# Patient Record
Sex: MALE | Race: WHITE | HISPANIC OR LATINO
[De-identification: names, ages, dates, MRNs, and addresses within clinical notes are randomized per-mention and may not be internally consistent; named-entity substitution may affect disease eponyms.]

---

## 2023-09-29 PROBLEM — Z00.00 ENCOUNTER FOR PREVENTIVE HEALTH EXAMINATION: Status: ACTIVE | Noted: 2023-09-29

## 2023-10-04 ENCOUNTER — LABORATORY RESULT (OUTPATIENT)
Age: 83
End: 2023-10-04

## 2023-10-04 ENCOUNTER — OUTPATIENT (OUTPATIENT)
Dept: OUTPATIENT SERVICES | Facility: HOSPITAL | Age: 83
LOS: 1 days | End: 2023-10-04
Payer: MEDICARE

## 2023-10-04 ENCOUNTER — APPOINTMENT (OUTPATIENT)
Dept: ULTRASOUND IMAGING | Facility: HOSPITAL | Age: 83
End: 2023-10-04

## 2023-10-04 ENCOUNTER — APPOINTMENT (OUTPATIENT)
Dept: NEPHROLOGY | Facility: CLINIC | Age: 83
End: 2023-10-04
Payer: MEDICARE

## 2023-10-04 VITALS — SYSTOLIC BLOOD PRESSURE: 173 MMHG | HEART RATE: 67 BPM | DIASTOLIC BLOOD PRESSURE: 76 MMHG

## 2023-10-04 PROCEDURE — 76770 US EXAM ABDO BACK WALL COMP: CPT

## 2023-10-04 PROCEDURE — 76770 US EXAM ABDO BACK WALL COMP: CPT | Mod: 26

## 2023-10-04 PROCEDURE — 99205 OFFICE O/P NEW HI 60 MIN: CPT

## 2023-10-04 RX ORDER — INSULIN GLARGINE 100 [IU]/ML
100 INJECTION, SOLUTION SUBCUTANEOUS
Refills: 0 | Status: ACTIVE | COMMUNITY

## 2023-10-05 LAB
ALBUMIN SERPL ELPH-MCNC: 3.6 G/DL
ALP BLD-CCNC: 91 U/L
ALT SERPL-CCNC: 16 U/L
ANION GAP SERPL CALC-SCNC: 13 MMOL/L
APPEARANCE: CLEAR
AST SERPL-CCNC: 18 U/L
BACTERIA: NEGATIVE /HPF
BILIRUB SERPL-MCNC: 0.2 MG/DL
BILIRUBIN URINE: NEGATIVE
BLOOD URINE: ABNORMAL
BUN SERPL-MCNC: 58 MG/DL
CALCIUM SERPL-MCNC: 8.9 MG/DL
CAST: 2 /LPF
CHLORIDE SERPL-SCNC: 110 MMOL/L
CO2 SERPL-SCNC: 16 MMOL/L
COLOR: YELLOW
CREAT SERPL-MCNC: 5.4 MG/DL
CREAT SPEC-SCNC: 94 MG/DL
CREAT SPEC-SCNC: 94 MG/DL
CREAT/PROT UR: 9.2 RATIO
CYSTATIN C SERPL-MCNC: 3.21 MG/L
EGFR: 10 ML/MIN/1.73M2
EPITHELIAL CELLS: 0 /HPF
ESTIMATED AVERAGE GLUCOSE: 169 MG/DL
GFR/BSA.PRED SERPLBLD CYS-BASED-ARV: 15 ML/MIN/1.73M2
GLUCOSE QUALITATIVE U: NEGATIVE MG/DL
HBA1C MFR BLD HPLC: 7.5 %
HBV SURFACE AB SER QL: NONREACTIVE
HBV SURFACE AG SER QL: NONREACTIVE
KETONES URINE: NEGATIVE MG/DL
LEUKOCYTE ESTERASE URINE: NEGATIVE
MICROALBUMIN 24H UR DL<=1MG/L-MCNC: 499.4 MG/DL
MICROALBUMIN/CREAT 24H UR-RTO: 5330 MG/G
MICROSCOPIC-UA: NORMAL
NITRITE URINE: NEGATIVE
PH URINE: 6
PHOSPHATE SERPL-MCNC: 5.3 MG/DL
POTASSIUM SERPL-SCNC: 6.7 MMOL/L
PROT SERPL-MCNC: 6.8 G/DL
PROT UR-MCNC: 862 MG/DL
PROTEIN URINE: 300 MG/DL
RED BLOOD CELLS URINE: 0 /HPF
SODIUM SERPL-SCNC: 139 MMOL/L
SPECIFIC GRAVITY URINE: 1.02
UROBILINOGEN URINE: 0.2 MG/DL
WHITE BLOOD CELLS URINE: 1 /HPF

## 2023-10-05 RX ORDER — SEVELAMER HYDROCHLORIDE 800 MG/1
800 TABLET, FILM COATED ORAL 3 TIMES DAILY
Qty: 540 | Refills: 0 | Status: DISCONTINUED | COMMUNITY
Start: 2023-10-05 | End: 2023-10-05

## 2023-10-06 LAB
C3 SERPL-MCNC: 140 MG/DL
C4 SERPL-MCNC: 38 MG/DL
DEPRECATED KAPPA LC FREE/LAMBDA SER: 1.63 RATIO
DSDNA AB SER-ACNC: <12 IU/ML
KAPPA LC CSF-MCNC: 8.47 MG/DL
KAPPA LC SERPL-MCNC: 13.77 MG/DL
M PROTEIN SPEC IFE-MCNC: NORMAL

## 2023-10-25 ENCOUNTER — APPOINTMENT (OUTPATIENT)
Dept: NEPHROLOGY | Facility: CLINIC | Age: 83
End: 2023-10-25

## 2023-11-08 LAB
CALCIUM SERPL-MCNC: 9.2 MG/DL
PARATHYROID HORMONE INTACT: 194 PG/ML

## 2023-11-17 ENCOUNTER — APPOINTMENT (OUTPATIENT)
Dept: NEPHROLOGY | Facility: CLINIC | Age: 83
End: 2023-11-17
Payer: MEDICARE

## 2023-11-17 PROCEDURE — 99214 OFFICE O/P EST MOD 30 MIN: CPT

## 2023-11-17 RX ORDER — SEVELAMER CARBONATE 800 MG/1
800 TABLET, FILM COATED ORAL 3 TIMES DAILY
Qty: 540 | Refills: 1 | Status: ACTIVE | COMMUNITY
Start: 2023-10-05 | End: 1900-01-01

## 2023-11-17 RX ORDER — NIFEDIPINE 60 MG/1
60 TABLET, EXTENDED RELEASE ORAL
Qty: 180 | Refills: 0 | Status: ACTIVE | COMMUNITY
Start: 2023-10-04 | End: 1900-01-01

## 2023-11-17 RX ORDER — VALSARTAN 160 MG/1
160 TABLET, COATED ORAL DAILY
Qty: 90 | Refills: 0 | Status: ACTIVE | COMMUNITY
Start: 2023-10-04 | End: 1900-01-01

## 2023-11-17 RX ORDER — MELOXICAM 15 MG/1
15 TABLET ORAL
Refills: 0 | Status: DISCONTINUED | COMMUNITY
End: 2023-11-17

## 2023-11-17 RX ORDER — LOSARTAN POTASSIUM 100 MG/1
100 TABLET, FILM COATED ORAL
Refills: 0 | Status: DISCONTINUED | COMMUNITY
End: 2023-11-17

## 2023-11-17 RX ORDER — SODIUM BICARBONATE 650 MG/1
650 TABLET ORAL 3 TIMES DAILY
Qty: 360 | Refills: 1 | Status: ACTIVE | COMMUNITY
Start: 2023-10-04 | End: 1900-01-01

## 2023-11-17 RX ORDER — AMLODIPINE BESYLATE 5 MG/1
5 TABLET ORAL
Refills: 0 | Status: DISCONTINUED | COMMUNITY
End: 2023-11-17

## 2023-11-17 RX ORDER — FUROSEMIDE 80 MG/1
80 TABLET ORAL DAILY
Qty: 30 | Refills: 0 | Status: DISCONTINUED | COMMUNITY
Start: 2023-10-04 | End: 2023-11-17

## 2023-11-17 RX ORDER — AMLODIPINE BESYLATE 2.5 MG/1
2.5 TABLET ORAL
Refills: 0 | Status: DISCONTINUED | COMMUNITY
End: 2023-11-17

## 2023-11-17 RX ORDER — BACLOFEN 10 MG/1
10 TABLET ORAL
Refills: 0 | Status: DISCONTINUED | COMMUNITY
End: 2023-11-17

## 2023-11-21 LAB
ALBUMIN MFR SERPL ELPH: 44.3 %
ALBUMIN SERPL-MCNC: 3 G/DL
ALBUMIN/GLOB SERPL: 0.8 RATIO
ALPHA1 GLOB MFR SERPL ELPH: 5.1 %
ALPHA1 GLOB SERPL ELPH-MCNC: 0.3 G/DL
ALPHA2 GLOB MFR SERPL ELPH: 18.9 %
ALPHA2 GLOB SERPL ELPH-MCNC: 1.3 G/DL
B-GLOBULIN MFR SERPL ELPH: 15.4 %
B-GLOBULIN SERPL ELPH-MCNC: 1 G/DL
DEPRECATED KAPPA LC FREE/LAMBDA SER: 1.38 RATIO
GAMMA GLOB FLD ELPH-MCNC: 1.1 G/DL
GAMMA GLOB MFR SERPL ELPH: 16.3 %
IGA SER QL IEP: 498 MG/DL
IGG SER QL IEP: 1064 MG/DL
IGM SER QL IEP: 45 MG/DL
INTERPRETATION SERPL IEP-IMP: NORMAL
KAPPA LC CSF-MCNC: 8.84 MG/DL
KAPPA LC SERPL-MCNC: 12.17 MG/DL
M PROTEIN SPEC IFE-MCNC: NORMAL
PROT SERPL-MCNC: 6.7 G/DL
PROT SERPL-MCNC: 6.7 G/DL

## 2023-12-04 ENCOUNTER — LABORATORY RESULT (OUTPATIENT)
Age: 83
End: 2023-12-04

## 2023-12-21 ENCOUNTER — APPOINTMENT (OUTPATIENT)
Dept: NEPHROLOGY | Facility: CLINIC | Age: 83
End: 2023-12-21
Payer: MEDICARE

## 2023-12-21 VITALS — DIASTOLIC BLOOD PRESSURE: 80 MMHG | SYSTOLIC BLOOD PRESSURE: 170 MMHG | HEART RATE: 79 BPM

## 2023-12-21 DIAGNOSIS — Z91.199 PATIENT'S NONCOMPLIANCE WITH OTHER MEDICAL TREATMENT AND REGIMEN DUE TO UNSPECIFIED REASON: ICD-10-CM

## 2023-12-21 PROCEDURE — 99214 OFFICE O/P EST MOD 30 MIN: CPT

## 2023-12-21 RX ORDER — FUROSEMIDE 80 MG/1
80 TABLET ORAL TWICE DAILY
Qty: 180 | Refills: 0 | Status: DISCONTINUED | COMMUNITY
Start: 2023-11-17 | End: 2023-12-21

## 2023-12-21 RX ORDER — FUROSEMIDE 80 MG/1
80 TABLET ORAL
Qty: 270 | Refills: 0 | Status: ACTIVE | COMMUNITY
Start: 2023-12-21 | End: 1900-01-01

## 2023-12-21 NOTE — HISTORY OF PRESENT ILLNESS
[FreeTextEntry1] : 83y M w/ hx of HTN, DM, HLP, and hx of CKD V presents today escorted by his wife for nephrology follow up; at the present time, denies active complaints such as CP, SOB, N/V/D, dizziness, lightheadedness, dysuria, hematuria, fever or chills. Denies recent visits to the adult ER or hospital admissions.  With regards to renal function, SCr per most recent labs from Dec 2023 showed renal function at 6.27 ranges w/ GFR at 8% w/o electrolyte imbalances reported on most recent BMP and cystatin C levels at 3.21 w/ eGFR by cystatin C levels at 15%

## 2023-12-21 NOTE — ASSESSMENT
[FreeTextEntry1] : 1. CKD stage V multifactorial - likely related to long standing Hx of HTN and DM nephropathy in addition to chronic use of NSAIDs At the present time, most recent SCr value at 6.27 w/ GFR at 8% with cystatin C levels at 3.93 w/ eGFR by cystatin C at 12% At present continues to show progression of CKD  Advised to adjust all meds to GFR< 10% Advised and educated on CVD risk factors Recommended to avoid NSAIDS and other nephrotoxins including baclofen  Discuss w/ pt and family member that CKD is close to requiring dialysis.  Wants to pursue RRT modalities Will refer to vascular surgery for AVF creation. May require a THDC placement due to marginal GFR At the present time, no urgent RRT needs. Acceptable lytes and volume status  Per [facility] patient has a risk of progression to dialysis up to 85% in 2 years. This was explained to family member and patient.  New BMP for electrolyte monitoring and cystatin c levels New hep B and Hep C panel requested.  New gamma interferon requested as well   2. Nephrotic range proteinuria - UPC showing 9.0-11g with previous albuminuria of 5.3g This is likely related to DM nephropathy in the setting of poorly controlled DM Previous work up negative for secondary causes of proteinuria (neg serological work up and SPEP) Not a candidate for SGLT2-i at present based on GFR Will c/w max'd dose tolerated of ARB. Already on max'd CCB doses  3. HTN - not at goal Suspect non compliance is contributing to poorly controlled BP BP regiment: valsartan 160mg PO daily, nifedipine 60mg PO BID and furosemide 80mg PO BID Will increase furosemide to 80mg PO TID BP checks and logs at home recommended C/w low salt diet  3. Metabolic acidosis - most recent bicarb at 19 ranges. Needs to c/w bicarb 1300mg PO TID  4. 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 Hyperphosphatemia - c/w renvela 1600mg PO TID Trend PTH before next visit   6. Non compliance - advised to attend medical appointments w/ family member in order to assure understanding on severity of on going CKD

## 2023-12-21 NOTE — REVIEW OF SYSTEMS
[Feeling Poorly] : not feeling poorly [Feeling Tired] : not feeling tired [Eyesight Problems] : no eyesight problems [Discharge From Eyes] : no purulent discharge from the eyes [Nosebleeds] : no nosebleeds [Nasal Discharge] : no nasal discharge [Chest Pain] : no chest pain [Palpitations] : no palpitations [Cough] : no cough [SOB on Exertion] : no shortness of breath during exertion [Constipation] : no constipation [Diarrhea] : no diarrhea [Hesitancy] : no urinary hesitancy [Nocturia] : no nocturia [Limb Pain] : no limb pain [Limb Swelling] : no limb swelling [Itching] : no itching [Change In A Mole] : no change in a mole [Dizziness] : no dizziness [Fainting] : no fainting [Anxiety] : no anxiety [Depression] : no depression [Muscle Weakness] : no muscle weakness [Erectile Dysfunction] : no erectile dysfunction [Swollen Glands] : no swollen glands [Swollen Glands In The Neck] : no swollen glands in the neck

## 2023-12-21 NOTE — PHYSICAL EXAM
[General Appearance - Alert] : alert [Strabismus] : no strabismus was seen [Hearing Threshold Finger Rub Not [place]] : hearing was normal [Neck Cervical Mass (___cm)] : no neck mass was observed [Jugular Venous Distention Increased] : there was no jugular-venous distention [] : no respiratory distress [Auscultation Breath Sounds / Voice Sounds] : lungs were clear to auscultation bilaterally [Heart Sounds] : normal S1 and S2 [Heart Sounds Gallop] : no gallops [Murmurs] : no murmurs [Edema] : there was no peripheral edema [Bowel Sounds] : normal bowel sounds [Abdomen Tenderness] : non-tender [No CVA Tenderness] : no ~M costovertebral angle tenderness [No Focal Deficits] : no focal deficits [FreeTextEntry1] : no asterixis  [Oriented To Time, Place, And Person] : oriented to person, place, and time

## 2024-01-16 LAB
ALBUMIN SERPL ELPH-MCNC: 3.6 G/DL
ALP BLD-CCNC: 79 U/L
ALT SERPL-CCNC: 12 U/L
ANION GAP SERPL CALC-SCNC: 15 MMOL/L
APPEARANCE: CLEAR
AST SERPL-CCNC: 11 U/L
BACTERIA: NEGATIVE /HPF
BILIRUB SERPL-MCNC: 0.2 MG/DL
BILIRUBIN URINE: NEGATIVE
BLOOD URINE: NEGATIVE
BUN SERPL-MCNC: 67 MG/DL
CALCIUM SERPL-MCNC: 8.6 MG/DL
CAST: 0 /LPF
CHLORIDE SERPL-SCNC: 101 MMOL/L
CO2 SERPL-SCNC: 25 MMOL/L
COLOR: YELLOW
CREAT SERPL-MCNC: 6.71 MG/DL
CREAT SPEC-SCNC: 30 MG/DL
CREAT/PROT UR: 12.9 RATIO
CYSTATIN C SERPL-MCNC: 4.45 MG/L
EGFR: 8 ML/MIN/1.73M2
EPITHELIAL CELLS: 0 /HPF
GFR/BSA.PRED SERPLBLD CYS-BASED-ARV: 10 ML/MIN/1.73M2
GLUCOSE QUALITATIVE U: 100 MG/DL
KETONES URINE: NEGATIVE MG/DL
LEUKOCYTE ESTERASE URINE: NEGATIVE
MICROSCOPIC-UA: NORMAL
NITRITE URINE: NEGATIVE
PH URINE: 7.5
PHOSPHATE SERPL-MCNC: 4.6 MG/DL
POTASSIUM SERPL-SCNC: 5 MMOL/L
PROT SERPL-MCNC: 6.7 G/DL
PROT UR-MCNC: 386 MG/DL
PROTEIN URINE: 300 MG/DL
RED BLOOD CELLS URINE: 1 /HPF
SODIUM SERPL-SCNC: 141 MMOL/L
SPECIFIC GRAVITY URINE: 1.01
UROBILINOGEN URINE: 0.2 MG/DL
WHITE BLOOD CELLS URINE: 0 /HPF

## 2024-01-22 ENCOUNTER — APPOINTMENT (OUTPATIENT)
Dept: VASCULAR SURGERY | Facility: CLINIC | Age: 84
End: 2024-01-22
Payer: MEDICARE

## 2024-01-22 VITALS
HEIGHT: 67 IN | BODY MASS INDEX: 29.82 KG/M2 | WEIGHT: 190 LBS | DIASTOLIC BLOOD PRESSURE: 57 MMHG | SYSTOLIC BLOOD PRESSURE: 149 MMHG | HEART RATE: 58 BPM

## 2024-01-22 DIAGNOSIS — Z01.818 ENCOUNTER FOR OTHER PREPROCEDURAL EXAMINATION: ICD-10-CM

## 2024-01-22 PROCEDURE — 93986 DUP-SCAN HEMO COMPL UNI STD: CPT

## 2024-01-22 PROCEDURE — 99205 OFFICE O/P NEW HI 60 MIN: CPT

## 2024-01-22 RX ORDER — SODIUM POLYSTYRENE SULFONATE 15 G/60ML
15 SUSPENSION ORAL; RECTAL
Qty: 3600 | Refills: 0 | Status: DISCONTINUED | COMMUNITY
Start: 2023-10-05 | End: 2024-01-22

## 2024-01-23 RX ORDER — ATORVASTATIN CALCIUM 80 MG/1
TABLET, FILM COATED ORAL
Refills: 0 | Status: ACTIVE | COMMUNITY

## 2024-01-30 NOTE — ASSESSMENT
[Other: _____] : [unfilled] [FreeTextEntry1] : 83yoM, Uz[place] speaking w/ hx of HTN, T2DM, HLD, CKD V who is referred by Dr. Timothy Winston to be evaluated for an AVF creation for impending dialysis.  On exam palpable radial/brachial pulses bilaterally, no neurologic deficits. LUE US was done in the office that demonstrated basilic vein that is adequate conduit for AV fistula. We discussed the procedure of creating of AVF, its RABs, all questions were answered. Patient agrees and would like to proceed. He will go for a pre-op testing today.

## 2024-01-30 NOTE — PROCEDURE
[FreeTextEntry1] : LUE US was done in the office that demonstrated basilic vein that is adequate conduit for AV fistula.

## 2024-01-30 NOTE — ADDENDUM
[FreeTextEntry1] : This note was written by Odalys RAYA, acting as a scribe for Dr. Dc Lai.  I, Dr. Dc Lai, have read and attest that all the information, medical decision-making, and discharge instructions within are true and accurate.  I, Dr. Dc Lai, personally performed the evaluation and management (E/M) services for this new patient.  That E/M includes conducting the initial examination, assessing all conditions, and establishing the plan of care.  Today, my ACP, Odalys ARYA, was here to observe my evaluation and management services for this patient to be followed going forward.  I spent a total of 62 minutes in this encounter.

## 2024-01-30 NOTE — PHYSICAL EXAM
[Respiratory Effort] : normal respiratory effort [Normal Heart Sounds] : normal heart sounds [2+] : left 2+ [Alert] : alert [Calm] : calm [Ankle Swelling (On Exam)] : not present [Abdomen Tenderness] : ~T ~M No abdominal tenderness [de-identified] : WN/WD, NAD [de-identified] : NC/AT [de-identified] : supple [de-identified] : +FROM

## 2024-01-30 NOTE — HISTORY OF PRESENT ILLNESS
[FreeTextEntry1] : 83yoM, Lebanese speaking w/ hx of HTN, T2DM, HLD, CKD V who is referred by Dr. Timothy Winston to be evaluated for an AVF creation for impending dialysis. Patient feels well, he is not sure how soon he might need a dialysis initiation. He is right hand dominant.

## 2024-01-31 ENCOUNTER — APPOINTMENT (OUTPATIENT)
Dept: NEPHROLOGY | Facility: CLINIC | Age: 84
End: 2024-01-31
Payer: MEDICARE

## 2024-01-31 ENCOUNTER — OUTPATIENT (OUTPATIENT)
Dept: OUTPATIENT SERVICES | Facility: HOSPITAL | Age: 84
LOS: 1 days | End: 2024-01-31
Payer: MEDICARE

## 2024-01-31 VITALS — DIASTOLIC BLOOD PRESSURE: 58 MMHG | SYSTOLIC BLOOD PRESSURE: 153 MMHG | HEART RATE: 59 BPM

## 2024-01-31 DIAGNOSIS — E87.20 ACIDOSIS, UNSPECIFIED: ICD-10-CM

## 2024-01-31 DIAGNOSIS — N25.81 SECONDARY HYPERPARATHYROIDISM OF RENAL ORIGIN: ICD-10-CM

## 2024-01-31 DIAGNOSIS — N18.5 CHRONIC KIDNEY DISEASE, STAGE 5: ICD-10-CM

## 2024-01-31 DIAGNOSIS — E13.22 OTHER SPECIFIED DIABETES MELLITUS WITH DIABETIC CHRONIC KIDNEY DISEASE: ICD-10-CM

## 2024-01-31 DIAGNOSIS — E11.29 TYPE 2 DIABETES MELLITUS WITH OTHER DIABETIC KIDNEY COMPLICATION: ICD-10-CM

## 2024-01-31 DIAGNOSIS — N18.5 OTHER SPECIFIED DIABETES MELLITUS WITH DIABETIC CHRONIC KIDNEY DISEASE: ICD-10-CM

## 2024-01-31 DIAGNOSIS — D63.8 ANEMIA IN OTHER CHRONIC DISEASES CLASSIFIED ELSEWHERE: ICD-10-CM

## 2024-01-31 DIAGNOSIS — I10 ESSENTIAL (PRIMARY) HYPERTENSION: ICD-10-CM

## 2024-01-31 DIAGNOSIS — I12.0 OTHER SPECIFIED DIABETES MELLITUS WITH DIABETIC CHRONIC KIDNEY DISEASE: ICD-10-CM

## 2024-01-31 DIAGNOSIS — Z01.818 ENCOUNTER FOR OTHER PREPROCEDURAL EXAMINATION: ICD-10-CM

## 2024-01-31 PROCEDURE — 93010 ELECTROCARDIOGRAM REPORT: CPT | Mod: NC

## 2024-01-31 PROCEDURE — 99214 OFFICE O/P EST MOD 30 MIN: CPT

## 2024-01-31 PROCEDURE — G2211 COMPLEX E/M VISIT ADD ON: CPT

## 2024-01-31 PROCEDURE — 93005 ELECTROCARDIOGRAM TRACING: CPT

## 2024-01-31 RX ORDER — CALCITRIOL 0.25 UG/1
0.25 CAPSULE, LIQUID FILLED ORAL
Qty: 60 | Refills: 0 | Status: ACTIVE | COMMUNITY
Start: 2024-01-31 | End: 1900-01-01

## 2024-01-31 NOTE — ASSESSMENT
[FreeTextEntry1] : 1. CKD stage V multifactorial - likely related to long standing Hx of HTN and DM nephropathy in addition to chronic use of NSAIDs At the present time, most recent SCr value at 6.91 w/ GFR at 7% with cystatin C levels at 4.65 w/ eGFR by cystatin C at 9% At present continues to show slow progression of CKD Advised to adjust all meds to GFR< 10% Advised and educated on CVD risk factors Recommended to avoid NSAIDS and other nephrotoxins Discuss w/ pt and family member that CKD is close to requiring dialysis. Advised and educated on uremic symptoms Underwent vascular surgery evaluation for AVF creation - pending pre-op testing.  Possible site of AVF ~ LUE basilic vein Plan for OR on 2/6/24 At the present time, no urgent RRT needs. Acceptable lytes and volume status Per [facility] patient has a risk of progression to dialysis up to 85% in 2 years. This was explained to family member and patient. New BMP for electrolyte monitoring and cystatin c levels New hep B and Hep C panel requested. New quantiferon  requested as well  2. Nephrotic range proteinuria - UPC showing 9.0-11g with previous albuminuria of 5.3g This is likely related to DM nephropathy in the setting of poorly controlled DM Previous work up negative for secondary causes of proteinuria (neg serological work up and SPEP) Not a candidate for SGLT2-i at present based on GFR Will c/w max'd dose tolerated of ARB. Already on max'd CCB doses  3. HTN - not at goal Suspect non compliance is contributing to poorly controlled BP BP regiment: valsartan 160mg PO daily, nifedipine 60mg PO BID and furosemide 80mg PO TID BP checks and logs at home recommended C/w low salt diet c/w fluid restriction 1.2L/d  3. Metabolic acidosis - most recent bicarb at goal c/w bicarb 1300mg PO TID  4. 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 Hyperphosphatemia/Secondary hyperparathyroidism - phosp levels gradually improving c/w renvela 2400mg PO TID Will start 0.25mcg PO daily  Trend PTH before next visit  6. Anemia of chronic disease- will check iron studies and cbc before next visit  7.Non compliance - advised to attend medical appointments w/ family member in order to assure understanding on severity of on going CKD.   8. No contraindications from the nephrology perspective to pursue AVF creation by vascular.

## 2024-01-31 NOTE — HISTORY OF PRESENT ILLNESS
[FreeTextEntry1] :     83y M w/ hx of HTN, DM, HLP, and hx of CKD V presents today escorted by his wife for nephrology follow up; at the present time, denies active complaints such as CP, SOB, N/V/D, dizziness, lightheadedness, dysuria, abnormal taste in the mouth, lower extremity edema, hematuria, fever or chills. Denies recent visits to the adult ER or hospital admissions in the last week. No new medications started in the last month  With regards to renal function, SCr per most recent labs from Jan 2024 showed renal function at 6.27 ranges w/ GFR at 8% w/o electrolyte imbalances reported on most recent BMP and cystatin C levels at 3.21 w/ eGFR by cystatin C levels at 15%

## 2024-01-31 NOTE — PHYSICAL EXAM
[General Appearance - Alert] : alert [Strabismus] : no strabismus was seen [Hearing Threshold Finger Rub Not [place]] : hearing was normal [] : the neck was supple [Neck Cervical Mass (___cm)] : no neck mass was observed [Jugular Venous Distention Increased] : there was no jugular-venous distention [Respiration, Rhythm And Depth] : normal respiratory rhythm and effort [Exaggerated Use Of Accessory Muscles For Inspiration] : no accessory muscle use [Auscultation Breath Sounds / Voice Sounds] : lungs were clear to auscultation bilaterally [Heart Sounds] : normal S1 and S2 [Heart Sounds Gallop] : no gallops [Murmurs] : no murmurs [Edema] : there was no peripheral edema [Bowel Sounds] : normal bowel sounds [Abdomen Soft] : soft [Abdomen Mass (___ Cm)] : no abdominal mass palpated [No CVA Tenderness] : no ~M costovertebral angle tenderness [No Focal Deficits] : no focal deficits [Oriented To Time, Place, And Person] : oriented to person, place, and time

## 2024-01-31 NOTE — REVIEW OF SYSTEMS
[Feeling Poorly] : not feeling poorly [Feeling Tired] : not feeling tired [Eyesight Problems] : no eyesight problems [Discharge From Eyes] : no purulent discharge from the eyes [Nosebleeds] : no nosebleeds [Nasal Discharge] : no nasal discharge [Chest Pain] : no chest pain [Palpitations] : no palpitations [Cough] : no cough [SOB on Exertion] : no shortness of breath during exertion [Diarrhea] : no diarrhea [Constipation] : no constipation [Hesitancy] : no urinary hesitancy [Nocturia] : no nocturia [Limb Pain] : no limb pain [Limb Swelling] : no limb swelling [Itching] : no itching [Change In A Mole] : no change in a mole [Dizziness] : no dizziness [Fainting] : no fainting [Anxiety] : no anxiety [Depression] : no depression [Muscle Weakness] : no muscle weakness [Erectile Dysfunction] : no erectile dysfunction [Swollen Glands] : no swollen glands [Swollen Glands In The Neck] : no swollen glands in the neck

## 2024-02-05 VITALS
DIASTOLIC BLOOD PRESSURE: 65 MMHG | HEIGHT: 68 IN | OXYGEN SATURATION: 99 % | SYSTOLIC BLOOD PRESSURE: 156 MMHG | TEMPERATURE: 97 F | RESPIRATION RATE: 16 BRPM | WEIGHT: 159.17 LBS | HEART RATE: 72 BPM

## 2024-02-05 NOTE — ASU PATIENT PROFILE, ADULT - NSICDXPASTSURGICALHX_GEN_ALL_CORE_FT
PAST SURGICAL HISTORY:  H/O eye surgery b/l glaucoma    H/O knee surgery left    H/O shoulder surgery left

## 2024-02-05 NOTE — ASU PREOP CHECKLIST - 3.
potassium 5.9, DOS potassium 5.9, DOS, DR Lai made aware potassium 5.9 via I-stat on admission, blood samplJ sent to lab for BMP, and potassium was 6. Vascular team made aware. patient was given Lokelma 10 g PO, and 5 units of insulin IVP and dextrose 50 % 1 dose IVP per vascular team. Pt's potassium 5.9 via I-stat on admission, blood sample sent to the lab for BMP, and potassium was 6. Vascular team made aware. Patient was given Lokelma 10 g PO, and 5 units of insulin IVP and dextrose 50 % 1 dose IVP per vascular team. Report given the ED nurse Sea and pt was transferred  to ED via w/c at 1230. Jaqueline ALBARRAN Pt's potassium 5.9 via I-stat on admission, blood sample sent to the lab for BMP, and potassium was 6. pt denies any chest pain. and Vascular team made aware. Patient was given Lokelma 10 g PO, and 5 units of insulin IVP and dextrose 50 % 1 dose IVP per vascular team. Report given the ED nurse Sea and pt was transferred  to ED via w/c at 1230. Jaqueline ALBARRAN

## 2024-02-05 NOTE — ASU PREOP CHECKLIST - PATIENT SENT TO
Problem: Pain  Goal: Verbalizes/displays adequate comfort level or baseline comfort level  Outcome: Progressing  Flowsheets (Taken 11/12/2022 0750)  Verbalizes/displays adequate comfort level or baseline comfort level: Encourage patient to monitor pain and request assistance     Problem: ABCDS Injury Assessment  Goal: Absence of physical injury  Outcome: Progressing     Problem: Safety - Adult  Goal: Free from fall injury  Outcome: Progressing     Problem: Chronic Conditions and Co-morbidities  Goal: Patient's chronic conditions and co-morbidity symptoms are monitored and maintained or improved  Outcome: Progressing  Flowsheets  Taken 11/12/2022 0750 by Sandra Moe 34 - Patient's Chronic Conditions and Co-Morbidity Symptoms are Monitored and Maintained or Improved: Monitor and assess patient's chronic conditions and comorbid symptoms for stability, deterioration, or improvement  Taken 11/11/2022 2200 by Austyn Vazquez RN  Care Plan - Patient's Chronic Conditions and Co-Morbidity Symptoms are Monitored and Maintained or Improved:   Monitor and assess patient's chronic conditions and comorbid symptoms for stability, deterioration, or improvement   Collaborate with multidisciplinary team to address chronic and comorbid conditions and prevent exacerbation or deterioration   Update acute care plan with appropriate goals if chronic or comorbid symptoms are exacerbated and prevent overall improvement and discharge     Problem: Skin/Tissue Integrity  Goal: Absence of new skin breakdown  Description: 1. Monitor for areas of redness and/or skin breakdown  2. Assess vascular access sites hourly  3. Every 4-6 hours minimum:  Change oxygen saturation probe site  4. Every 4-6 hours:  If on nasal continuous positive airway pressure, respiratory therapy assess nares and determine need for appliance change or resting period.   Outcome: Progressing operating room

## 2024-02-05 NOTE — ASU PATIENT PROFILE, ADULT - NSICDXPASTMEDICALHX_GEN_ALL_CORE_FT
PAST MEDICAL HISTORY:  Anemia     Chronic kidney disease (CKD) stage V    DM (diabetes mellitus)     Glaucoma     HLD (hyperlipidemia)     HTN (hypertension)

## 2024-02-06 ENCOUNTER — INPATIENT (INPATIENT)
Facility: HOSPITAL | Age: 84
LOS: 10 days | Discharge: HOME | DRG: 673 | End: 2024-02-17
Attending: STUDENT IN AN ORGANIZED HEALTH CARE EDUCATION/TRAINING PROGRAM | Admitting: SURGERY
Payer: MEDICARE

## 2024-02-06 ENCOUNTER — OUTPATIENT (OUTPATIENT)
Dept: INPATIENT UNIT | Facility: HOSPITAL | Age: 84
LOS: 1 days | Discharge: HOME | End: 2024-02-06

## 2024-02-06 VITALS
OXYGEN SATURATION: 98 % | HEART RATE: 66 BPM | DIASTOLIC BLOOD PRESSURE: 57 MMHG | WEIGHT: 184.97 LBS | TEMPERATURE: 98 F | RESPIRATION RATE: 18 BRPM | HEIGHT: 68 IN | SYSTOLIC BLOOD PRESSURE: 163 MMHG

## 2024-02-06 VITALS
WEIGHT: 159.17 LBS | DIASTOLIC BLOOD PRESSURE: 65 MMHG | TEMPERATURE: 97 F | OXYGEN SATURATION: 99 % | HEIGHT: 68 IN | RESPIRATION RATE: 16 BRPM | SYSTOLIC BLOOD PRESSURE: 156 MMHG | HEART RATE: 72 BPM

## 2024-02-06 DIAGNOSIS — D72.829 ELEVATED WHITE BLOOD CELL COUNT, UNSPECIFIED: ICD-10-CM

## 2024-02-06 DIAGNOSIS — Z98.890 OTHER SPECIFIED POSTPROCEDURAL STATES: Chronic | ICD-10-CM

## 2024-02-06 DIAGNOSIS — Z29.9 ENCOUNTER FOR PROPHYLACTIC MEASURES, UNSPECIFIED: ICD-10-CM

## 2024-02-06 DIAGNOSIS — E11.9 TYPE 2 DIABETES MELLITUS WITHOUT COMPLICATIONS: ICD-10-CM

## 2024-02-06 DIAGNOSIS — D64.9 ANEMIA, UNSPECIFIED: ICD-10-CM

## 2024-02-06 DIAGNOSIS — N18.5 CHRONIC KIDNEY DISEASE, STAGE 5: ICD-10-CM

## 2024-02-06 DIAGNOSIS — I10 ESSENTIAL (PRIMARY) HYPERTENSION: ICD-10-CM

## 2024-02-06 DIAGNOSIS — E78.5 HYPERLIPIDEMIA, UNSPECIFIED: ICD-10-CM

## 2024-02-06 DIAGNOSIS — E87.5 HYPERKALEMIA: ICD-10-CM

## 2024-02-06 DIAGNOSIS — I44.1 ATRIOVENTRICULAR BLOCK, SECOND DEGREE: ICD-10-CM

## 2024-02-06 LAB
ALBUMIN SERPL ELPH-MCNC: 3.1 G/DL — LOW (ref 3.3–5)
ALP SERPL-CCNC: 80 U/L — SIGNIFICANT CHANGE UP (ref 40–120)
ALT FLD-CCNC: 7 U/L — LOW (ref 10–45)
ANION GAP SERPL CALC-SCNC: 11 MMOL/L — SIGNIFICANT CHANGE UP (ref 5–17)
ANION GAP SERPL CALC-SCNC: 12 MMOL/L — SIGNIFICANT CHANGE UP (ref 5–17)
ANION GAP SERPL CALC-SCNC: 13 MMOL/L — SIGNIFICANT CHANGE UP (ref 5–17)
AST SERPL-CCNC: 10 U/L — SIGNIFICANT CHANGE UP (ref 10–40)
BASOPHILS # BLD AUTO: 0.07 K/UL — SIGNIFICANT CHANGE UP (ref 0–0.2)
BASOPHILS NFR BLD AUTO: 0.6 % — SIGNIFICANT CHANGE UP (ref 0–2)
BILIRUB SERPL-MCNC: 0.2 MG/DL — SIGNIFICANT CHANGE UP (ref 0.2–1.2)
BLD GP AB SCN SERPL QL: NEGATIVE — SIGNIFICANT CHANGE UP
BUN SERPL-MCNC: 79 MG/DL — HIGH (ref 7–23)
BUN SERPL-MCNC: 81 MG/DL — HIGH (ref 7–23)
BUN SERPL-MCNC: 84 MG/DL — HIGH (ref 7–23)
CALCIUM SERPL-MCNC: 8.5 MG/DL — SIGNIFICANT CHANGE UP (ref 8.4–10.5)
CALCIUM SERPL-MCNC: 8.6 MG/DL — SIGNIFICANT CHANGE UP (ref 8.4–10.5)
CALCIUM SERPL-MCNC: 8.7 MG/DL — SIGNIFICANT CHANGE UP (ref 8.4–10.5)
CHLORIDE SERPL-SCNC: 101 MMOL/L — SIGNIFICANT CHANGE UP (ref 96–108)
CHLORIDE SERPL-SCNC: 102 MMOL/L — SIGNIFICANT CHANGE UP (ref 96–108)
CHLORIDE SERPL-SCNC: 102 MMOL/L — SIGNIFICANT CHANGE UP (ref 96–108)
CO2 SERPL-SCNC: 23 MMOL/L — SIGNIFICANT CHANGE UP (ref 22–31)
CO2 SERPL-SCNC: 24 MMOL/L — SIGNIFICANT CHANGE UP (ref 22–31)
CO2 SERPL-SCNC: 24 MMOL/L — SIGNIFICANT CHANGE UP (ref 22–31)
CREAT SERPL-MCNC: 8.76 MG/DL — HIGH (ref 0.5–1.3)
CREAT SERPL-MCNC: 8.87 MG/DL — HIGH (ref 0.5–1.3)
CREAT SERPL-MCNC: 8.94 MG/DL — HIGH (ref 0.5–1.3)
EGFR: 5 ML/MIN/1.73M2 — LOW
EGFR: 6 ML/MIN/1.73M2 — LOW
EGFR: 6 ML/MIN/1.73M2 — LOW
EOSINOPHIL # BLD AUTO: 0.26 K/UL — SIGNIFICANT CHANGE UP (ref 0–0.5)
EOSINOPHIL NFR BLD AUTO: 2.4 % — SIGNIFICANT CHANGE UP (ref 0–6)
GLUCOSE BLDC GLUCOMTR-MCNC: 140 MG/DL — HIGH (ref 70–99)
GLUCOSE BLDC GLUCOMTR-MCNC: 154 MG/DL — HIGH (ref 70–99)
GLUCOSE BLDC GLUCOMTR-MCNC: 161 MG/DL — HIGH (ref 70–99)
GLUCOSE BLDC GLUCOMTR-MCNC: 178 MG/DL — HIGH (ref 70–99)
GLUCOSE SERPL-MCNC: 169 MG/DL — HIGH (ref 70–99)
GLUCOSE SERPL-MCNC: 206 MG/DL — HIGH (ref 70–99)
GLUCOSE SERPL-MCNC: 209 MG/DL — HIGH (ref 70–99)
HCT VFR BLD CALC: 28.9 % — LOW (ref 39–50)
HGB BLD-MCNC: 9.4 G/DL — LOW (ref 13–17)
IMM GRANULOCYTES NFR BLD AUTO: 0.3 % — SIGNIFICANT CHANGE UP (ref 0–0.9)
ISTAT VENOUS BE: 1 MMOL/L — SIGNIFICANT CHANGE UP (ref -2–3)
ISTAT VENOUS GLUCOSE: 160 MG/DL — HIGH (ref 70–99)
ISTAT VENOUS HCO3: 26 MMOL/L — SIGNIFICANT CHANGE UP (ref 23–28)
ISTAT VENOUS HEMATOCRIT: 29 % — LOW (ref 39–50)
ISTAT VENOUS HEMOGLOBIN: 9.9 GM/DL — LOW (ref 13–17)
ISTAT VENOUS IONIZED CALCIUM: 1.07 MMOL/L — LOW (ref 1.12–1.3)
ISTAT VENOUS PCO2: 42 MMHG — SIGNIFICANT CHANGE UP (ref 41–51)
ISTAT VENOUS PH: 7.39 — SIGNIFICANT CHANGE UP (ref 7.31–7.41)
ISTAT VENOUS PO2: <66 MMHG — LOW (ref 35–40)
ISTAT VENOUS POTASSIUM: 5.9 MMOL/L — HIGH (ref 3.5–5.3)
ISTAT VENOUS SO2: 50 % — SIGNIFICANT CHANGE UP
ISTAT VENOUS SODIUM: 137 MMOL/L — SIGNIFICANT CHANGE UP (ref 135–145)
ISTAT VENOUS TCO2: 27 MMOL/L — SIGNIFICANT CHANGE UP (ref 22–31)
LYMPHOCYTES # BLD AUTO: 1.18 K/UL — SIGNIFICANT CHANGE UP (ref 1–3.3)
LYMPHOCYTES # BLD AUTO: 10.8 % — LOW (ref 13–44)
MAGNESIUM SERPL-MCNC: 2.3 MG/DL — SIGNIFICANT CHANGE UP (ref 1.6–2.6)
MCHC RBC-ENTMCNC: 30.2 PG — SIGNIFICANT CHANGE UP (ref 27–34)
MCHC RBC-ENTMCNC: 32.5 GM/DL — SIGNIFICANT CHANGE UP (ref 32–36)
MCV RBC AUTO: 92.9 FL — SIGNIFICANT CHANGE UP (ref 80–100)
MONOCYTES # BLD AUTO: 1.46 K/UL — HIGH (ref 0–0.9)
MONOCYTES NFR BLD AUTO: 13.4 % — SIGNIFICANT CHANGE UP (ref 2–14)
NEUTROPHILS # BLD AUTO: 7.91 K/UL — HIGH (ref 1.8–7.4)
NEUTROPHILS NFR BLD AUTO: 72.5 % — SIGNIFICANT CHANGE UP (ref 43–77)
NRBC # BLD: 0 /100 WBCS — SIGNIFICANT CHANGE UP (ref 0–0)
PHOSPHATE SERPL-MCNC: 4.5 MG/DL — SIGNIFICANT CHANGE UP (ref 2.5–4.5)
PLATELET # BLD AUTO: 345 K/UL — SIGNIFICANT CHANGE UP (ref 150–400)
POTASSIUM SERPL-MCNC: 5 MMOL/L — SIGNIFICANT CHANGE UP (ref 3.5–5.3)
POTASSIUM SERPL-MCNC: 5 MMOL/L — SIGNIFICANT CHANGE UP (ref 3.5–5.3)
POTASSIUM SERPL-MCNC: 6 MMOL/L — HIGH (ref 3.5–5.3)
POTASSIUM SERPL-SCNC: 5 MMOL/L — SIGNIFICANT CHANGE UP (ref 3.5–5.3)
POTASSIUM SERPL-SCNC: 5 MMOL/L — SIGNIFICANT CHANGE UP (ref 3.5–5.3)
POTASSIUM SERPL-SCNC: 6 MMOL/L — HIGH (ref 3.5–5.3)
PROT SERPL-MCNC: 7.1 G/DL — SIGNIFICANT CHANGE UP (ref 6–8.3)
RBC # BLD: 3.11 M/UL — LOW (ref 4.2–5.8)
RBC # FLD: 12.7 % — SIGNIFICANT CHANGE UP (ref 10.3–14.5)
RH IG SCN BLD-IMP: NEGATIVE — SIGNIFICANT CHANGE UP
SODIUM SERPL-SCNC: 137 MMOL/L — SIGNIFICANT CHANGE UP (ref 135–145)
SODIUM SERPL-SCNC: 137 MMOL/L — SIGNIFICANT CHANGE UP (ref 135–145)
SODIUM SERPL-SCNC: 138 MMOL/L — SIGNIFICANT CHANGE UP (ref 135–145)
TSH SERPL-MCNC: 1.5 UIU/ML — SIGNIFICANT CHANGE UP (ref 0.27–4.2)
WBC # BLD: 10.91 K/UL — HIGH (ref 3.8–10.5)
WBC # FLD AUTO: 10.91 K/UL — HIGH (ref 3.8–10.5)

## 2024-02-06 PROCEDURE — 93010 ELECTROCARDIOGRAM REPORT: CPT

## 2024-02-06 PROCEDURE — 99291 CRITICAL CARE FIRST HOUR: CPT

## 2024-02-06 PROCEDURE — 99223 1ST HOSP IP/OBS HIGH 75: CPT

## 2024-02-06 RX ORDER — VALSARTAN 80 MG/1
160 TABLET ORAL EVERY 24 HOURS
Refills: 0 | Status: DISCONTINUED | OUTPATIENT
Start: 2024-02-06 | End: 2024-02-06

## 2024-02-06 RX ORDER — DEXTROSE 50 % IN WATER 50 %
15 SYRINGE (ML) INTRAVENOUS ONCE
Refills: 0 | Status: DISCONTINUED | OUTPATIENT
Start: 2024-02-06 | End: 2024-02-09

## 2024-02-06 RX ORDER — SEVELAMER CARBONATE 2400 MG/1
1600 POWDER, FOR SUSPENSION ORAL
Refills: 0 | Status: DISCONTINUED | OUTPATIENT
Start: 2024-02-06 | End: 2024-02-09

## 2024-02-06 RX ORDER — INSULIN GLARGINE 100 [IU]/ML
20 INJECTION, SOLUTION SUBCUTANEOUS AT BEDTIME
Refills: 0 | Status: DISCONTINUED | OUTPATIENT
Start: 2024-02-06 | End: 2024-02-06

## 2024-02-06 RX ORDER — TIMOLOL 0.5 %
1 DROPS OPHTHALMIC (EYE)
Refills: 0 | DISCHARGE

## 2024-02-06 RX ORDER — NIFEDIPINE 30 MG
60 TABLET, EXTENDED RELEASE 24 HR ORAL EVERY 12 HOURS
Refills: 0 | Status: DISCONTINUED | OUTPATIENT
Start: 2024-02-06 | End: 2024-02-07

## 2024-02-06 RX ORDER — SODIUM CHLORIDE 9 MG/ML
1000 INJECTION, SOLUTION INTRAVENOUS
Refills: 0 | Status: DISCONTINUED | OUTPATIENT
Start: 2024-02-06 | End: 2024-02-09

## 2024-02-06 RX ORDER — ACETAMINOPHEN 500 MG
650 TABLET ORAL EVERY 6 HOURS
Refills: 0 | Status: DISCONTINUED | OUTPATIENT
Start: 2024-02-06 | End: 2024-02-09

## 2024-02-06 RX ORDER — TAMSULOSIN HYDROCHLORIDE 0.4 MG/1
1 CAPSULE ORAL
Refills: 0 | DISCHARGE

## 2024-02-06 RX ORDER — DEXTROSE 50 % IN WATER 50 %
25 SYRINGE (ML) INTRAVENOUS ONCE
Refills: 0 | Status: DISCONTINUED | OUTPATIENT
Start: 2024-02-06 | End: 2024-02-09

## 2024-02-06 RX ORDER — HEPARIN SODIUM 5000 [USP'U]/ML
5000 INJECTION INTRAVENOUS; SUBCUTANEOUS ONCE
Refills: 0 | Status: COMPLETED | OUTPATIENT
Start: 2024-02-06 | End: 2024-02-06

## 2024-02-06 RX ORDER — ATORVASTATIN CALCIUM 80 MG/1
10 TABLET, FILM COATED ORAL AT BEDTIME
Refills: 0 | Status: DISCONTINUED | OUTPATIENT
Start: 2024-02-06 | End: 2024-02-09

## 2024-02-06 RX ORDER — NIFEDIPINE 30 MG
60 TABLET, EXTENDED RELEASE 24 HR ORAL EVERY 12 HOURS
Refills: 0 | Status: DISCONTINUED | OUTPATIENT
Start: 2024-02-06 | End: 2024-02-06

## 2024-02-06 RX ORDER — SODIUM BICARBONATE 1 MEQ/ML
1300 SYRINGE (ML) INTRAVENOUS THREE TIMES A DAY
Refills: 0 | Status: DISCONTINUED | OUTPATIENT
Start: 2024-02-07 | End: 2024-02-09

## 2024-02-06 RX ORDER — DEXTROSE 50 % IN WATER 50 %
50 SYRINGE (ML) INTRAVENOUS ONCE
Refills: 0 | Status: COMPLETED | OUTPATIENT
Start: 2024-02-06 | End: 2024-02-06

## 2024-02-06 RX ORDER — VALSARTAN 80 MG/1
1 TABLET ORAL
Refills: 0 | DISCHARGE

## 2024-02-06 RX ORDER — INFLUENZA VIRUS VACCINE 15; 15; 15; 15 UG/.5ML; UG/.5ML; UG/.5ML; UG/.5ML
0.7 SUSPENSION INTRAMUSCULAR ONCE
Refills: 0 | Status: DISCONTINUED | OUTPATIENT
Start: 2024-02-06 | End: 2024-02-17

## 2024-02-06 RX ORDER — HYDRALAZINE HCL 50 MG
5 TABLET ORAL ONCE
Refills: 0 | Status: COMPLETED | OUTPATIENT
Start: 2024-02-06 | End: 2024-02-06

## 2024-02-06 RX ORDER — INSULIN HUMAN 100 [IU]/ML
5 INJECTION, SOLUTION SUBCUTANEOUS ONCE
Refills: 0 | Status: COMPLETED | OUTPATIENT
Start: 2024-02-06 | End: 2024-02-06

## 2024-02-06 RX ORDER — TIMOLOL 0.5 %
1 DROPS OPHTHALMIC (EYE)
Refills: 0 | Status: DISCONTINUED | OUTPATIENT
Start: 2024-02-06 | End: 2024-02-06

## 2024-02-06 RX ORDER — FUROSEMIDE 40 MG
80 TABLET ORAL EVERY 8 HOURS
Refills: 0 | Status: DISCONTINUED | OUTPATIENT
Start: 2024-02-06 | End: 2024-02-06

## 2024-02-06 RX ORDER — INSULIN GLARGINE 100 [IU]/ML
30 INJECTION, SOLUTION SUBCUTANEOUS AT BEDTIME
Refills: 0 | Status: DISCONTINUED | OUTPATIENT
Start: 2024-02-06 | End: 2024-02-06

## 2024-02-06 RX ORDER — INSULIN GLARGINE 100 [IU]/ML
40 INJECTION, SOLUTION SUBCUTANEOUS
Refills: 0 | DISCHARGE

## 2024-02-06 RX ORDER — CALCIUM GLUCONATE 100 MG/ML
1 VIAL (ML) INTRAVENOUS ONCE
Refills: 0 | Status: DISCONTINUED | OUTPATIENT
Start: 2024-02-06 | End: 2024-02-06

## 2024-02-06 RX ORDER — INSULIN LISPRO 100/ML
VIAL (ML) SUBCUTANEOUS
Refills: 0 | Status: DISCONTINUED | OUTPATIENT
Start: 2024-02-06 | End: 2024-02-09

## 2024-02-06 RX ORDER — SODIUM ZIRCONIUM CYCLOSILICATE 10 G/10G
10 POWDER, FOR SUSPENSION ORAL EVERY 8 HOURS
Refills: 0 | Status: COMPLETED | OUTPATIENT
Start: 2024-02-06 | End: 2024-02-07

## 2024-02-06 RX ORDER — CALCITRIOL 0.5 UG/1
0.25 CAPSULE ORAL DAILY
Refills: 0 | Status: DISCONTINUED | OUTPATIENT
Start: 2024-02-06 | End: 2024-02-09

## 2024-02-06 RX ORDER — DEXTROSE 50 % IN WATER 50 %
12.5 SYRINGE (ML) INTRAVENOUS ONCE
Refills: 0 | Status: DISCONTINUED | OUTPATIENT
Start: 2024-02-06 | End: 2024-02-09

## 2024-02-06 RX ORDER — CALCIUM CHLORIDE
500 POWDER (GRAM) MISCELLANEOUS ONCE
Refills: 0 | Status: DISCONTINUED | OUTPATIENT
Start: 2024-02-06 | End: 2024-02-06

## 2024-02-06 RX ORDER — LANOLIN ALCOHOL/MO/W.PET/CERES
3 CREAM (GRAM) TOPICAL AT BEDTIME
Refills: 0 | Status: DISCONTINUED | OUTPATIENT
Start: 2024-02-06 | End: 2024-02-09

## 2024-02-06 RX ORDER — NIFEDIPINE 30 MG
1 TABLET, EXTENDED RELEASE 24 HR ORAL
Refills: 0 | DISCHARGE

## 2024-02-06 RX ORDER — ATORVASTATIN CALCIUM 80 MG/1
1 TABLET, FILM COATED ORAL
Refills: 0 | DISCHARGE

## 2024-02-06 RX ORDER — FUROSEMIDE 40 MG
80 TABLET ORAL EVERY 8 HOURS
Refills: 0 | Status: DISCONTINUED | OUTPATIENT
Start: 2024-02-06 | End: 2024-02-07

## 2024-02-06 RX ORDER — INSULIN GLARGINE 100 [IU]/ML
20 INJECTION, SOLUTION SUBCUTANEOUS AT BEDTIME
Refills: 0 | Status: DISCONTINUED | OUTPATIENT
Start: 2024-02-06 | End: 2024-02-09

## 2024-02-06 RX ORDER — GLUCAGON INJECTION, SOLUTION 0.5 MG/.1ML
1 INJECTION, SOLUTION SUBCUTANEOUS ONCE
Refills: 0 | Status: DISCONTINUED | OUTPATIENT
Start: 2024-02-06 | End: 2024-02-09

## 2024-02-06 RX ORDER — HYDRALAZINE HCL 50 MG
10 TABLET ORAL ONCE
Refills: 0 | Status: COMPLETED | OUTPATIENT
Start: 2024-02-06 | End: 2024-02-06

## 2024-02-06 RX ORDER — FUROSEMIDE 40 MG
1 TABLET ORAL
Refills: 0 | DISCHARGE

## 2024-02-06 RX ORDER — CALCITRIOL 0.5 UG/1
1 CAPSULE ORAL
Refills: 0 | DISCHARGE

## 2024-02-06 RX ORDER — SODIUM ZIRCONIUM CYCLOSILICATE 10 G/10G
10 POWDER, FOR SUSPENSION ORAL ONCE
Refills: 0 | Status: COMPLETED | OUTPATIENT
Start: 2024-02-06 | End: 2024-02-06

## 2024-02-06 RX ORDER — AMLODIPINE BESYLATE 2.5 MG/1
1 TABLET ORAL
Refills: 0 | DISCHARGE

## 2024-02-06 RX ORDER — CALCIUM GLUCONATE 100 MG/ML
1 VIAL (ML) INTRAVENOUS ONCE
Refills: 0 | Status: COMPLETED | OUTPATIENT
Start: 2024-02-06 | End: 2024-02-06

## 2024-02-06 RX ORDER — SEVELAMER CARBONATE 2400 MG/1
800 POWDER, FOR SUSPENSION ORAL
Refills: 0 | Status: DISCONTINUED | OUTPATIENT
Start: 2024-02-06 | End: 2024-02-06

## 2024-02-06 RX ORDER — SEVELAMER CARBONATE 2400 MG/1
2 POWDER, FOR SUSPENSION ORAL
Refills: 0 | DISCHARGE

## 2024-02-06 RX ADMIN — Medication 2: at 22:26

## 2024-02-06 RX ADMIN — SODIUM ZIRCONIUM CYCLOSILICATE 10 GRAM(S): 10 POWDER, FOR SUSPENSION ORAL at 19:29

## 2024-02-06 RX ADMIN — INSULIN HUMAN 5 UNIT(S): 100 INJECTION, SOLUTION SUBCUTANEOUS at 12:08

## 2024-02-06 RX ADMIN — Medication 10 MILLIGRAM(S): at 22:12

## 2024-02-06 RX ADMIN — Medication 80 MILLIGRAM(S): at 17:31

## 2024-02-06 RX ADMIN — Medication 100 GRAM(S): at 13:00

## 2024-02-06 RX ADMIN — SODIUM ZIRCONIUM CYCLOSILICATE 10 GRAM(S): 10 POWDER, FOR SUSPENSION ORAL at 11:43

## 2024-02-06 RX ADMIN — Medication 50 MILLILITER(S): at 12:02

## 2024-02-06 RX ADMIN — SEVELAMER CARBONATE 1600 MILLIGRAM(S): 2400 POWDER, FOR SUSPENSION ORAL at 17:30

## 2024-02-06 RX ADMIN — Medication 5 MILLIGRAM(S): at 21:45

## 2024-02-06 RX ADMIN — INSULIN GLARGINE 20 UNIT(S): 100 INJECTION, SOLUTION SUBCUTANEOUS at 22:26

## 2024-02-06 RX ADMIN — HEPARIN SODIUM 5000 UNIT(S): 5000 INJECTION INTRAVENOUS; SUBCUTANEOUS at 21:38

## 2024-02-06 RX ADMIN — CALCITRIOL 0.25 MICROGRAM(S): 0.5 CAPSULE ORAL at 17:30

## 2024-02-06 RX ADMIN — ATORVASTATIN CALCIUM 10 MILLIGRAM(S): 80 TABLET, FILM COATED ORAL at 21:38

## 2024-02-06 NOTE — ED PROVIDER NOTE - NS ED ROS FT
Constitutional: No fever or chills  Eyes: No discharge or drainage  Ears, Nose, Mouth, Throat: No nasal discharge, no sore throat  Cardiovascular: No chest pain, no palpitations  Respiratory: No shortness of breath, no cough  Gastrointestinal: No nausea or vomiting, no abdominal pain, no diarrhea or constipation  Musculoskeletal: No joint pain, no swelling  Skin: No rashes or lesions  Neurological: No numbness, weakness, tingling, no headache  Psychiatric: No depression

## 2024-02-06 NOTE — H&P ADULT - PROBLEM SELECTOR PLAN 9
F: NI  E: Replete as needed, careful given HD needs  N: Renal diet, npo after MN  Dvt ppx: heparin subq, then holding for IR procedure in AM  GI ppx: NI  Dispo: Tele  Code status: Full F: none  E: Replete as needed, careful given HD needs  N: Renal diet, npo after MN  Dvt ppx: heparin subq, then holding for IR procedure in AM  GI ppx: not needed  Dispo: Tele  Code status: Full

## 2024-02-06 NOTE — H&P ADULT - PROBLEM SELECTOR PLAN 1
Presented to the ED iso elevated K+ on pre-AV Fistula placement lab work. Initially K+ of 6.0. Was given insulin, dextrose, and lokelma while in the pre-op holding area. In the ED, the K+ noted to drop from 6.0 -> 5.0.  Etiology is most likely iso known renal insufficiency given known CKD stage 5. Patient has not taken new supplements, has no known type 4 RTA, no signs of rhabdo on exam nor history.   - monitor BMPs, repeat after arriving to tele this evening   - repeat ECG, monitor for changes such as peaked T-waves w/ shortened QT interval   - if patient's K+ jumps, utilize calcium gluconate, insulin 10 units IV with an amp of glucose 50meQ to prevent hypoglycemia, and if severe consider B-agonist -> albuterol nebs  -hold off on diuretics such as furosemide given inadequate renal function at this time  - utilize lokelma as needed   - ultimate plan is for AVF placement 2/7 and dialysis Presented to the ED iso elevated K+ on pre-AV Fistula placement lab work. Initially K+ of 6.0. Was given insulin, dextrose, and lokelma while in the pre-op holding area. In the ED, the K+ noted to drop from 6.0 -> 5.0.  Etiology is most likely iso known renal insufficiency given known CKD stage 5. Dr. 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  Patient has not taken new supplements. no signs of rhabdo on exam nor history.   - q6hr BMPs   - serial ECG, monitor for changes such as peaked T-waves w/ shortened QT interval   - can consider calcium gluconate, insulin 5 units IV with an amp of dextrose to prevent hypoglycemia, and if severe consider B-agonist -> albuterol nebs  - lokelma 10g q8 as per renal recs  - NPO at midnight for TDC placement 2/7 followed by HD, as per renal recs

## 2024-02-06 NOTE — CONSULT NOTE ADULT - ASSESSMENT
84 y/o primarily Occitan-speaking male w/ PMH HTN, DM, HLD, CKD stage 5, presenting for hyperkalemia. Pt follows with nephrologist Dr. Timothy Winston and vascular surgery Dr. Dc Lai, was planned for elective LUE AVF creation today. Pre-procedure VBG showed K of 5.9, BMP was obtained showing K of 6.0, pt was given lokelma 10g PO x1, insulin 5u IV x1, and 1 amp of d50; he was HDS and asymptomatic. Procedure was canceled and pt was sent to the ED. In ED, pt states he feels well and denies any acute complaints at this time. Reports chronic BLE edema which is unchanged. Denies fevers, chills, headache, CP, SOB, cough, orthopnea, abd pain, n/v/c/d, dizziness, lightheadedness, weakness, fatigue. Pt reports he forgot to take his lasix 80mg last night and this morning, also forgot to take his SPS suspension this morning which he takes for chronic hyperkalemia i/s/o CKD5. No known hx of cardiac disease, arrhythmias, never seen a cardiologist. ICU consulted for hyperkalemia and bradycardia with new Mobitz type 1 AV block.    NEURO  DEBBI    CARDIOVASCULAR    #2nd degree Mobitz type 1 (Wenckebach) AV block  Pt with no known hx of CAD, HF, arrhythmias, bradycardia. Prior EKG 1/31/24 showing HR 63, sinus rhythm with 1st degree AV block ([place] interval 238). EKG in ED showing HR 48, sinus rhythm with 2nd degree Mobitz type 1 (Wenckebach) AV block.  - cardiology consulted, f/u recs  - avoid beta-blockers, including ophthalmic solution  - obtain TSH    #HTN  Home meds: nifedipine ER 60mg PO BID, valsartan 160mg PO qd. BP 130s-160s/50s-60s in ED.  - c/w nifedipine 60mg PO BID  - holding valsartan i/s/o hyperkalemia, restart as appropriate  - renal/DASH/consistent carb diet    #HLD  Home meds: lipitor 10mg PO qhs.  - c/w lipitor 10mg PO qhs  - obtain lipid profile  - renal/DASH/consistent carb diet    PULMONARY  SpO2 98% on RA, breathing comfortably    GI  DEBBI    RENAL    #CKD stage 5/ESRD  #Hyperkalemia  Home meds: sevelamer carbonate 2400mg PO TID, sodium bicarb 1300mg PO TID, lasix 80mg PO TID, vitmain D3 0.25 mcg PO qd, sodium polystyrene sulfonate (SPS) suspension 15g/60 mL 2 tbsp PO TID. Was planned for elective LUE AVF creation today, procedure was canceled given hyperkalemia to 5.9 --> 6.0 this morning pre-procedure. S/p lokelma 10g PO x1, insulin 5u IV x1, 1 amp of d50, and calcium gluconate 1g IV x1. Renal consulted in ED, planning for tunneled HD cath placement by IR tomorrow AM.  - trend BMP, Mg, phos  - c/w lokelma 10g PO TID x 4 more doses  - c/w lasix 80mg PO TID  - c/w sodium bicarb 1300mg PO TID  - c/w vitamin D3 0.25mcg PO qd  - c/w renvela 1800mg PO TID  - renal/DASH/consistent carb diet  - follow-up renal recs  - IR consulted for tunneled HD cath placement tomorrow AM  - NPO at midnight    ENDO    #DM  Home meds: lantus 40u SQ qhs. Held lantus last night as pt was NPO for procedure today.  - monitor FS  - mISS  - lantus 20u SQ qhs  - obtain A1C  - renal/DASH/consistent carb diet    HEME/ONC    #Normocytic anemia  H&H 9.4/28.9 in ED, baseline 10.7/35.6 on 2/2/24. Likely 2/2 AOCD i/s/o CKD5.  - obtain iron studies  - maintain active T&S    ID    #Leukocytosis  WBC 10.91 in ED (down from 11.9 on 2/2/24). Afebrile, no signs or symptoms of infection.  - continue to monitor    Preventative:  F: None   E: HD  N: renal/DASH/consistent carb diet  DVT Prophylaxis: heparin 5000u SQ x1 then hold for IR procedure in AM  GI prophylaxis: None   CODE STATUS: FULL CODE 84 y/o primarily Bulgarian-speaking male w/ PMH HTN, DM, HLD, CKD stage 5, presenting for hyperkalemia. Pt follows with nephrologist Dr. Timothy Winston and vascular surgery Dr. Dc Lai, was planned for elective LUE AVF creation today. Pre-procedure VBG showed K of 5.9, BMP was obtained showing K of 6.0, pt was given lokelma 10g PO x1, insulin 5u IV x1, and 1 amp of d50; he was HDS and asymptomatic. Procedure was canceled and pt was sent to the ED. In ED, pt states he feels well and denies any acute complaints at this time. Reports chronic BLE edema which is unchanged. Denies fevers, chills, headache, CP, SOB, cough, orthopnea, abd pain, n/v/c/d, dizziness, lightheadedness, weakness, fatigue. Pt reports he forgot to take his lasix 80mg last night and this morning, also forgot to take his SPS suspension this morning which he takes for chronic hyperkalemia i/s/o CKD5. No known hx of cardiac disease, arrhythmias, never seen a cardiologist. ICU consulted for hyperkalemia and bradycardia with new Mobitz type 1 AV block.    NEURO  DEBBI    CARDIOVASCULAR    #2nd degree Mobitz type 1 (Wenckebach) AV block  Pt with no known hx of CAD, HF, arrhythmias, bradycardia. Prior EKG 1/31/24 showing HR 63, sinus rhythm with 1st degree AV block ([place] interval 238). EKG in ED showing HR 48, sinus rhythm with 2nd degree Mobitz type 1 (Wenckebach) AV block.  - cardiology consulted, f/u recs  - avoid beta-blockers, including ophthalmic solution  - obtain TSH    #HTN  Home meds: nifedipine ER 60mg PO BID, valsartan 160mg PO qd. BP 130s-160s/50s-60s in ED.  - c/w nifedipine 60mg PO BID  - holding valsartan i/s/o hyperkalemia, restart as appropriate  - renal/DASH/consistent carb diet    #HLD  Home meds: lipitor 10mg PO qhs.  - c/w lipitor 10mg PO qhs  - obtain lipid profile  - renal/DASH/consistent carb diet    PULMONARY  SpO2 98% on RA, breathing comfortably    GI  DEBBI    RENAL    #CKD stage 5/ESRD  #Hyperkalemia  Home meds: sevelamer carbonate 2400mg PO TID, sodium bicarb 1300mg PO TID, lasix 80mg PO TID, vitmain D3 0.25 mcg PO qd, sodium polystyrene sulfonate (SPS) suspension 15g/60 mL 2 tbsp PO TID. Was planned for elective LUE AVF creation today, procedure was canceled given hyperkalemia to 5.9 --> 6.0 this morning pre-procedure. S/p lokelma 10g PO x1, insulin 5u IV x1, 1 amp of d50, and calcium gluconate 1g IV x1. Renal consulted in ED, planning for tunneled HD cath placement by IR tomorrow AM.  - trend BMP, Mg, phos - repeat this evening  - c/w lokelma 10g PO TID x 4 more doses  - c/w lasix 80mg PO TID  - c/w sodium bicarb 1300mg PO TID  - c/w vitamin D3 0.25mcg PO qd  - c/w renvela 1800mg PO TID  - renal/DASH/consistent carb diet  - follow-up renal recs  - IR consulted for tunneled HD cath placement tomorrow AM  - NPO at midnight    ENDO    #DM  Home meds: lantus 40u SQ qhs. Held lantus last night as pt was NPO for procedure today.  - monitor FS  - mISS  - lantus 20u SQ qhs  - obtain A1C  - renal/DASH/consistent carb diet    HEME/ONC    #Normocytic anemia  H&H 9.4/28.9 in ED, baseline 10.7/35.6 on 2/2/24. Likely 2/2 AOCD i/s/o CKD5.  - obtain iron studies  - maintain active T&S    ID    #Leukocytosis  WBC 10.91 in ED (down from 11.9 on 2/2/24). Afebrile, no signs or symptoms of infection.  - continue to monitor    Preventative:  F: None   E: HD  N: renal/DASH/consistent carb diet  DVT Prophylaxis: heparin 5000u SQ x1 then hold for IR procedure in AM  GI prophylaxis: None   CODE STATUS: FULL CODE 84 y/o primarily Mongolian-speaking male w/ PMH HTN, DM, HLD, CKD stage 5, presenting for hyperkalemia. Pt follows with nephrologist Dr. Timothy Winston and vascular surgery Dr. Dc Lai, was planned for elective LUE AVF creation today. Pre-procedure VBG showed K of 5.9, BMP was obtained showing K of 6.0, pt was given lokelma 10g PO x1, insulin 5u IV x1, and 1 amp of d50; he was HDS and asymptomatic. Procedure was canceled and pt was sent to the ED. In ED, pt states he feels well and denies any acute complaints at this time. Reports chronic BLE edema which is unchanged. Denies fevers, chills, headache, CP, SOB, cough, orthopnea, abd pain, n/v/c/d, dizziness, lightheadedness, weakness, fatigue. Pt reports he forgot to take his lasix 80mg last night and this morning, also forgot to take his SPS suspension this morning which he takes for chronic hyperkalemia i/s/o CKD5. No known hx of cardiac disease, arrhythmias, never seen a cardiologist. ICU consulted for hyperkalemia and bradycardia with new Mobitz type 1 AV block.    NEURO  DEBBI    CARDIOVASCULAR    #2nd degree Mobitz type 1 (Wenckebach) AV block  Pt with no known hx of CAD, HF, arrhythmias, bradycardia. Prior EKG 1/31/24 showing HR 63, sinus rhythm with 1st degree AV block ([place] interval 238). EKG in ED showing HR 48, sinus rhythm with 2nd degree Mobitz type 1 (Wenckebach) AV block.  - cardiology consulted, f/u recs  - avoid beta-blockers, including ophthalmic solution  - obtain TSH    #HTN  Home meds: nifedipine ER 60mg PO BID, valsartan 160mg PO qd. BP 130s-160s/50s-60s in ED.  - c/w nifedipine 60mg PO BID  - holding valsartan i/s/o hyperkalemia, restart as appropriate  - renal/DASH/consistent carb diet    #HLD  Home meds: lipitor 10mg PO qhs.  - c/w lipitor 10mg PO qhs  - obtain lipid profile  - renal/DASH/consistent carb diet    PULMONARY  SpO2 98% on RA, breathing comfortably    GI  DEBBI    RENAL    #CKD stage 5/ESRD  #Hyperkalemia  Home meds: sevelamer carbonate 2400mg PO TID, sodium bicarb 1300mg PO TID, lasix 80mg PO TID, vitmain D3 0.25 mcg PO qd, sodium polystyrene sulfonate (SPS) suspension 15g/60 mL 2 tbsp PO TID. Was planned for elective LUE AVF creation today, procedure was canceled given hyperkalemia to 5.9 --> 6.0 this morning pre-procedure. Outpatient labs showing K range 5.0-5.7 over the last month. S/p lokelma 10g PO x1, insulin 5u IV x1, 1 amp of d50, and calcium gluconate 1g IV x1. Renal consulted in ED, planning for tunneled HD cath placement by IR tomorrow AM.  - trend BMP, Mg, phos - repeat this evening  - c/w lokelma 10g PO TID x 4 more doses  - c/w lasix 80mg PO TID  - c/w sodium bicarb 1300mg PO TID  - c/w vitamin D3 0.25mcg PO qd  - c/w renvela 1800mg PO TID  - renal/DASH/consistent carb diet  - follow-up renal recs  - IR consulted for tunneled HD cath placement tomorrow AM  - NPO at midnight    ENDO    #DM  Home meds: lantus 40u SQ qhs. Held lantus last night as pt was NPO for procedure today.  - monitor FS  - mISS  - lantus 20u SQ qhs  - obtain A1C  - renal/DASH/consistent carb diet    HEME/ONC    #Normocytic anemia  H&H 9.4/28.9 in ED, baseline 10.7/35.6 on 2/2/24. Likely 2/2 AOCD i/s/o CKD5.  - obtain iron studies  - maintain active T&S    ID    #Leukocytosis  WBC 10.91 in ED (down from 11.9 on 2/2/24). Afebrile, no signs or symptoms of infection.  - continue to monitor    Preventative:  F: None   E: HD  N: renal/DASH/consistent carb diet  DVT Prophylaxis: heparin 5000u SQ x1 then hold for IR procedure in AM  GI prophylaxis: None   CODE STATUS: FULL CODE 82 y/o primarily Occitan-speaking male w/ PMH HTN, DM, HLD, CKD stage 5, presenting for hyperkalemia. Pt follows with nephrologist Dr. Timothy Winston and vascular surgery Dr. Dc Lai, was planned for elective LUE AVF creation today. Pre-procedure VBG showed K of 5.9, BMP was obtained showing K of 6.0, pt was given lokelma 10g PO x1, insulin 5u IV x1, and 1 amp of d50; he was HDS and asymptomatic. Procedure was canceled and pt was sent to the ED. In ED, pt states he feels well and denies any acute complaints at this time. Reports chronic BLE edema which is unchanged. Denies fevers, chills, headache, CP, SOB, cough, orthopnea, abd pain, n/v/c/d, dizziness, lightheadedness, weakness, fatigue. Pt reports he forgot to take his lasix 80mg last night and this morning, also forgot to take his SPS suspension this morning which he takes for chronic hyperkalemia i/s/o CKD5. No known hx of cardiac disease, arrhythmias, never seen a cardiologist. ICU consulted for hyperkalemia and bradycardia with new Mobitz type 1 AV block.    NEURO  DEBBI    CARDIOVASCULAR    #2nd degree Mobitz type 1 (Wenckebach) AV block  Pt with no known hx of CAD, HF, arrhythmias, bradycardia. Prior EKG 1/31/24 showing HR 63, sinus rhythm with 1st degree AV block ([place] interval 238). EKG in ED showing HR 48, sinus rhythm with 2nd degree Mobitz type 1 (Wenckebach) AV block.  - cardiology consulted, f/u recs  - avoid beta-blockers, including ophthalmic solution  - obtain TSH    #HTN  Home meds: nifedipine ER 60mg PO BID, valsartan 160mg PO qd. BP 130s-160s/50s-60s in ED.  - c/w nifedipine 60mg PO BID  - holding valsartan i/s/o hyperkalemia, restart as appropriate  - renal/DASH/consistent carb diet    #HLD  Home meds: lipitor 10mg PO qhs.  - c/w lipitor 10mg PO qhs  - obtain lipid profile  - renal/DASH/consistent carb diet    PULMONARY  SpO2 98% on RA, breathing comfortably    GI  DEBBI    RENAL    #CKD stage 5/ESRD  #Hyperkalemia  Home meds: sevelamer carbonate 2400mg PO TID, sodium bicarb 1300mg PO TID, lasix 80mg PO TID, vitmain D3 0.25 mcg PO qd, sodium polystyrene sulfonate (SPS) suspension 15g/60 mL 2 tbsp PO TID. Follows w/ nephrologist Dr. Nohemy Winston and vascular surgeon Dr. Dc Lai. Was planned for elective LUE AVF creation today, procedure was canceled given hyperkalemia to 5.9 --> 6.0 this morning pre-procedure. Outpatient labs showing K range 5.0-5.7 over the last month. S/p lokelma 10g PO x1, insulin 5u IV x1, 1 amp of d50, and calcium gluconate 1g IV x1. Renal consulted in ED, planning for tunneled HD cath placement by IR tomorrow AM.  - trend BMP, Mg, phos - repeat this evening  - c/w lokelma 10g PO TID x 4 more doses  - c/w lasix 80mg PO TID  - c/w sodium bicarb 1300mg PO TID  - c/w vitamin D3 0.25mcg PO qd  - c/w renvela 1800mg PO TID  - renal/DASH/consistent carb diet  - follow-up renal recs  - IR consulted for tunneled HD cath placement tomorrow AM  - NPO at midnight  - pre-op labs in AM    ENDO    #DM  Home meds: lantus 40u SQ qhs. Held lantus last night as pt was NPO for procedure today. Last A1C was 7.5 on 1/17/24.  - monitor FS  - mISS  - lantus 20u SQ qhs  - obtain A1C  - renal/DASH/consistent carb diet    HEME/ONC    #Normocytic anemia  H&H 9.4/28.9 in ED, baseline 10.7/35.6 on 2/2/24. Likely 2/2 AOCD i/s/o CKD5.  - obtain iron studies  - maintain active T&S  - goal Hgb >8    ID    #Leukocytosis  WBC 10.91 in ED (down from 11.9 on 2/2/24). Afebrile, no signs or symptoms of infection.  - continue to monitor    Preventative:  F: None   E: HD  N: renal/DASH/consistent carb diet  DVT Prophylaxis: heparin 5000u SQ x1 then hold for IR procedure in AM  GI prophylaxis: None   CODE STATUS: FULL CODE 82 y/o primarily Japanese-speaking male w/ PMH HTN, DM, HLD, CKD stage 5, presenting for hyperkalemia. Pt follows with nephrologist Dr. Timothy Winston and vascular surgery Dr. Dc Lai, was planned for elective LUE AVF creation today. Pre-procedure VBG showed K of 5.9, BMP was obtained showing K of 6.0, pt was given lokelma 10g PO x1, insulin 5u IV x1, and 1 amp of d50; he was HDS and asymptomatic. Procedure was canceled and pt was sent to the ED. In ED, pt states he feels well and denies any acute complaints at this time. Reports chronic BLE edema which is unchanged. Denies fevers, chills, headache, CP, SOB, cough, orthopnea, abd pain, n/v/c/d, dizziness, lightheadedness, weakness, fatigue. Pt reports he forgot to take his lasix 80mg last night and this morning, also forgot to take his SPS suspension this morning which he takes for chronic hyperkalemia i/s/o CKD5. No known hx of cardiac disease, arrhythmias, never seen a cardiologist. ICU consulted for hyperkalemia and bradycardia with new Mobitz type 1 AV block.    NEURO  DEBBI    CARDIOVASCULAR    #2nd degree Mobitz type 1 (Wenckebach) AV block  Pt with no known hx of CAD, HF, arrhythmias, bradycardia. Prior EKG 1/31/24 showing HR 63, sinus rhythm with 1st degree AV block ([place] interval 238). EKG in ED showing HR 48, sinus rhythm with 2nd degree Mobitz type 1 (Wenckebach) AV block.  - cardiology consulted, f/u recs  - avoid beta-blockers, including ophthalmic solution  - obtain TSH    #HTN  Home meds: nifedipine ER 60mg PO BID, valsartan 160mg PO qd. BP 130s-160s/50s-60s in ED.  - c/w nifedipine 60mg PO BID  - holding valsartan i/s/o hyperkalemia, restart as appropriate  - renal/DASH/consistent carb diet    #HLD  Home meds: lipitor 10mg PO qhs.  - c/w lipitor 10mg PO qhs  - obtain lipid profile  - renal/DASH/consistent carb diet    PULMONARY  SpO2 98% on RA, breathing comfortably    GI  DEBBI    RENAL    #CKD stage 5/ESRD  #Hyperkalemia  Home meds: sevelamer carbonate 2400mg PO TID, sodium bicarb 1300mg PO TID, lasix 80mg PO TID, vitmain D3 0.25 mcg PO qd, sodium polystyrene sulfonate (SPS) suspension 15g/60 mL 2 tbsp PO TID. Follows w/ nephrologist Dr. Nohemy Winston and vascular surgeon Dr. Dc Lai. Was planned for elective LUE AVF creation today, procedure was canceled given hyperkalemia to 5.9 --> 6.0 this morning pre-procedure. Outpatient labs showing K range 5.0-5.7 over the last month. S/p lokelma 10g PO x1, insulin 5u IV x1, 1 amp of d50, and calcium gluconate 1g IV x1. Renal consulted in ED, planning for tunneled HD cath placement by IR tomorrow AM.  - trend BMP, Mg, phos - repeat this evening  - c/w lokelma 10g PO TID x 4 more doses  - c/w lasix 80mg PO TID  - c/w sodium bicarb 1300mg PO TID  - c/w vitamin D3 0.25mcg PO qd  - c/w renvela 1800mg PO TID  - renal/DASH/consistent carb diet  - follow-up renal recs  - IR consulted for tunneled HD cath placement tomorrow AM  - NPO at midnight  - pre-op labs in AM    ENDO    #DM  Home meds: lantus 40u SQ qhs. Held lantus last night as pt was NPO for procedure today. Last A1C was 7.5 on 1/17/24.  - monitor FS  - mISS  - lantus 20u SQ qhs  - obtain A1C  - renal/DASH/consistent carb diet    HEME/ONC    #Normocytic anemia  H&H 9.4/28.9 in ED, baseline 10.7/35.6 on 2/2/24. Likely 2/2 AOCD i/s/o CKD5.  - obtain iron studies  - maintain active T&S  - transfuse for Hgb<7    ID    #Leukocytosis  WBC 10.91 in ED (down from 11.9 on 2/2/24). Afebrile, no signs or symptoms of infection.  - continue to monitor    Preventative:  F: None   E: HD  N: renal/DASH/consistent carb diet  DVT Prophylaxis: heparin 5000u SQ x1 then hold for IR procedure in AM  GI prophylaxis: None   CODE STATUS: FULL CODE

## 2024-02-06 NOTE — H&P ADULT - NSHPSOCIALHISTORY_GEN_ALL_CORE
Living situation:  Functional status:  Recent travel:   Occupation:  Tobacco use:   EtOH use:  Illicit drug use:  Sexual History: Living situation: lives in a home with his wife and his son  Functional status: fully independent, no assistive walking devices  Recent travel: no  Occupation: retired   Tobacco use: none  EtOH use: has a beer at social events maybe once per month  Illicit drug use: none  Sexual History: son in room did not ask

## 2024-02-06 NOTE — H&P ADULT - PROBLEM SELECTOR PLAN 4
Known history of T2DM. Not on home medications. Last A1c unknown.  - moderate ISF-25 lispro sliding scale AC+qHS  - monitor FS  - consistent carb diet  - Obtain A1c Known history of T2DM. Insulin lantus 40u qhs as home medication. Last A1c unknown.  - moderate ISF-25 lispro sliding scale AC+qHS  - monitor FS  - consistent carb diet  - Obtain A1c  - lantus 20u SQ qhs given decreased kidney function, adjust as needed Known history of T2DM. Insulin lantus 40u qhs as home medication. Last A1c unknown.  - moderate ISF-25 lispro sliding scale AC+qHS  - monitor FS  - consistent carb diet  - f/u A1c  - lantus 20u SQ qhs given decreased kidney function, adjust as needed

## 2024-02-06 NOTE — H&P ADULT - PROBLEM SELECTOR PLAN 6
Multi year history of HLD. Well controlled on home atorvastatin 10mg qhs.  - c/w home atorvastatin 10mg qhs  - encourage healthy eating as outpatient and follow up with outpatient PCP Presented with a WBC count of  10.91, neutrophil predominance. Down from 11.9 on 2/2/24.  No systemic symptoms patient denies fever, chills, nausea, vomiting, weight loss, wheezing, night sweats  - continue to monitor  - DEBBI

## 2024-02-06 NOTE — PATIENT PROFILE ADULT - FUNCTIONAL ASSESSMENT - BASIC MOBILITY 6.
2-calculated by average/Not able to assess (calculate score using [facility] averaging method)

## 2024-02-06 NOTE — H&P ADULT - ASSESSMENT
Mr. Anguiano is an 82yo M PMH of HTN, DM, HLD, CKD Stage 5, who presented with hyperkalemia found on lab work during pre-op for an AVF, admitted to [facility] for further management of his hyperkalemia and new found Mobitz Type I, and medical optimization prior to AVF placement.

## 2024-02-06 NOTE — ED PROVIDER NOTE - PROGRESS NOTE DETAILS
pt doing well, k improved. still mobitz 1 on monitor and ekg. awaiting icu dispo for [facility] vs [facility]. per renal, will place hd cath, dialysis tomorrow.

## 2024-02-06 NOTE — H&P ADULT - PROBLEM SELECTOR PLAN 5
Multi year history of hypertension. On admission, presented with /57. Well controlled on home valsartan 160mg qd, lasix 80mg tid, nifedipine 60mg bid, amlodipine 2.5mg qd, losartan 100mg qd?  - c/w home medications as able  - CTM for CP/CT  - encourage low salt in diet Multi year history of hypertension. On admission, presented with /57. Well controlled on home valsartan 160mg qd, lasix 80mg tid, nifedipine 60mg bid.  - c/w home medications as able  - CTM for CP/CT  - encourage low salt in diet Multi year history of hypertension. On admission, presented with /57. Well controlled on home valsartan 160mg qd, lasix 80mg tid, nifedipine 60mg bid.  - c nifedipine 60mg PO BID  - c holding valsartan i/s/o hyperkalemia, restart as appropriate  - CTM for CP/CT  - encourage low salt in diet Multi year history of hypertension. On admission, presented with /57. Well controlled on home valsartan 160mg qd, lasix 80mg tid, nifedipine 60mg bid.  - c/w nifedipine 60mg PO BID  - c/w lasix 80mg TID   - holding valsartan i/s/o hyperkalemia, restart as appropriate  - CTM for CP/CT  - encourage low salt in diet

## 2024-02-06 NOTE — H&P ADULT - HISTORY OF PRESENT ILLNESS
PCP: Nohemy Winston  Pharmacy: [facility] ([phone])  - - - - -    HPI:   Mr. Anguiano is an 84yo M w/ a PMH of HTN, DM, HLD, CKD Stage 5, who presented with hyperkalemia found on labwork. He was scheduled to receive AVF today in preparation for dialysis but was noted to be hyperkalemic. At the facility, he was given insulin, dextrose, and lokelma. At that time, nephrology was called and recommended rechecking a BMP to evaluate hyperK resolution but PACU nurses stated it was against policy and Mr. Anguiano was sent to the ED for further evaluation and management. In the ED, the patient remained without any symptoms as he did not have complaints of fevers/chills, chest pain/tightness, no N/V and no abdominal pain.     In the ED:  Initial vital signs: T: 97.5F, HR: 66, BP: 163/57, R: 18, SpO2: 98% on RA  Labs: significant for WBC 10.9, Hgb 9.4, Hct 28.9, MCV 92.9, Plt 345. K 5.0, BUN/Cr 81/8.76, eGFR 6. Glucose 209.   EKG:  rate of 56, sinus rhythm, mobitz type 1 AV block  Medications: calcium gluconate 1g x 1  Consults: none    PCP: Nohemy Winston  Pharmacy: [facility] ([phone])  - - - - -    HPI:   Mr. Anguiano is an 84yo M w/ a PMH of HTN, DM, HLD, CKD Stage 5, who presented with hyperkalemia found on labwork. He was scheduled to receive AVF today in preparation for dialysis but was noted to be hyperkalemic. At the facility, he was given insulin, dextrose, and lokelma. At that time, nephrology was called and recommended rechecking a BMP to evaluate hyperK resolution but PACU nurses stated it was against policy and Mr. Anguiano was sent to the ED for further evaluation and management. In the ED, the patient remained without any symptoms as he did not have complaints of fevers/chills, chest pain/tightness, no N/V and no abdominal pain.     In the ED:  Initial vital signs: T: 97.5F, HR: 66, BP: 163/57, R: 18, SpO2: 98% on RA  Labs: significant for WBC 10.9, Hgb 9.4, Hct 28.9, MCV 92.9, Plt 345. K 5.0, BUN/Cr 81/8.76, eGFR 6. Glucose 209.   EKG:  rate of 56, sinus rhythm, mobitz type 1 AV block  Medications: calcium gluconate 1g x 1  Consults: ICU   PCP: Nohemy Winston  Pharmacy: [facility] ([phone])  - - - - -    HPI:   Mr. Anguiano is an 82yo M w/ a PMH of HTN, DM, HLD, CKD Stage 5, who presented with hyperkalemia found on labwork. He was scheduled to receive an elective LUE AVF creation today in preparation for dialysis but was noted to be hyperkalemic. At the facility, he was given insulin, dextrose, and lokelma. At that time, nephrology was called and recommended rechecking a BMP to evaluate hyperK resolution but PACU nurses stated it was against policy and Mr. Anguiano was sent to the ED for further evaluation and management and the procedure was cancelled. In the ED, the patient remained without any symptoms as he did not have complaints of fevers/chills, chest pain/tightness, no N/V and no abdominal pain. He reports he forgot to take his lasix 80mg last night and this morning, also forgot to take his SPS suspension this morning which he takes for chronic hyperkalemia i/s/o CKD5.    In the ED:  Initial vital signs: T: 97.5F, HR: 66, BP: 163/57, R: 18, SpO2: 98% on RA  Labs: significant for WBC 10.9, Hgb 9.4, Hct 28.9, MCV 92.9, Plt 345. K 5.0, BUN/Cr 81/8.76, eGFR 6. Glucose 209.   EKG:  rate of 56, sinus rhythm, mobitz type 1 AV block  Medications: calcium gluconate 1g x 1  Consults: ICU

## 2024-02-06 NOTE — ED ADULT NURSE NOTE - NSFALLRISKASMT_ED_ALL_ED_DT
06-Feb-2024 14:57 Pain Refusal Text: I offered to prescribe pain medication but the patient refused to take this medication.

## 2024-02-06 NOTE — ED ADULT TRIAGE NOTE - OTHER COMPLAINTS
RN reports patient received Lokelma 10 gram PO, Insulin 5 units IV and Dextrose prior to ER arrival Patient denies chest pain. IV #20g noted to right forearm. RN reports patient received Lokelma 10 gram PO, Insulin 5 units IV and Dextrose prior to ER arrival. Patient denies chest pain. IV #20g noted to right forearm.

## 2024-02-06 NOTE — ED ADULT NURSE NOTE - NSFALLRISKINTERV_ED_ALL_ED
Assistance OOB with selected safe patient handling equipment if applicable/Assistance with ambulation/Communicate fall risk and risk factors to all staff, patient, and family/Monitor gait and stability/Provide visual cue: yellow wristband, yellow gown, etc/Reinforce activity limits and safety measures with patient and family/Call bell, personal items and telephone in reach/Instruct patient to call for assistance before getting out of bed/chair/stretcher/Non-slip footwear applied when patient is off stretcher/[place] to call system/Physically safe environment - no spills, clutter or unnecessary equipment/Purposeful Proactive Rounding/Room/bathroom lighting operational, light cord in reach

## 2024-02-06 NOTE — ED PROVIDER NOTE - OBJECTIVE STATEMENT
83y M w/ hx of HTN, DM, HLD, CKD Stage 5 (neprologist Dr. Timothy Winston) presenting with hyperK. Pt was scheduled to receive AVF today in preparation for dialysis but was noted to be hyperK. Was given insulin, dextrose, lokelma upstairs. Neprology called, recommended rechecking but PACU nurses stated it was against policy and pt was sent to ER for further evaluation and management. Pt without any symptoms. No f/c/cp/sob/palpitations/weakness/abd pain/n/v. ROS as above.

## 2024-02-06 NOTE — ED ADULT NURSE NOTE - OBJECTIVE STATEMENT
pt received into spot 3 A&Ox4 ambulatory appears comfortable arrives from same day surgery with RN for eval of elevated K on labs pre- HD fistula pt received into spot 3 A&Ox4 ambulatory appears comfortable arrives from same day surgery with RN for eval of elevated K on labs pre- HD fistula placed to left arm. was noted to have elevated potassium, given LOKELMA insulin and dextrose sent to ED pending calcium gluconate. Upon arrival 12 lead ekg done bradycardic to [facility], pt denies cp sob palpitations lightheadedness dizziness weakness fever chills pain. Respirations unlabored sating 99% on RA abd soft nondistended no vomiting has been NPO since last night at 10pm. Calcium given per orders son at bedside

## 2024-02-06 NOTE — ED ADULT NURSE REASSESSMENT NOTE - NS ED NURSE REASSESS COMMENT FT1
pt resting on stretcher denies head ache CP SOB palpitations lightheadedness dizziness weakness. Respirations unlabored. Bradycardic to [facility] MD [place] aware. family at bedside pending final dispo

## 2024-02-06 NOTE — H&P ADULT - NSHPPHYSICALEXAM_GEN_ALL_CORE
Constitutional: NAD  HEENT: NC/AT, PERRL/EOMI, anicteric sclera, No JVD or thyromegaly, MMM  Respiratory: CTA B/L; no audible wheezing/ronchi/rales  Cardiac: +S1/S2; bradycardic, regular rhythm, no M/R/G;   Gastrointestinal: soft, NT/ND; no rebound or guarding; +BS all 4 quadrants  Extremities: WWP, no clubbing or cyanosis; no peripheral edema  Vascular: 2+ radial & DP pulses  Dermatologic: skin warm, dry and intact; no rashes, wounds, or scars  Neurologic: AAOx3; CNII-XII grossly intact; no focal deficits  Psychiatric: affect and characteristics of appearance, verbalizations, behaviors are appropriate Constitutional: NAD  HEENT: NC/AT, PERRL/EOMI, anicteric sclera, No JVD or thyromegaly, MMM  Respiratory: mild crackles b/l lung bases; no audible wheezing/ronchi/rales  Cardiac: +S1/S2; bradycardic, regular rhythm, no M/R/G;   Gastrointestinal: soft, NT/ND; no rebound or guarding; +BS all 4 quadrants  Extremities: WWP, no clubbing or cyanosis; 1-2+ pitting peripheral edema b/l (below the knee)  Vascular: 2+ radial & DP pulses  Dermatologic: skin warm, dry and intact; no rashes, scratch marks on the legs b/l  Neurologic: AAOx3; CNII-XII grossly intact; no focal deficits  Psychiatric: affect and characteristics of appearance, verbalizations, behaviors are appropriate

## 2024-02-06 NOTE — CONSULT NOTE ADULT - SUBJECTIVE AND OBJECTIVE BOX
NEPHROLOGY SERVICE INITIAL CONSULT NOTE    Nico Anguiano is an 82 yo M w/ PMH of CKD5, HTN, HLD, DM2 presenting for creation of AVF in preparation for dialysis. Pre-op labs revealed K 6 and procedure was canceled. Patient given lokelma and insulin, transferred to ED. Reports missing medications last night and this morning. Repeat K 5. Patient comfortable, offers no complaints. Nephrology consulted for further management of CKD5 and hyperkalemia.    REVIEW OF SYSTEMS:   Otherwise negative except as specified in HPI    PAST MEDICAL AND SURGICAL HISTORY:   PAST MEDICAL & SURGICAL HISTORY:  HTN (hypertension)      HLD (hyperlipidemia)      DM (diabetes mellitus)      Chronic kidney disease (CKD)  stage V      Anemia      Glaucoma      H/O shoulder surgery  left      H/O knee surgery  left      H/O eye surgery  b/l glaucoma          FAMILY HISTORY:  FAMILY HISTORY:      Otherwise Noncontributory to current admission    SOCIAL HISTORY:  Tobacco use: Denies  EtOH use: Denies  Illicit drug use: Denies    HOME MEDICATIONS:      ACTIVE MEDICATIONS:  MEDICATIONS  (STANDING):  atorvastatin 10 milliGRAM(s) Oral at bedtime  calcitriol   Capsule 0.25 MICROGram(s) Oral daily  dextrose 5%. 1000 milliLiter(s) (50 mL/Hr) IV Continuous <Continuous>  dextrose 5%. 1000 milliLiter(s) (100 mL/Hr) IV Continuous <Continuous>  dextrose 50% Injectable 25 Gram(s) IV Push once  dextrose 50% Injectable 25 Gram(s) IV Push once  dextrose 50% Injectable 12.5 Gram(s) IV Push once  furosemide    Tablet 80 milliGRAM(s) Oral every 8 hours  glucagon  Injectable 1 milliGRAM(s) IntraMuscular once  heparin   Injectable 5000 Unit(s) SubCutaneous once  influenza  Vaccine (HIGH DOSE) 0.7 milliLiter(s) IntraMuscular once  insulin glargine Injectable (LANTUS) 20 Unit(s) SubCutaneous at bedtime  insulin lispro (ADMELOG) corrective regimen sliding scale   SubCutaneous Before meals and at bedtime  NIFEdipine XL 60 milliGRAM(s) Oral every 12 hours  sevelamer carbonate 1600 milliGRAM(s) Oral three times a day with meals  sodium zirconium cyclosilicate 10 Gram(s) Oral every 8 hours    MEDICATIONS  (PRN):  acetaminophen     Tablet .. 650 milliGRAM(s) Oral every 6 hours PRN Temp greater or equal to 38C (100.4F), Mild Pain (1 - 3)  dextrose Oral Gel 15 Gram(s) Oral once PRN Blood Glucose LESS THAN 70 milliGRAM(s)/deciliter  melatonin 3 milliGRAM(s) Oral at bedtime PRN Insomnia      ALLERGIES:  Allergies    No Known Allergies    Intolerances        VITAL SIGNS:  Vital Signs Last 24 Hrs  T(C): 36.7 (06 Feb 2024 17:56), Max: 36.7 (06 Feb 2024 17:56)  T(F): 98.1 (06 Feb 2024 17:56), Max: 98.1 (06 Feb 2024 17:56)  HR: 58 (06 Feb 2024 18:17) (48 - 80)  BP: 188/77 (06 Feb 2024 18:17) (132/65 - 209/88)  BP(mean): 111 (06 Feb 2024 18:17) (110 - 127)  RR: 18 (06 Feb 2024 18:17) (16 - 18)  SpO2: 96% (06 Feb 2024 18:17) (94% - 99%)    Parameters below as of 06 Feb 2024 14:23  Patient On (Oxygen Delivery Method): room air        02-06-24 @ 07:01  -  02-06-24 @ 19:01  --------------------------------------------------------  IN:  Total IN: 0 mL    OUT:    Voided (mL): 125 mL  Total OUT: 125 mL    Total NET: -125 mL          PHYSICAL EXAM:  Constitutional: NAD, lying in bed  Head: NCAT, EOMI  Respiratory: CTAB, no crackles anteriorly  Cardiac: Regular rate  Gastrointestinal: abdomen soft, NT/ND  Extremities: 1+ LE edema  Neurologic: Awake, alert, interactive    LABS:                        9.4    10.91 )-----------( 345      ( 06 Feb 2024 12:44 )             28.9     02-06    138  |  102  |  81<H>  ----------------------------<  209<H>  5.0   |  24  |  8.76<H>    Ca    8.6      06 Feb 2024 12:44  Mg     2.4     02-06    TPro  7.1  /  Alb  3.1<L>  /  TBili  0.2  /  DBili  x   /  AST  10  /  ALT  7<L>  /  AlkPhos  80  02-06      Urinalysis Basic - ( 06 Feb 2024 12:44 )    Color: x / Appearance: x / SG: x / pH: x  Gluc: 209 mg/dL / Ketone: x  / Bili: x / Urobili: x   Blood: x / Protein: x / Nitrite: x   Leuk Esterase: x / RBC: x / WBC x   Sq Epi: x / Non Sq Epi: x / Bacteria: x          CAPILLARY BLOOD GLUCOSE      POCT Blood Glucose.: 140 mg/dL (06 Feb 2024 16:54)  POCT Blood Glucose.: 154 mg/dL (06 Feb 2024 12:09)  POCT Blood Glucose.: 161 mg/dL (06 Feb 2024 09:35)          RADIOLOGY & ADDITIONAL TESTS: Reviewed.

## 2024-02-06 NOTE — H&P ADULT - PROBLEM SELECTOR PLAN 7
Hgb on admission 9.4, MCV 92.9.   Currently no signs of active bleeding (no hematochezia, melena, hemoptysis, hematuria).  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  - obtain iron panel, LDH, haptoglobin, retic count  - obtain B12/folate  - trend CBC  - maintain active T&S  - transfuse if Hgb <7 Multi year history of HLD. Well controlled on home atorvastatin 10mg qhs.  - c/w home atorvastatin 10mg qhs  - encourage healthy eating as outpatient and follow up with outpatient PCP  - obtain lipid profile Multi year history of HLD. Well controlled on home atorvastatin 10mg qhs.  - c/w home atorvastatin 10mg qhs  - encourage healthy eating as outpatient and follow up with outpatient PCP  - f/u lipid panel

## 2024-02-06 NOTE — ED PROVIDER NOTE - CLINICAL SUMMARY MEDICAL DECISION MAKING FREE TEXT BOX
84 yo m with hyperK, received insulin/D50, lokelma in pacu. ordered for calcium. will recheck. neprology aware, will consult.

## 2024-02-06 NOTE — CONSULT NOTE ADULT - SUBJECTIVE AND OBJECTIVE BOX
[facility] SERVICE CONSULTATION NOTE    Consult Reason:     CC:     HPI:  83M PMH HTN, DM, HLD, CKD stage 5, presenting for hyperkalemia. Pt follows with nephrologist Dr. Timothy Winston and vascular surgery Dr. Dc Lai, was planned for elective LUE AVF creation today. Pre-procedure VBG showed K of 5.9, BMP was obtained showing K of 6.0, pt was given insulin       ROS:  Otherwise negative, except as specified in HPI.    PMH:    PSH:    FH:    SH:    ALLERGIES:    MEDICATIONS:    VITAL SIGNS:  ICU Vital Signs Last 24 Hrs  T(C): 36.4 (06 Feb 2024 12:38), Max: 36.4 (06 Feb 2024 12:38)  T(F): 97.5 (06 Feb 2024 12:38), Max: 97.5 (06 Feb 2024 12:38)  HR: 48 (06 Feb 2024 14:23) (48 - 72)  BP: 132/65 (06 Feb 2024 14:23) (132/65 - 163/57)  BP(mean): --  ABP: --  ABP(mean): --  RR: 16 (06 Feb 2024 14:23) (16 - 18)  SpO2: 99% (06 Feb 2024 14:23) (98% - 99%)    O2 Parameters below as of 06 Feb 2024 14:23  Patient On (Oxygen Delivery Method): room air          CAPILLARY BLOOD GLUCOSE      POCT Blood Glucose.: 154 mg/dL (06 Feb 2024 12:09)      PHYSICAL EXAM:  Constitutional: resting comfortably in bed, NAD  HEENT: NC/AT; PERRL, anicteric sclera; no oropharyngeal erythema or exudates; MMM  Neck: supple, no appreciable JVD  Respiratory: CTA B/L, no W/R/R; respirations appear non-labored, conversive in full sentences  Cardiovascular: +S1/S2, RRR  Gastrointestinal: abdomen soft, NT/ND  Extremities: WWP; no clubbing, cyanosis or edema  Vascular: 2+ radial, femoral, and DP/PT pulses B/L  Dermatologic: skin normal color and turgor; no visible rashes  Neurological:     LABS:                        9.4    10.91 )-----------( 345      ( 06 Feb 2024 12:44 )             28.9     02-06    138  |  102  |  81<H>  ----------------------------<  209<H>  5.0   |  24  |  8.76<H>    Ca    8.6      06 Feb 2024 12:44  Mg     2.4     02-06    TPro  7.1  /  Alb  3.1<L>  /  TBili  0.2  /  DBili  x   /  AST  10  /  ALT  7<L>  /  AlkPhos  80  02-06            Urinalysis Basic - ( 06 Feb 2024 12:44 )    Color: x / Appearance: x / SG: x / pH: x  Gluc: 209 mg/dL / Ketone: x  / Bili: x / Urobili: x   Blood: x / Protein: x / Nitrite: x   Leuk Esterase: x / RBC: x / WBC x   Sq Epi: x / Non Sq Epi: x / Bacteria: x          EKG: Reviewed.    RADIOLOGY & ADDITIONAL TESTS: Reviewed. [facility] SERVICE CONSULTATION NOTE    Consult Reason:     CC:     HPI:  82 y/o primarily Cuban-speaking male w/ PMH HTN, DM, HLD, CKD stage 5, presenting for hyperkalemia. Pt follows with nephrologist Dr. Timothy Winston and vascular surgery Dr. Dc Lai, was planned for elective LUE AVF creation today. Pre-procedure VBG showed K of 5.9, BMP was obtained showing K of 6.0, pt was given lokelma 10g PO x1, insulin 5u IV x1, and 1 amp of d50; he was HDS and asymptomatic. Procedure was canceled and pt was sent to the ED. In ED, pt states he feels well and denies any acute complaints at this time. Reports chronic BLE edema which is unchanged. Denies fevers, chills, headache, CP, SOB, cough, orthopnea, abd pain, n/v/c/d, dizziness, lightheadedness, weakness, fatigue. Pt reports he forgot to take his lasix 80mg last night and this morning, also forgot to take his SPS suspension this morning which he takes for chronic hyperkalemia i/s/o CKD5. No known hx of cardiac disease, arrhythmias, bradycardia, never seen a cardiologist. Son at the bedside providing additional history. ICU consulted for hyperkalemia and bradycardia with new Mobitz type 1 AV block.    ED course:  Vitals: temp 97.5, HR 66 --> 48, /57, RR 18, SpO2 98% on RA  Labs: WBC 10.91, H&H 9.4/28.9 (baseline Hgb 10.7), plt wnl. K 5.9 -> 6.0, BUN/Cr 84/8.87 (baseline Cr 8.03), eGFR 6, CMP otherwise wnl.  EKG: HR 48 w/ Mobitz type 1 AV block, no ST-T changes.  Imaging: none  Interventions: calcium gluconate 1g IV x1      ROS:  Otherwise negative, except as specified in HPI.    PMH: HTN, DM, HLD, CKD stage 5    PSH: R shoulder surgery, L knee surgery    SH: Denies smoking, alcohol use, recreational drug use.    ALLERGIES: NKDA    MEDICATIONS:  sevelamer carbonate 1600mg PO TID  sodium bicarb 1300mg PO TID  lasix 80mg PO TID  nifedipine ER 60mg PO BID  lipitor 10mg PO qd  vitamin D3 0.25 mcg PO qd  valsartan 160mg PO qd  lantus 40u SQ qd  SPS suspension 15g/60mL 2 tbsp PO TID    VITAL SIGNS:  ICU Vital Signs Last 24 Hrs  T(C): 36.4 (06 Feb 2024 12:38), Max: 36.4 (06 Feb 2024 12:38)  T(F): 97.5 (06 Feb 2024 12:38), Max: 97.5 (06 Feb 2024 12:38)  HR: 48 (06 Feb 2024 14:23) (48 - 72)  BP: 132/65 (06 Feb 2024 14:23) (132/65 - 163/57)  BP(mean): --  ABP: --  ABP(mean): --  RR: 16 (06 Feb 2024 14:23) (16 - 18)  SpO2: 99% (06 Feb 2024 14:23) (98% - 99%)    O2 Parameters below as of 06 Feb 2024 14:23  Patient On (Oxygen Delivery Method): room air          CAPILLARY BLOOD GLUCOSE      POCT Blood Glucose.: 154 mg/dL (06 Feb 2024 12:09)      PHYSICAL EXAM:  Constitutional: resting comfortably in bed, NAD  HEENT: NC/AT; PERRL, anicteric sclera; no oropharyngeal erythema or exudates; MMM  Neck: supple, no appreciable JVD  Respiratory: CTA B/L, no W/R/R; respirations appear non-labored, conversive in full sentences  Cardiovascular: +S1/S2, RRR  Gastrointestinal: abdomen soft, NT/ND  Extremities: WWP; no clubbing, cyanosis or edema  Vascular: 2+ radial, femoral, and DP/PT pulses B/L  Dermatologic: skin normal color and turgor; no visible rashes  Neurological:     LABS:                        9.4    10.91 )-----------( 345      ( 06 Feb 2024 12:44 )             28.9     02-06    138  |  102  |  81<H>  ----------------------------<  209<H>  5.0   |  24  |  8.76<H>    Ca    8.6      06 Feb 2024 12:44  Mg     2.4     02-06    TPro  7.1  /  Alb  3.1<L>  /  TBili  0.2  /  DBili  x   /  AST  10  /  ALT  7<L>  /  AlkPhos  80  02-06            Urinalysis Basic - ( 06 Feb 2024 12:44 )    Color: x / Appearance: x / SG: x / pH: x  Gluc: 209 mg/dL / Ketone: x  / Bili: x / Urobili: x   Blood: x / Protein: x / Nitrite: x   Leuk Esterase: x / RBC: x / WBC x   Sq Epi: x / Non Sq Epi: x / Bacteria: x          EKG: Reviewed.    RADIOLOGY & ADDITIONAL TESTS: Reviewed. [facility] SERVICE CONSULTATION NOTE    Consult Reason: hyperkalemia, new Mobitz type 1 AV block    CC: hyperkalemia    HPI:  84 y/o primarily Swedish-speaking male w/ PMH HTN, DM, HLD, CKD stage 5, presenting for hyperkalemia. Pt follows with nephrologist Dr. Timothy Winston and vascular surgery Dr. Dc Lai, was planned for elective LUE AVF creation today. Pre-procedure VBG showed K of 5.9, BMP was obtained showing K of 6.0, pt was given lokelma 10g PO x1, insulin 5u IV x1, and 1 amp of d50; he was HDS and asymptomatic. Procedure was canceled and pt was sent to the ED. In ED, pt states he feels well and denies any acute complaints at this time. Reports chronic BLE edema which is unchanged. Denies fevers, chills, headache, CP, SOB, cough, orthopnea, abd pain, n/v/c/d, dizziness, lightheadedness, weakness, fatigue. Pt reports he forgot to take his lasix 80mg last night and this morning, also forgot to take his SPS suspension this morning which he takes for chronic hyperkalemia i/s/o CKD5. No known hx of cardiac disease, arrhythmias, bradycardia, never seen a cardiologist. Son at the bedside providing additional history. ICU consulted for hyperkalemia and bradycardia with new Mobitz type 1 AV block.    ED course:  Vitals: temp 97.5, HR 66 --> 48, /57, RR 18, SpO2 98% on RA  Labs: WBC 10.91, H&H 9.4/28.9 (baseline Hgb 10.7), plt wnl. K 5.9 -> 6.0, BUN/Cr 84/8.87 (baseline Cr 8.03), eGFR 6, CMP otherwise wnl.  EKG: HR 48 w/ Mobitz type 1 AV block, no ST-T changes.  Imaging: none  Interventions: calcium gluconate 1g IV x1      ROS:  Otherwise negative, except as specified in HPI.    PMH: HTN, DM, HLD, CKD stage 5    PSH: R shoulder surgery, L knee surgery    SH: Denies smoking, alcohol use, recreational drug use.    ALLERGIES: NKDA    MEDICATIONS:  sevelamer carbonate 1600mg PO TID  sodium bicarb 1300mg PO TID  lasix 80mg PO TID  nifedipine ER 60mg PO BID  lipitor 10mg PO qd  vitamin D3 0.25 mcg PO qd  valsartan 160mg PO qd  lantus 40u SQ qd  SPS suspension 15g/60mL 2 tbsp PO TID    VITAL SIGNS:  ICU Vital Signs Last 24 Hrs  T(C): 36.4 (06 Feb 2024 12:38), Max: 36.4 (06 Feb 2024 12:38)  T(F): 97.5 (06 Feb 2024 12:38), Max: 97.5 (06 Feb 2024 12:38)  HR: 48 (06 Feb 2024 14:23) (48 - 72)  BP: 132/65 (06 Feb 2024 14:23) (132/65 - 163/57)  BP(mean): --  ABP: --  ABP(mean): --  RR: 16 (06 Feb 2024 14:23) (16 - 18)  SpO2: 99% (06 Feb 2024 14:23) (98% - 99%)    O2 Parameters below as of 06 Feb 2024 14:23  Patient On (Oxygen Delivery Method): room air          CAPILLARY BLOOD GLUCOSE      POCT Blood Glucose.: 154 mg/dL (06 Feb 2024 12:09)      PHYSICAL EXAM:  Constitutional: resting comfortably in bed, NAD  HEENT: NC/AT; PERRL, anicteric sclera; no oropharyngeal erythema or exudates; MMM  Neck: supple, no appreciable JVD  Respiratory: CTA B/L, no W/R/R; respirations appear non-labored, conversive in full sentences  Cardiovascular: +S1/S2, RRR  Gastrointestinal: abdomen soft, NT/ND  Extremities: WWP; no clubbing, cyanosis or edema  Vascular: 2+ radial, femoral, and DP/PT pulses B/L  Dermatologic: skin normal color and turgor; no visible rashes  Neurological:     LABS:                        9.4    10.91 )-----------( 345      ( 06 Feb 2024 12:44 )             28.9     02-06    138  |  102  |  81<H>  ----------------------------<  209<H>  5.0   |  24  |  8.76<H>    Ca    8.6      06 Feb 2024 12:44  Mg     2.4     02-06    TPro  7.1  /  Alb  3.1<L>  /  TBili  0.2  /  DBili  x   /  AST  10  /  ALT  7<L>  /  AlkPhos  80  02-06            Urinalysis Basic - ( 06 Feb 2024 12:44 )    Color: x / Appearance: x / SG: x / pH: x  Gluc: 209 mg/dL / Ketone: x  / Bili: x / Urobili: x   Blood: x / Protein: x / Nitrite: x   Leuk Esterase: x / RBC: x / WBC x   Sq Epi: x / Non Sq Epi: x / Bacteria: x          EKG: Reviewed.    RADIOLOGY & ADDITIONAL TESTS: Reviewed. [facility] SERVICE CONSULTATION NOTE    Consult Reason: hyperkalemia, new Mobitz type 1 AV block    CC: hyperkalemia    HPI:  82 y/o primarily Sami-speaking male w/ PMH HTN, DM, HLD, CKD stage 5, presenting for hyperkalemia. Pt follows with nephrologist Dr. Timothy Winston and vascular surgery Dr. Dc Lai, was planned for elective LUE AVF creation today. Pre-procedure VBG showed K of 5.9, BMP was obtained showing K of 6.0, pt was given lokelma 10g PO x1, insulin 5u IV x1, and 1 amp of d50; he was HDS and asymptomatic. Procedure was canceled and pt was sent to the ED. In ED, pt states he feels well and denies any acute complaints at this time. Reports chronic BLE edema which is unchanged. Denies fevers, chills, headache, CP, SOB, cough, orthopnea, abd pain, n/v/c/d, dizziness, lightheadedness, weakness, fatigue. Pt reports he forgot to take his lasix 80mg last night and this morning, also forgot to take his SPS suspension this morning which he takes for chronic hyperkalemia i/s/o CKD5. No known hx of cardiac disease, arrhythmias, bradycardia, never seen a cardiologist. Son at the bedside providing additional history. ICU consulted for hyperkalemia and bradycardia with new Mobitz type 1 AV block.    ED course:  Vitals: temp 97.5, HR 66 --> 48, /57, RR 18, SpO2 98% on RA  Labs: WBC 10.91, H&H 9.4/28.9 (baseline Hgb 10.7), plt wnl. K 5.9 -> 6.0, BUN/Cr 84/8.87 (baseline Cr 8.03), eGFR 6, CMP otherwise wnl.  EKG: HR 48 w/ Mobitz type 1 AV block, no ST-T changes.  Imaging: none  Interventions: calcium gluconate 1g IV x1      ROS:  Otherwise negative, except as specified in HPI.    PMH: HTN, DM, HLD, CKD stage 5    PSH: R shoulder surgery, L knee surgery    SH: Denies smoking, alcohol use, recreational drug use.    ALLERGIES: NKDA    MEDICATIONS:  sevelamer carbonate 1600mg PO TID  sodium bicarb 1300mg PO TID  lasix 80mg PO TID  nifedipine ER 60mg PO BID  lipitor 10mg PO qd  vitamin D3 0.25 mcg PO qd  valsartan 160mg PO qd  lantus 40u SQ qd  SPS suspension 15g/60mL 2 tbsp PO TID    VITAL SIGNS:  ICU Vital Signs Last 24 Hrs  T(C): 36.4 (06 Feb 2024 12:38), Max: 36.4 (06 Feb 2024 12:38)  T(F): 97.5 (06 Feb 2024 12:38), Max: 97.5 (06 Feb 2024 12:38)  HR: 48 (06 Feb 2024 14:23) (48 - 72)  BP: 132/65 (06 Feb 2024 14:23) (132/65 - 163/57)  BP(mean): --  ABP: --  ABP(mean): --  RR: 16 (06 Feb 2024 14:23) (16 - 18)  SpO2: 99% (06 Feb 2024 14:23) (98% - 99%)    O2 Parameters below as of 06 Feb 2024 14:23  Patient On (Oxygen Delivery Method): room air          CAPILLARY BLOOD GLUCOSE      POCT Blood Glucose.: 154 mg/dL (06 Feb 2024 12:09)      PHYSICAL EXAM:  Constitutional: resting comfortably in bed, NAD  HEENT: NC/AT; PERRL, anicteric sclera; no oropharyngeal erythema or exudates; MMM  Neck: supple, no appreciable JVD  Respiratory: CTA B/L, no W/R/R; respirations appear non-labored, conversive in full sentences  Cardiovascular: +S1/S2, RRR  Gastrointestinal: abdomen soft, NT/ND  Extremities: WWP; no clubbing, cyanosis, BLE pitting edema assisted up shins  Vascular: 2+ radial, femoral, and DP/PT pulses B/L  Dermatologic: skin normal color and turgor; no visible rashes  Neurological: A&Ox3, no focal deficits    LABS:                        9.4    10.91 )-----------( 345      ( 06 Feb 2024 12:44 )             28.9     02-06    138  |  102  |  81<H>  ----------------------------<  209<H>  5.0   |  24  |  8.76<H>    Ca    8.6      06 Feb 2024 12:44  Mg     2.4     02-06    TPro  7.1  /  Alb  3.1<L>  /  TBili  0.2  /  DBili  x   /  AST  10  /  ALT  7<L>  /  AlkPhos  80  02-06            Urinalysis Basic - ( 06 Feb 2024 12:44 )    Color: x / Appearance: x / SG: x / pH: x  Gluc: 209 mg/dL / Ketone: x  / Bili: x / Urobili: x   Blood: x / Protein: x / Nitrite: x   Leuk Esterase: x / RBC: x / WBC x   Sq Epi: x / Non Sq Epi: x / Bacteria: x          EKG: Reviewed.    RADIOLOGY & ADDITIONAL TESTS: Reviewed.

## 2024-02-06 NOTE — H&P ADULT - PROBLEM SELECTOR PLAN 3
New diagnosis for the patient, discovered on ED EKG this admission.   Patient without symptoms of light-headedness, dizziness, or fatigue. No recent loss of consciousness/syncope.  Etiology: metabolic abnormalities New diagnosis for the patient, discovered on ED EKG this admission.   Patient without symptoms of light-headedness, dizziness, or fatigue. No recent loss of consciousness/syncope.  Etiology: metabolic abnormalities (hyperkalemia), medications slowing alberto conduction (digoxin, beta-adrenergic blockers, calcium-channel blockers, amiodarone), or increased parasympathetic activation of the nervous system (increased vagal tone), which is often seen in healthy athletes.   Other causes of Mobitz type I block include a heart attack, disorders affecting the heart muscle walls (cardiomyopathies), inflammation of the heart muscle (myocarditis), infection of the inner layer of the heart (endocarditis), inherited heart defects, infiltrative and autoimmune disorders, and cardiac surgical procedures. New diagnosis for the patient, discovered on ED EKG this admission.   Patient without symptoms of light-headedness, dizziness, or fatigue. No recent loss of consciousness/syncope.  Etiology most likely iso metabolic abnormalities (pt hyperkalemic), less likely 2/2 medications slowing alberto conduction (however pt is notably on calcium-channel blockers/amiodarone). Less likely 2/2 MI as no ST changes on ECG/no symptoms. Cannot r/o infiltrative /autoimmune disorders, however less likely since this is a new diagnosis.  - monitor ECGs after addressing possibly contributing etiology/factors as per above   - address reversible causes of alberto block as per above/below  - atropine if needed to improve av node conduction  - no need for EP consult for temporary pacemaker at this time New diagnosis for the patient, discovered on ED EKG this admission.   Patient without symptoms of light-headedness, dizziness, or fatigue. No recent loss of consciousness/syncope.  Etiology most likely iso metabolic abnormalities (pt hyperkalemic), less likely 2/2 medications slowing alberto conduction (however pt is notably on calcium-channel blockers/amiodarone). Less likely 2/2 MI as no ST changes on ECG/no symptoms. Cannot r/o infiltrative /autoimmune disorders, however less likely since this is a new diagnosis.  - monitor ECGs after addressing possibly contributing etiology/factors as per above   - address reversible causes of alberto block as per above/below  - atropine if needed to improve av node conduction  - no need for EP consult for temporary pacemaker at this time  - cardiology consulted f/u recs  - c to avoid beta-blockers, including ophthalmic solution  - check a TSH New diagnosis for the patient, discovered on ED EKG this admission.   Patient without symptoms of light-headedness, dizziness, or fatigue. No recent loss of consciousness/syncope.  Etiology most likely iso metabolic abnormalities (pt hyperkalemic), less likely 2/2 medications slowing alberto conduction (however pt is notably on calcium-channel blockers/amiodarone). Less likely 2/2 MI as no ST changes on ECG/no symptoms. Cannot r/o infiltrative /autoimmune disorders, however less likely since this is a new diagnosis.  - monitor ECGs after addressing possibly contributing etiology/factors as per above   - address reversible causes of alberto block as per above/below  - atropine if needed to improve av node conduction  - no need for EP consult for temporary pacemaker at this time  - cardiology consulted in the ED, appreciate recs  - holding beta-blockers, including ophthalmic solution timolol, can resume as appropriate   - f/u TSH

## 2024-02-06 NOTE — PATIENT PROFILE ADULT - FALL HARM RISK - HARM RISK INTERVENTIONS

## 2024-02-06 NOTE — CONSULT NOTE ADULT - ASSESSMENT
82 yo M w/ PMH of CKD5, HTN, HLD, DM2 presenting for creation of AVF in preparation for dialysis. Pre-op labs revealed K 6 and procedure was canceled. Patient given lokelma and insulin, transferred to ED. Reports missing medications last night and this morning. Repeat K 5. Patient comfortable, offers no complaints. Nephrology consulted for further management of CKD5 and hyperkalemia.    CKD5 with hyperkalemia  - Discussed with patient, family at bedside, and outpatient Nephrologist Dr. Aguirre, will plan to start dialysis given electrolyte derangement and advanced CKD.  - Can continue lokelma 10g q8h for hyperkalemia  - TDC to be placed tomorrow, will initiate HD afterward  - NPO after midnight  - Renal diet, fluid restriction <1.2L/day, strict I&O, daily standing weights  - Check hepatitis panel and quantiferon  - Social work consult for HD unit placement

## 2024-02-06 NOTE — H&P ADULT - NSHPLABSRESULTS_GEN_ALL_CORE
9.4    10.91 )-----------( 345      ( 06 Feb 2024 12:44 )             28.9       02-06    138  |  102  |  81<H>  ----------------------------<  209<H>  5.0   |  24  |  8.76<H>    Ca    8.6      06 Feb 2024 12:44  Mg     2.4     02-06    TPro  7.1  /  Alb  3.1<L>  /  TBili  0.2  /  DBili  x   /  AST  10  /  ALT  7<L>  /  AlkPhos  80  02-06         CAPILLARY BLOOD GLUCOSE      POCT Blood Glucose.: 154 mg/dL (06 Feb 2024 12:09)        Lactate Trend      Urinalysis Basic - ( 06 Feb 2024 12:44 )    Color: x / Appearance: x / SG: x / pH: x  Gluc: 209 mg/dL / Ketone: x  / Bili: x / Urobili: x   Blood: x / Protein: x / Nitrite: x   Leuk Esterase: x / RBC: x / WBC x   Sq Epi: x / Non Sq Epi: x / Bacteria: x      RADIOLOGY, EKG & ADDITIONAL TESTS: Reviewed.

## 2024-02-06 NOTE — ED ADULT NURSE NOTE - OTHER COMPLAINTS
RN reports patient received Lokelma 10 gram PO, Insulin 5 units IV and Dextrose prior to ER arrival. Patient denies chest pain. IV #20g noted to right forearm.

## 2024-02-06 NOTE — ED ADULT TRIAGE NOTE - CHIEF COMPLAINT QUOTE
Per RN from same day surgery, patient was scheduled for AV fistula procedure today, had elevated potassium level.

## 2024-02-06 NOTE — H&P ADULT - PROBLEM SELECTOR PLAN 2
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)  - Continue to trend Cr  - Avoid nephrotoxic agents  - Adjust medication dosages for level of renal function  - ultimate plan is for AVF placement 2/7 and dialysis 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)  - Continue to trend Cr  - Avoid nephrotoxic agents  - Adjust medication dosages for level of renal function  - c lokelma 10g PO TID x 4 more doses  - c sodium bicarb 1300mg PO TID  - c vitamin D3 0.25mcg PO qd  - c renvela 1800mg PO TID  - ultimate plan is for AVF placement 2/7 and dialysis, npo at [place], pre op ecg and labs in AM 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)  - Continue to trend Cr  - Avoid nephrotoxic agents  - Adjust medication dosages for level of renal function  - c/w lokelma 10g PO TID x 4 more doses  - c/w lasix 80mg PO TID  - c/w sodium bicarb 1300mg PO TID  - c/w vitamin D3 0.25mcg PO qd  - c/w renvela 1800mg PO TID  - renal diet with 1.2L fluid restriction  - NPO at midnight for TDC placement 2/7 followed by HD, as per renal recs

## 2024-02-06 NOTE — CHART NOTE - NSCHARTNOTEFT_GEN_A_CORE
Vascular surgery team was notified that patient's potassium level was 5.9, patient was hemodynamically normal and asymptomatic. Repeat potassium levels were obtained that showed K+ level of 6.0. Vascular team went to assess patient who remained hemodynamically stable and asymptomatic. Team spoke with nephrology (outpatient nephrologist made aware) who recommended treatment with lokelma, insulin, and calcium with repeat K+ level test in 30mins. Per nephrology, if patient's K+ levels decreased then patient could leave hospital to home with outpatient follow-up, if K+ levels were to remain the same or increase then patient should be sent to ED. Lokelma, insulin, and calcium were given in pre-op area. Team spoke with pre-op holding director who advised team that repeat K+ level test 30mins after lokelma, insulin, and calcium treatment was against policy. Per pre-op policy, patient was sent to ED for further workup and management. Vascular surgery team was notified that patient's potassium level was 5.9, patient was hemodynamically normal and asymptomatic. Repeat potassium levels were obtained that showed K+ level of 6.0. Vascular team went to assess patient who remained hemodynamically stable and asymptomatic. Team spoke with nephrology (outpatient nephrologist made aware) who recommended treatment with lokelma, insulin, and calcium with repeat K+ level test in 30mins. Per nephrology, if patient's K+ levels decreased then patient could leave hospital to home with outpatient follow-up, if K+ levels were to remain the same or increase then patient should be sent to ED. EKG was ordered however not obtained. Lokelma, insulin, and calcium were given in pre-op area. Team spoke with pre-op holding director who advised team that repeat K+ level test 30mins after lokelma, insulin, and calcium treatment and EKG was against policy. Per pre-op policy, patient was sent to ED for further workup and management.

## 2024-02-06 NOTE — H&P ADULT - PROBLEM SELECTOR PLAN 8
F: NI  E: Replete as needed, careful given HD needs  N: Renal diet, npo after MN  Dvt ppx: hold iso procedure, SCDs  GI ppx: NI  Dispo: Tele  Code status: Full F: NI  E: Replete as needed, careful given HD needs  N: Renal diet, npo after MN  Dvt ppx: heparin subq  GI ppx: NI  Dispo: Tele  Code status: Full Hgb on admission 9.4, MCV 92.9.   Currently no signs of active bleeding (no hematochezia, melena, hemoptysis, hematuria).  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  - obtain iron panel, LDH, haptoglobin, retic count  - obtain B12/folate  - trend CBC  - maintain active T&S  - transfuse if Hgb <7 Hgb on admission 9.4, MCV 92.9.   Currently no signs of active bleeding (no hematochezia, melena, hemoptysis, hematuria).  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  - f/u iron panel, LDH, haptoglobin, reticuocyte count  - f/u B12/folate  - trend CBC  - maintain active T&S  - transfuse if Hgb <7

## 2024-02-06 NOTE — PRE-ANESTHESIA EVALUATION ADULT - NSANTHOSAYNRD_GEN_A_CORE
No. QUAN screening performed.  STOP BANG Legend: 0-2 = LOW Risk; 3-4 = INTERMEDIATE Risk; 5-8 = HIGH Risk

## 2024-02-07 PROBLEM — H40.9 UNSPECIFIED GLAUCOMA: Chronic | Status: ACTIVE | Noted: 2024-02-05

## 2024-02-07 PROBLEM — E78.5 HYPERLIPIDEMIA, UNSPECIFIED: Chronic | Status: ACTIVE | Noted: 2024-02-05

## 2024-02-07 PROBLEM — D64.9 ANEMIA, UNSPECIFIED: Chronic | Status: ACTIVE | Noted: 2024-02-05

## 2024-02-07 PROBLEM — I10 ESSENTIAL (PRIMARY) HYPERTENSION: Chronic | Status: ACTIVE | Noted: 2024-02-05

## 2024-02-07 LAB
A1C WITH ESTIMATED AVERAGE GLUCOSE RESULT: 7.7 % — HIGH (ref 4–5.6)
ANION GAP SERPL CALC-SCNC: 12 MMOL/L — SIGNIFICANT CHANGE UP (ref 5–17)
APTT BLD: 28.5 SEC — SIGNIFICANT CHANGE UP (ref 24.5–35.6)
BLD GP AB SCN SERPL QL: NEGATIVE — SIGNIFICANT CHANGE UP
BUN SERPL-MCNC: 85 MG/DL — HIGH (ref 7–23)
CALCIUM SERPL-MCNC: 8.7 MG/DL — SIGNIFICANT CHANGE UP (ref 8.4–10.5)
CHLORIDE SERPL-SCNC: 105 MMOL/L — SIGNIFICANT CHANGE UP (ref 96–108)
CHOLEST SERPL-MCNC: 134 MG/DL — SIGNIFICANT CHANGE UP
CO2 SERPL-SCNC: 21 MMOL/L — LOW (ref 22–31)
CREAT SERPL-MCNC: 8.94 MG/DL — HIGH (ref 0.5–1.3)
EGFR: 5 ML/MIN/1.73M2 — LOW
ESTIMATED AVERAGE GLUCOSE: 174 MG/DL — HIGH (ref 68–114)
FERRITIN SERPL-MCNC: 345 NG/ML — SIGNIFICANT CHANGE UP (ref 30–400)
FOLATE SERPL-MCNC: 3.2 NG/ML — LOW
GLUCOSE BLDC GLUCOMTR-MCNC: 120 MG/DL — HIGH (ref 70–99)
GLUCOSE BLDC GLUCOMTR-MCNC: 131 MG/DL — HIGH (ref 70–99)
GLUCOSE BLDC GLUCOMTR-MCNC: 142 MG/DL — HIGH (ref 70–99)
GLUCOSE BLDC GLUCOMTR-MCNC: 71 MG/DL — SIGNIFICANT CHANGE UP (ref 70–99)
GLUCOSE BLDC GLUCOMTR-MCNC: 72 MG/DL — SIGNIFICANT CHANGE UP (ref 70–99)
GLUCOSE BLDC GLUCOMTR-MCNC: 78 MG/DL — SIGNIFICANT CHANGE UP (ref 70–99)
GLUCOSE SERPL-MCNC: 142 MG/DL — HIGH (ref 70–99)
HAPTOGLOB SERPL-MCNC: 306 MG/DL — HIGH (ref 34–200)
HAV IGM SER-ACNC: SIGNIFICANT CHANGE UP
HBV CORE IGM SER-ACNC: SIGNIFICANT CHANGE UP
HBV SURFACE AG SER-ACNC: SIGNIFICANT CHANGE UP
HCT VFR BLD CALC: 28.5 % — LOW (ref 39–50)
HCV AB S/CO SERPL IA: 0.03 S/CO — SIGNIFICANT CHANGE UP
HCV AB SERPL-IMP: SIGNIFICANT CHANGE UP
HDLC SERPL-MCNC: 54 MG/DL — SIGNIFICANT CHANGE UP
HGB BLD-MCNC: 9.4 G/DL — LOW (ref 13–17)
INR BLD: 0.92 — SIGNIFICANT CHANGE UP (ref 0.85–1.18)
IRON SATN MFR SERPL: 22 % — SIGNIFICANT CHANGE UP (ref 16–55)
IRON SATN MFR SERPL: 38 UG/DL — LOW (ref 45–165)
LDH SERPL L TO P-CCNC: 168 U/L — SIGNIFICANT CHANGE UP (ref 50–242)
LIPID PNL WITH DIRECT LDL SERPL: 61 MG/DL — SIGNIFICANT CHANGE UP
MCHC RBC-ENTMCNC: 30.7 PG — SIGNIFICANT CHANGE UP (ref 27–34)
MCHC RBC-ENTMCNC: 33 GM/DL — SIGNIFICANT CHANGE UP (ref 32–36)
MCV RBC AUTO: 93.1 FL — SIGNIFICANT CHANGE UP (ref 80–100)
NON HDL CHOLESTEROL: 80 MG/DL — SIGNIFICANT CHANGE UP
NRBC # BLD: 0 /100 WBCS — SIGNIFICANT CHANGE UP (ref 0–0)
PLATELET # BLD AUTO: 342 K/UL — SIGNIFICANT CHANGE UP (ref 150–400)
POTASSIUM SERPL-MCNC: 4.4 MMOL/L — SIGNIFICANT CHANGE UP (ref 3.5–5.3)
POTASSIUM SERPL-SCNC: 4.4 MMOL/L — SIGNIFICANT CHANGE UP (ref 3.5–5.3)
PROTHROM AB SERPL-ACNC: 10.5 SEC — SIGNIFICANT CHANGE UP (ref 9.5–13)
RBC # BLD: 3.03 M/UL — LOW (ref 4.2–5.8)
RBC # BLD: 3.06 M/UL — LOW (ref 4.2–5.8)
RBC # FLD: 12.7 % — SIGNIFICANT CHANGE UP (ref 10.3–14.5)
RETICS #: 32.4 K/UL — SIGNIFICANT CHANGE UP (ref 25–125)
RETICS/RBC NFR: 1.1 % — SIGNIFICANT CHANGE UP (ref 0.5–2.5)
RH IG SCN BLD-IMP: NEGATIVE — SIGNIFICANT CHANGE UP
SODIUM SERPL-SCNC: 138 MMOL/L — SIGNIFICANT CHANGE UP (ref 135–145)
TIBC SERPL-MCNC: 169 UG/DL — LOW (ref 220–430)
TRANSFERRIN SERPL-MCNC: 135 MG/DL — LOW (ref 200–360)
TRIGL SERPL-MCNC: 97 MG/DL — SIGNIFICANT CHANGE UP
UIBC SERPL-MCNC: 131 UG/DL — SIGNIFICANT CHANGE UP (ref 110–370)
VIT B12 SERPL-MCNC: 794 PG/ML — SIGNIFICANT CHANGE UP (ref 232–1245)
WBC # BLD: 11.52 K/UL — HIGH (ref 3.8–10.5)
WBC # FLD AUTO: 11.52 K/UL — HIGH (ref 3.8–10.5)

## 2024-02-07 PROCEDURE — 99233 SBSQ HOSP IP/OBS HIGH 50: CPT

## 2024-02-07 PROCEDURE — 99221 1ST HOSP IP/OBS SF/LOW 40: CPT

## 2024-02-07 PROCEDURE — 93306 TTE W/DOPPLER COMPLETE: CPT | Mod: 26

## 2024-02-07 PROCEDURE — 99223 1ST HOSP IP/OBS HIGH 75: CPT | Mod: GC

## 2024-02-07 RX ORDER — FUROSEMIDE 40 MG
80 TABLET ORAL EVERY 8 HOURS
Refills: 0 | Status: DISCONTINUED | OUTPATIENT
Start: 2024-02-07 | End: 2024-02-09

## 2024-02-07 RX ORDER — FUROSEMIDE 40 MG
40 TABLET ORAL ONCE
Refills: 0 | Status: COMPLETED | OUTPATIENT
Start: 2024-02-07 | End: 2024-02-07

## 2024-02-07 RX ORDER — VALSARTAN 80 MG/1
160 TABLET ORAL EVERY 24 HOURS
Refills: 0 | Status: DISCONTINUED | OUTPATIENT
Start: 2024-02-07 | End: 2024-02-09

## 2024-02-07 RX ORDER — FUROSEMIDE 40 MG
80 TABLET ORAL EVERY 8 HOURS
Refills: 0 | Status: DISCONTINUED | OUTPATIENT
Start: 2024-02-07 | End: 2024-02-07

## 2024-02-07 RX ORDER — HYDRALAZINE HCL 50 MG
10 TABLET ORAL ONCE
Refills: 0 | Status: COMPLETED | OUTPATIENT
Start: 2024-02-07 | End: 2024-02-07

## 2024-02-07 RX ORDER — DEXTROSE 50 % IN WATER 50 %
25 SYRINGE (ML) INTRAVENOUS ONCE
Refills: 0 | Status: DISCONTINUED | OUTPATIENT
Start: 2024-02-07 | End: 2024-02-07

## 2024-02-07 RX ORDER — DEXTROSE 50 % IN WATER 50 %
25 SYRINGE (ML) INTRAVENOUS ONCE
Refills: 0 | Status: COMPLETED | OUTPATIENT
Start: 2024-02-07 | End: 2024-02-07

## 2024-02-07 RX ORDER — DEXTROSE 50 % IN WATER 50 %
50 SYRINGE (ML) INTRAVENOUS ONCE
Refills: 0 | Status: COMPLETED | OUTPATIENT
Start: 2024-02-07 | End: 2024-02-07

## 2024-02-07 RX ORDER — NIFEDIPINE 30 MG
60 TABLET, EXTENDED RELEASE 24 HR ORAL EVERY 12 HOURS
Refills: 0 | Status: DISCONTINUED | OUTPATIENT
Start: 2024-02-07 | End: 2024-02-07

## 2024-02-07 RX ORDER — VALSARTAN 80 MG/1
160 TABLET ORAL EVERY 12 HOURS
Refills: 0 | Status: DISCONTINUED | OUTPATIENT
Start: 2024-02-07 | End: 2024-02-07

## 2024-02-07 RX ORDER — NIFEDIPINE 30 MG
60 TABLET, EXTENDED RELEASE 24 HR ORAL EVERY 12 HOURS
Refills: 0 | Status: DISCONTINUED | OUTPATIENT
Start: 2024-02-07 | End: 2024-02-09

## 2024-02-07 RX ADMIN — Medication 80 MILLIGRAM(S): at 16:54

## 2024-02-07 RX ADMIN — SEVELAMER CARBONATE 1600 MILLIGRAM(S): 2400 POWDER, FOR SUSPENSION ORAL at 07:07

## 2024-02-07 RX ADMIN — Medication 40 MILLIGRAM(S): at 08:28

## 2024-02-07 RX ADMIN — Medication 50 MILLILITER(S): at 05:34

## 2024-02-07 RX ADMIN — VALSARTAN 160 MILLIGRAM(S): 80 TABLET ORAL at 08:42

## 2024-02-07 RX ADMIN — Medication 25 MILLILITER(S): at 12:27

## 2024-02-07 RX ADMIN — Medication 60 MILLIGRAM(S): at 10:29

## 2024-02-07 RX ADMIN — Medication 10 MILLIGRAM(S): at 07:50

## 2024-02-07 RX ADMIN — Medication 1300 MILLIGRAM(S): at 21:46

## 2024-02-07 RX ADMIN — SODIUM ZIRCONIUM CYCLOSILICATE 10 GRAM(S): 10 POWDER, FOR SUSPENSION ORAL at 12:27

## 2024-02-07 RX ADMIN — Medication 10 MILLIGRAM(S): at 04:00

## 2024-02-07 RX ADMIN — CALCITRIOL 0.25 MICROGRAM(S): 0.5 CAPSULE ORAL at 12:27

## 2024-02-07 RX ADMIN — Medication 10 MILLIGRAM(S): at 15:11

## 2024-02-07 RX ADMIN — INSULIN GLARGINE 20 UNIT(S): 100 INJECTION, SOLUTION SUBCUTANEOUS at 21:47

## 2024-02-07 RX ADMIN — SODIUM ZIRCONIUM CYCLOSILICATE 10 GRAM(S): 10 POWDER, FOR SUSPENSION ORAL at 19:25

## 2024-02-07 RX ADMIN — SODIUM ZIRCONIUM CYCLOSILICATE 10 GRAM(S): 10 POWDER, FOR SUSPENSION ORAL at 03:56

## 2024-02-07 RX ADMIN — Medication 1300 MILLIGRAM(S): at 14:35

## 2024-02-07 RX ADMIN — SEVELAMER CARBONATE 1600 MILLIGRAM(S): 2400 POWDER, FOR SUSPENSION ORAL at 16:54

## 2024-02-07 RX ADMIN — Medication 1300 MILLIGRAM(S): at 07:06

## 2024-02-07 RX ADMIN — SEVELAMER CARBONATE 1600 MILLIGRAM(S): 2400 POWDER, FOR SUSPENSION ORAL at 12:26

## 2024-02-07 RX ADMIN — Medication 60 MILLIGRAM(S): at 18:22

## 2024-02-07 RX ADMIN — ATORVASTATIN CALCIUM 10 MILLIGRAM(S): 80 TABLET, FILM COATED ORAL at 21:47

## 2024-02-07 RX ADMIN — VALSARTAN 160 MILLIGRAM(S): 80 TABLET ORAL at 14:35

## 2024-02-07 NOTE — PROGRESS NOTE ADULT - PROBLEM SELECTOR PLAN 5
Multi year history of hypertension. On admission, presented with /57. Well controlled on home valsartan 160mg qd, lasix 80mg tid, nifedipine 60mg bid.  - c/w nifedipine 60mg PO BID  - c/w lasix 80mg TID   - holding valsartan i/s/o hyperkalemia, restart as appropriate  - CTM for CP/CT  - encourage low salt in diet Multi year history of hypertension. On admission, presented with /57. Well controlled on home valsartan 160mg qd, lasix 80mg tid, nifedipine 60mg bid.  - c/w nifedipine 60mg PO BID  - c/w lasix 80mg TID   - Restart Valsartan 160mg   - CTM for CP/CT  - encourage low salt in diet

## 2024-02-07 NOTE — PROGRESS NOTE ADULT - PROBLEM SELECTOR PLAN 7
Multi year history of HLD. Well controlled on home atorvastatin 10mg qhs.  - c/w home atorvastatin 10mg qhs  - encourage healthy eating as outpatient and follow up with outpatient PCP  - f/u lipid panel Multi year history of HLD. Well controlled on home atorvastatin 10mg qhs.  - c/w home atorvastatin 10mg qhs  - encourage healthy eating as outpatient and follow up with outpatient PCP

## 2024-02-07 NOTE — PROGRESS NOTE ADULT - PROBLEM SELECTOR PLAN 8
Hgb on admission 9.4, MCV 92.9.   Currently no signs of active bleeding (no hematochezia, melena, hemoptysis, hematuria).  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  - f/u iron panel, LDH, haptoglobin, reticuocyte count  - f/u B12/folate  - trend CBC  - maintain active T&S  - transfuse if Hgb <7

## 2024-02-07 NOTE — CONSULT NOTE ADULT - SUBJECTIVE AND OBJECTIVE BOX
incomplete  EPS Consult Note     HPI: 84yo M, primarily Cypriot-speaking,  w/ a PMH of HTN, DM, HLD, CKD Stage 5 who was scheduled for elective LUE AVF 2/6 in preparation for HD, but found to be hyperkalemic on outpatient labs and sent to ED for further management. Patient states he forgot his home lasix and suspension last night and this morning. In the ED he had no complaints. VSS. Found to have K 6. Recived calcium gluconate and hyperkalemia cocktail. Seen by nephrology, plan to start HD 2/7. EKG noted to have Mobitz Type 1. Patient admitted to medicine/telemetry for medical optimization prior to TDC placement. EP consulted for bradycardia and 2nd degree HB.     Subjective: Patient seen and examined at bedside. Patient feeling well, without complaints. Patient denies any history of arrythmia or bradycardia, but has not seen a cardiologist prior. Patient can walk > 3 blocks at baseline.     PAST MEDICAL & SURGICAL HISTORY:  HTN (hypertension)  HLD (hyperlipidemia)  DM (diabetes mellitus)  Chronic kidney disease (CKD) stage V  Anemia  Glaucoma  H/O shoulder surgery -left  H/O knee surgery -left  H/O eye surgery  b/l glaucoma    Social History:no smoking, no drugs, no alcohol    pertinent home medications:    Inpatient Medications:   acetaminophen     Tablet .. 650 milliGRAM(s) Oral every 6 hours PRN  atorvastatin 10 milliGRAM(s) Oral at bedtime  calcitriol   Capsule 0.25 MICROGram(s) Oral daily  furosemide    Tablet 80 milliGRAM(s) Oral every 8 hours  glucagon  Injectable 1 milliGRAM(s) IntraMuscular once  influenza  Vaccine (HIGH DOSE) 0.7 milliLiter(s) IntraMuscular once  insulin glargine Injectable (LANTUS) 20 Unit(s) SubCutaneous at bedtime  insulin lispro (ADMELOG) corrective regimen sliding scale   SubCutaneous Before meals and at bedtime  melatonin 3 milliGRAM(s) Oral at bedtime PRN  NIFEdipine XL 60 milliGRAM(s) Oral every 12 hours  sevelamer carbonate 1600 milliGRAM(s) Oral three times a day with meals  sodium bicarbonate 1300 milliGRAM(s) Oral three times a day  sodium zirconium cyclosilicate 10 Gram(s) Oral every 8 hours  valsartan 160 milliGRAM(s) Oral every 24 hours    Allergies: No Known Allergies      ROS:   CONSTITUTIONAL: No fever, weight loss + fatigue  EYES: Pt denies  RESPIRATORY: No cough, wheezing, chills or hemoptysis; No Shortness of Breath  CARDIOVASCULAR: see HPI  GASTROINTESTINAL: Pt denies  NEUROLOGICAL: Pt denies  SKIN: Pt denies   PSYCHIATRIC: Pt denies  HEME/LYMPH: Pt denies    PHYSICAL:  T(C): 36.6 (02-07-24 @ 13:33), Max: 37.2 (02-06-24 @ 21:25)  HR: 52 (02-07-24 @ 13:03) (46 - 80)  BP: 156/70 (02-07-24 @ 13:03) (132/65 - 247/109)  RR: 18 (02-07-24 @ 13:03) (16 - 18)  SpO2: 95% (02-07-24 @ 13:03) (94% - 99%)  Wt(kg): --    Appearance: No acute distress, well developed  Eyes: normal appearing conjunctiva, pupils and eyelids  Cardiovascular: Normal S1, S2   Respiratory: Lungs clear to auscultation	bilaterally.  No wheeze, rhonchi, rales noted  Gastrointestinal:  Soft, NT/ND 	  Neurologic:  No deficit noted  Psych: A&Ox3, normal mood/affect  Musculoskeletal: normal gait  Skin: no rash noted, normal color and pigmentation.        LABS:                        9.4    11.52 )-----------( 342      ( 07 Feb 2024 05:30 )             28.5     02-07    138  |  105  |  85<H>  ----------------------------<  142<H>  4.4   |  21<L>  |  8.94<H>    Ca    8.7      07 Feb 2024 05:30  Phos  4.5     02-06  Mg     2.3     02-06    TPro  7.1  /  Alb  3.1<L>  /  TBili  0.2  /  DBili  x   /  AST  10  /  ALT  7<L>  /  AlkPhos  80  02-06    PT/INR - ( 07 Feb 2024 05:30 )   PT: 10.5 sec;   INR: 0.92          PTT - ( 07 Feb 2024 05:30 )  PTT:28.5 sec  TSH: 1.500  EKG: Sinus bradycardia @ 48 bpm, second degree Type I AV Block   Telemetry: Intermittent episodes of either first degree AV block or second degree AV block Type I     ECHO: pending     Assessment Plan:  84yo M, primarily Cypriot-speaking,  w/ a PMH of HTN, DM, HLD, CKD Stage 5 who was scheduled for elective LUE AVF 2/6 in preparation for HD, but found to be hyperkalemic on outpatient labs and sent to ED for further management. Patient states he forgot his home lasix and suspension last night and this morning. In the ED he had no complaints. VSS. Found to have K 6. Recived calcium gluconate and hyperkalemia cocktail. Seen by nephrology, plan to start HD 2/7. EKG noted to have Mobitz Type 1. Patient admitted to medicine/telemetry for medical optimization prior to TDC placement. EP consulted for bradycardia and 2nd degree HB.     - Telemetry reviewed, patient wih prolonged ME interval or episodes of second degree AV block Type I.            EPS Consult Note     HPI: 82yo M, primarily Montserratian-speaking,  w/ a PMH of HTN, DM, HLD, CKD Stage 5 who was scheduled for elective LUE AVF 2/6 in preparation for HD, but found to be hyperkalemic on outpatient labs and sent to ED for further management. Patient states he forgot his home lasix and suspension last night and this morning. In the ED he had no complaints. VSS. Found to have K 6. Recived calcium gluconate and hyperkalemia cocktail. Seen by nephrology, plan to start HD 2/7. EKG noted to have Mobitz Type 1. Patient admitted to medicine/telemetry for medical optimization prior to TDC placement. EP consulted for bradycardia and 2nd degree HB.     Subjective: Patient seen and examined at bedside. Patient feeling well, without complaints. Patient denies any history of arrythmia or bradycardia, but has not seen a cardiologist prior. Patient can walk > 3 blocks at baseline.     PAST MEDICAL & SURGICAL HISTORY:  HTN (hypertension)  HLD (hyperlipidemia)  DM (diabetes mellitus)  Chronic kidney disease (CKD) stage V  Anemia  Glaucoma  H/O shoulder surgery -left  H/O knee surgery -left  H/O eye surgery  b/l glaucoma    Social History:no smoking, no drugs, no alcohol    pertinent home medications:    Inpatient Medications:   acetaminophen     Tablet .. 650 milliGRAM(s) Oral every 6 hours PRN  atorvastatin 10 milliGRAM(s) Oral at bedtime  calcitriol   Capsule 0.25 MICROGram(s) Oral daily  furosemide    Tablet 80 milliGRAM(s) Oral every 8 hours  glucagon  Injectable 1 milliGRAM(s) IntraMuscular once  influenza  Vaccine (HIGH DOSE) 0.7 milliLiter(s) IntraMuscular once  insulin glargine Injectable (LANTUS) 20 Unit(s) SubCutaneous at bedtime  insulin lispro (ADMELOG) corrective regimen sliding scale   SubCutaneous Before meals and at bedtime  melatonin 3 milliGRAM(s) Oral at bedtime PRN  NIFEdipine XL 60 milliGRAM(s) Oral every 12 hours  sevelamer carbonate 1600 milliGRAM(s) Oral three times a day with meals  sodium bicarbonate 1300 milliGRAM(s) Oral three times a day  sodium zirconium cyclosilicate 10 Gram(s) Oral every 8 hours  valsartan 160 milliGRAM(s) Oral every 24 hours    Allergies: No Known Allergies      ROS:   CONSTITUTIONAL: No fever, weight loss + fatigue  EYES: Pt denies  RESPIRATORY: No cough, wheezing, chills or hemoptysis; No Shortness of Breath  CARDIOVASCULAR: see HPI  GASTROINTESTINAL: Pt denies  NEUROLOGICAL: Pt denies  SKIN: Pt denies   PSYCHIATRIC: Pt denies  HEME/LYMPH: Pt denies    PHYSICAL:  T(C): 36.6 (02-07-24 @ 13:33), Max: 37.2 (02-06-24 @ 21:25)  HR: 52 (02-07-24 @ 13:03) (46 - 80)  BP: 156/70 (02-07-24 @ 13:03) (132/65 - 247/109)  RR: 18 (02-07-24 @ 13:03) (16 - 18)  SpO2: 95% (02-07-24 @ 13:03) (94% - 99%)  Wt(kg): --    Appearance: No acute distress, well developed  Eyes: normal appearing conjunctiva, pupils and eyelids  Cardiovascular: Normal S1, S2   Respiratory: Lungs clear to auscultation	bilaterally.  No wheeze, rhonchi, rales noted  Gastrointestinal:  Soft, NT/ND 	  Neurologic:  No deficit noted  Psych: A&Ox3, normal mood/affect  Musculoskeletal: normal gait  Skin: no rash noted, normal color and pigmentation.        LABS:                        9.4    11.52 )-----------( 342      ( 07 Feb 2024 05:30 )             28.5     02-07    138  |  105  |  85<H>  ----------------------------<  142<H>  4.4   |  21<L>  |  8.94<H>    Ca    8.7      07 Feb 2024 05:30  Phos  4.5     02-06  Mg     2.3     02-06    TPro  7.1  /  Alb  3.1<L>  /  TBili  0.2  /  DBili  x   /  AST  10  /  ALT  7<L>  /  AlkPhos  80  02-06    PT/INR - ( 07 Feb 2024 05:30 )   PT: 10.5 sec;   INR: 0.92          PTT - ( 07 Feb 2024 05:30 )  PTT:28.5 sec  TSH: 1.500  EKG: Sinus bradycardia @ 48 bpm, second degree Type I AV Block   Telemetry: Intermittent episodes of either first degree AV block or second degree AV block Type I     ECHO: pending     Assessment Plan:  82yo M, primarily Montserratian-speaking,  w/ a PMH of HTN, DM, HLD, CKD Stage 5 who was scheduled for elective LUE AVF 2/6 in preparation for HD, but found to be hyperkalemic on outpatient labs and sent to ED for further management. Patient states he forgot his home lasix and suspension last night and this morning. In the ED he had no complaints. VSS. Found to have K 6. Recived calcium gluconate and hyperkalemia cocktail. Seen by nephrology, plan to start HD 2/7. EKG noted to have Mobitz Type 1. Patient admitted to medicine/telemetry for medical optimization prior to TDC placement. EP consulted for bradycardia and 2nd degree HB.     - Telemetry reviewed, patient wih prolonged MI interval or episodes of second degree AV block (Wenckebach)  - No episodes of Mobitz Type II noted on telemetry.   - Agree with cardiology, there is no C/I to proceeding with HD catheter procedure based on his Second Degree AV Block (Wenckebach).   - Recommend patient to f/u with cardiology and undergo echocardiogram upon discharge

## 2024-02-07 NOTE — PROGRESS NOTE ADULT - PROBLEM SELECTOR PLAN 1
Presented to the ED iso elevated K+ on pre-AV Fistula placement lab work. Initially K+ of 6.0. Was given insulin, dextrose, and lokelma while in the pre-op holding area. In the ED, the K+ noted to drop from 6.0 -> 5.0.  Etiology is most likely iso known renal insufficiency given known CKD stage 5. Dr. 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  Patient has not taken new supplements. no signs of rhabdo on exam nor history.   - q6hr BMPs   - serial ECG, monitor for changes such as peaked T-waves w/ shortened QT interval   - can consider calcium gluconate, insulin 5 units IV with an amp of dextrose to prevent hypoglycemia, and if severe consider B-agonist -> albuterol nebs  - lokelma 10g q8 as per renal recs  - NPO at midnight for TDC placement 2/7 followed by HD, as per renal recs Presented to the ED iso elevated K+ on pre-AV Fistula placement lab work. Initially K+ of 6.0. Was given insulin, dextrose, and lokelma while in the pre-op holding area. In the ED, the K+ noted to drop from 6.0 -> 5.0.  Etiology is most likely iso known renal insufficiency given known CKD stage 5. Dr. 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  - q6hr BMPs   - serial ECG, monitor for changes such as peaked T-waves w/ shortened QT interval   - lokelma 10g q8 as per renal recs  - TDC placement today 2/7

## 2024-02-07 NOTE — PROGRESS NOTE ADULT - ASSESSMENT
Mr. Anguiano is an 84yo M PMH of HTN, DM, HLD, CKD Stage 5, who presented with hyperkalemia found on lab work during pre-op for an AVF, admitted to [facility] for further management of his hyperkalemia and new found Mobitz Type I, and medical optimization prior to AVF placement.  Mr. Anguiano is an 84yo M PMH of HTN, DM, HLD, CKD Stage 5, who presented with hyperkalemia found on lab work during pre-op for an AVF, admitted to [facility] for further management of his hyperkalemia and new found Mobitz Type I, and medical optimization prior to AVF placement.

## 2024-02-07 NOTE — PROGRESS NOTE ADULT - PROBLEM SELECTOR PLAN 9
F: none  E: Replete as needed, careful given HD needs  N: Renal diet, npo after MN  Dvt ppx: heparin subq, then holding for IR procedure in AM  GI ppx: not needed  Dispo: Tele  Code status: Full F: none  E: Replete as needed, careful given HD needs  N: Renal diet, npo after MN  Dvt ppx: heparin subq  Dispo: Tele  Code status: Full

## 2024-02-07 NOTE — CONSULT NOTE ADULT - ASSESSMENT
84yo M w/ a PMH of HTN, DM, HLD, CKD Stage 5 admitted for hyperkalemia with plan to start HD. Found to have Mobitz type I AV block, as well as episodes of 2:1 AVB for which cardiology has been consulted. Patient remains asymptomatic.     Review of studies:   EKG 2/6: Mobitz type 1 AV block   telemetry reviewed: HR range 38 - 60, mostly type 1 AVB with episodes of 2:1 AVB    #Mobitz type 1 AVB with episodes of 2:1 AVB  - Patient remains asx. Episodes continue after electrolyte derangements were corrected  - obtain formal TTE  - EP consult for possible ppm given 2:1 episodes   - avoid av alberto agents, keep pads on    #hypertensive urgency   - continue home meds (valsartan 160mg PO qd, nifedipine 60mg po BID, and lasix 80mg po TID)    Flori Orantes   Cardiology fellow 84yo M w/ a PMH of HTN, DM, HLD, CKD Stage 5 admitted for hyperkalemia with plan to start HD. Found to have Mobitz type I AV block, as well as episodes of 2:1 AVB for which cardiology has been consulted. Patient remains asymptomatic.     Review of studies:   EKG 2/6: Mobitz type 1 AV block   telemetry reviewed: HR range 38 - 60, mostly type 1 AVB with episodes of 2:1 AVB    #Mobitz type 1 AVB with episodes of 2:1 AVB  - Patient remains asx. Episodes continue after electrolyte derangements were corrected  - obtain formal TTE  - EP consult for possible ppm given 2:1 episodes   - avoid av alberto agents, keep pads on    #hypertensive urgency   - continue home meds (valsartan 160mg PO qd, nifedipine 60mg po BID, and lasix 80mg po TID)    #HLD   - c/w lipitor 10mg    Flori Orantes   Cardiology fellow 84yo M w/ a PMH of HTN, DM, HLD, CKD Stage 5 admitted for hyperkalemia with plan to start HD. Found to have Mobitz type I AV block for which cardiology has been consulted. Patient remains asymptomatic.     Review of studies:   EKG 2/6: Mobitz type 1 AV block   telemetry reviewed: HR range 38 - 60, noted to have second degree Type 1 AV block     #Mobitz type 1 AVB   - Patient remains asx  - Previously had first degree documented on ECG's   - Pending TTE today   - He has no active contraindications prior to HD cath placement. He is low-intermediate risk for low risk procedure. No further testing needed prior to procedure   - avoid av alberto agents, keep pads on    #hypertensive urgency   - PO meds were held yesterday. He was hypertensive this AM and was given multiple IV hydralazine pushes   - continue home meds (valsartan 160mg PO qd, nifedipine 60mg po BID, and lasix 80mg po TID)  - Avoid IV pushes    #HLD   - c/w lipitor 10mg    Cardiology will continue to follow

## 2024-02-07 NOTE — PROGRESS NOTE ADULT - PROBLEM SELECTOR PLAN 6
Presented with a WBC count of  10.91, neutrophil predominance. Down from 11.9 on 2/2/24.  No systemic symptoms patient denies fever, chills, nausea, vomiting, weight loss, wheezing, night sweats  - continue to monitor  - DEBBI

## 2024-02-07 NOTE — PROGRESS NOTE ADULT - PROBLEM SELECTOR PLAN 3
New diagnosis for the patient, discovered on ED EKG this admission.   Patient without symptoms of light-headedness, dizziness, or fatigue. No recent loss of consciousness/syncope.  Etiology most likely iso metabolic abnormalities (pt hyperkalemic), less likely 2/2 medications slowing alberto conduction (however pt is notably on calcium-channel blockers/amiodarone). Less likely 2/2 MI as no ST changes on ECG/no symptoms. Cannot r/o infiltrative /autoimmune disorders, however less likely since this is a new diagnosis.  - monitor ECGs after addressing possibly contributing etiology/factors as per above   - address reversible causes of alberto block as per above/below  - atropine if needed to improve av node conduction  - no need for EP consult for temporary pacemaker at this time  - cardiology consulted in the ED, appreciate recs  - holding beta-blockers, including ophthalmic solution timolol, can resume as appropriate   - f/u TSH New diagnosis for the patient, discovered on ED EKG this admission.   Patient without symptoms of light-headedness, dizziness, or fatigue. No recent loss of consciousness/syncope.  Etiology most likely iso metabolic abnormalities (pt hyperkalemic), less likely 2/2 medications slowing alberto conduction (however pt is notably on calcium-channel blockers/amiodarone). Less likely 2/2 MI as no ST changes on ECG/no symptoms. Cannot r/o infiltrative /autoimmune disorders, however less likely since this is a new diagnosis.  - monitor ECGs after addressing possibly contributing etiology/factors as per above   - address reversible causes of alberto block as per above/below  - holding beta-blockers, including ophthalmic solution timolol, can resume as appropriate   - F/U Gen cards/EP Recs given 2:1 blocking seen

## 2024-02-07 NOTE — CONSULT NOTE ADULT - NS ATTEND AMEND GEN_ALL_CORE FT
83 year old man with Wenckebach physiology on ECG prior to OR. As noted, it is AV alberto, and there is no contraindication to performing the scheduled procedure.

## 2024-02-07 NOTE — CONSULT NOTE ADULT - SUBJECTIVE AND OBJECTIVE BOX
HPI:  82yo M w/ a PMH of HTN, DM, HLD, CKD Stage 5 who was scheduled for elective LUE AVF 2/6 in preparation for HD, but found to be hyperkalemic and sent to ED for further management. Patient forgot to take his home lasix and suspension last night and this morning. In the ED he had no complaints. VSS. Found to have K 6. Recived calcium gluconate and hyperkalemia cocktail. Seen by nephrology, plan to start HD 2/7. EKG noted to have Mobitz Type 1   Labs: significant for WBC 10.9, Hgb 9.4, Hct 28.9, MCV 92.9, Plt 345. K 5.0, BUN/Cr 81/8.76, eGFR 6. Glucose 209. EKG:  rate of 56, sinus rhythm, mobitz type 1 AV block for which cardiology has been consulted.     HR range 38 - 60. BP hypertensive to 180s.   Patient seen at the bedside. Feels well, no complaints. Denies dizziness, lightheadedness, nausea, vomiting, headaches, or prior syncopal episodes. Has not seen a cardiologist before and denies history of arrythmia or bradycardia. He can walk >3 blocks at baseline without symptoms. However, he has mostly stayed at home with his wife recently.       ROS: Per HPI    PAST MEDICAL & SURGICAL HISTORY:  HTN (hypertension)      HLD (hyperlipidemia)      DM (diabetes mellitus)      Chronic kidney disease (CKD)  stage V      Anemia      Glaucoma      H/O shoulder surgery  left      H/O knee surgery  left      H/O eye surgery  b/l glaucoma        SOCIAL HISTORY:  FAMILY HISTORY:    ALLERGIES: 	  No Known Allergies          MEDICATIONS:  acetaminophen     Tablet .. 650 milliGRAM(s) Oral every 6 hours PRN  atorvastatin 10 milliGRAM(s) Oral at bedtime  calcitriol   Capsule 0.25 MICROGram(s) Oral daily  dextrose 5%. 1000 milliLiter(s) IV Continuous <Continuous>  dextrose 5%. 1000 milliLiter(s) IV Continuous <Continuous>  dextrose 50% Injectable 25 Gram(s) IV Push once  dextrose 50% Injectable 25 Gram(s) IV Push once  dextrose 50% Injectable 50 milliLiter(s) IV Push once  dextrose 50% Injectable 12.5 Gram(s) IV Push once  dextrose Oral Gel 15 Gram(s) Oral once PRN  furosemide    Tablet 80 milliGRAM(s) Oral every 8 hours  glucagon  Injectable 1 milliGRAM(s) IntraMuscular once  influenza  Vaccine (HIGH DOSE) 0.7 milliLiter(s) IntraMuscular once  insulin glargine Injectable (LANTUS) 20 Unit(s) SubCutaneous at bedtime  insulin lispro (ADMELOG) corrective regimen sliding scale   SubCutaneous Before meals and at bedtime  melatonin 3 milliGRAM(s) Oral at bedtime PRN  NIFEdipine XL 60 milliGRAM(s) Oral every 12 hours  sevelamer carbonate 1600 milliGRAM(s) Oral three times a day with meals  sodium bicarbonate 1300 milliGRAM(s) Oral three times a day  sodium zirconium cyclosilicate 10 Gram(s) Oral every 8 hours      T(C): 37.2 (02-07-24 @ 01:00), Max: 37.2 (02-06-24 @ 21:25)  HR: 52 (02-07-24 @ 04:23) (46 - 80)  BP: 176/76 (02-07-24 @ 04:23) (132/65 - 209/88)  RR: 18 (02-07-24 @ 04:23) (16 - 18)  SpO2: 96% (02-07-24 @ 04:23) (94% - 99%)    02-06-24 @ 07:01  -  02-07-24 @ 05:29  --------------------------------------------------------  IN: 0 mL / OUT: 125 mL / NET: -125 mL        PHYSICAL EXAM:  pleasant, NAD  bradycardic, no mrg  ctab  soft ntnd   no le edema, wwp    I&O's Summary    06 Feb 2024 07:01  -  07 Feb 2024 05:29  --------------------------------------------------------  IN: 0 mL / OUT: 125 mL / NET: -125 mL      Height (cm): 172.7 (02-06 @ 12:38), 172.7 (02-06 @ 09:43)  Weight (kg): 83.9 (02-06 @ 12:38), 72.2 (02-06 @ 09:43)  BMI (kg/m2): 28.1 (02-06 @ 12:38), 24.2 (02-06 @ 09:43)  BSA (m2): 1.98 (02-06 @ 12:38), 1.85 (02-06 @ 09:43)  LABS:	 	                        9.4    10.91 )-----------( 345      ( 06 Feb 2024 12:44 )             28.9     02-06    137  |  102  |  79<H>  ----------------------------<  206<H>  5.0   |  24  |  8.94<H>    Ca    8.5      06 Feb 2024 20:55  Phos  4.5     02-06  Mg     2.3     02-06    TPro  7.1  /  Alb  3.1<L>  /  TBili  0.2  /  DBili  x   /  AST  10  /  ALT  7<L>  /  AlkPhos  80  02-06

## 2024-02-07 NOTE — PROGRESS NOTE ADULT - PROBLEM SELECTOR PLAN 2
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)  - Continue to trend Cr  - Avoid nephrotoxic agents  - Adjust medication dosages for level of renal function  - c/w lokelma 10g PO TID x 4 more doses  - c/w lasix 80mg PO TID  - c/w sodium bicarb 1300mg PO TID  - c/w vitamin D3 0.25mcg PO qd  - c/w renvela 1800mg PO TID  - renal diet with 1.2L fluid restriction  - NPO at midnight for TDC placement 2/7 followed by HD, as per renal recs 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)  - Avoid nephrotoxic agents  - c/w lokelma 10g PO TID x 4 more doses  - c/w lasix 80mg PO TID  - c/w sodium bicarb 1300mg PO TID  - c/w vitamin D3 0.25mcg PO qd  - c/w renvela 1800mg PO TID  - renal diet with 1.2L fluid restriction  - TDC placement today f/u post HD

## 2024-02-07 NOTE — PROGRESS NOTE ADULT - SUBJECTIVE AND OBJECTIVE BOX
INTERVAL HPI/OVERNIGHT EVENTS:    SUBJECTIVE: Patient seen and examined at bedside.     ROS: All negative except as listed above.    VITAL SIGNS:  ICU Vital Signs Last 24 Hrs  T(C): 37.2 (07 Feb 2024 01:00), Max: 37.2 (06 Feb 2024 21:25)  T(F): 98.9 (07 Feb 2024 01:00), Max: 98.9 (06 Feb 2024 21:25)  HR: 52 (07 Feb 2024 04:23) (46 - 80)  BP: 176/76 (07 Feb 2024 04:23) (132/65 - 209/88)  BP(mean): 109 (07 Feb 2024 04:23) (109 - 127)  ABP: --  ABP(mean): --  RR: 18 (07 Feb 2024 04:23) (16 - 18)  SpO2: 96% (07 Feb 2024 04:23) (94% - 99%)    O2 Parameters below as of 07 Feb 2024 04:23  Patient On (Oxygen Delivery Method): room air            Plateau pressure:   P/F ratio:     02-06 @ 07:01  -  02-07 @ 06:08  --------------------------------------------------------  IN: 0 mL / OUT: 125 mL / NET: -125 mL      CAPILLARY BLOOD GLUCOSE      POCT Blood Glucose.: 71 mg/dL (07 Feb 2024 05:23)      ECG: reviewed.    PHYSICAL EXAM:    GENERAL: NAD, lying in bed comfortably  HEAD:  Atraumatic, normocephalic  EYES: EOMI, PERRLA, conjunctiva and sclera clear  NECK: Supple, trachea midline, no JVD  HEART: Regular rate and rhythm, no murmurs, rubs, or gallops  LUNGS: Unlabored respirations.  Clear to auscultation bilaterally, no crackles, wheezing, or rhonchi  ABDOMEN: Soft, nontender, nondistended, +BS  EXTREMITIES: 2+ peripheral pulses bilaterally, cap refill<2 secs. No clubbing, cyanosis, or edema  NERVOUS SYSTEM:  A&Ox3, following commands, moving all extremities, no focal deficits   SKIN: No rashes or lesions    MEDICATIONS:  MEDICATIONS  (STANDING):  atorvastatin 10 milliGRAM(s) Oral at bedtime  calcitriol   Capsule 0.25 MICROGram(s) Oral daily  dextrose 5%. 1000 milliLiter(s) (100 mL/Hr) IV Continuous <Continuous>  dextrose 5%. 1000 milliLiter(s) (50 mL/Hr) IV Continuous <Continuous>  dextrose 50% Injectable 25 Gram(s) IV Push once  dextrose 50% Injectable 25 Gram(s) IV Push once  dextrose 50% Injectable 12.5 Gram(s) IV Push once  furosemide    Tablet 80 milliGRAM(s) Oral every 8 hours  glucagon  Injectable 1 milliGRAM(s) IntraMuscular once  influenza  Vaccine (HIGH DOSE) 0.7 milliLiter(s) IntraMuscular once  insulin glargine Injectable (LANTUS) 20 Unit(s) SubCutaneous at bedtime  insulin lispro (ADMELOG) corrective regimen sliding scale   SubCutaneous Before meals and at bedtime  NIFEdipine XL 60 milliGRAM(s) Oral every 12 hours  sevelamer carbonate 1600 milliGRAM(s) Oral three times a day with meals  sodium bicarbonate 1300 milliGRAM(s) Oral three times a day  sodium zirconium cyclosilicate 10 Gram(s) Oral every 8 hours    MEDICATIONS  (PRN):  acetaminophen     Tablet .. 650 milliGRAM(s) Oral every 6 hours PRN Temp greater or equal to 38C (100.4F), Mild Pain (1 - 3)  dextrose Oral Gel 15 Gram(s) Oral once PRN Blood Glucose LESS THAN 70 milliGRAM(s)/deciliter  melatonin 3 milliGRAM(s) Oral at bedtime PRN Insomnia      ALLERGIES:  Allergies    No Known Allergies    Intolerances        LABS:                        9.4    10.91 )-----------( 345      ( 06 Feb 2024 12:44 )             28.9     02-06    137  |  102  |  79<H>  ----------------------------<  206<H>  5.0   |  24  |  8.94<H>    Ca    8.5      06 Feb 2024 20:55  Phos  4.5     02-06  Mg     2.3     02-06    TPro  7.1  /  Alb  3.1<L>  /  TBili  0.2  /  DBili  x   /  AST  10  /  ALT  7<L>  /  AlkPhos  80  02-06      Urinalysis Basic - ( 06 Feb 2024 20:55 )    Color: x / Appearance: x / SG: x / pH: x  Gluc: 206 mg/dL / Ketone: x  / Bili: x / Urobili: x   Blood: x / Protein: x / Nitrite: x   Leuk Esterase: x / RBC: x / WBC x   Sq Epi: x / Non Sq Epi: x / Bacteria: x      ABG:      vBG:    Micro:        RADIOLOGY & ADDITIONAL TESTS: Reviewed. INTERVAL HPI/OVERNIGHT EVENTS: 5.0 on repeat. Hydralazine 5 for sustained sbp >180. Repeat >190, 10 hydral pushed. Per tele went to 2:1 conduction keisha walker, cards notified, ekg obtained. Cards: EP conslt in AM. at 4AM, SBP >200, hydral 10 pushed. FSG 75 1amp given.     SUBJECTIVE: Patient seen and examined at bedside. NAD, mentating appropriatly. Denies SOB, CP, abdominal pain     ROS: All negative except as listed above.    VITAL SIGNS:  ICU Vital Signs Last 24 Hrs  T(C): 37.2 (07 Feb 2024 01:00), Max: 37.2 (06 Feb 2024 21:25)  T(F): 98.9 (07 Feb 2024 01:00), Max: 98.9 (06 Feb 2024 21:25)  HR: 52 (07 Feb 2024 04:23) (46 - 80)  BP: 176/76 (07 Feb 2024 04:23) (132/65 - 209/88)  BP(mean): 109 (07 Feb 2024 04:23) (109 - 127)  ABP: --  ABP(mean): --  RR: 18 (07 Feb 2024 04:23) (16 - 18)  SpO2: 96% (07 Feb 2024 04:23) (94% - 99%)    O2 Parameters below as of 07 Feb 2024 04:23  Patient On (Oxygen Delivery Method): room air            Plateau pressure:   P/F ratio:     02-06 @ 07:01  -  02-07 @ 06:08  --------------------------------------------------------  IN: 0 mL / OUT: 125 mL / NET: -125 mL      CAPILLARY BLOOD GLUCOSE      POCT Blood Glucose.: 71 mg/dL (07 Feb 2024 05:23)      ECG: reviewed.    PHYSICAL EXAM:    GENERAL: NAD, lying in bed comfortably  HEAD:  Atraumatic, normocephalic  EYES: EOMI, PERRLA, conjunctiva and sclera clear  NECK: Supple, trachea midline, no JVD  HEART: Regular rate and rhythm, no murmurs, rubs, or gallops  LUNGS: Unlabored respirations.  Clear to auscultation bilaterally, no crackles, wheezing, or rhonchi  ABDOMEN: Soft, nontender, nondistended, +BS  EXTREMITIES: 2+ peripheral pulses bilaterally, cap refill<2 secs. No clubbing, cyanosis, or edema  NERVOUS SYSTEM:  A&Ox3, following commands, moving all extremities, no focal deficits   SKIN: No rashes or lesions    MEDICATIONS:  MEDICATIONS  (STANDING):  atorvastatin 10 milliGRAM(s) Oral at bedtime  calcitriol   Capsule 0.25 MICROGram(s) Oral daily  dextrose 5%. 1000 milliLiter(s) (100 mL/Hr) IV Continuous <Continuous>  dextrose 5%. 1000 milliLiter(s) (50 mL/Hr) IV Continuous <Continuous>  dextrose 50% Injectable 25 Gram(s) IV Push once  dextrose 50% Injectable 25 Gram(s) IV Push once  dextrose 50% Injectable 12.5 Gram(s) IV Push once  furosemide    Tablet 80 milliGRAM(s) Oral every 8 hours  glucagon  Injectable 1 milliGRAM(s) IntraMuscular once  influenza  Vaccine (HIGH DOSE) 0.7 milliLiter(s) IntraMuscular once  insulin glargine Injectable (LANTUS) 20 Unit(s) SubCutaneous at bedtime  insulin lispro (ADMELOG) corrective regimen sliding scale   SubCutaneous Before meals and at bedtime  NIFEdipine XL 60 milliGRAM(s) Oral every 12 hours  sevelamer carbonate 1600 milliGRAM(s) Oral three times a day with meals  sodium bicarbonate 1300 milliGRAM(s) Oral three times a day  sodium zirconium cyclosilicate 10 Gram(s) Oral every 8 hours    MEDICATIONS  (PRN):  acetaminophen     Tablet .. 650 milliGRAM(s) Oral every 6 hours PRN Temp greater or equal to 38C (100.4F), Mild Pain (1 - 3)  dextrose Oral Gel 15 Gram(s) Oral once PRN Blood Glucose LESS THAN 70 milliGRAM(s)/deciliter  melatonin 3 milliGRAM(s) Oral at bedtime PRN Insomnia      ALLERGIES:  Allergies    No Known Allergies    Intolerances        LABS:                        9.4    10.91 )-----------( 345      ( 06 Feb 2024 12:44 )             28.9     02-06    137  |  102  |  79<H>  ----------------------------<  206<H>  5.0   |  24  |  8.94<H>    Ca    8.5      06 Feb 2024 20:55  Phos  4.5     02-06  Mg     2.3     02-06    TPro  7.1  /  Alb  3.1<L>  /  TBili  0.2  /  DBili  x   /  AST  10  /  ALT  7<L>  /  AlkPhos  80  02-06      Urinalysis Basic - ( 06 Feb 2024 20:55 )    Color: x / Appearance: x / SG: x / pH: x  Gluc: 206 mg/dL / Ketone: x  / Bili: x / Urobili: x   Blood: x / Protein: x / Nitrite: x   Leuk Esterase: x / RBC: x / WBC x   Sq Epi: x / Non Sq Epi: x / Bacteria: x      ABG:      vBG:    Micro:        RADIOLOGY & ADDITIONAL TESTS: Reviewed.

## 2024-02-07 NOTE — PROGRESS NOTE ADULT - SUBJECTIVE AND OBJECTIVE BOX
CC: HYPERKALEMIA      INTERVAL HISTORY:    * K decreased to 4.4 after treatment. AVF procedure held. HTN uncontrolled.     ROS: No chest pain. No shortness of breath. No nausea.    PAST MEDICAL & SURGICAL HISTORY:  H/O shoulder surgery    H/O shoulder surgery    HTN (hypertension)    HLD (hyperlipidemia)    DM (diabetes mellitus)    Chronic kidney disease (CKD)  stage V    Anemia    Glaucoma    H/O shoulder surgery  left    H/O knee surgery  left    H/O eye surgery  b/l glaucoma      PHYSICAL EXAM:  T(C): 36.7 (07 Feb 2024 10:31), Max: 37.2 (06 Feb 2024 21:25)  HR: 48 (07 Feb 2024 10:29)  BP: 213/81 (07 Feb 2024 10:29) (132/65 - 247/109)  RR: 18 (07 Feb 2024 10:29)  SpO2: 97% (07 Feb 2024 10:29)      06 Feb 2024 07:01  -  07 Feb 2024 07:00  --------------------------------------------------------  IN:  Total IN: 0 mL    OUT:    Voided (mL): 125 mL  Total OUT: 125 mL    Total NET: -125 mL      07 Feb 2024 07:01  -  07 Feb 2024 12:33  --------------------------------------------------------  IN:  Total IN: 0 mL    OUT:    Voided (mL): 500 mL  Total OUT: 500 mL    Total NET: -500 mL        Weight 83.9 (06 Feb 2024 12:38), 72.2 (06 Feb 2024 09:43)    Appearance: alert, pleasant, INAD.  ENT: oral mucosa moist, no pallor/cyanosis.  Neck: no JVD visible.  Cardiac: no rubs. no murmurs. Extremities: pitting ankles   Skin: no rashes.  Extremities (digits): no clubbing or cyanosis.  Respratory effort: no access muscle use. Lungs: CTA anteriorly.  Abdomen: soft. nontender. no masses.  Psych affect: not depressed.    MEDICATIONS  (STANDING):  atorvastatin 10 milliGRAM(s) Oral at bedtime  calcitriol   Capsule 0.25 MICROGram(s) Oral daily  dextrose 5%. 1000 milliLiter(s) (50 mL/Hr) IV Continuous <Continuous>  dextrose 5%. 1000 milliLiter(s) (100 mL/Hr) IV Continuous <Continuous>  dextrose 50% Injectable 12.5 Gram(s) IV Push once  dextrose 50% Injectable 25 Gram(s) IV Push once  dextrose 50% Injectable 25 Gram(s) IV Push once  furosemide    Tablet 80 milliGRAM(s) Oral every 8 hours  glucagon  Injectable 1 milliGRAM(s) IntraMuscular once  influenza  Vaccine (HIGH DOSE) 0.7 milliLiter(s) IntraMuscular once  insulin glargine Injectable (LANTUS) 20 Unit(s) SubCutaneous at bedtime  insulin lispro (ADMELOG) corrective regimen sliding scale   SubCutaneous Before meals and at bedtime  NIFEdipine XL 60 milliGRAM(s) Oral every 12 hours  sevelamer carbonate 1600 milliGRAM(s) Oral three times a day with meals  sodium bicarbonate 1300 milliGRAM(s) Oral three times a day  sodium zirconium cyclosilicate 10 Gram(s) Oral every 8 hours  valsartan 160 milliGRAM(s) Oral every 12 hours    MEDICATIONS  (PRN):  acetaminophen     Tablet .. 650 milliGRAM(s) Oral every 6 hours PRN Temp greater or equal to 38C (100.4F), Mild Pain (1 - 3)  dextrose Oral Gel 15 Gram(s) Oral once PRN Blood Glucose LESS THAN 70 milliGRAM(s)/deciliter  melatonin 3 milliGRAM(s) Oral at bedtime PRN Insomnia    DATA:  138    |  105    |  85<H>  ----------------------------<  142<H>  Ca:8.7   (07 Feb 2024 05:30)  4.4     |  21<L>  |  8.94<H>        TPro  7.1    /  Alb  3.1<L>  /  TBili  0.2    /  DBili  x      /  AST  10     /  ALT  7<L>   /  AlkPhos  80     06 Feb 2024 09:58    SCr 8.94 [07 Feb 2024 05:30]  SCr 8.94 [06 Feb 2024 20:55]  SCr 8.76 [06 Feb 2024 12:44]  SCr 8.87 [06 Feb 2024 09:58]                          9.4<L>  11.52<H> )-----------( 342      ( 07 Feb 2024 05:30 )             28.5<L>    Phos:-- Mg:-- PTH:-- Uric acid:-- Serum Osm:--  Ferritin:345 ng/mL Iron:38 ug/dL<L> TIBC:169 ug/dL<L> Tsat:22 %  B12:-- TSH:-- (07 Feb 2024 05:30)    Urinalysis Basic - ( 07 Feb 2024 05:30 )  Color: x / Appearance: x / SG: x / pH: x  Gluc: 142 mg/dL<H> / Ketone: x  / Bili: x / Urobili: x   Blood: x / Protein: x / Nitrite: x   Leuk Esterase: x / RBC: x / WBC x   Sq Epi: x / Non Sq Epi: x / Bacteria: x

## 2024-02-08 ENCOUNTER — TRANSCRIPTION ENCOUNTER (OUTPATIENT)
Age: 84
End: 2024-02-08

## 2024-02-08 PROBLEM — E11.9 TYPE 2 DIABETES MELLITUS WITHOUT COMPLICATIONS: Chronic | Status: ACTIVE | Noted: 2024-02-05

## 2024-02-08 PROBLEM — N18.9 CHRONIC KIDNEY DISEASE, UNSPECIFIED: Chronic | Status: ACTIVE | Noted: 2024-02-05

## 2024-02-08 LAB
ALBUMIN SERPL ELPH-MCNC: 2.7 G/DL — LOW (ref 3.3–5)
ALBUMIN SERPL ELPH-MCNC: 2.7 G/DL — LOW (ref 3.3–5)
ALP SERPL-CCNC: 71 U/L — SIGNIFICANT CHANGE UP (ref 40–120)
ALP SERPL-CCNC: 74 U/L — SIGNIFICANT CHANGE UP (ref 40–120)
ALT FLD-CCNC: 7 U/L — LOW (ref 10–45)
ALT FLD-CCNC: 9 U/L — LOW (ref 10–45)
ANION GAP SERPL CALC-SCNC: 10 MMOL/L — SIGNIFICANT CHANGE UP (ref 5–17)
ANION GAP SERPL CALC-SCNC: 14 MMOL/L — SIGNIFICANT CHANGE UP (ref 5–17)
ANISOCYTOSIS BLD QL: SLIGHT — SIGNIFICANT CHANGE UP
APTT BLD: 28.3 SEC — SIGNIFICANT CHANGE UP (ref 24.5–35.6)
AST SERPL-CCNC: 10 U/L — SIGNIFICANT CHANGE UP (ref 10–40)
AST SERPL-CCNC: 11 U/L — SIGNIFICANT CHANGE UP (ref 10–40)
BASOPHILS # BLD AUTO: 0 K/UL — SIGNIFICANT CHANGE UP (ref 0–0.2)
BASOPHILS # BLD AUTO: 0.11 K/UL — SIGNIFICANT CHANGE UP (ref 0–0.2)
BASOPHILS NFR BLD AUTO: 0 % — SIGNIFICANT CHANGE UP (ref 0–2)
BASOPHILS NFR BLD AUTO: 0.9 % — SIGNIFICANT CHANGE UP (ref 0–2)
BILIRUB SERPL-MCNC: 0.2 MG/DL — SIGNIFICANT CHANGE UP (ref 0.2–1.2)
BILIRUB SERPL-MCNC: 0.2 MG/DL — SIGNIFICANT CHANGE UP (ref 0.2–1.2)
BLD GP AB SCN SERPL QL: NEGATIVE — SIGNIFICANT CHANGE UP
BUN SERPL-MCNC: 50 MG/DL — HIGH (ref 7–23)
BUN SERPL-MCNC: 79 MG/DL — HIGH (ref 7–23)
CALCIUM SERPL-MCNC: 8.5 MG/DL — SIGNIFICANT CHANGE UP (ref 8.4–10.5)
CALCIUM SERPL-MCNC: 8.8 MG/DL — SIGNIFICANT CHANGE UP (ref 8.4–10.5)
CHLORIDE SERPL-SCNC: 100 MMOL/L — SIGNIFICANT CHANGE UP (ref 96–108)
CHLORIDE SERPL-SCNC: 103 MMOL/L — SIGNIFICANT CHANGE UP (ref 96–108)
CO2 SERPL-SCNC: 21 MMOL/L — LOW (ref 22–31)
CO2 SERPL-SCNC: 26 MMOL/L — SIGNIFICANT CHANGE UP (ref 22–31)
CREAT SERPL-MCNC: 6.64 MG/DL — HIGH (ref 0.5–1.3)
CREAT SERPL-MCNC: 9.03 MG/DL — HIGH (ref 0.5–1.3)
EGFR: 5 ML/MIN/1.73M2 — LOW
EGFR: 8 ML/MIN/1.73M2 — LOW
EOSINOPHIL # BLD AUTO: 0 K/UL — SIGNIFICANT CHANGE UP (ref 0–0.5)
EOSINOPHIL # BLD AUTO: 0.12 K/UL — SIGNIFICANT CHANGE UP (ref 0–0.5)
EOSINOPHIL NFR BLD AUTO: 0 % — SIGNIFICANT CHANGE UP (ref 0–6)
EOSINOPHIL NFR BLD AUTO: 0.9 % — SIGNIFICANT CHANGE UP (ref 0–6)
GIANT PLATELETS BLD QL SMEAR: PRESENT — SIGNIFICANT CHANGE UP
GIANT PLATELETS BLD QL SMEAR: PRESENT — SIGNIFICANT CHANGE UP
GLUCOSE BLDC GLUCOMTR-MCNC: 117 MG/DL — HIGH (ref 70–99)
GLUCOSE BLDC GLUCOMTR-MCNC: 147 MG/DL — HIGH (ref 70–99)
GLUCOSE BLDC GLUCOMTR-MCNC: 153 MG/DL — HIGH (ref 70–99)
GLUCOSE BLDC GLUCOMTR-MCNC: 159 MG/DL — HIGH (ref 70–99)
GLUCOSE BLDC GLUCOMTR-MCNC: 67 MG/DL — LOW (ref 70–99)
GLUCOSE BLDC GLUCOMTR-MCNC: 70 MG/DL — SIGNIFICANT CHANGE UP (ref 70–99)
GLUCOSE SERPL-MCNC: 108 MG/DL — HIGH (ref 70–99)
GLUCOSE SERPL-MCNC: 54 MG/DL — CRITICAL LOW (ref 70–99)
HCT VFR BLD CALC: 31.3 % — LOW (ref 39–50)
HCT VFR BLD CALC: 33.5 % — LOW (ref 39–50)
HGB BLD-MCNC: 10.1 G/DL — LOW (ref 13–17)
HGB BLD-MCNC: 10.7 G/DL — LOW (ref 13–17)
HYPOCHROMIA BLD QL: SLIGHT — SIGNIFICANT CHANGE UP
INR BLD: 0.99 — SIGNIFICANT CHANGE UP (ref 0.85–1.18)
LYMPHOCYTES # BLD AUTO: 0.31 K/UL — LOW (ref 1–3.3)
LYMPHOCYTES # BLD AUTO: 0.69 K/UL — LOW (ref 1–3.3)
LYMPHOCYTES # BLD AUTO: 2.6 % — LOW (ref 13–44)
LYMPHOCYTES # BLD AUTO: 5.2 % — LOW (ref 13–44)
MAGNESIUM SERPL-MCNC: 2.1 MG/DL — SIGNIFICANT CHANGE UP (ref 1.6–2.6)
MAGNESIUM SERPL-MCNC: 2.4 MG/DL — SIGNIFICANT CHANGE UP (ref 1.6–2.6)
MANUAL SMEAR VERIFICATION: SIGNIFICANT CHANGE UP
MANUAL SMEAR VERIFICATION: SIGNIFICANT CHANGE UP
MCHC RBC-ENTMCNC: 30.3 PG — SIGNIFICANT CHANGE UP (ref 27–34)
MCHC RBC-ENTMCNC: 30.5 PG — SIGNIFICANT CHANGE UP (ref 27–34)
MCHC RBC-ENTMCNC: 31.9 GM/DL — LOW (ref 32–36)
MCHC RBC-ENTMCNC: 32.3 GM/DL — SIGNIFICANT CHANGE UP (ref 32–36)
MCV RBC AUTO: 94.6 FL — SIGNIFICANT CHANGE UP (ref 80–100)
MCV RBC AUTO: 94.9 FL — SIGNIFICANT CHANGE UP (ref 80–100)
MICROCYTES BLD QL: SLIGHT — SIGNIFICANT CHANGE UP
MONOCYTES # BLD AUTO: 1.48 K/UL — HIGH (ref 0–0.9)
MONOCYTES # BLD AUTO: 1.89 K/UL — HIGH (ref 0–0.9)
MONOCYTES NFR BLD AUTO: 11.2 % — SIGNIFICANT CHANGE UP (ref 2–14)
MONOCYTES NFR BLD AUTO: 15.8 % — HIGH (ref 2–14)
NEUTROPHILS # BLD AUTO: 10.92 K/UL — HIGH (ref 1.8–7.4)
NEUTROPHILS # BLD AUTO: 9.65 K/UL — HIGH (ref 1.8–7.4)
NEUTROPHILS NFR BLD AUTO: 80.7 % — HIGH (ref 43–77)
NEUTROPHILS NFR BLD AUTO: 82.7 % — HIGH (ref 43–77)
OVALOCYTES BLD QL SMEAR: SLIGHT — SIGNIFICANT CHANGE UP
OVALOCYTES BLD QL SMEAR: SLIGHT — SIGNIFICANT CHANGE UP
PHOSPHATE SERPL-MCNC: 4.1 MG/DL — SIGNIFICANT CHANGE UP (ref 2.5–4.5)
PHOSPHATE SERPL-MCNC: 4.8 MG/DL — HIGH (ref 2.5–4.5)
PLAT MORPH BLD: ABNORMAL
PLAT MORPH BLD: ABNORMAL
PLATELET # BLD AUTO: 358 K/UL — SIGNIFICANT CHANGE UP (ref 150–400)
PLATELET # BLD AUTO: 380 K/UL — SIGNIFICANT CHANGE UP (ref 150–400)
POIKILOCYTOSIS BLD QL AUTO: SLIGHT — SIGNIFICANT CHANGE UP
POTASSIUM SERPL-MCNC: 4.1 MMOL/L — SIGNIFICANT CHANGE UP (ref 3.5–5.3)
POTASSIUM SERPL-MCNC: 4.5 MMOL/L — SIGNIFICANT CHANGE UP (ref 3.5–5.3)
POTASSIUM SERPL-SCNC: 4.1 MMOL/L — SIGNIFICANT CHANGE UP (ref 3.5–5.3)
POTASSIUM SERPL-SCNC: 4.5 MMOL/L — SIGNIFICANT CHANGE UP (ref 3.5–5.3)
PROT SERPL-MCNC: 6.2 G/DL — SIGNIFICANT CHANGE UP (ref 6–8.3)
PROT SERPL-MCNC: 6.3 G/DL — SIGNIFICANT CHANGE UP (ref 6–8.3)
PROTHROM AB SERPL-ACNC: 11.3 SEC — SIGNIFICANT CHANGE UP (ref 9.5–13)
RBC # BLD: 3.31 M/UL — LOW (ref 4.2–5.8)
RBC # BLD: 3.53 M/UL — LOW (ref 4.2–5.8)
RBC # FLD: 12.6 % — SIGNIFICANT CHANGE UP (ref 10.3–14.5)
RBC # FLD: 12.8 % — SIGNIFICANT CHANGE UP (ref 10.3–14.5)
RBC BLD AUTO: ABNORMAL
RBC BLD AUTO: ABNORMAL
RH IG SCN BLD-IMP: NEGATIVE — SIGNIFICANT CHANGE UP
SCHISTOCYTES BLD QL AUTO: SLIGHT — SIGNIFICANT CHANGE UP
SODIUM SERPL-SCNC: 136 MMOL/L — SIGNIFICANT CHANGE UP (ref 135–145)
SODIUM SERPL-SCNC: 138 MMOL/L — SIGNIFICANT CHANGE UP (ref 135–145)
WBC # BLD: 11.96 K/UL — HIGH (ref 3.8–10.5)
WBC # BLD: 13.21 K/UL — HIGH (ref 3.8–10.5)
WBC # FLD AUTO: 11.96 K/UL — HIGH (ref 3.8–10.5)
WBC # FLD AUTO: 13.21 K/UL — HIGH (ref 3.8–10.5)

## 2024-02-08 PROCEDURE — 36558 INSERT TUNNELED CV CATH: CPT | Mod: RT

## 2024-02-08 PROCEDURE — 77001 FLUOROGUIDE FOR VEIN DEVICE: CPT | Mod: 26

## 2024-02-08 PROCEDURE — 71045 X-RAY EXAM CHEST 1 VIEW: CPT | Mod: 26,XU

## 2024-02-08 PROCEDURE — 76937 US GUIDE VASCULAR ACCESS: CPT | Mod: 26

## 2024-02-08 PROCEDURE — 99232 SBSQ HOSP IP/OBS MODERATE 35: CPT

## 2024-02-08 PROCEDURE — 99233 SBSQ HOSP IP/OBS HIGH 50: CPT

## 2024-02-08 RX ORDER — DEXTROSE 50 % IN WATER 50 %
25 SYRINGE (ML) INTRAVENOUS ONCE
Refills: 0 | Status: COMPLETED | OUTPATIENT
Start: 2024-02-08 | End: 2024-02-08

## 2024-02-08 RX ADMIN — Medication 650 MILLIGRAM(S): at 12:00

## 2024-02-08 RX ADMIN — Medication 650 MILLIGRAM(S): at 20:21

## 2024-02-08 RX ADMIN — SEVELAMER CARBONATE 1600 MILLIGRAM(S): 2400 POWDER, FOR SUSPENSION ORAL at 06:25

## 2024-02-08 RX ADMIN — Medication 80 MILLIGRAM(S): at 06:15

## 2024-02-08 RX ADMIN — Medication 80 MILLIGRAM(S): at 21:01

## 2024-02-08 RX ADMIN — Medication 650 MILLIGRAM(S): at 21:21

## 2024-02-08 RX ADMIN — Medication 80 MILLIGRAM(S): at 00:19

## 2024-02-08 RX ADMIN — Medication 80 MILLIGRAM(S): at 16:11

## 2024-02-08 RX ADMIN — INSULIN GLARGINE 20 UNIT(S): 100 INJECTION, SOLUTION SUBCUTANEOUS at 22:40

## 2024-02-08 RX ADMIN — VALSARTAN 160 MILLIGRAM(S): 80 TABLET ORAL at 13:59

## 2024-02-08 RX ADMIN — Medication 1300 MILLIGRAM(S): at 06:10

## 2024-02-08 RX ADMIN — SEVELAMER CARBONATE 1600 MILLIGRAM(S): 2400 POWDER, FOR SUSPENSION ORAL at 11:31

## 2024-02-08 RX ADMIN — Medication 1300 MILLIGRAM(S): at 21:01

## 2024-02-08 RX ADMIN — CALCITRIOL 0.25 MICROGRAM(S): 0.5 CAPSULE ORAL at 11:31

## 2024-02-08 RX ADMIN — Medication 25 MILLILITER(S): at 06:11

## 2024-02-08 RX ADMIN — Medication 2: at 22:34

## 2024-02-08 RX ADMIN — Medication 650 MILLIGRAM(S): at 11:35

## 2024-02-08 RX ADMIN — ATORVASTATIN CALCIUM 10 MILLIGRAM(S): 80 TABLET, FILM COATED ORAL at 21:01

## 2024-02-08 RX ADMIN — Medication 60 MILLIGRAM(S): at 17:35

## 2024-02-08 RX ADMIN — SEVELAMER CARBONATE 1600 MILLIGRAM(S): 2400 POWDER, FOR SUSPENSION ORAL at 16:13

## 2024-02-08 RX ADMIN — Medication 1300 MILLIGRAM(S): at 13:17

## 2024-02-08 RX ADMIN — Medication 60 MILLIGRAM(S): at 06:11

## 2024-02-08 RX ADMIN — Medication 2: at 16:31

## 2024-02-08 NOTE — PROGRESS NOTE ADULT - PROBLEM SELECTOR PLAN 1
Presented to the ED iso elevated K+ on pre-AV Fistula placement lab work. Initially K+ of 6.0. Was given insulin, dextrose, and lokelma while in the pre-op holding area. In the ED, the K+ noted to drop from 6.0 -> 5.0.  Etiology is most likely iso known renal insufficiency given known CKD stage 5. Dr. 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  - q6hr BMPs   - serial ECG, monitor for changes such as peaked T-waves w/ shortened QT interval   - lokelma 10g q8 as per renal recs  - TDC placement today 2/8 Presented to the ED iso elevated K+ on pre-AV Fistula placement lab work. Initially K+ of 6.0. Was given insulin, dextrose, and lokelma while in the pre-op holding area. In the ED, the K+ noted to drop from 6.0 -> 5.0.  Etiology is most likely iso known renal insufficiency given known CKD stage 5. Dr. 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  - q6hr BMPs   - serial ECG, monitor for changes such as peaked T-waves w/ shortened QT interval   - lokelma 10g q8 as per renal recs  - TDC placement today 2/8, inquire with vascular sx for possiblity of AVF placement this admission Presented to the ED iso elevated K+ on pre-AV Fistula placement lab work. Initially K+ of 6.0. Was given insulin, dextrose, and lokelma while in the pre-op holding area. In the ED, the K+ noted to drop from 6.0 -> 5.0.  Etiology is most likely iso known renal insufficiency given known CKD stage 5. Dr. 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  - q6hr BMPs   - serial ECG, monitor for changes such as peaked T-waves w/ shortened QT interval   - lokelma 10g q8 as per renal recs  - TDC placement today 2/8  - to undergo AVF creation tomorrow AM by vascular, f/u pre-op labs and CXR at 8pm

## 2024-02-08 NOTE — PROGRESS NOTE ADULT - PROBLEM SELECTOR PLAN 3
New diagnosis for the patient, discovered on ED EKG this admission. 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 No recent loss of consciousness/syncope.  Etiology most likely iso metabolic abnormalities (pt hyperkalemic), less likely 2/2 medications slowing alberto conduction (however pt is notably on calcium-channel blockers/amiodarone). Less likely 2/2 MI as no ST changes on ECG/no symptoms.   - monitor ECGs   - address reversible causes of alberto block as per above/below  - holding beta-blockers, including ophthalmic solution timolol, can resume as appropriate  - no plan for pacemaker placement at this time as per EP New diagnosis for the patient, discovered on ED EKG this admission. 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 No recent loss of consciousness/syncope.  Etiology most likely iso metabolic abnormalities (pt hyperkalemic), less likely 2/2 medications slowing alberto conduction (however pt is notably on calcium-channel blockers/amiodarone). Less likely 2/2 MI as no ST changes on ECG/no symptoms.   - address reversible causes of alberto block as per above/below  - holding beta-blockers, including ophthalmic solution timolol, can resume as appropriate  - no plan for pacemaker placement at this time as per EP

## 2024-02-08 NOTE — PROGRESS NOTE ADULT - PROBLEM SELECTOR PLAN 7
Multi year history of HLD. Well controlled on home atorvastatin 10mg qhs.  - c/w home atorvastatin 10mg qhs  - encourage healthy eating as outpatient and follow up with outpatient PCP

## 2024-02-08 NOTE — PROGRESS NOTE ADULT - ASSESSMENT
Mr. Anguiano is an 82yo M PMH of HTN, DM, HLD, CKD Stage 5, who presented with hyperkalemia found on lab work during pre-op for an AVF, admitted to [facility] for further management of his hyperkalemia and new found Mobitz Type I, and medical optimization prior to AVF placement.

## 2024-02-08 NOTE — PROGRESS NOTE ADULT - SUBJECTIVE AND OBJECTIVE BOX
**INCOMPLETE NOTE    OVERNIGHT EVENTS: AM fingerstick 67, NPO gave 1/2 amp.     SUBJECTIVE:  Patient seen and examined at bedside.  ROS: Patient denies h/n/v/d, fever, chills, cp, palpitations, sob, abd pain, leg swelling, rashes, dysuria, and changes in BM.     Vital Signs Last 12 Hrs  T(F): 98.4 (02-08-24 @ 05:44), Max: 98.4 (02-08-24 @ 01:00)  HR: 62 (02-08-24 @ 04:03) (56 - 62)  BP: 129/61 (02-08-24 @ 04:03) (125/58 - 146/66)  BP(mean): 88 (02-08-24 @ 04:03) (84 - 95)  RR: 18 (02-08-24 @ 04:03) (18 - 18)  SpO2: 94% (02-08-24 @ 04:03) (93% - 94%)  I&O's Summary    07 Feb 2024 07:01  -  08 Feb 2024 07:00  --------------------------------------------------------  IN: 120 mL / OUT: 1600 mL / NET: -1480 mL        PHYSICAL EXAM:  Constitutional: NAD, comfortable in bed.  HEENT: NC/AT, PERRLA, EOMI, no conjunctival pallor or scleral icterus, MMM  Neck: Supple, no JVD  Respiratory: CTA B/L. No w/r/r.   Cardiovascular: RRR, normal S1 and S2, no m/r/g.   Gastrointestinal: +BS, soft NTND, no guarding or rebound tenderness, no palpable masses   Extremities: wwp; no cyanosis, clubbing or edema.   Vascular: Pulses equal and strong throughout.   Neurological: AAOx3, no CN deficits, strength and sensation intact throughout.   Skin: No gross skin abnormalities or rashes        LABS:                        10.1   13.21 )-----------( 380      ( 08 Feb 2024 05:30 )             31.3     02-07    138  |  105  |  85<H>  ----------------------------<  142<H>  4.4   |  21<L>  |  8.94<H>    Ca    8.7      07 Feb 2024 05:30  Phos  4.8     02-08  Mg     2.4     02-08    TPro  7.1  /  Alb  3.1<L>  /  TBili  0.2  /  DBili  x   /  AST  10  /  ALT  7<L>  /  AlkPhos  80  02-06    PT/INR - ( 07 Feb 2024 05:30 )   PT: 10.5 sec;   INR: 0.92          PTT - ( 07 Feb 2024 05:30 )  PTT:28.5 sec  Urinalysis Basic - ( 07 Feb 2024 05:30 )    Color: x / Appearance: x / SG: x / pH: x  Gluc: 142 mg/dL / Ketone: x  / Bili: x / Urobili: x   Blood: x / Protein: x / Nitrite: x   Leuk Esterase: x / RBC: x / WBC x   Sq Epi: x / Non Sq Epi: x / Bacteria: x          RADIOLOGY & ADDITIONAL TESTS:    MEDICATIONS  (STANDING):  atorvastatin 10 milliGRAM(s) Oral at bedtime  calcitriol   Capsule 0.25 MICROGram(s) Oral daily  dextrose 5%. 1000 milliLiter(s) (50 mL/Hr) IV Continuous <Continuous>  dextrose 5%. 1000 milliLiter(s) (100 mL/Hr) IV Continuous <Continuous>  dextrose 50% Injectable 12.5 Gram(s) IV Push once  dextrose 50% Injectable 25 Gram(s) IV Push once  dextrose 50% Injectable 25 Gram(s) IV Push once  furosemide    Tablet 80 milliGRAM(s) Oral every 8 hours  glucagon  Injectable 1 milliGRAM(s) IntraMuscular once  influenza  Vaccine (HIGH DOSE) 0.7 milliLiter(s) IntraMuscular once  insulin glargine Injectable (LANTUS) 20 Unit(s) SubCutaneous at bedtime  insulin lispro (ADMELOG) corrective regimen sliding scale   SubCutaneous Before meals and at bedtime  NIFEdipine XL 60 milliGRAM(s) Oral every 12 hours  sevelamer carbonate 1600 milliGRAM(s) Oral three times a day with meals  sodium bicarbonate 1300 milliGRAM(s) Oral three times a day  valsartan 160 milliGRAM(s) Oral every 24 hours    MEDICATIONS  (PRN):  acetaminophen     Tablet .. 650 milliGRAM(s) Oral every 6 hours PRN Temp greater or equal to 38C (100.4F), Mild Pain (1 - 3)  dextrose Oral Gel 15 Gram(s) Oral once PRN Blood Glucose LESS THAN 70 milliGRAM(s)/deciliter  melatonin 3 milliGRAM(s) Oral at bedtime PRN Insomnia   OVERNIGHT EVENTS: AM fingerstick 67, NPO gave 1/2 amp.     SUBJECTIVE:  Patient seen and examined at bedside. Feels well with no complaints   ROS: Patient denies h/n/v/d, fever, chills, cp, palpitations, sob, abd pain, leg swelling, rashes, dysuria, and changes in BM.     Vital Signs Last 12 Hrs  T(F): 98.4 (02-08-24 @ 05:44), Max: 98.4 (02-08-24 @ 01:00)  HR: 62 (02-08-24 @ 04:03) (56 - 62)  BP: 129/61 (02-08-24 @ 04:03) (125/58 - 146/66)  BP(mean): 88 (02-08-24 @ 04:03) (84 - 95)  RR: 18 (02-08-24 @ 04:03) (18 - 18)  SpO2: 94% (02-08-24 @ 04:03) (93% - 94%)  I&O's Summary    07 Feb 2024 07:01  -  08 Feb 2024 07:00  --------------------------------------------------------  IN: 120 mL / OUT: 1600 mL / NET: -1480 mL        PHYSICAL EXAM:  Constitutional: NAD, comfortable in bed.  HEENT: NC/AT, PERRLA, EOMI, no conjunctival pallor or scleral icterus, MMM  Neck: Supple, no JVD  Respiratory: CTA B/L. No w/r/r.   Cardiovascular: normal S1 and S2, no m/r/g.   Gastrointestinal: +BS, soft NTND, no guarding or rebound tenderness, no palpable masses   Extremities: wwp; no cyanosis, clubbing or edema.   Vascular: Pulses equal and strong throughout.   Neurological: AAOx3, no CN deficits, strength and sensation intact throughout.   Skin: No gross skin abnormalities or rashes        LABS:                        10.1   13.21 )-----------( 380      ( 08 Feb 2024 05:30 )             31.3     02-07    138  |  105  |  85<H>  ----------------------------<  142<H>  4.4   |  21<L>  |  8.94<H>    Ca    8.7      07 Feb 2024 05:30  Phos  4.8     02-08  Mg     2.4     02-08    TPro  7.1  /  Alb  3.1<L>  /  TBili  0.2  /  DBili  x   /  AST  10  /  ALT  7<L>  /  AlkPhos  80  02-06    PT/INR - ( 07 Feb 2024 05:30 )   PT: 10.5 sec;   INR: 0.92          PTT - ( 07 Feb 2024 05:30 )  PTT:28.5 sec  Urinalysis Basic - ( 07 Feb 2024 05:30 )    Color: x / Appearance: x / SG: x / pH: x  Gluc: 142 mg/dL / Ketone: x  / Bili: x / Urobili: x   Blood: x / Protein: x / Nitrite: x   Leuk Esterase: x / RBC: x / WBC x   Sq Epi: x / Non Sq Epi: x / Bacteria: x          RADIOLOGY & ADDITIONAL TESTS:    MEDICATIONS  (STANDING):  atorvastatin 10 milliGRAM(s) Oral at bedtime  calcitriol   Capsule 0.25 MICROGram(s) Oral daily  dextrose 5%. 1000 milliLiter(s) (50 mL/Hr) IV Continuous <Continuous>  dextrose 5%. 1000 milliLiter(s) (100 mL/Hr) IV Continuous <Continuous>  dextrose 50% Injectable 12.5 Gram(s) IV Push once  dextrose 50% Injectable 25 Gram(s) IV Push once  dextrose 50% Injectable 25 Gram(s) IV Push once  furosemide    Tablet 80 milliGRAM(s) Oral every 8 hours  glucagon  Injectable 1 milliGRAM(s) IntraMuscular once  influenza  Vaccine (HIGH DOSE) 0.7 milliLiter(s) IntraMuscular once  insulin glargine Injectable (LANTUS) 20 Unit(s) SubCutaneous at bedtime  insulin lispro (ADMELOG) corrective regimen sliding scale   SubCutaneous Before meals and at bedtime  NIFEdipine XL 60 milliGRAM(s) Oral every 12 hours  sevelamer carbonate 1600 milliGRAM(s) Oral three times a day with meals  sodium bicarbonate 1300 milliGRAM(s) Oral three times a day  valsartan 160 milliGRAM(s) Oral every 24 hours    MEDICATIONS  (PRN):  acetaminophen     Tablet .. 650 milliGRAM(s) Oral every 6 hours PRN Temp greater or equal to 38C (100.4F), Mild Pain (1 - 3)  dextrose Oral Gel 15 Gram(s) Oral once PRN Blood Glucose LESS THAN 70 milliGRAM(s)/deciliter  melatonin 3 milliGRAM(s) Oral at bedtime PRN Insomnia   Step down 7L to RMF     Hospital Course: ANUPAM Anguiano is an 84yo M w/ a PMH of HTN, DM, HLD, CKD Stage 5, who presented with hyperkalemia. He was scheduled to receive an elective LUE AVF creation in preparation for dialysis but was noted to be hyperkalemic necessitating insulin, dextrose and Lokelma that time, nephrology was called and recommended rechecking a BMP to evaluate hyperK resolution but PACU nurses stated it was against policy and Mr. Agnuiano was sent to [facility] and admitted to [facility]. During his time on 7L the patient's EKG was significant for progression of known type 1 AV block to second degree AV block. EP/Cards consulted and at this time there is no need for intervention however it is recommended that the patient receive an echocardiogram outpatient. The patient received an HD catheter on 2/8 and underwent his first session of HD. He will be  undergoing AVF creation tomorrow 2/9 by vascular surgery. At this time the patient is stable for step down to RMF     OVERNIGHT EVENTS: AM fingerstick 67, NPO gave 1/2 amp.     SUBJECTIVE:  Patient seen and examined at bedside. Feels well with no complaints   ROS: Patient denies h/n/v/d, fever, chills, cp, palpitations, sob, abd pain, leg swelling, rashes, dysuria, and changes in BM.     Vital Signs Last 12 Hrs  T(F): 98.4 (02-08-24 @ 05:44), Max: 98.4 (02-08-24 @ 01:00)  HR: 62 (02-08-24 @ 04:03) (56 - 62)  BP: 129/61 (02-08-24 @ 04:03) (125/58 - 146/66)  BP(mean): 88 (02-08-24 @ 04:03) (84 - 95)  RR: 18 (02-08-24 @ 04:03) (18 - 18)  SpO2: 94% (02-08-24 @ 04:03) (93% - 94%)  I&O's Summary    07 Feb 2024 07:01  -  08 Feb 2024 07:00  --------------------------------------------------------  IN: 120 mL / OUT: 1600 mL / NET: -1480 mL        PHYSICAL EXAM:  Constitutional: NAD, comfortable in bed.  HEENT: NC/AT, PERRLA, EOMI, no conjunctival pallor or scleral icterus, MMM  Neck: Supple, no JVD  Respiratory: CTA B/L. No w/r/r.   Cardiovascular: normal S1 and S2, no m/r/g.   Gastrointestinal: +BS, soft NTND, no guarding or rebound tenderness, no palpable masses   Extremities: wwp; no cyanosis, clubbing or edema.   Vascular: Pulses equal and strong throughout.   Neurological: AAOx3, no CN deficits, strength and sensation intact throughout.   Skin: No gross skin abnormalities or rashes        LABS:                        10.1   13.21 )-----------( 380      ( 08 Feb 2024 05:30 )             31.3     02-07    138  |  105  |  85<H>  ----------------------------<  142<H>  4.4   |  21<L>  |  8.94<H>    Ca    8.7      07 Feb 2024 05:30  Phos  4.8     02-08  Mg     2.4     02-08    TPro  7.1  /  Alb  3.1<L>  /  TBili  0.2  /  DBili  x   /  AST  10  /  ALT  7<L>  /  AlkPhos  80  02-06    PT/INR - ( 07 Feb 2024 05:30 )   PT: 10.5 sec;   INR: 0.92          PTT - ( 07 Feb 2024 05:30 )  PTT:28.5 sec  Urinalysis Basic - ( 07 Feb 2024 05:30 )    Color: x / Appearance: x / SG: x / pH: x  Gluc: 142 mg/dL / Ketone: x  / Bili: x / Urobili: x   Blood: x / Protein: x / Nitrite: x   Leuk Esterase: x / RBC: x / WBC x   Sq Epi: x / Non Sq Epi: x / Bacteria: x          RADIOLOGY & ADDITIONAL TESTS:    MEDICATIONS  (STANDING):  atorvastatin 10 milliGRAM(s) Oral at bedtime  calcitriol   Capsule 0.25 MICROGram(s) Oral daily  dextrose 5%. 1000 milliLiter(s) (50 mL/Hr) IV Continuous <Continuous>  dextrose 5%. 1000 milliLiter(s) (100 mL/Hr) IV Continuous <Continuous>  dextrose 50% Injectable 12.5 Gram(s) IV Push once  dextrose 50% Injectable 25 Gram(s) IV Push once  dextrose 50% Injectable 25 Gram(s) IV Push once  furosemide    Tablet 80 milliGRAM(s) Oral every 8 hours  glucagon  Injectable 1 milliGRAM(s) IntraMuscular once  influenza  Vaccine (HIGH DOSE) 0.7 milliLiter(s) IntraMuscular once  insulin glargine Injectable (LANTUS) 20 Unit(s) SubCutaneous at bedtime  insulin lispro (ADMELOG) corrective regimen sliding scale   SubCutaneous Before meals and at bedtime  NIFEdipine XL 60 milliGRAM(s) Oral every 12 hours  sevelamer carbonate 1600 milliGRAM(s) Oral three times a day with meals  sodium bicarbonate 1300 milliGRAM(s) Oral three times a day  valsartan 160 milliGRAM(s) Oral every 24 hours    MEDICATIONS  (PRN):  acetaminophen     Tablet .. 650 milliGRAM(s) Oral every 6 hours PRN Temp greater or equal to 38C (100.4F), Mild Pain (1 - 3)  dextrose Oral Gel 15 Gram(s) Oral once PRN Blood Glucose LESS THAN 70 milliGRAM(s)/deciliter  melatonin 3 milliGRAM(s) Oral at bedtime PRN Insomnia

## 2024-02-08 NOTE — PROVIDER CONTACT NOTE (HYPOGLYCEMIA EVENT) - NS PROVIDER CONTACT BACKGROUND-HYPO
Age: 83y    Gender: Male    POCT Blood Glucose:  117 mg/dL (02-08-24 @ 12:54)  70 mg/dL (02-08-24 @ 11:44)        eMAR:    Pt asymptomatic. NPO after midnight for IR procedure d/c and pt placed lunch order. Given 2 cups apple sauce and 1 cup apple juice. MD Wallace aware. 	    
Age: 83y    Gender: Male    POCT Blood Glucose:  120 mg/dL (02-07-24 @ 12:52)  72 mg/dL (02-07-24 @ 12:04)        eMAR:  dextrose 50% Injectable   25 milliLiter(s) IV Push (02-07-24 @ 12:27)        Pt asymptomatic with POCT 72. D50W IVP 25mL administered with relief of POCT to 120.

## 2024-02-08 NOTE — PROGRESS NOTE ADULT - PROBLEM SELECTOR PLAN 8
Hgb on admission 9.4, MCV 92.9.   Currently no signs of active bleeding (no hematochezia, melena, hemoptysis, hematuria).  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  - trend CBC  - maintain active T&S  - transfuse if Hgb <7

## 2024-02-08 NOTE — PROGRESS NOTE ADULT - ASSESSMENT
Mr. Anguiano is an 84yo M PMH of HTN, DM, HLD, CKD Stage 5, who presented with hyperkalemia found on lab work during pre-op for an AVF, admitted to [facility] for further management of his hyperkalemia and new found Mobitz Type I, and medical optimization prior to AVF placement.

## 2024-02-08 NOTE — PROGRESS NOTE ADULT - PROBLEM SELECTOR PLAN 5
Multi year history of hypertension. On admission, presented with /57. Well controlled on home valsartan 160mg qd, lasix 80mg tid, nifedipine 60mg bid.  - c/w nifedipine 60mg PO BID  - c/w lasix 80mg TID   - Restart Valsartan 160mg   - CTM for CP/CT  - encourage low salt in diet

## 2024-02-08 NOTE — PROGRESS NOTE ADULT - PROBLEM SELECTOR PLAN 5
Multi year history of hypertension. On admission, presented with /57. Well controlled on home valsartan 160mg qd, lasix 80mg tid, nifedipine 60mg bid.  - c/w nifedipine 60mg PO BID  - c/w lasix 80mg TID   - Restart Valsartan 160mg   - CTM for CP/CT  - encourage low salt in diet Multi year history of hypertension. On admission, presented with /57. Well controlled on home valsartan 160mg qd, lasix 80mg tid, nifedipine 60mg bid.  - c/w nifedipine 60mg PO BID  - c/w lasix 80mg TID   - c/w Valsartan 160mg   - CTM for CP/CT  - encourage low salt in diet

## 2024-02-08 NOTE — PROGRESS NOTE ADULT - SUBJECTIVE AND OBJECTIVE BOX
*****INCOMPLETE NOTE*****  Hospital Course: ANUPAM Anguiano is an 82yo M w/ a PMH of HTN, DM, HLD, CKD Stage 5, who presented with hyperkalemia. He was scheduled to receive an elective LUE AVF creation in preparation for dialysis but was noted to be hyperkalemic necessitating insulin, dextrose and Lokelma that time, nephrology was called and recommended rechecking a BMP to evaluate hyperK resolution but PACU nurses stated it was against policy and Mr. Anguiano was sent to [facility] and admitted to [facility]. During his time on 7L the patient's EKG was significant for progression of known type 1 AV block to second degree AV block. EP/Cards consulted and at this time there is no need for intervention however it is recommended that the patient receive an echocardiogram outpatient. The patient received an HD catheter on 2/8 and underwent his first session of HD. He will be  undergoing AVF creation tomorrow 2/9 by vascular surgery. At this time the patient is stable for step down to RMF     INTERVAL HPI/OVERNIGHT EVENTS: jeana    SUBJECTIVE: Patient seen and examined at bedside, resting comfortably in bed, and does not appear to be in any acute distress. Patient states  Patient denies any recent fevers, chills, nausea, vomiting, headache, acute sob, chest pain, abdominal pain, genitourinary sx, extremity pain or swelling.     ANTIBIOTICS/RELEVANT:    MEDICATIONS  (STANDING):  atorvastatin 10 milliGRAM(s) Oral at bedtime  calcitriol   Capsule 0.25 MICROGram(s) Oral daily  dextrose 5%. 1000 milliLiter(s) (100 mL/Hr) IV Continuous <Continuous>  dextrose 5%. 1000 milliLiter(s) (50 mL/Hr) IV Continuous <Continuous>  dextrose 50% Injectable 25 Gram(s) IV Push once  dextrose 50% Injectable 25 Gram(s) IV Push once  dextrose 50% Injectable 12.5 Gram(s) IV Push once  furosemide    Tablet 80 milliGRAM(s) Oral every 8 hours  glucagon  Injectable 1 milliGRAM(s) IntraMuscular once  influenza  Vaccine (HIGH DOSE) 0.7 milliLiter(s) IntraMuscular once  insulin glargine Injectable (LANTUS) 20 Unit(s) SubCutaneous at bedtime  insulin lispro (ADMELOG) corrective regimen sliding scale   SubCutaneous Before meals and at bedtime  NIFEdipine XL 60 milliGRAM(s) Oral every 12 hours  sevelamer carbonate 1600 milliGRAM(s) Oral three times a day with meals  sodium bicarbonate 1300 milliGRAM(s) Oral three times a day  valsartan 160 milliGRAM(s) Oral every 24 hours    MEDICATIONS  (PRN):  acetaminophen     Tablet .. 650 milliGRAM(s) Oral every 6 hours PRN Temp greater or equal to 38C (100.4F), Mild Pain (1 - 3)  dextrose Oral Gel 15 Gram(s) Oral once PRN Blood Glucose LESS THAN 70 milliGRAM(s)/deciliter  melatonin 3 milliGRAM(s) Oral at bedtime PRN Insomnia      Vital Signs Last 24 Hrs  T(C): 36.7 (08 Feb 2024 16:55), Max: 36.9 (08 Feb 2024 01:00)  T(F): 98 (08 Feb 2024 16:55), Max: 98.4 (08 Feb 2024 01:00)  HR: 51 (08 Feb 2024 16:55) (40 - 62)  BP: 165/73 (08 Feb 2024 16:55) (125/58 - 178/80)  BP(mean): 108 (08 Feb 2024 16:55) (84 - 115)  RR: 19 (08 Feb 2024 16:55) (18 - 19)  SpO2: 97% (08 Feb 2024 16:55) (93% - 98%)    Parameters below as of 08 Feb 2024 16:55  Patient On (Oxygen Delivery Method): room air        PHYSICAL EXAM:  General: in no acute distress  Eyes: EOMI intact bilaterally. Anicteric sclerae, moist conjunctivae  HENT: Moist mucous membranes  Neck: Trachea midline, supple  Lungs: CTA B/L. No wheezes, rales, or rhonchi  Cardiovascular: RRR. No murmurs, rubs, or gallops  Abdomen: Soft, non-tender non-distended; No rebound or guarding  Extremities: WWP, No clubbing, cyanosis or edema  Neurological: Alert and oriented  Skin: Warm and dry. No obvious rash     LABS:                        10.1   13.21 )-----------( 380      ( 08 Feb 2024 05:30 )             31.3     02-08    138  |  103  |  79<H>  ----------------------------<  54<LL>  4.1   |  21<L>  |  9.03<H>    Ca    8.8      08 Feb 2024 05:30  Phos  4.8     02-08  Mg     2.4     02-08    TPro  6.2  /  Alb  2.7<L>  /  TBili  0.2  /  DBili  x   /  AST  10  /  ALT  7<L>  /  AlkPhos  71  02-08    PT/INR - ( 07 Feb 2024 05:30 )   PT: 10.5 sec;   INR: 0.92          PTT - ( 07 Feb 2024 05:30 )  PTT:28.5 sec  Urinalysis Basic - ( 08 Feb 2024 05:30 )    Color: x / Appearance: x / SG: x / pH: x  Gluc: 54 mg/dL / Ketone: x  / Bili: x / Urobili: x   Blood: x / Protein: x / Nitrite: x   Leuk Esterase: x / RBC: x / WBC x   Sq Epi: x / Non Sq Epi: x / Bacteria: x        MICROBIOLOGY:    RADIOLOGY & ADDITIONAL STUDIES: -----TRANSFER FROM TELEMETRY TO [facility] MEDICAL FLOORS--------  Hospital Course: ANUPAM Anguiano is an 82yo M w/ a PMH of HTN, DM, HLD, CKD Stage 5, who presented with hyperkalemia. He was scheduled to receive an elective LUE AVF creation in preparation for dialysis but was noted to be hyperkalemic necessitating insulin, dextrose and Lokelma that time, nephrology was called and recommended rechecking a BMP to evaluate hyperK resolution but PACU nurses stated it was against policy and Mr. Anguiano was sent to [facility] and admitted to [facility]. During his time on 7L the patient's EKG was significant for progression of known type 1 AV block to second degree AV block. EP/Cards consulted and at this time there is no need for intervention however it is recommended that the patient receive an echocardiogram outpatient. The patient received an HD catheter on 2/8 and underwent his first session of HD. He will be  undergoing AVF creation tomorrow 2/9 by vascular surgery. At this time the patient is stable for step down to RMF     INTERVAL HPI/OVERNIGHT EVENTS: jeana    SUBJECTIVE: Patient seen and examined at bedside, resting comfortably in bed, and does not appear to be in any acute distress. Patient denies any lightheadedness, chest pain, dizziness, headaches, uri sxs, diarrhea/constipation or gu sxs.    MEDICATIONS  (STANDING):  atorvastatin 10 milliGRAM(s) Oral at bedtime  calcitriol   Capsule 0.25 MICROGram(s) Oral daily  dextrose 5%. 1000 milliLiter(s) (100 mL/Hr) IV Continuous <Continuous>  dextrose 5%. 1000 milliLiter(s) (50 mL/Hr) IV Continuous <Continuous>  dextrose 50% Injectable 25 Gram(s) IV Push once  dextrose 50% Injectable 25 Gram(s) IV Push once  dextrose 50% Injectable 12.5 Gram(s) IV Push once  furosemide    Tablet 80 milliGRAM(s) Oral every 8 hours  glucagon  Injectable 1 milliGRAM(s) IntraMuscular once  influenza  Vaccine (HIGH DOSE) 0.7 milliLiter(s) IntraMuscular once  insulin glargine Injectable (LANTUS) 20 Unit(s) SubCutaneous at bedtime  insulin lispro (ADMELOG) corrective regimen sliding scale   SubCutaneous Before meals and at bedtime  NIFEdipine XL 60 milliGRAM(s) Oral every 12 hours  sevelamer carbonate 1600 milliGRAM(s) Oral three times a day with meals  sodium bicarbonate 1300 milliGRAM(s) Oral three times a day  valsartan 160 milliGRAM(s) Oral every 24 hours    MEDICATIONS  (PRN):  acetaminophen     Tablet .. 650 milliGRAM(s) Oral every 6 hours PRN Temp greater or equal to 38C (100.4F), Mild Pain (1 - 3)  dextrose Oral Gel 15 Gram(s) Oral once PRN Blood Glucose LESS THAN 70 milliGRAM(s)/deciliter  melatonin 3 milliGRAM(s) Oral at bedtime PRN Insomnia      Vital Signs Last 24 Hrs  T(C): 36.7 (08 Feb 2024 16:55), Max: 36.9 (08 Feb 2024 01:00)  T(F): 98 (08 Feb 2024 16:55), Max: 98.4 (08 Feb 2024 01:00)  HR: 51 (08 Feb 2024 16:55) (40 - 62)  BP: 165/73 (08 Feb 2024 16:55) (125/58 - 178/80)  BP(mean): 108 (08 Feb 2024 16:55) (84 - 115)  RR: 19 (08 Feb 2024 16:55) (18 - 19)  SpO2: 97% (08 Feb 2024 16:55) (93% - 98%)    Parameters below as of 08 Feb 2024 16:55  Patient On (Oxygen Delivery Method): room air        PHYSICAL EXAM:  General: in no acute distress  Eyes: EOMI intact bilaterally  HENT: Moist mucous membranes  Neck: Trachea midline  Lungs: CTA B/L  Cardiovascular: bradycardic, regular rhythm  Abdomen: Soft, non-tender non-distended  Extremities: WWP  Neurological: Alert and oriented  Skin: Warm and dry    LABS:                        10.1   13.21 )-----------( 380      ( 08 Feb 2024 05:30 )             31.3     02-08    138  |  103  |  79<H>  ----------------------------<  54<LL>  4.1   |  21<L>  |  9.03<H>    Ca    8.8      08 Feb 2024 05:30  Phos  4.8     02-08  Mg     2.4     02-08    TPro  6.2  /  Alb  2.7<L>  /  TBili  0.2  /  DBili  x   /  AST  10  /  ALT  7<L>  /  AlkPhos  71  02-08    PT/INR - ( 07 Feb 2024 05:30 )   PT: 10.5 sec;   INR: 0.92          PTT - ( 07 Feb 2024 05:30 )  PTT:28.5 sec  Urinalysis Basic - ( 08 Feb 2024 05:30 )    Color: x / Appearance: x / SG: x / pH: x  Gluc: 54 mg/dL / Ketone: x  / Bili: x / Urobili: x   Blood: x / Protein: x / Nitrite: x   Leuk Esterase: x / RBC: x / WBC x   Sq Epi: x / Non Sq Epi: x / Bacteria: x

## 2024-02-08 NOTE — PROGRESS NOTE ADULT - PROBLEM SELECTOR PLAN 1
Presented to the ED iso elevated K+ on pre-AV Fistula placement lab work. Initially K+ of 6.0. Was given insulin, dextrose, and lokelma while in the pre-op holding area. In the ED, the K+ noted to drop from 6.0 -> 5.0.  Etiology is most likely iso known renal insufficiency given known CKD stage 5. Dr. 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  - q6hr BMPs   - serial ECG, monitor for changes such as peaked T-waves w/ shortened QT interval   - lokelma 10g q8 as per renal recs  - TDC placement today 2/8  - to undergo AVF creation tomorrow AM by vascular, f/u pre-op labs and CXR at 8pm Presented to the ED iso elevated K+ on pre-AV Fistula placement lab work. Initially K+ of 6.0. Was given insulin, dextrose, and lokelma while in the pre-op holding area. In the ED, the K+ noted to drop from 6.0 -> 5.0.  Etiology is most likely iso known renal insufficiency given known CKD stage 5. Dr. 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  TDC placement today 2/8    - f/u 8PM BMP, daily thereafter  - to undergo AVF creation tomorrow AM by vascular, f/u pre-op labs and CXR at 8pm

## 2024-02-08 NOTE — PROGRESS NOTE ADULT - PROBLEM SELECTOR PLAN 3
New diagnosis for the patient, discovered on ED EKG this admission.   Patient without symptoms of light-headedness, dizziness, or fatigue. No recent loss of consciousness/syncope.  Etiology most likely iso metabolic abnormalities (pt hyperkalemic), less likely 2/2 medications slowing alberto conduction (however pt is notably on calcium-channel blockers/amiodarone). Less likely 2/2 MI as no ST changes on ECG/no symptoms. Cannot r/o infiltrative /autoimmune disorders, however less likely since this is a new diagnosis.  - monitor ECGs   - address reversible causes of alberto block as per above/below  - holding beta-blockers, including ophthalmic solution timolol, can resume as appropriate New diagnosis for the patient, discovered on ED EKG this admission.   Patient without symptoms of light-headedness, dizziness, or fatigue. No recent loss of consciousness/syncope.  Etiology most likely iso metabolic abnormalities (pt hyperkalemic), less likely 2/2 medications slowing alberto conduction (however pt is notably on calcium-channel blockers/amiodarone). Less likely 2/2 MI as no ST changes on ECG/no symptoms. Cannot r/o infiltrative /autoimmune disorders, however less likely since this is a new diagnosis.  - monitor ECGs   - address reversible causes of alberto block as per above/below  - holding beta-blockers, including ophthalmic solution timolol, can resume as appropriate  - no plan for pacemaker placement at this time as per EP New diagnosis for the patient, discovered on ED EKG this admission. 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 No recent loss of consciousness/syncope.  Etiology most likely iso metabolic abnormalities (pt hyperkalemic), less likely 2/2 medications slowing alberto conduction (however pt is notably on calcium-channel blockers/amiodarone). Less likely 2/2 MI as no ST changes on ECG/no symptoms.   - monitor ECGs   - address reversible causes of alberto block as per above/below  - holding beta-blockers, including ophthalmic solution timolol, can resume as appropriate  - no plan for pacemaker placement at this time as per EP

## 2024-02-08 NOTE — PROGRESS NOTE ADULT - PROBLEM SELECTOR PLAN 4
Known history of T2DM. Insulin lantus 40u qhs as home medication. Last A1c unknown.  - moderate ISF-25 lispro sliding scale AC+qHS  - monitor FS  - consistent carb diet  - lantus 20u SQ qhs given decreased kidney function, adjust as needed

## 2024-02-08 NOTE — PROGRESS NOTE ADULT - PROBLEM SELECTOR PLAN 9
F: none  E: Replete as needed, careful given HD needs  N: Renal diet, npo after MN  Dvt ppx: heparin subq  Dispo: Tele  Code status: Full

## 2024-02-08 NOTE — PROGRESS NOTE ADULT - PROBLEM SELECTOR PLAN 2
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)  - Avoid nephrotoxic agents  - c/w lokelma 10g PO TID x 4 more doses  - c/w lasix 80mg PO TID  - c/w sodium bicarb 1300mg PO TID  - c/w vitamin D3 0.25mcg PO qd  - c/w renvela 1800mg PO TID  - renal diet with 1.2L fluid restriction  - TDC placement today f/u post HD 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)  - Avoid nephrotoxic agents  - c/w lokelma 10g PO TID x 4 more doses  - c/w lasix 80mg PO TID  - c/w sodium bicarb 1300mg PO TID  - c/w vitamin D3 0.25mcg PO qd  - c/w renvela 1800mg PO TID  - renal diet with 1.2L fluid restriction

## 2024-02-08 NOTE — PROGRESS NOTE ADULT - PROBLEM SELECTOR PLAN 2
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

## 2024-02-08 NOTE — PROGRESS NOTE ADULT - SUBJECTIVE AND OBJECTIVE BOX
Nephrology progress note    Seen on HD at bedside. S/p TDC today. Tolerating treatment, no acute complaints. HDS. Continue HD as ordered.    Allergies:  No Known Allergies    Hospital Medications:   MEDICATIONS  (STANDING):  atorvastatin 10 milliGRAM(s) Oral at bedtime  calcitriol   Capsule 0.25 MICROGram(s) Oral daily  dextrose 5%. 1000 milliLiter(s) (50 mL/Hr) IV Continuous <Continuous>  dextrose 5%. 1000 milliLiter(s) (100 mL/Hr) IV Continuous <Continuous>  dextrose 50% Injectable 12.5 Gram(s) IV Push once  dextrose 50% Injectable 25 Gram(s) IV Push once  dextrose 50% Injectable 25 Gram(s) IV Push once  furosemide    Tablet 80 milliGRAM(s) Oral every 8 hours  glucagon  Injectable 1 milliGRAM(s) IntraMuscular once  influenza  Vaccine (HIGH DOSE) 0.7 milliLiter(s) IntraMuscular once  insulin glargine Injectable (LANTUS) 20 Unit(s) SubCutaneous at bedtime  insulin lispro (ADMELOG) corrective regimen sliding scale   SubCutaneous Before meals and at bedtime  NIFEdipine XL 60 milliGRAM(s) Oral every 12 hours  sevelamer carbonate 1600 milliGRAM(s) Oral three times a day with meals  sodium bicarbonate 1300 milliGRAM(s) Oral three times a day  valsartan 160 milliGRAM(s) Oral every 24 hours    REVIEW OF SYSTEMS:  All other review of systems is negative unless indicated above.    VITALS:  T(F): 98 (02-08-24 @ 16:55), Max: 98.4 (02-08-24 @ 01:00)  HR: 51 (02-08-24 @ 16:55)  BP: 165/73 (02-08-24 @ 16:55)  RR: 19 (02-08-24 @ 16:55)  SpO2: 97% (02-08-24 @ 16:55)  Wt(kg): --    02-06 @ 07:01  -  02-07 @ 07:00  --------------------------------------------------------  IN: 0 mL / OUT: 125 mL / NET: -125 mL    02-07 @ 07:01 - 02-08 @ 07:00  --------------------------------------------------------  IN: 360 mL / OUT: 1900 mL / NET: -1540 mL    02-08 @ 07:01  -  02-08 @ 17:32  --------------------------------------------------------  IN: 0 mL / OUT: 550 mL / NET: -550 mL      Height (cm): 172.7 (02-08 @ 09:04)  Weight (kg): 83.9 (02-08 @ 09:04)  BMI (kg/m2): 28.1 (02-08 @ 09:04)  BSA (m2): 1.98 (02-08 @ 09:04)    PHYSICAL EXAM:  Constitutional: NAD, lying in bed on HD  Head: NCAT, EOMI  Respiratory: CTAB, no crackles anteriorly  Cardiac: Regular rate  Gastrointestinal: abdomen soft, NT/ND  Extremities: 1+ LE edema  Neurologic: Awake, alert, interactive  Access: TDC accessed for HD    LABS:  02-08    138  |  103  |  79<H>  ----------------------------<  54<LL>  4.1   |  21<L>  |  9.03<H>    Ca    8.8      08 Feb 2024 05:30  Phos  4.8     02-08  Mg     2.4     02-08    TPro  6.2  /  Alb  2.7<L>  /  TBili  0.2  /  DBili      /  AST  10  /  ALT  7<L>  /  AlkPhos  71  02-08                          10.1   13.21 )-----------( 380      ( 08 Feb 2024 05:30 )             31.3       Urine Studies:  Creatinine Trend: 9.03<--, 8.94<--, 8.94<--, 8.76<--, 8.87<--  Urinalysis Basic - ( 08 Feb 2024 05:30 )    Color:  / Appearance:  / SG:  / pH:   Gluc: 54 mg/dL / Ketone:   / Bili:  / Urobili:    Blood:  / Protein:  / Nitrite:    Leuk Esterase:  / RBC:  / WBC    Sq Epi:  / Non Sq Epi:  / Bacteria:         RADIOLOGY & ADDITIONAL STUDIES:  Reviewed

## 2024-02-08 NOTE — PROGRESS NOTE ADULT - PROBLEM SELECTOR PLAN 9
F: none  E: Replete as needed, careful given HD needs  N: Renal diet, npo after MN  Dvt ppx: heparin subq  Dispo: Tele  Code status: Full F: none  E: Replete as needed, careful given HD needs  N: Renal diet, npo after MN  Dvt ppx: heparin subq  Dispo: RMF  Code status: Full

## 2024-02-09 ENCOUNTER — APPOINTMENT (OUTPATIENT)
Dept: VASCULAR SURGERY | Facility: CLINIC | Age: 84
End: 2024-02-09

## 2024-02-09 ENCOUNTER — TRANSCRIPTION ENCOUNTER (OUTPATIENT)
Age: 84
End: 2024-02-09

## 2024-02-09 LAB
ALBUMIN SERPL ELPH-MCNC: 2.6 G/DL — LOW (ref 3.3–5)
ALP SERPL-CCNC: 71 U/L — SIGNIFICANT CHANGE UP (ref 40–120)
ALT FLD-CCNC: 7 U/L — LOW (ref 10–45)
ANION GAP SERPL CALC-SCNC: 11 MMOL/L — SIGNIFICANT CHANGE UP (ref 5–17)
AST SERPL-CCNC: 9 U/L — LOW (ref 10–40)
BILIRUB SERPL-MCNC: 0.2 MG/DL — SIGNIFICANT CHANGE UP (ref 0.2–1.2)
BUN SERPL-MCNC: 60 MG/DL — HIGH (ref 7–23)
CALCIUM SERPL-MCNC: 8.5 MG/DL — SIGNIFICANT CHANGE UP (ref 8.4–10.5)
CHLORIDE SERPL-SCNC: 103 MMOL/L — SIGNIFICANT CHANGE UP (ref 96–108)
CO2 SERPL-SCNC: 26 MMOL/L — SIGNIFICANT CHANGE UP (ref 22–31)
CREAT SERPL-MCNC: 7.28 MG/DL — HIGH (ref 0.5–1.3)
EGFR: 7 ML/MIN/1.73M2 — LOW
GLUCOSE BLDC GLUCOMTR-MCNC: 102 MG/DL — HIGH (ref 70–99)
GLUCOSE BLDC GLUCOMTR-MCNC: 125 MG/DL — HIGH (ref 70–99)
GLUCOSE BLDC GLUCOMTR-MCNC: 136 MG/DL — HIGH (ref 70–99)
GLUCOSE BLDC GLUCOMTR-MCNC: 265 MG/DL — HIGH (ref 70–99)
GLUCOSE BLDC GLUCOMTR-MCNC: 58 MG/DL — LOW (ref 70–99)
GLUCOSE BLDC GLUCOMTR-MCNC: 66 MG/DL — LOW (ref 70–99)
GLUCOSE BLDC GLUCOMTR-MCNC: 82 MG/DL — SIGNIFICANT CHANGE UP (ref 70–99)
GLUCOSE BLDC GLUCOMTR-MCNC: 98 MG/DL — SIGNIFICANT CHANGE UP (ref 70–99)
GLUCOSE SERPL-MCNC: 49 MG/DL — CRITICAL LOW (ref 70–99)
HCT VFR BLD CALC: 32.7 % — LOW (ref 39–50)
HGB BLD-MCNC: 10.7 G/DL — LOW (ref 13–17)
ISTAT VENOUS BE: 1 MMOL/L — SIGNIFICANT CHANGE UP (ref -2–3)
ISTAT VENOUS GLUCOSE: 104 MG/DL — HIGH (ref 70–99)
ISTAT VENOUS HCO3: 25 MMOL/L — SIGNIFICANT CHANGE UP (ref 23–28)
ISTAT VENOUS HEMATOCRIT: 42 % — SIGNIFICANT CHANGE UP (ref 39–50)
ISTAT VENOUS HEMOGLOBIN: 14.3 GM/DL — SIGNIFICANT CHANGE UP (ref 13–17)
ISTAT VENOUS IONIZED CALCIUM: 1.1 MMOL/L — LOW (ref 1.12–1.3)
ISTAT VENOUS PCO2: 38 MMHG — LOW (ref 41–51)
ISTAT VENOUS PH: 7.43 — HIGH (ref 7.31–7.41)
ISTAT VENOUS PO2: 101 MMHG — HIGH (ref 35–40)
ISTAT VENOUS POTASSIUM: 3.9 MMOL/L — SIGNIFICANT CHANGE UP (ref 3.5–5.3)
ISTAT VENOUS SO2: 98 % — SIGNIFICANT CHANGE UP
ISTAT VENOUS SODIUM: 139 MMOL/L — SIGNIFICANT CHANGE UP (ref 135–145)
ISTAT VENOUS TCO2: 26 MMOL/L — SIGNIFICANT CHANGE UP (ref 22–31)
MAGNESIUM SERPL-MCNC: 2.1 MG/DL — SIGNIFICANT CHANGE UP (ref 1.6–2.6)
MCHC RBC-ENTMCNC: 30.3 PG — SIGNIFICANT CHANGE UP (ref 27–34)
MCHC RBC-ENTMCNC: 32.7 GM/DL — SIGNIFICANT CHANGE UP (ref 32–36)
MCV RBC AUTO: 92.6 FL — SIGNIFICANT CHANGE UP (ref 80–100)
NRBC # BLD: 0 /100 WBCS — SIGNIFICANT CHANGE UP (ref 0–0)
PHOSPHATE SERPL-MCNC: 4.6 MG/DL — HIGH (ref 2.5–4.5)
PLATELET # BLD AUTO: 365 K/UL — SIGNIFICANT CHANGE UP (ref 150–400)
POTASSIUM SERPL-MCNC: 3.9 MMOL/L — SIGNIFICANT CHANGE UP (ref 3.5–5.3)
POTASSIUM SERPL-SCNC: 3.9 MMOL/L — SIGNIFICANT CHANGE UP (ref 3.5–5.3)
PROT SERPL-MCNC: 6.9 G/DL — SIGNIFICANT CHANGE UP (ref 6–8.3)
RBC # BLD: 3.53 M/UL — LOW (ref 4.2–5.8)
RBC # FLD: 12.9 % — SIGNIFICANT CHANGE UP (ref 10.3–14.5)
SODIUM SERPL-SCNC: 140 MMOL/L — SIGNIFICANT CHANGE UP (ref 135–145)
WBC # BLD: 14.11 K/UL — HIGH (ref 3.8–10.5)
WBC # FLD AUTO: 14.11 K/UL — HIGH (ref 3.8–10.5)

## 2024-02-09 PROCEDURE — 36821 AV FUSION DIRECT ANY SITE: CPT | Mod: GC,LT

## 2024-02-09 PROCEDURE — 99232 SBSQ HOSP IP/OBS MODERATE 35: CPT

## 2024-02-09 DEVICE — CLIP APPLIER ETHICON LIGACLIP 11.5" MEDIUM: Type: IMPLANTABLE DEVICE | Site: LEFT | Status: FUNCTIONAL

## 2024-02-09 DEVICE — SURGICEL FIBRILLAR 4 X 4": Type: IMPLANTABLE DEVICE | Site: LEFT | Status: FUNCTIONAL

## 2024-02-09 DEVICE — CLIP APPLIER ETHICON LIGACLIP 9 3/8" SMALL: Type: IMPLANTABLE DEVICE | Site: LEFT | Status: FUNCTIONAL

## 2024-02-09 RX ORDER — ONDANSETRON 8 MG/1
4 TABLET, FILM COATED ORAL EVERY 6 HOURS
Refills: 0 | Status: DISCONTINUED | OUTPATIENT
Start: 2024-02-09 | End: 2024-02-17

## 2024-02-09 RX ORDER — HYDROMORPHONE HYDROCHLORIDE 2 MG/ML
0.25 INJECTION INTRAMUSCULAR; INTRAVENOUS; SUBCUTANEOUS
Refills: 0 | Status: DISCONTINUED | OUTPATIENT
Start: 2024-02-09 | End: 2024-02-14

## 2024-02-09 RX ORDER — LIDOCAINE 4 G/100G
1 CREAM TOPICAL ONCE
Refills: 0 | Status: COMPLETED | OUTPATIENT
Start: 2024-02-09 | End: 2024-02-09

## 2024-02-09 RX ORDER — DEXTROSE 50 % IN WATER 50 %
12.5 SYRINGE (ML) INTRAVENOUS ONCE
Refills: 0 | Status: COMPLETED | OUTPATIENT
Start: 2024-02-09 | End: 2024-02-09

## 2024-02-09 RX ADMIN — LIDOCAINE 1 PATCH: 4 CREAM TOPICAL at 00:36

## 2024-02-09 RX ADMIN — Medication 1300 MILLIGRAM(S): at 06:16

## 2024-02-09 RX ADMIN — Medication 12.5 MILLILITER(S): at 08:52

## 2024-02-09 RX ADMIN — Medication 80 MILLIGRAM(S): at 06:16

## 2024-02-09 RX ADMIN — Medication 60 MILLIGRAM(S): at 06:16

## 2024-02-09 RX ADMIN — LIDOCAINE 1 PATCH: 4 CREAM TOPICAL at 06:25

## 2024-02-09 NOTE — PROGRESS NOTE ADULT - ASSESSMENT
Mr. Anguiano is an 84yo M PMH of HTN, DM, HLD, CKD Stage 5, who presented with hyperkalemia found on lab work during pre-op for an AVF, admitted to [facility] for further management of his hyperkalemia and new found Mobitz Type I, and medical optimization prior to AVF placement.

## 2024-02-09 NOTE — PROGRESS NOTE ADULT - SUBJECTIVE AND OBJECTIVE BOX
**INCOMPLETE NOTE    OVERNIGHT EVENTS: None    SUBJECTIVE:  Patient seen and examined at bedside, comfortable, NAD. Denied fever, chest pain, dyspnea, abdominal pain.     Vital Signs Last 12 Hrs  T(F): 98.5 (02-09-24 @ 06:06), Max: 98.8 (02-08-24 @ 20:36)  HR: 57 (02-09-24 @ 06:06) (57 - 60)  BP: 164/70 (02-09-24 @ 06:06) (148/61 - 178/69)  BP(mean): 101 (02-09-24 @ 06:06) (101 - 101)  RR: 18 (02-09-24 @ 06:06) (18 - 18)  SpO2: 96% (02-09-24 @ 06:06) (95% - 96%)  I&O's Summary    08 Feb 2024 07:01  -  09 Feb 2024 07:00  --------------------------------------------------------  IN: 0 mL / OUT: 1050 mL / NET: -1050 mL        PHYSICAL EXAM:  Constitutional: NAD, comfortable in bed.  HEENT: NC/AT, PERRLA, EOMI, no conjunctival pallor or scleral icterus, MMM  Neck: Supple, no JVD  Respiratory: CTA B/L. No w/r/r.   Cardiovascular: RRR, normal S1 and S2, no m/r/g.   Gastrointestinal: +BS, soft NTND, no guarding or rebound tenderness, no palpable masses   Extremities: wwp; no cyanosis, clubbing or edema.   Vascular: Pulses equal and strong throughout.   Neurological: AAOx3, no CN deficits, strength and sensation intact throughout.   Skin: No gross skin abnormalities or rashes        LABS:                        10.7   11.96 )-----------( 358      ( 08 Feb 2024 20:37 )             33.5     02-08    136  |  100  |  50<H>  ----------------------------<  108<H>  4.5   |  26  |  6.64<H>    Ca    8.5      08 Feb 2024 20:37  Phos  4.1     02-08  Mg     2.1     02-08    TPro  6.3  /  Alb  2.7<L>  /  TBili  0.2  /  DBili  x   /  AST  11  /  ALT  9<L>  /  AlkPhos  74  02-08    PT/INR - ( 08 Feb 2024 20:37 )   PT: 11.3 sec;   INR: 0.99          PTT - ( 08 Feb 2024 20:37 )  PTT:28.3 sec  Urinalysis Basic - ( 08 Feb 2024 20:37 )    Color: x / Appearance: x / SG: x / pH: x  Gluc: 108 mg/dL / Ketone: x  / Bili: x / Urobili: x   Blood: x / Protein: x / Nitrite: x   Leuk Esterase: x / RBC: x / WBC x   Sq Epi: x / Non Sq Epi: x / Bacteria: x          RADIOLOGY & ADDITIONAL TESTS:    MEDICATIONS  (STANDING):  atorvastatin 10 milliGRAM(s) Oral at bedtime  calcitriol   Capsule 0.25 MICROGram(s) Oral daily  dextrose 5%. 1000 milliLiter(s) (100 mL/Hr) IV Continuous <Continuous>  dextrose 5%. 1000 milliLiter(s) (50 mL/Hr) IV Continuous <Continuous>  dextrose 50% Injectable 25 Gram(s) IV Push once  dextrose 50% Injectable 25 Gram(s) IV Push once  dextrose 50% Injectable 12.5 Gram(s) IV Push once  furosemide    Tablet 80 milliGRAM(s) Oral every 8 hours  glucagon  Injectable 1 milliGRAM(s) IntraMuscular once  influenza  Vaccine (HIGH DOSE) 0.7 milliLiter(s) IntraMuscular once  insulin glargine Injectable (LANTUS) 20 Unit(s) SubCutaneous at bedtime  insulin lispro (ADMELOG) corrective regimen sliding scale   SubCutaneous Before meals and at bedtime  NIFEdipine XL 60 milliGRAM(s) Oral every 12 hours  sevelamer carbonate 1600 milliGRAM(s) Oral three times a day with meals  sodium bicarbonate 1300 milliGRAM(s) Oral three times a day  valsartan 160 milliGRAM(s) Oral every 24 hours    MEDICATIONS  (PRN):  acetaminophen     Tablet .. 650 milliGRAM(s) Oral every 6 hours PRN Temp greater or equal to 38C (100.4F), Mild Pain (1 - 3)  dextrose Oral Gel 15 Gram(s) Oral once PRN Blood Glucose LESS THAN 70 milliGRAM(s)/deciliter  melatonin 3 milliGRAM(s) Oral at bedtime PRN Insomnia   OVERNIGHT EVENTS: None    SUBJECTIVE:  Patient seen and examined at bedside, comfortable, NAD. Denied fever, chest pain, dyspnea, abdominal pain.     Vital Signs Last 12 Hrs  T(F): 98.5 (02-09-24 @ 06:06), Max: 98.8 (02-08-24 @ 20:36)  HR: 57 (02-09-24 @ 06:06) (57 - 60)  BP: 164/70 (02-09-24 @ 06:06) (148/61 - 178/69)  BP(mean): 101 (02-09-24 @ 06:06) (101 - 101)  RR: 18 (02-09-24 @ 06:06) (18 - 18)  SpO2: 96% (02-09-24 @ 06:06) (95% - 96%)  I&O's Summary    08 Feb 2024 07:01  -  09 Feb 2024 07:00  --------------------------------------------------------  IN: 0 mL / OUT: 1050 mL / NET: -1050 mL        PHYSICAL EXAM:  General: in no acute distress  Eyes: EOMI intact bilaterally  HENT: Moist mucous membranes  Neck: Trachea midline  Lungs: CTA B/L  Cardiovascular: bradycardic, regular rhythm  Abdomen: Soft, non-tender non-distended  Extremities: WWP  Neurological: Alert and oriented  Skin: Warm and dry        LABS:                        10.7   11.96 )-----------( 358      ( 08 Feb 2024 20:37 )             33.5     02-08    136  |  100  |  50<H>  ----------------------------<  108<H>  4.5   |  26  |  6.64<H>    Ca    8.5      08 Feb 2024 20:37  Phos  4.1     02-08  Mg     2.1     02-08    TPro  6.3  /  Alb  2.7<L>  /  TBili  0.2  /  DBili  x   /  AST  11  /  ALT  9<L>  /  AlkPhos  74  02-08    PT/INR - ( 08 Feb 2024 20:37 )   PT: 11.3 sec;   INR: 0.99          PTT - ( 08 Feb 2024 20:37 )  PTT:28.3 sec  Urinalysis Basic - ( 08 Feb 2024 20:37 )    Color: x / Appearance: x / SG: x / pH: x  Gluc: 108 mg/dL / Ketone: x  / Bili: x / Urobili: x   Blood: x / Protein: x / Nitrite: x   Leuk Esterase: x / RBC: x / WBC x   Sq Epi: x / Non Sq Epi: x / Bacteria: x          RADIOLOGY & ADDITIONAL TESTS:    MEDICATIONS  (STANDING):  atorvastatin 10 milliGRAM(s) Oral at bedtime  calcitriol   Capsule 0.25 MICROGram(s) Oral daily  dextrose 5%. 1000 milliLiter(s) (100 mL/Hr) IV Continuous <Continuous>  dextrose 5%. 1000 milliLiter(s) (50 mL/Hr) IV Continuous <Continuous>  dextrose 50% Injectable 25 Gram(s) IV Push once  dextrose 50% Injectable 25 Gram(s) IV Push once  dextrose 50% Injectable 12.5 Gram(s) IV Push once  furosemide    Tablet 80 milliGRAM(s) Oral every 8 hours  glucagon  Injectable 1 milliGRAM(s) IntraMuscular once  influenza  Vaccine (HIGH DOSE) 0.7 milliLiter(s) IntraMuscular once  insulin glargine Injectable (LANTUS) 20 Unit(s) SubCutaneous at bedtime  insulin lispro (ADMELOG) corrective regimen sliding scale   SubCutaneous Before meals and at bedtime  NIFEdipine XL 60 milliGRAM(s) Oral every 12 hours  sevelamer carbonate 1600 milliGRAM(s) Oral three times a day with meals  sodium bicarbonate 1300 milliGRAM(s) Oral three times a day  valsartan 160 milliGRAM(s) Oral every 24 hours    MEDICATIONS  (PRN):  acetaminophen     Tablet .. 650 milliGRAM(s) Oral every 6 hours PRN Temp greater or equal to 38C (100.4F), Mild Pain (1 - 3)  dextrose Oral Gel 15 Gram(s) Oral once PRN Blood Glucose LESS THAN 70 milliGRAM(s)/deciliter  melatonin 3 milliGRAM(s) Oral at bedtime PRN Insomnia

## 2024-02-09 NOTE — PROGRESS NOTE ADULT - PROBLEM SELECTOR PLAN 9
F: none  E: Replete as needed, careful given HD needs  N: Renal diet, npo after MN  Dvt ppx: heparin subq  Dispo: RMF  Code status: Full

## 2024-02-09 NOTE — PROGRESS NOTE ADULT - PROBLEM SELECTOR PLAN 7
Multi year history of HLD. Well controlled on home atorvastatin 10mg qhs.  - c/w home atorvastatin 10mg qhs  - encourage healthy eating as outpatient and follow up with outpatient PCP Multi year history of HLD. Well controlled on home atorvastatin 10mg qhs.    - c/w home atorvastatin 10mg qhs  - encourage healthy eating as outpatient and follow up with outpatient PCP

## 2024-02-09 NOTE — PROGRESS NOTE ADULT - PROBLEM SELECTOR PLAN 3
New diagnosis for the patient, discovered on ED EKG this admission. 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 No recent loss of consciousness/syncope.  Etiology most likely iso metabolic abnormalities (pt hyperkalemic), less likely 2/2 medications slowing alberto conduction (however pt is notably on calcium-channel blockers/amiodarone). Less likely 2/2 MI as no ST changes on ECG/no symptoms.   - address reversible causes of alberto block as per above/below  - holding beta-blockers, including ophthalmic solution timolol, can resume as appropriate  - no plan for pacemaker placement at this time as per EP Telemetry reviewed, patient wih prolonged NC interval or episodes of second degree AV block (Wenckebach)    - No episodes of Mobitz Type II noted on telemetry.   - outpatient f/u with cardiology, recommend outpatient evaluation inc echo

## 2024-02-09 NOTE — PRE-ANESTHESIA EVALUATION ADULT - NSANTHPEFT_GEN_ALL_CORE
General: AAOx3, NAD  Eyes: The sclera and conjunctiva normal, pupils equal in size.  ENT: The ears and nose were normal in appearance; oropharynx clear, moist mucus membranes.  Neck: The appearance of the neck was normal, with no gross masses or nodules.  Respiratory: Unlabored, no retractions.  CV: RRR  Neurological: No focal deficit, moves all extremities.  Exercise Tolerance:  limited General: AAOx3, NAD  Eyes: The sclera and conjunctiva normal, pupils equal in size.  ENT: The ears and nose were normal in appearance; oropharynx clear, moist mucus membranes.  Neck: The appearance of the neck was normal, with no gross masses or nodules.  Respiratory: Unlabored, no retractions.  CV: RRR  Neurological: No focal deficit, moves all extremities.  Exercise Tolerance:  limited 3 blocks

## 2024-02-09 NOTE — BRIEF OPERATIVE NOTE - OPERATION/FINDINGS
Ultrasound was used to giovana patient for preferred vein to create AV fistula. Patient was prepped and draped in sterile fashion. Ultrasound was used to giovana patient for preferred vein to create AV fistula. Patient was prepped and draped in sterile fashion. Incision was made, median cephalic vein was identified and mobilized. Median cephalic vein was anastomosed to brachial artery proximal to bifurcation of artery into radial and ulnar artery. Adequate hemostasis was achieved. A thrill was identified on doppler in vein. Skin was reapproximated and closed with monacryl suture. Dressing consisted of dermabond.

## 2024-02-09 NOTE — PROGRESS NOTE ADULT - PROBLEM SELECTOR PLAN 6
Presented with a WBC count of  10.91, neutrophil predominance. Uptrending  No systemic symptoms patient denies fever, chills, nausea, vomiting, weight loss, wheezing, night sweats  Protein normal, albumin low    - continue to monitor  - DEBBI

## 2024-02-09 NOTE — PROGRESS NOTE ADULT - SUBJECTIVE AND OBJECTIVE BOX
Patient is a 83y Male seen and evaluated at bedside.       Meds:    acetaminophen     Tablet .. 650 every 6 hours PRN  atorvastatin 10 at bedtime  calcitriol   Capsule 0.25 daily  dextrose 5%. 1000 <Continuous>  dextrose 5%. 1000 <Continuous>  dextrose 50% Injectable 25 once  dextrose 50% Injectable 25 once  dextrose 50% Injectable 12.5 once  dextrose Oral Gel 15 once PRN  furosemide    Tablet 80 every 8 hours  glucagon  Injectable 1 once  influenza  Vaccine (HIGH DOSE) 0.7 once  insulin glargine Injectable (LANTUS) 20 at bedtime  insulin lispro (ADMELOG) corrective regimen sliding scale  Before meals and at bedtime  melatonin 3 at bedtime PRN  NIFEdipine XL 60 every 12 hours  sevelamer carbonate 1600 three times a day with meals  sodium bicarbonate 1300 three times a day  valsartan 160 every 24 hours      T(C): , Max: 37.1 (02-08-24 @ 20:36)  T(F): , Max: 98.8 (02-08-24 @ 20:36)  HR: 57 (02-09-24 @ 12:49)  BP: 164/70 (02-09-24 @ 12:49)  BP(mean): 101 (02-09-24 @ 12:49)  RR: 18 (02-09-24 @ 12:49)  SpO2: 96% (02-09-24 @ 12:49)  Wt(kg): --    02-08 @ 07:01  -  02-09 @ 07:00  --------------------------------------------------------  IN: 0 mL / OUT: 1050 mL / NET: -1050 mL      Height (cm): 172.7 (02-09 @ 12:49)  Weight (kg): 83.9 (02-09 @ 12:49)  BMI (kg/m2): 28.1 (02-09 @ 12:49)  BSA (m2): 1.98 (02-09 @ 12:49)    Review of Systems:  ROS negative except as per HPI      PHYSICAL EXAM:  Constitutional: NAD, lying in bed on HD  Head: NCAT, EOMI  Respiratory: CTAB, no crackles anteriorly  Cardiac: Regular rate  Gastrointestinal: abdomen soft, NT/ND  Extremities: 1+ LE edema  Neurologic: Awake, alert, interactive  Access: TDC     LABS:                        10.7   14.11 )-----------( 365      ( 09 Feb 2024 05:30 )             32.7     02-09    140  |  103  |  60<H>  ----------------------------<  49<LL>  3.9   |  26  |  7.28<H>    Ca    8.5      09 Feb 2024 05:30  Phos  4.6     02-09  Mg     2.1     02-09    TPro  6.9  /  Alb  2.6<L>  /  TBili  0.2  /  DBili  x   /  AST  9<L>  /  ALT  7<L>  /  AlkPhos  71  02-09      PT/INR - ( 08 Feb 2024 20:37 )   PT: 11.3 sec;   INR: 0.99          PTT - ( 08 Feb 2024 20:37 )  PTT:28.3 sec  Urinalysis Basic - ( 09 Feb 2024 05:30 )    Color: x / Appearance: x / SG: x / pH: x  Gluc: 49 mg/dL / Ketone: x  / Bili: x / Urobili: x   Blood: x / Protein: x / Nitrite: x   Leuk Esterase: x / RBC: x / WBC x   Sq Epi: x / Non Sq Epi: x / Bacteria: x            RADIOLOGY & ADDITIONAL STUDIES:           Patient is a 83y Male.       Meds:    acetaminophen     Tablet .. 650 every 6 hours PRN  atorvastatin 10 at bedtime  calcitriol   Capsule 0.25 daily  dextrose 5%. 1000 <Continuous>  dextrose 5%. 1000 <Continuous>  dextrose 50% Injectable 25 once  dextrose 50% Injectable 25 once  dextrose 50% Injectable 12.5 once  dextrose Oral Gel 15 once PRN  furosemide    Tablet 80 every 8 hours  glucagon  Injectable 1 once  influenza  Vaccine (HIGH DOSE) 0.7 once  insulin glargine Injectable (LANTUS) 20 at bedtime  insulin lispro (ADMELOG) corrective regimen sliding scale  Before meals and at bedtime  melatonin 3 at bedtime PRN  NIFEdipine XL 60 every 12 hours  sevelamer carbonate 1600 three times a day with meals  sodium bicarbonate 1300 three times a day  valsartan 160 every 24 hours      T(C): , Max: 37.1 (02-08-24 @ 20:36)  T(F): , Max: 98.8 (02-08-24 @ 20:36)  HR: 57 (02-09-24 @ 12:49)  BP: 164/70 (02-09-24 @ 12:49)  BP(mean): 101 (02-09-24 @ 12:49)  RR: 18 (02-09-24 @ 12:49)  SpO2: 96% (02-09-24 @ 12:49)  Wt(kg): --    02-08 @ 07:01  -  02-09 @ 07:00  --------------------------------------------------------  IN: 0 mL / OUT: 1050 mL / NET: -1050 mL      Height (cm): 172.7 (02-09 @ 12:49)  Weight (kg): 83.9 (02-09 @ 12:49)  BMI (kg/m2): 28.1 (02-09 @ 12:49)  BSA (m2): 1.98 (02-09 @ 12:49)    Review of Systems:  ROS negative except as per HPI      PHYSICAL EXAM:  Deferred    LABS:                        10.7   14.11 )-----------( 365      ( 09 Feb 2024 05:30 )             32.7     02-09    140  |  103  |  60<H>  ----------------------------<  49<LL>  3.9   |  26  |  7.28<H>    Ca    8.5      09 Feb 2024 05:30  Phos  4.6     02-09  Mg     2.1     02-09    TPro  6.9  /  Alb  2.6<L>  /  TBili  0.2  /  DBili  x   /  AST  9<L>  /  ALT  7<L>  /  AlkPhos  71  02-09      PT/INR - ( 08 Feb 2024 20:37 )   PT: 11.3 sec;   INR: 0.99          PTT - ( 08 Feb 2024 20:37 )  PTT:28.3 sec  Urinalysis Basic - ( 09 Feb 2024 05:30 )    Color: x / Appearance: x / SG: x / pH: x  Gluc: 49 mg/dL / Ketone: x  / Bili: x / Urobili: x   Blood: x / Protein: x / Nitrite: x   Leuk Esterase: x / RBC: x / WBC x   Sq Epi: x / Non Sq Epi: x / Bacteria: x            RADIOLOGY & ADDITIONAL STUDIES:

## 2024-02-09 NOTE — PROGRESS NOTE ADULT - PROBLEM SELECTOR PLAN 5
Multi year history of hypertension. On admission, presented with /57. Well controlled on home valsartan 160mg qd, lasix 80mg tid, nifedipine 60mg bid.  - c/w nifedipine 60mg PO BID  - c/w lasix 80mg TID   - c/w Valsartan 160mg   - CTM for CP/CT  - encourage low salt in diet Multi year history of hypertension. On admission, presented with /57. Well controlled on home valsartan 160mg qd, lasix 80mg tid, nifedipine 60mg bid.    - c/w nifedipine 60mg PO BID  - c/w lasix 80mg TID   - c/w Valsartan 160mg   - CTM for CP/CT  - encourage low salt in diet

## 2024-02-09 NOTE — PROGRESS NOTE ADULT - ASSESSMENT
*******incomplete*******      82 yo M w/ PMH of CKD5, HTN, HLD, DM2 presenting for creation of AVF in preparation for dialysis. Pre-op labs revealed K 6 and procedure was canceled. Patient given lokelma and insulin, transferred to ED. Reports missing medications last night and this morning. Repeat K 5. Patient comfortable, offers no complaints. Nephrology consulted for further management of CKD5 and hyperkalemia.    CKD5 with hyperkalemia  - HD#2 today. Next HD tomorrow  - Can discontinue Lokelma now that patient is on HD and will monitor K  - Renal diet, fluid restriction <1.2L/day, strict I&O, daily standing weights  - Check hepatitis panel and QuantiFeron  - Vascular following for AVF creation  - Social work consult for HD unit placement 84 yo M w/ PMH of CKD5, HTN, HLD, DM2 presenting for creation of AVF in preparation for dialysis. Pre-op labs revealed K 6 and procedure was canceled. Patient given lokelma and insulin, transferred to ED. Reports missing medications last night and this morning. Repeat K 5. Patient comfortable, offers no complaints. Nephrology consulted for further management of CKD5 and hyperkalemia.    CKD5 with hyperkalemia  - HD#2 today. Next HD tomorrow  - Can discontinue sodium bicarbonate now that patient is on HD  - Renal diet, fluid restriction <1.2L/day, strict I&O, daily standing weights  - Check hepatitis panel and QuantiFeron  - Vascular following for AVF creation  - Social work consult for HD unit placement 82 yo M w/ PMH of CKD5, HTN, HLD, DM2 presenting for creation of AVF in preparation for dialysis. Pre-op labs revealed K 6 and procedure was canceled. Patient given lokelma and insulin, transferred to ED. Reports missing medications last night and this morning. Repeat K 5. Patient comfortable, offers no complaints. Nephrology consulted for further management of CKD5 and hyperkalemia.    New start ESRD  - HD#2 today. Next HD tomorrow  - Can discontinue sodium bicarbonate now that patient is on HD  - Renal diet, fluid restriction <1.2L/day, strict I&O, daily standing weights  - Check hepatitis panel and QuantiFeron  - Vascular following for AVF creation  - Social work consult for HD unit placement

## 2024-02-09 NOTE — PROGRESS NOTE ADULT - PROBLEM SELECTOR PLAN 4
Known history of T2DM. Insulin lantus 40u qhs as home medication. Last A1c unknown.  - moderate ISF-25 lispro sliding scale AC+qHS  - monitor FS  - consistent carb diet  - lantus 20u SQ qhs given decreased kidney function, adjust as needed Known history of T2DM. Insulin lantus 40u qhs as home medication. Last A1c unknown.    - moderate ISF-25 lispro sliding scale AC+qHS  - monitor FS  - consistent carb diet  - lantus 20u SQ qhs given decreased kidney function, adjust as needed

## 2024-02-09 NOTE — BRIEF OPERATIVE NOTE - NSICDXBRIEFPREOP_GEN_ALL_CORE_FT
Not fully controlled. Improving with medication, following with psychiatry, therapy and sleep psychology. Working towards accommodations at work vs disability   PRE-OP DIAGNOSIS:  Chronic kidney disease, stage 5 09-Feb-2024 15:54:13  Adenike Roberson

## 2024-02-09 NOTE — PROGRESS NOTE ADULT - PROBLEM SELECTOR PLAN 1
Presented to the ED iso elevated K+ on pre-AV Fistula placement lab work. Initially K+ of 6.0. Was given insulin, dextrose, and lokelma while in the pre-op holding area. In the ED, the K+ noted to drop from 6.0 -> 5.0.  Etiology is most likely iso known renal insufficiency given known CKD stage 5. Dr. 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  TDC placement today 2/8    - f/u 8PM BMP, daily thereafter  - to undergo AVF creation tomorrow AM by vascular, f/u pre-op labs and CXR at 8pm Presented to the ED iso elevated K+ on pre-AV Fistula placement lab work. Initially K+ of 6.0. Was given insulin, dextrose, and lokelma while in the pre-op holding area. In the ED, the K+ noted to drop from 6.0 -> 5.0.  Etiology is most likely iso known renal insufficiency given known CKD stage 5. Dr. 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    - f/u daily BMP  - AVF creation 2/9

## 2024-02-09 NOTE — PROGRESS NOTE ADULT - PROBLEM SELECTOR PLAN 2
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)  - Avoid nephrotoxic agents  - c/w lasix 80mg PO TID (home med)  - c/w sodium bicarb 1300mg PO TID  - c/w vitamin D3 0.25mcg PO qd  - c/w renvela 1800mg PO TID  - renal diet with 1.2L fluid restriction Admission Cr: 8.76 (baseline is unclear, though patient likely at baseline given known CKD stage 5 now requiring chronic HD)    - avoid nephrotoxic agents  - c/w lasix 80mg PO TID (home med)  - c/w sodium bicarb 1300mg PO TID  - c/w vitamin D3 0.25mcg PO qd  - c/w renvela 1800mg PO TID  - renal diet with 1.2L fluid restriction  - regular hemodialysis per nephrology

## 2024-02-10 LAB
ALBUMIN SERPL ELPH-MCNC: 2 G/DL — LOW (ref 3.3–5)
ALP SERPL-CCNC: 63 U/L — SIGNIFICANT CHANGE UP (ref 40–120)
ALT FLD-CCNC: <5 U/L — LOW (ref 10–45)
ANION GAP SERPL CALC-SCNC: 11 MMOL/L — SIGNIFICANT CHANGE UP (ref 5–17)
AST SERPL-CCNC: 8 U/L — LOW (ref 10–40)
BASOPHILS # BLD AUTO: 0.04 K/UL — SIGNIFICANT CHANGE UP (ref 0–0.2)
BASOPHILS NFR BLD AUTO: 0.3 % — SIGNIFICANT CHANGE UP (ref 0–2)
BILIRUB SERPL-MCNC: <0.2 MG/DL — SIGNIFICANT CHANGE UP (ref 0.2–1.2)
BUN SERPL-MCNC: 48 MG/DL — HIGH (ref 7–23)
CALCIUM SERPL-MCNC: 8 MG/DL — LOW (ref 8.4–10.5)
CHLORIDE SERPL-SCNC: 99 MMOL/L — SIGNIFICANT CHANGE UP (ref 96–108)
CO2 SERPL-SCNC: 26 MMOL/L — SIGNIFICANT CHANGE UP (ref 22–31)
CREAT SERPL-MCNC: 6.48 MG/DL — HIGH (ref 0.5–1.3)
EGFR: 8 ML/MIN/1.73M2 — LOW
EOSINOPHIL # BLD AUTO: 0.09 K/UL — SIGNIFICANT CHANGE UP (ref 0–0.5)
EOSINOPHIL NFR BLD AUTO: 0.6 % — SIGNIFICANT CHANGE UP (ref 0–6)
GAMMA INTERFERON BACKGROUND BLD IA-ACNC: 0.01 IU/ML — SIGNIFICANT CHANGE UP
GLUCOSE BLDC GLUCOMTR-MCNC: 112 MG/DL — HIGH (ref 70–99)
GLUCOSE BLDC GLUCOMTR-MCNC: 137 MG/DL — HIGH (ref 70–99)
GLUCOSE BLDC GLUCOMTR-MCNC: 256 MG/DL — HIGH (ref 70–99)
GLUCOSE BLDC GLUCOMTR-MCNC: 288 MG/DL — HIGH (ref 70–99)
GLUCOSE SERPL-MCNC: 242 MG/DL — HIGH (ref 70–99)
HCT VFR BLD CALC: 27.2 % — LOW (ref 39–50)
HGB BLD-MCNC: 8.8 G/DL — LOW (ref 13–17)
IMM GRANULOCYTES NFR BLD AUTO: 0.4 % — SIGNIFICANT CHANGE UP (ref 0–0.9)
LYMPHOCYTES # BLD AUTO: 0.88 K/UL — LOW (ref 1–3.3)
LYMPHOCYTES # BLD AUTO: 6.3 % — LOW (ref 13–44)
M TB IFN-G BLD-IMP: NEGATIVE — SIGNIFICANT CHANGE UP
M TB IFN-G CD4+ BCKGRND COR BLD-ACNC: 0 IU/ML — SIGNIFICANT CHANGE UP
M TB IFN-G CD4+CD8+ BCKGRND COR BLD-ACNC: 0.01 IU/ML — SIGNIFICANT CHANGE UP
MCHC RBC-ENTMCNC: 30.7 PG — SIGNIFICANT CHANGE UP (ref 27–34)
MCHC RBC-ENTMCNC: 32.4 GM/DL — SIGNIFICANT CHANGE UP (ref 32–36)
MCV RBC AUTO: 94.8 FL — SIGNIFICANT CHANGE UP (ref 80–100)
MONOCYTES # BLD AUTO: 2.35 K/UL — HIGH (ref 0–0.9)
MONOCYTES NFR BLD AUTO: 16.9 % — HIGH (ref 2–14)
NEUTROPHILS # BLD AUTO: 10.45 K/UL — HIGH (ref 1.8–7.4)
NEUTROPHILS NFR BLD AUTO: 75.5 % — SIGNIFICANT CHANGE UP (ref 43–77)
NRBC # BLD: 0 /100 WBCS — SIGNIFICANT CHANGE UP (ref 0–0)
PLATELET # BLD AUTO: 272 K/UL — SIGNIFICANT CHANGE UP (ref 150–400)
POTASSIUM SERPL-MCNC: 4.2 MMOL/L — SIGNIFICANT CHANGE UP (ref 3.5–5.3)
POTASSIUM SERPL-SCNC: 4.2 MMOL/L — SIGNIFICANT CHANGE UP (ref 3.5–5.3)
PROT SERPL-MCNC: 6 G/DL — SIGNIFICANT CHANGE UP (ref 6–8.3)
QUANT TB PLUS MITOGEN MINUS NIL: 1.03 IU/ML — SIGNIFICANT CHANGE UP
RBC # BLD: 2.87 M/UL — LOW (ref 4.2–5.8)
RBC # FLD: 12.7 % — SIGNIFICANT CHANGE UP (ref 10.3–14.5)
SODIUM SERPL-SCNC: 136 MMOL/L — SIGNIFICANT CHANGE UP (ref 135–145)
WBC # BLD: 13.87 K/UL — HIGH (ref 3.8–10.5)
WBC # FLD AUTO: 13.87 K/UL — HIGH (ref 3.8–10.5)

## 2024-02-10 PROCEDURE — 90937 HEMODIALYSIS REPEATED EVAL: CPT

## 2024-02-10 PROCEDURE — 99232 SBSQ HOSP IP/OBS MODERATE 35: CPT

## 2024-02-10 RX ORDER — FUROSEMIDE 40 MG
80 TABLET ORAL EVERY 8 HOURS
Refills: 0 | Status: DISCONTINUED | OUTPATIENT
Start: 2024-02-10 | End: 2024-02-10

## 2024-02-10 RX ORDER — INSULIN LISPRO 100/ML
VIAL (ML) SUBCUTANEOUS
Refills: 0 | Status: DISCONTINUED | OUTPATIENT
Start: 2024-02-10 | End: 2024-02-17

## 2024-02-10 RX ORDER — ERYTHROPOIETIN 10000 [IU]/ML
8000 INJECTION, SOLUTION INTRAVENOUS; SUBCUTANEOUS ONCE
Refills: 0 | Status: COMPLETED | OUTPATIENT
Start: 2024-02-10 | End: 2024-02-10

## 2024-02-10 RX ORDER — VALSARTAN 80 MG/1
160 TABLET ORAL EVERY 24 HOURS
Refills: 0 | Status: DISCONTINUED | OUTPATIENT
Start: 2024-02-11 | End: 2024-02-17

## 2024-02-10 RX ORDER — CALCITRIOL 0.5 UG/1
0.25 CAPSULE ORAL DAILY
Refills: 0 | Status: DISCONTINUED | OUTPATIENT
Start: 2024-02-10 | End: 2024-02-17

## 2024-02-10 RX ORDER — ACETAMINOPHEN 500 MG
650 TABLET ORAL EVERY 6 HOURS
Refills: 0 | Status: DISCONTINUED | OUTPATIENT
Start: 2024-02-10 | End: 2024-02-17

## 2024-02-10 RX ORDER — LIDOCAINE 4 G/100G
1 CREAM TOPICAL ONCE
Refills: 0 | Status: COMPLETED | OUTPATIENT
Start: 2024-02-10 | End: 2024-02-10

## 2024-02-10 RX ORDER — DEXTROSE 50 % IN WATER 50 %
12.5 SYRINGE (ML) INTRAVENOUS ONCE
Refills: 0 | Status: DISCONTINUED | OUTPATIENT
Start: 2024-02-10 | End: 2024-02-17

## 2024-02-10 RX ORDER — GLUCAGON INJECTION, SOLUTION 0.5 MG/.1ML
1 INJECTION, SOLUTION SUBCUTANEOUS ONCE
Refills: 0 | Status: DISCONTINUED | OUTPATIENT
Start: 2024-02-10 | End: 2024-02-17

## 2024-02-10 RX ORDER — INSULIN GLARGINE 100 [IU]/ML
20 INJECTION, SOLUTION SUBCUTANEOUS AT BEDTIME
Refills: 0 | Status: DISCONTINUED | OUTPATIENT
Start: 2024-02-10 | End: 2024-02-14

## 2024-02-10 RX ORDER — DEXTROSE 50 % IN WATER 50 %
25 SYRINGE (ML) INTRAVENOUS ONCE
Refills: 0 | Status: DISCONTINUED | OUTPATIENT
Start: 2024-02-10 | End: 2024-02-17

## 2024-02-10 RX ORDER — TUBERCULIN PURIFIED PROTEIN DERIVATIVE 5 [IU]/.1ML
5 INJECTION, SOLUTION INTRADERMAL ONCE
Refills: 0 | Status: DISCONTINUED | OUTPATIENT
Start: 2024-02-10 | End: 2024-02-17

## 2024-02-10 RX ORDER — FUROSEMIDE 40 MG
80 TABLET ORAL EVERY 8 HOURS
Refills: 0 | Status: DISCONTINUED | OUTPATIENT
Start: 2024-02-10 | End: 2024-02-12

## 2024-02-10 RX ORDER — DEXTROSE 50 % IN WATER 50 %
15 SYRINGE (ML) INTRAVENOUS ONCE
Refills: 0 | Status: DISCONTINUED | OUTPATIENT
Start: 2024-02-10 | End: 2024-02-17

## 2024-02-10 RX ORDER — ATORVASTATIN CALCIUM 80 MG/1
10 TABLET, FILM COATED ORAL AT BEDTIME
Refills: 0 | Status: DISCONTINUED | OUTPATIENT
Start: 2024-02-10 | End: 2024-02-17

## 2024-02-10 RX ORDER — SODIUM CHLORIDE 9 MG/ML
1000 INJECTION, SOLUTION INTRAVENOUS
Refills: 0 | Status: DISCONTINUED | OUTPATIENT
Start: 2024-02-10 | End: 2024-02-17

## 2024-02-10 RX ORDER — LANOLIN ALCOHOL/MO/W.PET/CERES
3 CREAM (GRAM) TOPICAL AT BEDTIME
Refills: 0 | Status: DISCONTINUED | OUTPATIENT
Start: 2024-02-10 | End: 2024-02-17

## 2024-02-10 RX ORDER — NIFEDIPINE 30 MG
60 TABLET, EXTENDED RELEASE 24 HR ORAL EVERY 12 HOURS
Refills: 0 | Status: DISCONTINUED | OUTPATIENT
Start: 2024-02-10 | End: 2024-02-12

## 2024-02-10 RX ORDER — SEVELAMER CARBONATE 2400 MG/1
800 POWDER, FOR SUSPENSION ORAL
Refills: 0 | Status: DISCONTINUED | OUTPATIENT
Start: 2024-02-10 | End: 2024-02-17

## 2024-02-10 RX ORDER — NIFEDIPINE 30 MG
60 TABLET, EXTENDED RELEASE 24 HR ORAL EVERY 12 HOURS
Refills: 0 | Status: DISCONTINUED | OUTPATIENT
Start: 2024-02-10 | End: 2024-02-10

## 2024-02-10 RX ADMIN — SEVELAMER CARBONATE 800 MILLIGRAM(S): 2400 POWDER, FOR SUSPENSION ORAL at 17:33

## 2024-02-10 RX ADMIN — Medication 60 MILLIGRAM(S): at 17:33

## 2024-02-10 RX ADMIN — ATORVASTATIN CALCIUM 10 MILLIGRAM(S): 80 TABLET, FILM COATED ORAL at 00:58

## 2024-02-10 RX ADMIN — SEVELAMER CARBONATE 800 MILLIGRAM(S): 2400 POWDER, FOR SUSPENSION ORAL at 00:58

## 2024-02-10 RX ADMIN — ERYTHROPOIETIN 8000 UNIT(S): 10000 INJECTION, SOLUTION INTRAVENOUS; SUBCUTANEOUS at 12:28

## 2024-02-10 RX ADMIN — Medication 80 MILLIGRAM(S): at 13:40

## 2024-02-10 RX ADMIN — ATORVASTATIN CALCIUM 10 MILLIGRAM(S): 80 TABLET, FILM COATED ORAL at 21:37

## 2024-02-10 RX ADMIN — LIDOCAINE 1 PATCH: 4 CREAM TOPICAL at 13:47

## 2024-02-10 RX ADMIN — Medication 80 MILLIGRAM(S): at 01:11

## 2024-02-10 RX ADMIN — LIDOCAINE 1 PATCH: 4 CREAM TOPICAL at 18:28

## 2024-02-10 RX ADMIN — CALCITRIOL 0.25 MICROGRAM(S): 0.5 CAPSULE ORAL at 13:40

## 2024-02-10 RX ADMIN — Medication 80 MILLIGRAM(S): at 07:12

## 2024-02-10 RX ADMIN — Medication 650 MILLIGRAM(S): at 06:04

## 2024-02-10 RX ADMIN — Medication 60 MILLIGRAM(S): at 00:58

## 2024-02-10 RX ADMIN — Medication 650 MILLIGRAM(S): at 07:04

## 2024-02-10 RX ADMIN — INSULIN GLARGINE 20 UNIT(S): 100 INJECTION, SOLUTION SUBCUTANEOUS at 22:27

## 2024-02-10 RX ADMIN — Medication 80 MILLIGRAM(S): at 21:37

## 2024-02-10 RX ADMIN — Medication 650 MILLIGRAM(S): at 19:12

## 2024-02-10 RX ADMIN — Medication 6: at 18:26

## 2024-02-10 NOTE — PROGRESS NOTE ADULT - PROBLEM SELECTOR PLAN 1
Presented to the ED iso elevated K+ on pre-AV Fistula placement lab work. Initially K+ of 6.0. Was given insulin, dextrose, and lokelma while in the pre-op holding area. In the ED, the K+ noted to drop from 6.0 -> 5.0.  Etiology is most likely iso known renal insufficiency given known CKD stage 5. Dr. 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    - f/u daily BMP  - AVF creation 2/9

## 2024-02-10 NOTE — PROGRESS NOTE ADULT - SUBJECTIVE AND OBJECTIVE BOX
s: states he feels well. Reports mild incisional pain, states is it is overall well controlled. Denies weakness, numbness or tingling of the left hand.     o: Vitals reviewed, stable.     Gen: Awake, alert. Well appearing. Elderly, frail.   CV: HDS, WWP Catheter in R IJ.   Pulm: On room air breathing quietly.   Abd: S/nt/nd.   LUE: Incision in AC fossa c/d/i, closed w/ suture and glue. Faint thrill palpable proximal to lateral aspect of fistula. Palpable brachial, radial and ulnar pulses, 2+.     A/P: 83w/ ESRD s/p median cephalic vein to brachial AVF 2/10, in expected post-operative condition.     - Patient should see Dr. Lai upon discharge for 1 week post-op follow up.   - Vascular surgery (Team 3) will continue to follow. Please call if we can be of assistance. The phone number is [phone] and we can be reached there 24/7.

## 2024-02-10 NOTE — PROGRESS NOTE ADULT - SUBJECTIVE AND OBJECTIVE BOX
Patient is a 83y Male seen and evaluated at bedside.       Meds:    acetaminophen     Tablet .. 650 every 6 hours PRN  atorvastatin 10 at bedtime  calcitriol   Capsule 0.25 daily  dextrose 5%. 1000 <Continuous>  dextrose 5%. 1000 <Continuous>  dextrose 50% Injectable 12.5 once  dextrose 50% Injectable 25 once  dextrose 50% Injectable 25 once  dextrose Oral Gel 15 once PRN  furosemide    Tablet 80 every 8 hours  glucagon  Injectable 1 once  HYDROmorphone  Injectable 0.25 every 15 minutes PRN  influenza  Vaccine (HIGH DOSE) 0.7 once  insulin glargine Injectable (LANTUS) 20 at bedtime  insulin lispro (ADMELOG) corrective regimen sliding scale  Before meals and at bedtime  melatonin 3 at bedtime PRN  NIFEdipine XL 60 every 12 hours  ondansetron Injectable 4 every 6 hours PRN  sevelamer carbonate 800 three times a day with meals      T(C): , Max: 37.3 (02-09-24 @ 17:20)  T(F): , Max: 99.2 (02-09-24 @ 17:20)  HR: 69 (02-10-24 @ 06:04)  BP: 139/76 (02-10-24 @ 06:04)  BP(mean): 97 (02-10-24 @ 06:04)  RR: 18 (02-10-24 @ 06:04)  SpO2: 93% (02-10-24 @ 06:04)  Wt(kg): --    Height (cm): 172.7 (02-09 @ 12:49)  Weight (kg): 83.9 (02-09 @ 12:49)  BMI (kg/m2): 28.1 (02-09 @ 12:49)  BSA (m2): 1.98 (02-09 @ 12:49)    Review of Systems:  ROS negative except as per HPI      PHYSICAL EXAM:  GENERAL: NAD  NECK: supple, No JVD  CHEST/LUNG: Clear to auscultation bilaterally  HEART: normal S1S2, RRR  ABDOMEN: Soft, Nontender, +BS, No flank tenderness bilateral  EXTREMITIES: No clubbing, cyanosis, or edema   NEUROLOGY: AAO x3, no focal neurological deficit  ACCESS: good thrill and bruit appreciated      LABS:                        10.7   14.11 )-----------( 365      ( 09 Feb 2024 05:30 )             32.7     02-09    140  |  103  |  60<H>  ----------------------------<  49<LL>  3.9   |  26  |  7.28<H>    Ca    8.5      09 Feb 2024 05:30  Phos  4.6     02-09  Mg     2.1     02-09    TPro  6.9  /  Alb  2.6<L>  /  TBili  0.2  /  DBili  x   /  AST  9<L>  /  ALT  7<L>  /  AlkPhos  71  02-09      PT/INR - ( 08 Feb 2024 20:37 )   PT: 11.3 sec;   INR: 0.99          PTT - ( 08 Feb 2024 20:37 )  PTT:28.3 sec  Urinalysis Basic - ( 09 Feb 2024 05:30 )    Color: x / Appearance: x / SG: x / pH: x  Gluc: 49 mg/dL / Ketone: x  / Bili: x / Urobili: x   Blood: x / Protein: x / Nitrite: x   Leuk Esterase: x / RBC: x / WBC x   Sq Epi: x / Non Sq Epi: x / Bacteria: x            RADIOLOGY & ADDITIONAL STUDIES:           Patient is a 83y Male seen and evaluated at bedside during dialysis. Laying comfortably in bed. Denies any symptoms.      Meds:    acetaminophen     Tablet .. 650 every 6 hours PRN  atorvastatin 10 at bedtime  calcitriol   Capsule 0.25 daily  dextrose 5%. 1000 <Continuous>  dextrose 5%. 1000 <Continuous>  dextrose 50% Injectable 12.5 once  dextrose 50% Injectable 25 once  dextrose 50% Injectable 25 once  dextrose Oral Gel 15 once PRN  furosemide    Tablet 80 every 8 hours  glucagon  Injectable 1 once  HYDROmorphone  Injectable 0.25 every 15 minutes PRN  influenza  Vaccine (HIGH DOSE) 0.7 once  insulin glargine Injectable (LANTUS) 20 at bedtime  insulin lispro (ADMELOG) corrective regimen sliding scale  Before meals and at bedtime  melatonin 3 at bedtime PRN  NIFEdipine XL 60 every 12 hours  ondansetron Injectable 4 every 6 hours PRN  sevelamer carbonate 800 three times a day with meals      T(C): , Max: 37.3 (02-09-24 @ 17:20)  T(F): , Max: 99.2 (02-09-24 @ 17:20)  HR: 69 (02-10-24 @ 06:04)  BP: 139/76 (02-10-24 @ 06:04)  BP(mean): 97 (02-10-24 @ 06:04)  RR: 18 (02-10-24 @ 06:04)  SpO2: 93% (02-10-24 @ 06:04)  Wt(kg): --    Height (cm): 172.7 (02-09 @ 12:49)  Weight (kg): 83.9 (02-09 @ 12:49)  BMI (kg/m2): 28.1 (02-09 @ 12:49)  BSA (m2): 1.98 (02-09 @ 12:49)    Review of Systems:  ROS negative except as per HPI      PHYSICAL EXAM:  Constitutional: NAD, lying in bed on HD  Head: NCAT, EOMI  Respiratory: CTAB, no crackles anteriorly  Cardiac: Regular rate  Gastrointestinal: abdomen soft, NT/ND  Extremities: 1+ LE edema  Neurologic: Awake, alert, interactive  Access: Rt TDC accessed for HD    LABS:                        10.7   14.11 )-----------( 365      ( 09 Feb 2024 05:30 )             32.7     02-09    140  |  103  |  60<H>  ----------------------------<  49<LL>  3.9   |  26  |  7.28<H>    Ca    8.5      09 Feb 2024 05:30  Phos  4.6     02-09  Mg     2.1     02-09    TPro  6.9  /  Alb  2.6<L>  /  TBili  0.2  /  DBili  x   /  AST  9<L>  /  ALT  7<L>  /  AlkPhos  71  02-09      PT/INR - ( 08 Feb 2024 20:37 )   PT: 11.3 sec;   INR: 0.99          PTT - ( 08 Feb 2024 20:37 )  PTT:28.3 sec  Urinalysis Basic - ( 09 Feb 2024 05:30 )    Color: x / Appearance: x / SG: x / pH: x  Gluc: 49 mg/dL / Ketone: x  / Bili: x / Urobili: x   Blood: x / Protein: x / Nitrite: x   Leuk Esterase: x / RBC: x / WBC x   Sq Epi: x / Non Sq Epi: x / Bacteria: x            RADIOLOGY & ADDITIONAL STUDIES:           Patient is a 83y Male seen and evaluated at bedside during dialysis. Laying comfortably in bed. Denies any symptoms.      Meds:    acetaminophen     Tablet .. 650 every 6 hours PRN  atorvastatin 10 at bedtime  calcitriol   Capsule 0.25 daily  dextrose 5%. 1000 <Continuous>  dextrose 5%. 1000 <Continuous>  dextrose 50% Injectable 12.5 once  dextrose 50% Injectable 25 once  dextrose 50% Injectable 25 once  dextrose Oral Gel 15 once PRN  furosemide    Tablet 80 every 8 hours  glucagon  Injectable 1 once  HYDROmorphone  Injectable 0.25 every 15 minutes PRN  influenza  Vaccine (HIGH DOSE) 0.7 once  insulin glargine Injectable (LANTUS) 20 at bedtime  insulin lispro (ADMELOG) corrective regimen sliding scale  Before meals and at bedtime  melatonin 3 at bedtime PRN  NIFEdipine XL 60 every 12 hours  ondansetron Injectable 4 every 6 hours PRN  sevelamer carbonate 800 three times a day with meals      T(C): , Max: 37.3 (24 @ 17:20)  T(F): , Max: 99.2 (24 @ 17:20)  HR: 69 (02-10-24 @ 06:04)  BP: 139/76 (02-10-24 @ 06:04)  BP(mean): 97 (02-10-24 @ 06:04)  RR: 18 (02-10-24 @ 06:04)  SpO2: 93% (02-10-24 @ 06:04)  Wt(kg): --    Height (cm): 172.7 ( @ 12:49)  Weight (kg): 83.9 ( @ 12:49)  BMI (kg/m2): 28.1 ( @ :49)  BSA (m2): 1.98 ( @ :49)    Review of Systems:  ROS negative except as per HPI      PHYSICAL EXAM:  Constitutional: NAD, lying in bed on HD  Head: NCAT, EOMI  Respiratory: CTAB, no crackles anteriorly  Cardiac: Regular rate  Gastrointestinal: abdomen soft, NT/ND  Extremities: 1+ LE edema  Neurologic: Awake, alert, interactive  Access: Rt TDC accessed for HD    LABS:                        10.7   14.11 )-----------( 365      ( 2024 05:30 )             32.7         140  |  103  |  60<H>  ----------------------------<  49<LL>  3.9   |  26  |  7.28<H>    Ca    8.5      2024 05:30  Phos  4.6       Mg     2.1         TPro  6.9  /  Alb  2.6<L>  /  TBili  0.2  /  DBili  x   /  AST  9<L>  /  ALT  7<L>  /  AlkPhos  71        PT/INR - ( 2024 20:37 )   PT: 11.3 sec;   INR: 0.99          PTT - ( 2024 20:37 )  PTT:28.3 sec  Urinalysis Basic - ( 2024 05:30 )    Color: x / Appearance: x / SG: x / pH: x  Gluc: 49 mg/dL / Ketone: x  / Bili: x / Urobili: x   Blood: x / Protein: x / Nitrite: x   Leuk Esterase: x / RBC: x / WBC x   Sq Epi: x / Non Sq Epi: x / Bacteria: x            RADIOLOGY & ADDITIONAL STUDIES:      Creatinine: 6.48 mg/dL (02-10-24 @ 09:46)  Creatinine: 7.28 mg/dL (24 @ 05:30)  Creatinine: 6.64 mg/dL (24 @ 20:37)  Creatinine: 9.03 mg/dL (24 @ 05:30)  Creatinine: 8.94 mg/dL (24 @ 05:30)  Creatinine: 8.94 mg/dL (24 @ 20:55)  Creatinine: 8.76 mg/dL (24 @ 12:44)  Creatinine: 8.87 mg/dL (24 @ 09:58)      Hemoglobin: 8.8 g/dL (02-10-24 @ 09:46)  Hemoglobin: 10.7 g/dL (24 @ 05:30)  Phosphorus: 4.6 mg/dL (24 @ 05:30)  Hemoglobin: 10.7 g/dL (24 @ 20:37)    Albumin: 2.0 g/dL (02-10-24 @ 09:46)  Albumin: 2.6 g/dL (24 @ 05:30)    epoetin alba-epbx (RETACRIT) Injectable 8000 Unit(s) IV Push once, 02-10-24 @ 10:15, Routine, Stop order after: 1 Doses  sevelamer carbonate 800 milliGRAM(s) Oral three times a day with meals, 24 @ 15:43, Routine  sevelamer carbonate 1600 milliGRAM(s) Oral three times a day with meals, 24 @ 16:46,   sevelamer carbonate 800 milliGRAM(s) Oral three times a day with meals, 02-10-24 @ 00:37, STAT      Hemodialysis Treatment.:     Schedule: Once, Modality: Hemodialysis, Access: Internal Jugular Central Venous Catheter    Dialyzer: Optiflux [id number], Time: 180 Min    Blood Flow: 250 mL/Min , Dialysate Flow: 500 mL/Min, Dialysate Temp: 36.5, Tubinmm (Adult)    Target Fluid Removal: 0 Liters    Dialysate Electrolytes (mEq/L): Potassium 3, Calcium 2.5, Sodium 138, Bicarbonate 35 (02-10-24 @ 08:18) [Completed]

## 2024-02-10 NOTE — PROGRESS NOTE ADULT - ASSESSMENT
Mr. Anguiano is an 82yo M PMH of HTN, DM, HLD, CKD Stage 5, who presented with hyperkalemia found on lab work during pre-op for an AVF, admitted to [facility] for further management of his hyperkalemia and new found Mobitz Type I, and medical optimization prior to AVF placement.

## 2024-02-10 NOTE — PROGRESS NOTE ADULT - PROBLEM SELECTOR PLAN 2
Admission Cr: 8.76 (baseline is unclear, though patient likely at baseline given known CKD stage 5 now requiring chronic HD)    - avoid nephrotoxic agents  - c/w lasix 80mg PO TID (home med)  - c/w sodium bicarb 1300mg PO TID  - c/w vitamin D3 0.25mcg PO qd  - c/w renvela 1800mg PO TID  - renal diet with 1.2L fluid restriction  - regular hemodialysis per nephrology

## 2024-02-10 NOTE — PROGRESS NOTE ADULT - SUBJECTIVE AND OBJECTIVE BOX
Feeling okay, no issues overnight.  DEnies pain at the new fistula.      acetaminophen     Tablet .. 650 every 6 hours PRN  atorvastatin 10 at bedtime  calcitriol   Capsule 0.25 daily  dextrose 5%. 1000 <Continuous>  dextrose 5%. 1000 <Continuous>  dextrose 50% Injectable 12.5 once  dextrose 50% Injectable 25 once  dextrose 50% Injectable 25 once  dextrose Oral Gel 15 once PRN  furosemide    Tablet 80 every 8 hours  glucagon  Injectable 1 once  HYDROmorphone  Injectable 0.25 every 15 minutes PRN  influenza  Vaccine (HIGH DOSE) 0.7 once  insulin glargine Injectable (LANTUS) 20 at bedtime  insulin lispro (ADMELOG) corrective regimen sliding scale  Before meals and at bedtime  melatonin 3 at bedtime PRN  NIFEdipine XL 60 every 12 hours  ondansetron Injectable 4 every 6 hours PRN  sevelamer carbonate 800 three times a day with meals      T(C): , Max: 37.3 (02-09-24 @ 17:20)  T(F): , Max: 99.2 (02-09-24 @ 17:20)  HR: 69 (02-10-24 @ 06:04)  BP: 139/76 (02-10-24 @ 06:04)  BP(mean): 97 (02-10-24 @ 06:04)  RR: 18 (02-10-24 @ 06:04)  SpO2: 93% (02-10-24 @ 06:04)  Wt(kg): --    Height (cm): 172.7 (02-09 @ 12:49)  Weight (kg): 83.9 (02-09 @ 12:49)  BMI (kg/m2): 28.1 (02-09 @ 12:49)  BSA (m2): 1.98 (02-09 @ 12:49)    Review of Systems:  ROS negative except as per HPI      PHYSICAL EXAM:  Constitutional: NAD, lying in bed  Head: NCAT,mmm  Respiratory: CTAB, no wheeze  Cardiac: normal s1s2, no mrg  Gastrointestinal: soft, ntnd, normoactive bowel sounds  Extremities: wwp, no edema      LABS:                        10.7   14.11 )-----------( 365      ( 09 Feb 2024 05:30 )             32.7     02-09    140  |  103  |  60<H>  ----------------------------<  49<LL>  3.9   |  26  |  7.28<H>    Ca    8.5      09 Feb 2024 05:30  Phos  4.6     02-09  Mg     2.1     02-09    TPro  6.9  /  Alb  2.6<L>  /  TBili  0.2  /  DBili  x   /  AST  9<L>  /  ALT  7<L>  /  AlkPhos  71  02-09      PT/INR - ( 08 Feb 2024 20:37 )   PT: 11.3 sec;   INR: 0.99          PTT - ( 08 Feb 2024 20:37 )  PTT:28.3 sec  Urinalysis Basic - ( 09 Feb 2024 05:30 )    Color: x / Appearance: x / SG: x / pH: x  Gluc: 49 mg/dL / Ketone: x  / Bili: x / Urobili: x   Blood: x / Protein: x / Nitrite: x   Leuk Esterase: x / RBC: x / WBC x   Sq Epi: x / Non Sq Epi: x / Bacteria: x

## 2024-02-10 NOTE — PROGRESS NOTE ADULT - PROBLEM SELECTOR PLAN 3
Telemetry reviewed, patient wih prolonged MD interval or episodes of second degree AV block (Wenckebach)    - No episodes of Mobitz Type II noted on telemetry.   - outpatient f/u with cardiology, recommend outpatient evaluation inc echo

## 2024-02-10 NOTE — PROGRESS NOTE ADULT - SUBJECTIVE AND OBJECTIVE BOX
Hemoglobin: 8.8 g/dL (02-10-24 @ 09:46)  Hemoglobin: 10.7 g/dL (24 @ 05:30)  Phosphorus: 4.6 mg/dL (24 @ 05:30)  Hemoglobin: 10.7 g/dL (24 @ 20:37)    Albumin: 2.6 g/dL (24 @ 05:30)  Albumin: 2.7 g/dL (24 @ 20:37)    T(C): 37.4 (02-10-24 @ 09:35), Max: 37.4 (02-10-24 @ 09:35)  HR: 64 (02-10-24 @ 09:35) (52 - 77)  BP: 134/60 (02-10-24 @ 09:35) (121/61 - 166/69)  RR: 18 (02-10-24 @ 09:35) (12 - 18)  SpO2: 95% (02-10-24 @ 09:35) (93% - 100%)  epoetin alba-epbx (RETACRIT) Injectable 8000 Unit(s) IV Push once, 02-10-24 @ 10:15, Routine, Stop order after: 1 Doses  sevelamer carbonate 1600 milliGRAM(s) Oral three times a day with meals, 24 @ 16:46,   sevelamer carbonate 800 milliGRAM(s) Oral three times a day with meals, 24 @ 15:43, Routine  sevelamer carbonate 800 milliGRAM(s) Oral three times a day with meals, 02-10-24 @ 00:37, STAT      Hemodialysis Treatment.:     Schedule: Once, Modality: Hemodialysis, Access: Internal Jugular Central Venous Catheter    Dialyzer: Optiflux [id number], Time: 180 Min    Blood Flow: 250 mL/Min , Dialysate Flow: 500 mL/Min, Dialysate Temp: 36.5, Tubinmm (Adult)    Target Fluid Removal: 0 Liters    Dialysate Electrolytes (mEq/L): Potassium 3, Calcium 2.5, Sodium 138, Bicarbonate 35 (02-10-24 @ 08:18) [Completed]        Seen on HD. Tolerating well. VS Stable. Continue HD as above.

## 2024-02-10 NOTE — PROGRESS NOTE ADULT - PROBLEM SELECTOR PLAN 5
Multi year history of hypertension. On admission, presented with /57. Well controlled on home valsartan 160mg qd, lasix 80mg tid, nifedipine 60mg bid.    - c/w nifedipine 60mg PO BID  - c/w lasix 80mg TID   - c/w Valsartan 160mg   - CTM for CP/CT  - encourage low salt in diet

## 2024-02-11 ENCOUNTER — TRANSCRIPTION ENCOUNTER (OUTPATIENT)
Age: 84
End: 2024-02-11

## 2024-02-11 DIAGNOSIS — M25.561 PAIN IN RIGHT KNEE: ICD-10-CM

## 2024-02-11 LAB
ALBUMIN SERPL ELPH-MCNC: 2.4 G/DL — LOW (ref 3.3–5)
ALP SERPL-CCNC: 73 U/L — SIGNIFICANT CHANGE UP (ref 40–120)
ALT FLD-CCNC: <5 U/L — LOW (ref 10–45)
ANION GAP SERPL CALC-SCNC: 13 MMOL/L — SIGNIFICANT CHANGE UP (ref 5–17)
AST SERPL-CCNC: 11 U/L — SIGNIFICANT CHANGE UP (ref 10–40)
BASOPHILS # BLD AUTO: 0.05 K/UL — SIGNIFICANT CHANGE UP (ref 0–0.2)
BASOPHILS NFR BLD AUTO: 0.3 % — SIGNIFICANT CHANGE UP (ref 0–2)
BILIRUB SERPL-MCNC: 0.2 MG/DL — SIGNIFICANT CHANGE UP (ref 0.2–1.2)
BLD GP AB SCN SERPL QL: NEGATIVE — SIGNIFICANT CHANGE UP
BUN SERPL-MCNC: 37 MG/DL — HIGH (ref 7–23)
CALCIUM SERPL-MCNC: 9 MG/DL — SIGNIFICANT CHANGE UP (ref 8.4–10.5)
CHLORIDE SERPL-SCNC: 98 MMOL/L — SIGNIFICANT CHANGE UP (ref 96–108)
CO2 SERPL-SCNC: 24 MMOL/L — SIGNIFICANT CHANGE UP (ref 22–31)
CREAT SERPL-MCNC: 5.32 MG/DL — HIGH (ref 0.5–1.3)
EGFR: 10 ML/MIN/1.73M2 — LOW
EOSINOPHIL # BLD AUTO: 0.12 K/UL — SIGNIFICANT CHANGE UP (ref 0–0.5)
EOSINOPHIL NFR BLD AUTO: 0.8 % — SIGNIFICANT CHANGE UP (ref 0–6)
GLUCOSE BLDC GLUCOMTR-MCNC: 169 MG/DL — HIGH (ref 70–99)
GLUCOSE BLDC GLUCOMTR-MCNC: 198 MG/DL — HIGH (ref 70–99)
GLUCOSE BLDC GLUCOMTR-MCNC: 215 MG/DL — HIGH (ref 70–99)
GLUCOSE BLDC GLUCOMTR-MCNC: 233 MG/DL — HIGH (ref 70–99)
GLUCOSE SERPL-MCNC: 185 MG/DL — HIGH (ref 70–99)
HCT VFR BLD CALC: 29.8 % — LOW (ref 39–50)
HCT VFR BLD CALC: 31.1 % — LOW (ref 39–50)
HGB BLD-MCNC: 9.6 G/DL — LOW (ref 13–17)
HGB BLD-MCNC: 9.8 G/DL — LOW (ref 13–17)
IMM GRANULOCYTES NFR BLD AUTO: 0.8 % — SIGNIFICANT CHANGE UP (ref 0–0.9)
LYMPHOCYTES # BLD AUTO: 0.85 K/UL — LOW (ref 1–3.3)
LYMPHOCYTES # BLD AUTO: 5.7 % — LOW (ref 13–44)
MAGNESIUM SERPL-MCNC: 2.2 MG/DL — SIGNIFICANT CHANGE UP (ref 1.6–2.6)
MCHC RBC-ENTMCNC: 30.1 PG — SIGNIFICANT CHANGE UP (ref 27–34)
MCHC RBC-ENTMCNC: 30.4 PG — SIGNIFICANT CHANGE UP (ref 27–34)
MCHC RBC-ENTMCNC: 31.5 GM/DL — LOW (ref 32–36)
MCHC RBC-ENTMCNC: 32.2 GM/DL — SIGNIFICANT CHANGE UP (ref 32–36)
MCV RBC AUTO: 94.3 FL — SIGNIFICANT CHANGE UP (ref 80–100)
MCV RBC AUTO: 95.4 FL — SIGNIFICANT CHANGE UP (ref 80–100)
MONOCYTES # BLD AUTO: 2.73 K/UL — HIGH (ref 0–0.9)
MONOCYTES NFR BLD AUTO: 18.4 % — HIGH (ref 2–14)
NEUTROPHILS # BLD AUTO: 10.96 K/UL — HIGH (ref 1.8–7.4)
NEUTROPHILS NFR BLD AUTO: 74 % — SIGNIFICANT CHANGE UP (ref 43–77)
NRBC # BLD: 0 /100 WBCS — SIGNIFICANT CHANGE UP (ref 0–0)
NRBC # BLD: 0 /100 WBCS — SIGNIFICANT CHANGE UP (ref 0–0)
PHOSPHATE SERPL-MCNC: 4.6 MG/DL — HIGH (ref 2.5–4.5)
PLATELET # BLD AUTO: 321 K/UL — SIGNIFICANT CHANGE UP (ref 150–400)
PLATELET # BLD AUTO: 371 K/UL — SIGNIFICANT CHANGE UP (ref 150–400)
POTASSIUM SERPL-MCNC: 4.2 MMOL/L — SIGNIFICANT CHANGE UP (ref 3.5–5.3)
POTASSIUM SERPL-SCNC: 4.2 MMOL/L — SIGNIFICANT CHANGE UP (ref 3.5–5.3)
PROT SERPL-MCNC: 6.7 G/DL — SIGNIFICANT CHANGE UP (ref 6–8.3)
RBC # BLD: 3.16 M/UL — LOW (ref 4.2–5.8)
RBC # BLD: 3.26 M/UL — LOW (ref 4.2–5.8)
RBC # FLD: 12.2 % — SIGNIFICANT CHANGE UP (ref 10.3–14.5)
RBC # FLD: 12.4 % — SIGNIFICANT CHANGE UP (ref 10.3–14.5)
RH IG SCN BLD-IMP: NEGATIVE — SIGNIFICANT CHANGE UP
SODIUM SERPL-SCNC: 135 MMOL/L — SIGNIFICANT CHANGE UP (ref 135–145)
WBC # BLD: 13 K/UL — HIGH (ref 3.8–10.5)
WBC # BLD: 14.83 K/UL — HIGH (ref 3.8–10.5)
WBC # FLD AUTO: 13 K/UL — HIGH (ref 3.8–10.5)
WBC # FLD AUTO: 14.83 K/UL — HIGH (ref 3.8–10.5)

## 2024-02-11 PROCEDURE — 73560 X-RAY EXAM OF KNEE 1 OR 2: CPT | Mod: 26,RT

## 2024-02-11 PROCEDURE — 99232 SBSQ HOSP IP/OBS MODERATE 35: CPT

## 2024-02-11 RX ORDER — SENNA PLUS 8.6 MG/1
2 TABLET ORAL ONCE
Refills: 0 | Status: COMPLETED | OUTPATIENT
Start: 2024-02-11 | End: 2024-02-11

## 2024-02-11 RX ORDER — SENNA PLUS 8.6 MG/1
2 TABLET ORAL AT BEDTIME
Refills: 0 | Status: DISCONTINUED | OUTPATIENT
Start: 2024-02-12 | End: 2024-02-17

## 2024-02-11 RX ORDER — POLYETHYLENE GLYCOL 3350 17 G/17G
17 POWDER, FOR SOLUTION ORAL AT BEDTIME
Refills: 0 | Status: DISCONTINUED | OUTPATIENT
Start: 2024-02-12 | End: 2024-02-17

## 2024-02-11 RX ORDER — PANTOPRAZOLE SODIUM 20 MG/1
40 TABLET, DELAYED RELEASE ORAL EVERY 12 HOURS
Refills: 0 | Status: DISCONTINUED | OUTPATIENT
Start: 2024-02-11 | End: 2024-02-11

## 2024-02-11 RX ORDER — PANTOPRAZOLE SODIUM 20 MG/1
40 TABLET, DELAYED RELEASE ORAL EVERY 12 HOURS
Refills: 0 | Status: DISCONTINUED | OUTPATIENT
Start: 2024-02-12 | End: 2024-02-17

## 2024-02-11 RX ORDER — POLYETHYLENE GLYCOL 3350 17 G/17G
17 POWDER, FOR SOLUTION ORAL ONCE
Refills: 0 | Status: COMPLETED | OUTPATIENT
Start: 2024-02-11 | End: 2024-02-11

## 2024-02-11 RX ADMIN — Medication 4: at 17:55

## 2024-02-11 RX ADMIN — Medication 60 MILLIGRAM(S): at 06:31

## 2024-02-11 RX ADMIN — Medication 60 MILLIGRAM(S): at 17:44

## 2024-02-11 RX ADMIN — Medication 650 MILLIGRAM(S): at 11:36

## 2024-02-11 RX ADMIN — Medication 80 MILLIGRAM(S): at 13:04

## 2024-02-11 RX ADMIN — SEVELAMER CARBONATE 800 MILLIGRAM(S): 2400 POWDER, FOR SUSPENSION ORAL at 09:35

## 2024-02-11 RX ADMIN — CALCITRIOL 0.25 MICROGRAM(S): 0.5 CAPSULE ORAL at 11:33

## 2024-02-11 RX ADMIN — Medication 4: at 22:29

## 2024-02-11 RX ADMIN — LIDOCAINE 1 PATCH: 4 CREAM TOPICAL at 01:05

## 2024-02-11 RX ADMIN — Medication 2: at 09:30

## 2024-02-11 RX ADMIN — ONDANSETRON 4 MILLIGRAM(S): 8 TABLET, FILM COATED ORAL at 15:17

## 2024-02-11 RX ADMIN — Medication 80 MILLIGRAM(S): at 06:31

## 2024-02-11 RX ADMIN — VALSARTAN 160 MILLIGRAM(S): 80 TABLET ORAL at 13:04

## 2024-02-11 RX ADMIN — INSULIN GLARGINE 20 UNIT(S): 100 INJECTION, SOLUTION SUBCUTANEOUS at 22:28

## 2024-02-11 RX ADMIN — SEVELAMER CARBONATE 800 MILLIGRAM(S): 2400 POWDER, FOR SUSPENSION ORAL at 11:33

## 2024-02-11 RX ADMIN — Medication 2: at 13:01

## 2024-02-11 NOTE — DISCHARGE NOTE PROVIDER - NSDCFUADDAPPT_GEN_ALL_CORE_FT
Please bring your Insurance card, Photo ID and Discharge paperwork to the following appointment:    (1) Please follow up with your Vascular Surgery Provider, Dr. Dc Lai at [address], [place] [address] on 2/26/2024 at 10:15am.    Appointment was scheduled by Ms. DAWSON Donnelly, Referral Coordinator.   [facility]  [address], [address] Floor, [place] [address] (phone: [phone])    Please follow up in 2 months, to repeat Endoscopy & ensure resolution of your stomach ulcer.          Please bring your Insurance card, Photo ID and Discharge paperwork to the following appointment:    (1) Please follow up with your Vascular Surgery Provider, Dr. Dc Lai at [address], [address], [place] [address] on 2/26/2024 at 10:15am.    Appointment was scheduled by Ms. DAWSON Donnelly, Referral Coordinator.

## 2024-02-11 NOTE — PROGRESS NOTE ADULT - PROBLEM SELECTOR PLAN 8
Hgb on admission 9.4, MCV 92.9.   Currently no signs of active bleeding (no hematochezia, melena, hemoptysis, hematuria).  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  - trend CBC  - maintain active T&S  - transfuse if Hgb <7 r knee pain since fall a few weeks ago, with some swelling/fluid collected on exam, rom, strength, sensation intact    - follow up r knee x ray

## 2024-02-11 NOTE — PROGRESS NOTE ADULT - PROBLEM SELECTOR PLAN 4
Known history of T2DM. Insulin lantus 40u qhs as home medication. Last A1c unknown.    - moderate ISF-25 lispro sliding scale AC+qHS  - monitor FS  - consistent carb diet  - lantus 20u SQ qhs given decreased kidney function, adjust as needed Multi year history of hypertension. On admission, presented with /57. Well controlled on home valsartan 160mg qd, lasix 80mg tid, nifedipine 60mg bid.    - c/w nifedipine 60mg PO BID  - c/w lasix 80mg TID   - c/w Valsartan 160mg   - CTM for CP/CT  - encourage low salt in diet

## 2024-02-11 NOTE — PROGRESS NOTE ADULT - PROBLEM SELECTOR PLAN 6
Presented with a WBC count of  10.91, neutrophil predominance. Uptrending  No systemic symptoms patient denies fever, chills, nausea, vomiting, weight loss, wheezing, night sweats  Protein normal, albumin low    - continue to monitor  - DEBBI Multi year history of HLD. Well controlled on home atorvastatin 10mg qhs.    - c/w home atorvastatin 10mg qhs  - encourage healthy eating as outpatient and follow up with outpatient PCP

## 2024-02-11 NOTE — DISCHARGE NOTE PROVIDER - PROVIDER TOKENS
PROVIDER:[TOKEN:[[id number] PROVIDER:[TOKEN:[[id number]],SCHEDULEDAPPT:[02/26/2024],SCHEDULEDAPPTTIME:[10:15 AM],ESTABLISHEDPATIENT:[T]]

## 2024-02-11 NOTE — DISCHARGE NOTE PROVIDER - ATTENDING DISCHARGE PHYSICAL EXAMINATION:
Physical exam:  GENERAL: NAD, lying in bed comfortably  HEAD:  Atraumatic, Normocephalic  EYES: EOMI, PERRLA, conjunctiva and sclera clear  ENT: Moist mucous membranes  NECK: Supple, No JVD  CHEST/LUNG: Clear to auscultation bilaterally; No rales, rhonchi, wheezing, or rubs. Rt TDC   HEART: Regular rate and rhythm; S1+ S2+   ABDOMEN: Bowel sounds present; Soft, Nontender, Nondistended   EXTREMITIES:  2+ Peripheral Pulses, brisk capillary refill. No clubbing, cyanosis, or edema  NERVOUS SYSTEM:  Alert & Oriented , speech clear   MSK: FROM all 4 extremities, full and equal strength, LUE avf   SKIN: No rashes or lesions    83M with PMH CKD5, now progressed to ESRD on HD, HTN, DM2, admitted for fistula placement, but required admission to telemetry unit for hyperkalemia, now course complicated by new coffee ground emesis and pending further evaluation.     Had chest pain 2/13 with EKG done Mobitz type 1 AVB, reportedly similar to admission.  Troponin trended down.  He denies any symptoms right now. cards consulted for optimization - pending ekg am but pt is low-intermediate risk for a low-risk procedure in EGD   pending further endoscopic workup with GI today   transfuse for Hb <7, maintain active type and screen  continue on PPI BID  continue above therapy for Dm2- with HSS - holding Lantus 15 u bed time d/t recurrent hypoglycemia   ESRD w HD - new ( will get establisted to have HD t/th/s) RIJ TDC-- >IR followed up - catheter working .

## 2024-02-11 NOTE — PROGRESS NOTE ADULT - ASSESSMENT
Mr. Anguiano is an 82yo M PMH of HTN, DM, HLD, CKD Stage 5, who presented with hyperkalemia found on lab work during pre-op for an AVF, admitted to [facility] for further management of his hyperkalemia and new found Mobitz Type I, and medical optimization prior to AVF placement.

## 2024-02-11 NOTE — PROGRESS NOTE ADULT - PROBLEM SELECTOR PROBLEM 2
[Dear  ___] : Dear  [unfilled], [Consult Letter:] : I had the pleasure of evaluating your patient, [unfilled]. [Please see my note below.] : Please see my note below. [Consult Closing:] : Thank you very much for allowing me to participate in the care of this patient.  If you have any questions, please do not hesitate to contact me. [Sincerely,] : Sincerely, [Band[facility] Rossi DO, [facility]] : Band[facility] Rossi [facility], [facility] [Director, Respiratory Care] : Director, Respiratory Care [[facility]] : [facility] [FreeTextEntry3] : [facility]\par Pulmonary Critical Care\par Director Pulmonary Division\par Medical Director Respiratory Therapy\par [facility]\par \par  [DrMarilynn  ___] : Dr. PATEL [DrMarilynn ___] : Dr. PATEL Chronic kidney disease, stage 5 Mobitz type 1 second degree AV block

## 2024-02-11 NOTE — DISCHARGE NOTE PROVIDER - NSDCFUSCHEDAPPT_GEN_ALL_CORE_FT
Nohemy Zuniga  [facility]  NEPHRO [address]  Scheduled Appointment: 02/28/2024     Dc Lai  [facility]  VASCULAR [address]  Scheduled Appointment: 02/26/2024    Nohemy Zuniga  [facility]  NEPHRO [address]  Scheduled Appointment: 02/28/2024

## 2024-02-11 NOTE — DISCHARGE NOTE PROVIDER - NSDCMRMEDTOKEN_GEN_ALL_CORE_FT
atorvastatin 10 mg oral tablet: 1 tab(s) orally once a day  calcitriol 0.25 mcg oral capsule: 1 cap(s) orally once a day  Lantus 100 units/mL subcutaneous solution: 40 unit(s) subcutaneous once a day (at bedtime)  Lantus 100 units/mL subcutaneous solution: 40 unit(s) subcutaneous once a day (at bedtime)  Lasix 80 mg oral tablet: 1 tab(s) orally 3 times a day  NIFEdipine 60 mg oral tablet, extended release: 1 tab(s) orally 2 times a day  sevelamer carbonate 800 mg oral tablet: 2 tab(s) orally 3 times a day  sodium bicarbonate 650 mg PO 2 tabs x3 daily:   sodium polystyrene sulfonate 15 g/60 mL oral and rectal suspension: 30 milliliter(s) orally 3 times a day  Timoptic 0.5% ophthalmic solution: 1 drop(s) in each affected eye 2 times a day  valsartan 160 mg oral tablet: 1 tab(s) orally once a day   atorvastatin 10 mg oral tablet: 1 tab(s) orally once a day  calcitriol 0.25 mcg oral capsule: 1 cap(s) orally once a day  Lantus 100 units/mL subcutaneous solution: 40 unit(s) subcutaneous once a day (at bedtime)  Lantus 100 units/mL subcutaneous solution: 40 unit(s) subcutaneous once a day (at bedtime)  Lasix 80 mg oral tablet: 1 tab(s) orally 3 times a day  NIFEdipine 60 mg oral tablet, extended release: 1 tab(s) orally 2 times a day  Rolling Walker: In need of Rolling Walker  sevelamer carbonate 800 mg oral tablet: 2 tab(s) orally 3 times a day  sodium bicarbonate 650 mg PO 2 tabs x3 daily:   sodium polystyrene sulfonate 15 g/60 mL oral and rectal suspension: 30 milliliter(s) orally 3 times a day  Timoptic 0.5% ophthalmic solution: 1 drop(s) in each affected eye 2 times a day  valsartan 160 mg oral tablet: 1 tab(s) orally once a day   atorvastatin 10 mg oral tablet: 1 tab(s) orally once a day  calcitriol 0.25 mcg oral capsule: 1 cap(s) orally once a day  Lantus 100 units/mL subcutaneous solution: 40 unit(s) subcutaneous once a day (at bedtime)  NIFEdipine 60 mg oral tablet, extended release: 1 tab(s) orally 2 times a day  Protonix 40 mg oral delayed release tablet: 1 tab(s) orally 2 times a day  Protonix 40 mg oral granule, delayed release: 1 each orally once a day  Rolling Walker: In need of Rolling Walker  sevelamer carbonate 800 mg oral tablet: 2 tab(s) orally 3 times a day  sodium bicarbonate 650 mg PO 2 tabs x3 daily:   Timoptic 0.5% ophthalmic solution: 1 drop(s) in each affected eye 2 times a day  valsartan 160 mg oral tablet: 1 tab(s) orally once a day

## 2024-02-11 NOTE — PROGRESS NOTE ADULT - PROBLEM SELECTOR PLAN 3
Telemetry reviewed, patient wih prolonged AL interval or episodes of second degree AV block (Wenckebach)  No episodes of Mobitz Type II noted on telemetry.     - outpatient f/u with cardiology, recommend outpatient evaluation inc echo Known history of T2DM. Insulin lantus 40u qhs as home medication. Last A1c unknown.    - moderate ISF-25 lispro sliding scale AC+qHS  - monitor FS  - consistent carb diet  - lantus 20u SQ qhs given decreased kidney function, adjust as needed

## 2024-02-11 NOTE — PROGRESS NOTE ADULT - SUBJECTIVE AND OBJECTIVE BOX
OVERNIGHT EVENTS: None    SUBJECTIVE:  Patient seen and examined at bedside, comfortable, NAD. Denied fever, chest pain, dyspnea, abdominal pain.     Vital Signs Last 12 Hrs  T(F): 98.6 (02-11-24 @ 06:02), Max: 98.6 (02-11-24 @ 06:02)  HR: 80 (02-11-24 @ 06:21) (64 - 80)  BP: 148/69 (02-11-24 @ 06:21) (142/70 - 166/60)  BP(mean): 95 (02-11-24 @ 06:02) (95 - 95)  RR: 18 (02-11-24 @ 06:02) (18 - 18)  SpO2: 94% (02-11-24 @ 06:02) (94% - 94%)  I&O's Summary      PHYSICAL EXAM:  Constitutional: NAD, comfortable in bed.  HEENT: NC/AT, PERRLA, EOMI, no conjunctival pallor or scleral icterus, MMM  Neck: Supple, no JVD  Respiratory: CTA B/L. No w/r/r.   Cardiovascular: RRR, normal S1 and S2, no m/r/g.   Gastrointestinal: +BS, soft NTND, no guarding or rebound tenderness, no palpable masses   Extremities: wwp; no cyanosis, clubbing or edema.   Vascular: Pulses equal and strong throughout.   Neurological: AAOx3, no CN deficits, strength and sensation intact throughout.   Skin: No gross skin abnormalities or rashes        LABS:                        9.8    14.83 )-----------( 321      ( 11 Feb 2024 05:30 )             31.1     02-11    135  |  98  |  37<H>  ----------------------------<  185<H>  4.2   |  24  |  5.32<H>    Ca    9.0      11 Feb 2024 05:30  Phos  4.6     02-11  Mg     2.2     02-11    TPro  6.7  /  Alb  2.4<L>  /  TBili  0.2  /  DBili  x   /  AST  11  /  ALT  <5<L>  /  AlkPhos  73  02-11      Urinalysis Basic - ( 11 Feb 2024 05:30 )    Color: x / Appearance: x / SG: x / pH: x  Gluc: 185 mg/dL / Ketone: x  / Bili: x / Urobili: x   Blood: x / Protein: x / Nitrite: x   Leuk Esterase: x / RBC: x / WBC x   Sq Epi: x / Non Sq Epi: x / Bacteria: x          RADIOLOGY & ADDITIONAL TESTS:    MEDICATIONS  (STANDING):  atorvastatin 10 milliGRAM(s) Oral at bedtime  calcitriol   Capsule 0.25 MICROGram(s) Oral daily  dextrose 5%. 1000 milliLiter(s) (50 mL/Hr) IV Continuous <Continuous>  dextrose 5%. 1000 milliLiter(s) (100 mL/Hr) IV Continuous <Continuous>  dextrose 50% Injectable 25 Gram(s) IV Push once  dextrose 50% Injectable 12.5 Gram(s) IV Push once  dextrose 50% Injectable 25 Gram(s) IV Push once  furosemide    Tablet 80 milliGRAM(s) Oral every 8 hours  glucagon  Injectable 1 milliGRAM(s) IntraMuscular once  influenza  Vaccine (HIGH DOSE) 0.7 milliLiter(s) IntraMuscular once  insulin glargine Injectable (LANTUS) 20 Unit(s) SubCutaneous at bedtime  insulin lispro (ADMELOG) corrective regimen sliding scale   SubCutaneous Before meals and at bedtime  NIFEdipine XL 60 milliGRAM(s) Oral every 12 hours  PPD  5 Tuberculin Unit(s) Injectable 5 Unit(s) IntraDermal once  sevelamer carbonate 800 milliGRAM(s) Oral three times a day with meals  valsartan 160 milliGRAM(s) Oral every 24 hours    MEDICATIONS  (PRN):  acetaminophen     Tablet .. 650 milliGRAM(s) Oral every 6 hours PRN Temp greater or equal to 38C (100.4F), Moderate Pain (4 - 6)  dextrose Oral Gel 15 Gram(s) Oral once PRN Blood Glucose LESS THAN 70 milliGRAM(s)/deciliter  HYDROmorphone  Injectable 0.25 milliGRAM(s) IV Push every 15 minutes PRN breakthrough pain in the PACU  melatonin 3 milliGRAM(s) Oral at bedtime PRN Insomnia  ondansetron Injectable 4 milliGRAM(s) IV Push every 6 hours PRN Nausea and/or Vomiting   OVERNIGHT EVENTS: None    SUBJECTIVE:  Patient seen and examined at bedside, comfortable, NAD. Denied fever, chest pain, dyspnea, abdominal pain.     Vital Signs Last 12 Hrs  T(F): 98.6 (02-11-24 @ 06:02), Max: 98.6 (02-11-24 @ 06:02)  HR: 80 (02-11-24 @ 06:21) (64 - 80)  BP: 148/69 (02-11-24 @ 06:21) (142/70 - 166/60)  BP(mean): 95 (02-11-24 @ 06:02) (95 - 95)  RR: 18 (02-11-24 @ 06:02) (18 - 18)  SpO2: 94% (02-11-24 @ 06:02) (94% - 94%)  I&O's Summary      PHYSICAL EXAM:  Constitutional: NAD, comfortable in bed.  HEENT: NC/AT, PERRLA, EOMI, no conjunctival pallor or scleral icterus, MMM  Neck: Supple, no JVD  Respiratory: CTA B/L. No w/r/r.   Cardiovascular: RRR, normal S1 and S2, no m/r/g.   Gastrointestinal: +BS, soft NTND, no guarding or rebound tenderness, no palpable masses   Extremities: wwp; no cyanosis, clubbing or edema. R knee swollen w overlying, healing superficial skin wound s/p fall; full rom, sensation intact, mildly ttp  Vascular: Pulses equal and strong throughout.   Neurological: AAOx3, no CN deficits, strength and sensation intact throughout.   Skin: No gross skin abnormalities or rashes        LABS:                        9.8    14.83 )-----------( 321      ( 11 Feb 2024 05:30 )             31.1     02-11    135  |  98  |  37<H>  ----------------------------<  185<H>  4.2   |  24  |  5.32<H>    Ca    9.0      11 Feb 2024 05:30  Phos  4.6     02-11  Mg     2.2     02-11    TPro  6.7  /  Alb  2.4<L>  /  TBili  0.2  /  DBili  x   /  AST  11  /  ALT  <5<L>  /  AlkPhos  73  02-11      Urinalysis Basic - ( 11 Feb 2024 05:30 )    Color: x / Appearance: x / SG: x / pH: x  Gluc: 185 mg/dL / Ketone: x  / Bili: x / Urobili: x   Blood: x / Protein: x / Nitrite: x   Leuk Esterase: x / RBC: x / WBC x   Sq Epi: x / Non Sq Epi: x / Bacteria: x          RADIOLOGY & ADDITIONAL TESTS:    MEDICATIONS  (STANDING):  atorvastatin 10 milliGRAM(s) Oral at bedtime  calcitriol   Capsule 0.25 MICROGram(s) Oral daily  dextrose 5%. 1000 milliLiter(s) (50 mL/Hr) IV Continuous <Continuous>  dextrose 5%. 1000 milliLiter(s) (100 mL/Hr) IV Continuous <Continuous>  dextrose 50% Injectable 25 Gram(s) IV Push once  dextrose 50% Injectable 12.5 Gram(s) IV Push once  dextrose 50% Injectable 25 Gram(s) IV Push once  furosemide    Tablet 80 milliGRAM(s) Oral every 8 hours  glucagon  Injectable 1 milliGRAM(s) IntraMuscular once  influenza  Vaccine (HIGH DOSE) 0.7 milliLiter(s) IntraMuscular once  insulin glargine Injectable (LANTUS) 20 Unit(s) SubCutaneous at bedtime  insulin lispro (ADMELOG) corrective regimen sliding scale   SubCutaneous Before meals and at bedtime  NIFEdipine XL 60 milliGRAM(s) Oral every 12 hours  PPD  5 Tuberculin Unit(s) Injectable 5 Unit(s) IntraDermal once  sevelamer carbonate 800 milliGRAM(s) Oral three times a day with meals  valsartan 160 milliGRAM(s) Oral every 24 hours    MEDICATIONS  (PRN):  acetaminophen     Tablet .. 650 milliGRAM(s) Oral every 6 hours PRN Temp greater or equal to 38C (100.4F), Moderate Pain (4 - 6)  dextrose Oral Gel 15 Gram(s) Oral once PRN Blood Glucose LESS THAN 70 milliGRAM(s)/deciliter  HYDROmorphone  Injectable 0.25 milliGRAM(s) IV Push every 15 minutes PRN breakthrough pain in the PACU  melatonin 3 milliGRAM(s) Oral at bedtime PRN Insomnia  ondansetron Injectable 4 milliGRAM(s) IV Push every 6 hours PRN Nausea and/or Vomiting

## 2024-02-11 NOTE — PROGRESS NOTE ADULT - PROBLEM SELECTOR PLAN 5
Multi year history of hypertension. On admission, presented with /57. Well controlled on home valsartan 160mg qd, lasix 80mg tid, nifedipine 60mg bid.    - c/w nifedipine 60mg PO BID  - c/w lasix 80mg TID   - c/w Valsartan 160mg   - CTM for CP/CT  - encourage low salt in diet Presented with a WBC count of  10.91, neutrophil predominance. Uptrending  No systemic symptoms patient denies fever, chills, nausea, vomiting, weight loss, wheezing, night sweats  Protein normal, albumin low    - continue to monitor  - DEBBI

## 2024-02-11 NOTE — DISCHARGE NOTE PROVIDER - HOSPITAL COURSE
Mr. Anguiano is an 82yo M PMH of HTN, DM, HLD, CKD Stage 5, who presented with hyperkalemia found on lab work during pre-op for an AVF, admitted for management of hyperkalemia & mobitz 1 prior to avf placement for new hd, now sp successful avf placement, hd via tunneled catheter, ccb new coffee-ground emesis, evaluated by egd, stable for dc with gi follow up & continued hd.    Hospital Course (by problem):    1. ESRD   - continue sodium bicarb 1300mg PO TID  - continue vitamin D3 0.25mcg PO qd  - continue renvela 1800mg PO TID  - regular hemodialysis per nephrology    2. Upper GI bleed  EGD: Normal esophagus, single cratered clean based non-bleeding ulcer was found in the body lesser curve, stomach otherwise nml;   erythema of the mucosa with no bleeding noted in the duodenal bulb. These findings are compatible with duodenitis.    - Protonix 40 mg BID x 2 weeks, then once daily thereafter  - Needs f/u EGD in 8 weeks  - Ensure out-patient GI follow-up at [address] [place] [address], [address] [place] [address] (phone: [phone])    3. Mobitz type 1 second degree AV block.   No episodes of Mobitz Type II noted on telemetry.     - outpatient f/u with cardiology, recommend outpatient evaluation inc echo.    4. DM  known history of T2DM  home medication(s): Insulin lantus 40u qhs  A1c 7.7 february 2024    - resume home regimen  - outpatient follow up w endocrine    5. HTN  well-controlled on home valsartan 160mg qd, lasix 80mg tid, nifedipine 60mg bid.  lasix discontinued 2/11, per nephrology (given initiation of dialysis)    - continue nifedipine 60mg PO BID  - continue lasix 80mg TID   - continue valsartan 160mg     6. HLD  well-controlled on home atorvastatin 10mg qhs    - continue atorvastatin 10mg qhs    6. Deconditioning  iso prolonged hospital course, chronic illness & reduced mobility    - home PT    Physical Exam at time of discharge:  General: NAD  HEENT: PERRL, EOM, anicteric sclera, MMM  Neck: supple, no JVD  Respiratory: CTA B/L; no W/R/R  Cardiovascular: +S1/S2; RRR; no M/R/G  Gastrointestinal: soft, NT/ND; +BS x4  Extremities: WWP; 2+ peripheral pulses B/L; no LE edema; right ttp, full rom  Skin: normal color and turgor; no rash  Neurologic: AOx3

## 2024-02-11 NOTE — PROGRESS NOTE ADULT - PROBLEM SELECTOR PLAN 1
Admission Cr: 8.76 (baseline is unclear, though patient likely at baseline given known CKD stage 5 now requiring chronic HD)  hyperkalemia on admission requiring insulin, dextrose, and lokelma; currently stable    - avoid nephrotoxic agents  - continue lasix 80mg PO TID (home med)  - continue sodium bicarb 1300mg PO TID  - continue vitamin D3 0.25mcg PO qd; follow up vitamin d levels  - continue renvela 1800mg PO TID  - continue renal diet with 1.2L fluid restriction  - regular hemodialysis per nephrology  - follow up PTH, vitamin d, daily bmp

## 2024-02-11 NOTE — DISCHARGE NOTE PROVIDER - NSDCCPCAREPLAN_GEN_ALL_CORE_FT
PRINCIPAL DISCHARGE DIAGNOSIS  Diagnosis: ESRD on dialysis  Assessment and Plan of Treatment: see attached

## 2024-02-11 NOTE — PROGRESS NOTE ADULT - PROBLEM SELECTOR PLAN 7
Multi year history of HLD. Well controlled on home atorvastatin 10mg qhs.    - c/w home atorvastatin 10mg qhs  - encourage healthy eating as outpatient and follow up with outpatient PCP Hgb on admission 9.4, MCV 92.9.   Currently no signs of active bleeding (no hematochezia, melena, hemoptysis, hematuria).  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  - trend CBC  - maintain active T&S  - transfuse if Hgb <7

## 2024-02-11 NOTE — DISCHARGE NOTE PROVIDER - CARE PROVIDER_API CALL
Dc Lai  Vascular Surgery  [address]  [place] [address]  Phone: [phone]  Fax: [phone]  Follow Up Time:    Dc Lai  Vascular Surgery  [address]  [place] [address]  Phone: [phone]  Fax: [phone]  Established Patient  Scheduled Appointment: 02/26/2024 10:15 AM

## 2024-02-11 NOTE — PROGRESS NOTE ADULT - PROBLEM SELECTOR PLAN 2
Admission Cr: 8.76 (baseline is unclear, though patient likely at baseline given known CKD stage 5 now requiring chronic HD)  hyperkalemia on admission requiring insulin, dextrose, and lokelma; currently stable    - avoid nephrotoxic agents  - continue lasix 80mg PO TID (home med)  - continue sodium bicarb 1300mg PO TID  - continue vitamin D3 0.25mcg PO qd; follow up vitamin d levels  - continue renvela 1800mg PO TID  - continue renal diet with 1.2L fluid restriction  - regular hemodialysis per nephrology  - follow up PTH, vitamin d, daily bmp Telemetry reviewed, patient wih prolonged NJ interval or episodes of second degree AV block (Wenckebach)  No episodes of Mobitz Type II noted on telemetry.     - outpatient f/u with cardiology, recommend outpatient evaluation inc echo

## 2024-02-12 DIAGNOSIS — K59.00 CONSTIPATION, UNSPECIFIED: ICD-10-CM

## 2024-02-12 DIAGNOSIS — K92.2 GASTROINTESTINAL HEMORRHAGE, UNSPECIFIED: ICD-10-CM

## 2024-02-12 PROBLEM — I44.1 ATRIOVENTRICULAR BLOCK, SECOND DEGREE: Chronic | Status: ACTIVE | Noted: 2024-02-08

## 2024-02-12 LAB
24R-OH-CALCIDIOL SERPL-MCNC: 11.6 NG/ML — LOW (ref 30–80)
ANION GAP SERPL CALC-SCNC: 12 MMOL/L — SIGNIFICANT CHANGE UP (ref 5–17)
BASOPHILS # BLD AUTO: 0 K/UL — SIGNIFICANT CHANGE UP (ref 0–0.2)
BASOPHILS NFR BLD AUTO: 0 % — SIGNIFICANT CHANGE UP (ref 0–2)
BUN SERPL-MCNC: 66 MG/DL — HIGH (ref 7–23)
CALCIUM SERPL-MCNC: 8.3 MG/DL — LOW (ref 8.4–10.5)
CALCIUM SERPL-MCNC: 8.8 MG/DL — SIGNIFICANT CHANGE UP (ref 8.4–10.5)
CHLORIDE SERPL-SCNC: 97 MMOL/L — SIGNIFICANT CHANGE UP (ref 96–108)
CO2 SERPL-SCNC: 28 MMOL/L — SIGNIFICANT CHANGE UP (ref 22–31)
CREAT SERPL-MCNC: 6.87 MG/DL — HIGH (ref 0.5–1.3)
EGFR: 7 ML/MIN/1.73M2 — LOW
EOSINOPHIL # BLD AUTO: 0 K/UL — SIGNIFICANT CHANGE UP (ref 0–0.5)
EOSINOPHIL NFR BLD AUTO: 0 % — SIGNIFICANT CHANGE UP (ref 0–6)
GIANT PLATELETS BLD QL SMEAR: PRESENT — SIGNIFICANT CHANGE UP
GLUCOSE BLDC GLUCOMTR-MCNC: 108 MG/DL — HIGH (ref 70–99)
GLUCOSE BLDC GLUCOMTR-MCNC: 111 MG/DL — HIGH (ref 70–99)
GLUCOSE BLDC GLUCOMTR-MCNC: 149 MG/DL — HIGH (ref 70–99)
GLUCOSE BLDC GLUCOMTR-MCNC: 270 MG/DL — HIGH (ref 70–99)
GLUCOSE SERPL-MCNC: 152 MG/DL — HIGH (ref 70–99)
HCT VFR BLD CALC: 28.3 % — LOW (ref 39–50)
HGB BLD-MCNC: 8.9 G/DL — LOW (ref 13–17)
HYPOCHROMIA BLD QL: SLIGHT — SIGNIFICANT CHANGE UP
LYMPHOCYTES # BLD AUTO: 0.94 K/UL — LOW (ref 1–3.3)
LYMPHOCYTES # BLD AUTO: 6.8 % — LOW (ref 13–44)
MAGNESIUM SERPL-MCNC: 2.4 MG/DL — SIGNIFICANT CHANGE UP (ref 1.6–2.6)
MANUAL SMEAR VERIFICATION: SIGNIFICANT CHANGE UP
MCHC RBC-ENTMCNC: 30.5 PG — SIGNIFICANT CHANGE UP (ref 27–34)
MCHC RBC-ENTMCNC: 31.4 GM/DL — LOW (ref 32–36)
MCV RBC AUTO: 96.9 FL — SIGNIFICANT CHANGE UP (ref 80–100)
MONOCYTES # BLD AUTO: 1.07 K/UL — HIGH (ref 0–0.9)
MONOCYTES NFR BLD AUTO: 7.7 % — SIGNIFICANT CHANGE UP (ref 2–14)
NEUTROPHILS # BLD AUTO: 11.83 K/UL — HIGH (ref 1.8–7.4)
NEUTROPHILS NFR BLD AUTO: 85.5 % — HIGH (ref 43–77)
OVALOCYTES BLD QL SMEAR: SLIGHT — SIGNIFICANT CHANGE UP
PHOSPHATE SERPL-MCNC: 6 MG/DL — HIGH (ref 2.5–4.5)
PLAT MORPH BLD: ABNORMAL
PLATELET # BLD AUTO: 364 K/UL — SIGNIFICANT CHANGE UP (ref 150–400)
POIKILOCYTOSIS BLD QL AUTO: SLIGHT — SIGNIFICANT CHANGE UP
POLYCHROMASIA BLD QL SMEAR: SLIGHT — SIGNIFICANT CHANGE UP
POTASSIUM SERPL-MCNC: 4.4 MMOL/L — SIGNIFICANT CHANGE UP (ref 3.5–5.3)
POTASSIUM SERPL-SCNC: 4.4 MMOL/L — SIGNIFICANT CHANGE UP (ref 3.5–5.3)
PTH-INTACT FLD-MCNC: 190 PG/ML — HIGH (ref 15–65)
RBC # BLD: 2.92 M/UL — LOW (ref 4.2–5.8)
RBC # FLD: 12.4 % — SIGNIFICANT CHANGE UP (ref 10.3–14.5)
RBC BLD AUTO: ABNORMAL
SODIUM SERPL-SCNC: 137 MMOL/L — SIGNIFICANT CHANGE UP (ref 135–145)
WBC # BLD: 13.84 K/UL — HIGH (ref 3.8–10.5)
WBC # FLD AUTO: 13.84 K/UL — HIGH (ref 3.8–10.5)

## 2024-02-12 PROCEDURE — 99222 1ST HOSP IP/OBS MODERATE 55: CPT

## 2024-02-12 PROCEDURE — 74018 RADEX ABDOMEN 1 VIEW: CPT | Mod: 26

## 2024-02-12 PROCEDURE — 99233 SBSQ HOSP IP/OBS HIGH 50: CPT | Mod: GC

## 2024-02-12 RX ORDER — FUROSEMIDE 40 MG
80 TABLET ORAL EVERY 8 HOURS
Refills: 0 | Status: DISCONTINUED | OUTPATIENT
Start: 2024-02-12 | End: 2024-02-13

## 2024-02-12 RX ORDER — LIDOCAINE 4 G/100G
1 CREAM TOPICAL ONCE
Refills: 0 | Status: COMPLETED | OUTPATIENT
Start: 2024-02-12 | End: 2024-02-12

## 2024-02-12 RX ORDER — NIFEDIPINE 30 MG
60 TABLET, EXTENDED RELEASE 24 HR ORAL EVERY 12 HOURS
Refills: 0 | Status: DISCONTINUED | OUTPATIENT
Start: 2024-02-12 | End: 2024-02-17

## 2024-02-12 RX ADMIN — INSULIN GLARGINE 20 UNIT(S): 100 INJECTION, SOLUTION SUBCUTANEOUS at 22:34

## 2024-02-12 RX ADMIN — VALSARTAN 160 MILLIGRAM(S): 80 TABLET ORAL at 15:42

## 2024-02-12 RX ADMIN — Medication 80 MILLIGRAM(S): at 06:49

## 2024-02-12 RX ADMIN — Medication 80 MILLIGRAM(S): at 15:42

## 2024-02-12 RX ADMIN — SEVELAMER CARBONATE 800 MILLIGRAM(S): 2400 POWDER, FOR SUSPENSION ORAL at 13:23

## 2024-02-12 RX ADMIN — LIDOCAINE 1 PATCH: 4 CREAM TOPICAL at 16:46

## 2024-02-12 RX ADMIN — PANTOPRAZOLE SODIUM 40 MILLIGRAM(S): 20 TABLET, DELAYED RELEASE ORAL at 06:49

## 2024-02-12 RX ADMIN — PANTOPRAZOLE SODIUM 40 MILLIGRAM(S): 20 TABLET, DELAYED RELEASE ORAL at 17:43

## 2024-02-12 RX ADMIN — Medication 60 MILLIGRAM(S): at 19:39

## 2024-02-12 RX ADMIN — Medication 6: at 13:21

## 2024-02-12 RX ADMIN — ATORVASTATIN CALCIUM 10 MILLIGRAM(S): 80 TABLET, FILM COATED ORAL at 22:34

## 2024-02-12 RX ADMIN — Medication 80 MILLIGRAM(S): at 22:34

## 2024-02-12 RX ADMIN — LIDOCAINE 1 PATCH: 4 CREAM TOPICAL at 18:44

## 2024-02-12 RX ADMIN — SENNA PLUS 2 TABLET(S): 8.6 TABLET ORAL at 22:34

## 2024-02-12 RX ADMIN — SEVELAMER CARBONATE 800 MILLIGRAM(S): 2400 POWDER, FOR SUSPENSION ORAL at 17:43

## 2024-02-12 RX ADMIN — Medication 60 MILLIGRAM(S): at 06:49

## 2024-02-12 RX ADMIN — CALCITRIOL 0.25 MICROGRAM(S): 0.5 CAPSULE ORAL at 11:23

## 2024-02-12 RX ADMIN — SEVELAMER CARBONATE 800 MILLIGRAM(S): 2400 POWDER, FOR SUSPENSION ORAL at 09:21

## 2024-02-12 RX ADMIN — POLYETHYLENE GLYCOL 3350 17 GRAM(S): 17 POWDER, FOR SOLUTION ORAL at 22:33

## 2024-02-12 NOTE — DIETITIAN INITIAL EVALUATION ADULT - PROBLEM SELECTOR PLAN 5
Multi year history of hypertension. On admission, presented with /57. Well controlled on home valsartan 160mg qd, lasix 80mg tid, nifedipine 60mg bid.  - c/w nifedipine 60mg PO BID  - c/w lasix 80mg TID   - holding valsartan i/s/o hyperkalemia, restart as appropriate  - CTM for CP/CT  - encourage low salt in diet

## 2024-02-12 NOTE — DIETITIAN INITIAL EVALUATION ADULT - OTHER INFO
84yo M w/ a PMH of HTN, DM, HLD, CKD Stage 5, who presented with hyperkalemia found on labwork. He was scheduled to receive an elective LUE AVF creation today in preparation for dialysis but was noted to be hyperkalemic. At the facility, he was given insulin, dextrose, and lokelma. At that time, nephrology was called and recommended rechecking a BMP to evaluate hyperK resolution but PACU nurses stated it was against policy and Mr. Anguiano was sent to the ED for further evaluation and management and the procedure was cancelled. In the ED, the patient remained without any symptoms as he did not have complaints of fevers/chills, chest pain/tightness, no N/V and no abdominal pain. He reports he forgot to take his lasix 80mg last night and this morning, also forgot to take his SPS suspension this morning which he takes for chronic hyperkalemia i/s/o CKD5.    Patient and family seen at bedside for nutrition assessment. Pt NPO, noted with 3 episodes of coffee ground emesis. Pending EGD tomorrow. Pt confirms NKFA. No difficulty chewing/swallowing reported. Family reports fair appetite in-house and PTA, states intake depends on whether pt likes the food. Food preferences include rice, pasta, bread, beans. Dosing weight: 185#, BMI 28.1, family reports UBW of 186# and unsure if pt has lost weight. Pt with fluctuating weights in [facility]- will continue to monitor weight trends in-house. No pressure injuries or edema documented. Noted with constipation, last BM 2/6 per EMR- on bowel regimen. Labs: BUN 66 (H), Cr 6.87 (H), GFR 7 (L), Phos 6.0 (H), POCT 149-270 x 24 hours, HgbA1c 7.7%. Meds: insulin glargine, protonix, calcitriol, furosemide, sevelamer, zofran. NFPE not feasible at this time as pt fell asleep during interview- will reattempt at follow up. Based on ASPEN guidelines, pt does not meet criteria for malnutrition at this time. Nutrition education provided to family. Discussed renal diet recommendations including foods high in K and Phos. Handouts provided. Family expressed appreciation and understanding. See nutrition recommendations below.

## 2024-02-12 NOTE — DIETITIAN INITIAL EVALUATION ADULT - ADD RECOMMEND
1. Recommend advance diet to Consistent Carbohydrate Renal as medically feasible   2. Encourage and monitor PO intake, honor preferences as able   >> Consistently meet >75% of estimated needs during admission   >> Consider oral nutrition supplement or liberalizing diet should intake decline </= 50%   3. Monitor labs, wt trends, GI function, and skin integrity   4. Pain and bowel regimen per team   5. RD to remain available for additional nutrition interventions and diet edu as needed

## 2024-02-12 NOTE — DIETITIAN INITIAL EVALUATION ADULT - PROBLEM SELECTOR PLAN 9
F: none  E: Replete as needed, careful given HD needs  N: Renal diet, npo after MN  Dvt ppx: heparin subq, then holding for IR procedure in AM  GI ppx: not needed  Dispo: Tele  Code status: Full

## 2024-02-12 NOTE — DIETITIAN INITIAL EVALUATION ADULT - OTHER CALCULATIONS
Based on Standards of Care pt within % IBW (120%) thus actual body weight used for all calculations (185#). Needs adjusted for advanced age, ESRD on HD. Fluid recs per team.

## 2024-02-12 NOTE — DIETITIAN INITIAL EVALUATION ADULT - PROBLEM SELECTOR PLAN 7
Multi year history of HLD. Well controlled on home atorvastatin 10mg qhs.  - c/w home atorvastatin 10mg qhs  - encourage healthy eating as outpatient and follow up with outpatient PCP  - f/u lipid panel

## 2024-02-12 NOTE — DIETITIAN INITIAL EVALUATION ADULT - PROBLEM SELECTOR PLAN 1
Presented to the ED iso elevated K+ on pre-AV Fistula placement lab work. Initially K+ of 6.0. Was given insulin, dextrose, and lokelma while in the pre-op holding area. In the ED, the K+ noted to drop from 6.0 -> 5.0.  Etiology is most likely iso known renal insufficiency given known CKD stage 5. Dr. 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  Patient has not taken new supplements. no signs of rhabdo on exam nor history.   - q6hr BMPs   - serial ECG, monitor for changes such as peaked T-waves w/ shortened QT interval   - can consider calcium gluconate, insulin 5 units IV with an amp of dextrose to prevent hypoglycemia, and if severe consider B-agonist -> albuterol nebs  - lokelma 10g q8 as per renal recs  - NPO at midnight for TDC placement 2/7 followed by HD, as per renal recs

## 2024-02-12 NOTE — PROGRESS NOTE ADULT - SUBJECTIVE AND OBJECTIVE BOX
OVERNIGHT EVENTS: Coffee ground emesis x3- 2x later yesterday evening, 1x overnight. vitals stable, hgb stable, active t&s. pt denies hx gastritis or pud    SUBJECTIVE:  Patient seen and examined at bedside. denies headache, chest pain, fever, chills, abdominal pain. reports persistent constipation    Vital Signs Last 12 Hrs  T(F): 98.5 (02-12-24 @ 11:38), Max: 98.8 (02-12-24 @ 05:55)  HR: 55 (02-12-24 @ 11:38) (55 - 63)  BP: 129/60 (02-12-24 @ 11:38) (129/60 - 136/63)  BP(mean): 88 (02-12-24 @ 05:55) (88 - 88)  RR: 18 (02-12-24 @ 11:38) (17 - 18)  SpO2: 94% (02-12-24 @ 11:38) (94% - 94%)  I&O's Summary      PHYSICAL EXAM:  Constitutional: NAD, comfortable in bed.  HEENT: NC/AT, PERRLA, EOMI, no conjunctival pallor or scleral icterus, MMM  Neck: Supple, no JVD  Respiratory: CTA B/L. No w/r/r.   Cardiovascular: RRR, normal S1 and S2, no m/r/g.   Gastrointestinal: +BS, soft NTND, no guarding or rebound tenderness, no palpable masses   Extremities: wwp; no cyanosis, clubbing or edema. R knee swollen w overlying, healing superficial skin wound s/p fall; full rom, sensation intact, mildly ttp  Vascular: Pulses equal and strong throughout.   Neurological: AAOx3, no CN deficits, strength and sensation intact throughout.   Skin: No gross skin abnormalities or rashes            LABS:                        8.9    13.84 )-----------( 364      ( 12 Feb 2024 05:30 )             28.3     02-12    137  |  97  |  66<H>  ----------------------------<  152<H>  4.4   |  28  |  6.87<H>    Ca    8.8      12 Feb 2024 05:30  Phos  6.0     02-12  Mg     2.4     02-12    TPro  6.7  /  Alb  2.4<L>  /  TBili  0.2  /  DBili  x   /  AST  11  /  ALT  <5<L>  /  AlkPhos  73  02-11      Urinalysis Basic - ( 12 Feb 2024 05:30 )    Color: x / Appearance: x / SG: x / pH: x  Gluc: 152 mg/dL / Ketone: x  / Bili: x / Urobili: x   Blood: x / Protein: x / Nitrite: x   Leuk Esterase: x / RBC: x / WBC x   Sq Epi: x / Non Sq Epi: x / Bacteria: x          RADIOLOGY & ADDITIONAL TESTS:    MEDICATIONS  (STANDING):  atorvastatin 10 milliGRAM(s) Oral at bedtime  calcitriol   Capsule 0.25 MICROGram(s) Oral daily  dextrose 5%. 1000 milliLiter(s) (50 mL/Hr) IV Continuous <Continuous>  dextrose 5%. 1000 milliLiter(s) (100 mL/Hr) IV Continuous <Continuous>  dextrose 50% Injectable 25 Gram(s) IV Push once  dextrose 50% Injectable 12.5 Gram(s) IV Push once  dextrose 50% Injectable 25 Gram(s) IV Push once  furosemide    Tablet 80 milliGRAM(s) Oral every 8 hours  glucagon  Injectable 1 milliGRAM(s) IntraMuscular once  influenza  Vaccine (HIGH DOSE) 0.7 milliLiter(s) IntraMuscular once  insulin glargine Injectable (LANTUS) 20 Unit(s) SubCutaneous at bedtime  insulin lispro (ADMELOG) corrective regimen sliding scale   SubCutaneous Before meals and at bedtime  NIFEdipine XL 60 milliGRAM(s) Oral every 12 hours  pantoprazole  Injectable 40 milliGRAM(s) IV Push every 12 hours  polyethylene glycol 3350 17 Gram(s) Oral at bedtime  PPD  5 Tuberculin Unit(s) Injectable 5 Unit(s) IntraDermal once  senna 2 Tablet(s) Oral at bedtime  sevelamer carbonate 800 milliGRAM(s) Oral three times a day with meals  valsartan 160 milliGRAM(s) Oral every 24 hours    MEDICATIONS  (PRN):  acetaminophen     Tablet .. 650 milliGRAM(s) Oral every 6 hours PRN Temp greater or equal to 38C (100.4F), Moderate Pain (4 - 6)  dextrose Oral Gel 15 Gram(s) Oral once PRN Blood Glucose LESS THAN 70 milliGRAM(s)/deciliter  HYDROmorphone  Injectable 0.25 milliGRAM(s) IV Push every 15 minutes PRN breakthrough pain in the PACU  melatonin 3 milliGRAM(s) Oral at bedtime PRN Insomnia  ondansetron Injectable 4 milliGRAM(s) IV Push every 6 hours PRN Nausea and/or Vomiting

## 2024-02-12 NOTE — DIETITIAN INITIAL EVALUATION ADULT - PROBLEM SELECTOR PLAN 4
Known history of T2DM. Insulin lantus 40u qhs as home medication. Last A1c unknown.  - moderate ISF-25 lispro sliding scale AC+qHS  - monitor FS  - consistent carb diet  - f/u A1c  - lantus 20u SQ qhs given decreased kidney function, adjust as needed

## 2024-02-12 NOTE — PROGRESS NOTE ADULT - PROBLEM SELECTOR PLAN 5
Telemetry reviewed, patient wih prolonged KS interval or episodes of second degree AV block (Wenckebach)  No episodes of Mobitz Type II noted on telemetry.     - outpatient f/u with cardiology, recommend outpatient evaluation inc echo

## 2024-02-12 NOTE — CONSULT NOTE ADULT - ASSESSMENT
Pt. is a 84 yo male who was admitted for management of hyperkalemia found during pre-op evaluation for AVF for his CKD stage 5. Pt. has experienced 3 episodes of coffee ground emesis since yesterday (02/11).     Recommendations:  -Plan for EGD tomorrow?  -Keep NPO    Pt. is a 84 yo male with a pmhx  HTN, DM, HLD, CKD Stage 5 who was admitted for management of hyperkalemia found during pre-op evaluation for an elective AVF in setting of CKD and was found to be in Mobitz Type 1 on arrival. Pt. has experienced 3 episodes of coffee ground emesis since yesterday (02/11).      Recommendations:  -Plan for EGD tomorrow?  -Keep NPO    Pt. is a 84 yo male with a pmhx  HTN, DM, HLD, CKD Stage 5 who was admitted for management of hyperkalemia found during pre-op evaluation for an elective AVF in setting of CKD and was found to be in Mobitz Type 1 on arrival. Pt. has experienced 3 episodes of coffee ground emesis since yesterday (02/11) and remained hemodynamically stable.     Recommendations:  -Plan for EGD tomorrow  -Keep NPO   -Continue on PPI 40MG BID   83M with a PMH of HTN, DM, HLD, CKD Stage 5 who was admitted for management of hyperkalemia found during pre-op evaluation for an elective AVF in setting of CKD and was found to be in Mobitz Type 1 on arrival. Course complicated by 3 episodes of coffee ground emesis since yesterday (02/11) and remained hemodynamically stable. GI consulted for UGIB    Recommendations:  - plan for EGD, iddeally tomorrow  -  NPO except medications after midnight   - continue PPI 40 mg IV BID   - trend Hb and maintain active type and screen     Case discussed with Dr. Avalos. GI Team will continue to follow.     Lisa Ruiz D.O.   Gastroenterology Fellow  Weekday 7am-5pm Pager: [phone]  Weeknights/Weekend/Holiday Coverage: Please call the  for contact info

## 2024-02-12 NOTE — DIETITIAN INITIAL EVALUATION ADULT - PERTINENT MEDS FT
MEDICATIONS  (STANDING):  atorvastatin 10 milliGRAM(s) Oral at bedtime  calcitriol   Capsule 0.25 MICROGram(s) Oral daily  dextrose 5%. 1000 milliLiter(s) (50 mL/Hr) IV Continuous <Continuous>  dextrose 5%. 1000 milliLiter(s) (100 mL/Hr) IV Continuous <Continuous>  dextrose 50% Injectable 25 Gram(s) IV Push once  dextrose 50% Injectable 25 Gram(s) IV Push once  dextrose 50% Injectable 12.5 Gram(s) IV Push once  furosemide    Tablet 80 milliGRAM(s) Oral every 8 hours  glucagon  Injectable 1 milliGRAM(s) IntraMuscular once  influenza  Vaccine (HIGH DOSE) 0.7 milliLiter(s) IntraMuscular once  insulin glargine Injectable (LANTUS) 20 Unit(s) SubCutaneous at bedtime  insulin lispro (ADMELOG) corrective regimen sliding scale   SubCutaneous Before meals and at bedtime  NIFEdipine XL 60 milliGRAM(s) Oral every 12 hours  pantoprazole  Injectable 40 milliGRAM(s) IV Push every 12 hours  polyethylene glycol 3350 17 Gram(s) Oral at bedtime  PPD  5 Tuberculin Unit(s) Injectable 5 Unit(s) IntraDermal once  senna 2 Tablet(s) Oral at bedtime  sevelamer carbonate 800 milliGRAM(s) Oral three times a day with meals  valsartan 160 milliGRAM(s) Oral every 24 hours    MEDICATIONS  (PRN):  acetaminophen     Tablet .. 650 milliGRAM(s) Oral every 6 hours PRN Temp greater or equal to 38C (100.4F), Moderate Pain (4 - 6)  dextrose Oral Gel 15 Gram(s) Oral once PRN Blood Glucose LESS THAN 70 milliGRAM(s)/deciliter  HYDROmorphone  Injectable 0.25 milliGRAM(s) IV Push every 15 minutes PRN breakthrough pain in the PACU  melatonin 3 milliGRAM(s) Oral at bedtime PRN Insomnia  ondansetron Injectable 4 milliGRAM(s) IV Push every 6 hours PRN Nausea and/or Vomiting

## 2024-02-12 NOTE — DIETITIAN INITIAL EVALUATION ADULT - NUTRITIONGOAL OUTCOME1
He had already been taking 10mg XR per mom and the 5mg was added as a trial to give him 15mg total  Mom is out of 5mg tablets and needs a refill on this   Confirmed with mom Pt to meet at least 75% of nutritional needs consistently

## 2024-02-12 NOTE — CONSULT NOTE ADULT - ATTENDING COMMENTS
Patient is an 82 yo Male with PMH of HTN, DM, HLD, CKD Stage 5 admitted for hyperkalemia with plan to start HD, Found to have Sinus rhythm with 1st Degree AVB as well as  Mobitz type I AV block for which cardiology has been consulted.     Review of studies:   - EKG 2/6/2024: SR, Mobitz type 1 AV block   - Telemetry 2/5-6/2024: HR range 38 - 60, noted to have second degree Type 1 AV block. No sinus pause or 2:1 AV block noted  - TTE 2/7/2024: Normal left ventricular size and systolic function. Normal right ventricular size and systolic function. No significant valvular disease.   Pulmonary hypertension present, pulmonary artery systolic pressure is 39 mmHg. No pericardial effusion.    # Pre-Operative CV Assessment in patient Mobitz type 1 AVB   - Patient noted to have Mobitz Type I on tele in setting of electrolyte derangement that have since normalized  - He has remained Hemdonydynamicall stable without symptoms: No dizziness, fatigue or marked lightheadness  - ECG reviewed showing SR with Mobitx Type 1; Other ECG with sinus rhythm with first degree AVB and AL interval of 240  - Echo today revealed normal Biventricular function without RWMA or valvular heart disease  - At this time there are no active contraindications prior to HD cath placement/AVF creation. Patient is considered at low-intermediate risk for low risk procedure. No further testing needed prior to procedure   - Keep K>4 and Mag>2    #HTN Urgency  - Patient with markedly elevated BP in the setting of BP meds being withheld  - Cont with valsartan 160mg PO qd, nifedipine 60mg po BID, and lasix 80mg po TID as patient continues to make urine  - Please Avoid IV pushes. If needed can increase Valsartan to 240 mg po daily (up to 320 mg)  - Can also add Hydralazine 25 mg po TID should renal not want to uptitrate ARB at this time  - BP will normalize with HD. Please ensure stricy holding parameters on all anti Hypertensives    Cardiology will cont to follow with you, please call with any questions
Pt seen and d/w fellow.  Will plan on an EGD tomorrow to further investigate coffee ground emesis.  Monitor Hgb.  Continue PPI bid.
CKD planned for AVF, found to be hyperkalemic  Medically managing hyperkalemia  Plan for tunneled dialysis catheter, HD following catheter placement  cardiology consult for mobitz I and clearance    decision making high complexity

## 2024-02-12 NOTE — DIETITIAN INITIAL EVALUATION ADULT - PROBLEM SELECTOR PLAN 3
New diagnosis for the patient, discovered on ED EKG this admission.   Patient without symptoms of light-headedness, dizziness, or fatigue. No recent loss of consciousness/syncope.  Etiology most likely iso metabolic abnormalities (pt hyperkalemic), less likely 2/2 medications slowing alberto conduction (however pt is notably on calcium-channel blockers/amiodarone). Less likely 2/2 MI as no ST changes on ECG/no symptoms. Cannot r/o infiltrative /autoimmune disorders, however less likely since this is a new diagnosis.  - monitor ECGs after addressing possibly contributing etiology/factors as per above   - address reversible causes of alberto block as per above/below  - atropine if needed to improve av node conduction  - no need for EP consult for temporary pacemaker at this time  - cardiology consulted in the ED, appreciate recs  - holding beta-blockers, including ophthalmic solution timolol, can resume as appropriate   - f/u TSH

## 2024-02-12 NOTE — CONSULT NOTE ADULT - REASON FOR ADMISSION
Hyperkalemia/Mobitz Type I

## 2024-02-12 NOTE — PROGRESS NOTE ADULT - PROBLEM SELECTOR PLAN 2
2/11 new-onset coffee-ground emesis, pts vitals wnl, hds, ordered cbc, t&s, no signs of acute bleeding, additional event around 6:50pm - started pt on iv protonics bid & clear liquid diet  2/11 overnight pt had one episode of coffe-ground emesis, VS stable, comfortable-appearing  gi consulted, recommendations appreciated    - npo  - continue iv protonix bid  - follow up egd, planned for 2/13

## 2024-02-12 NOTE — PROGRESS NOTE ADULT - PROBLEM SELECTOR PLAN 4
r knee pain since fall a few weeks ago, with some swelling/fluid collected on exam, rom, strength, sensation intact    - pt consulted, recommendations appreciated  - follow up r knee x ray

## 2024-02-12 NOTE — DIETITIAN INITIAL EVALUATION ADULT - PROBLEM SELECTOR PLAN 2
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)  - Continue to trend Cr  - Avoid nephrotoxic agents  - Adjust medication dosages for level of renal function  - c/w lokelma 10g PO TID x 4 more doses  - c/w lasix 80mg PO TID  - c/w sodium bicarb 1300mg PO TID  - c/w vitamin D3 0.25mcg PO qd  - c/w renvela 1800mg PO TID  - renal diet with 1.2L fluid restriction  - NPO at midnight for TDC placement 2/7 followed by HD, as per renal recs

## 2024-02-12 NOTE — DIETITIAN INITIAL EVALUATION ADULT - NS FNS DIET ORDER
DISPLAY PLAN FREE TEXT DISPLAY PLAN FREE TEXT DISPLAY PLAN FREE TEXT DISPLAY PLAN FREE TEXT DISPLAY PLAN FREE TEXT DISPLAY PLAN FREE TEXT DISPLAY PLAN FREE TEXT DISPLAY PLAN FREE TEXT DISPLAY PLAN FREE TEXT DISPLAY PLAN FREE TEXT DISPLAY PLAN FREE TEXT DISPLAY PLAN FREE TEXT DISPLAY PLAN FREE TEXT DISPLAY PLAN FREE TEXT Diet, NPO (02-12-24 @ 09:39)

## 2024-02-12 NOTE — DIETITIAN INITIAL EVALUATION ADULT - PERTINENT LABORATORY DATA
02-12    137  |  97  |  66<H>  ----------------------------<  152<H>  4.4   |  28  |  6.87<H>    Ca    8.8      12 Feb 2024 05:30  Phos  6.0     02-12  Mg     2.4     02-12    TPro  6.7  /  Alb  2.4<L>  /  TBili  0.2  /  DBili  x   /  AST  11  /  ALT  <5<L>  /  AlkPhos  73  02-11  POCT Blood Glucose.: 270 mg/dL (02-12-24 @ 12:55)  A1C with Estimated Average Glucose Result: 7.7 % (02-07-24 @ 05:30)

## 2024-02-12 NOTE — PROGRESS NOTE ADULT - ASSESSMENT
Mr. Anguiano is an 84yo M PMH of HTN, DM, HLD, CKD Stage 5, who presented with hyperkalemia found on lab work during pre-op for an AVF, admitted for management of hyperkalemia & mobitz 1 prior to avf placement for new hd, now sp successful avf placement but w new coffee-ground emesis.

## 2024-02-12 NOTE — CONSULT NOTE ADULT - SUBJECTIVE AND OBJECTIVE BOX
HPI:   Mr. Anguiano is an 84yo M w/ a PMH of HTN, DM, HLD, CKD Stage 5, who presented with hyperkalemia found on labwork. He was scheduled to receive an elective LUE AVF creation today in preparation for dialysis but was noted to be hyperkalemic.     Home Medications:  atorvastatin 10 mg oral tablet: 1 tab(s) orally once a day (06 Feb 2024 09:15)  calcitriol 0.25 mcg oral capsule: 1 cap(s) orally once a day (06 Feb 2024 09:15)  Lantus 100 units/mL subcutaneous solution: 40 unit(s) subcutaneous once a day (at bedtime) (06 Feb 2024 16:42)  Lantus 100 units/mL subcutaneous solution: 40 unit(s) subcutaneous once a day (at bedtime) (06 Feb 2024 17:36)  Lasix 80 mg oral tablet: 1 tab(s) orally 3 times a day (06 Feb 2024 16:19)  NIFEdipine 60 mg oral tablet, extended release: 1 tab(s) orally 2 times a day (06 Feb 2024 16:18)  sevelamer carbonate 800 mg oral tablet: 2 tab(s) orally 3 times a day (06 Feb 2024 16:19)  sodium bicarbonate 650 mg PO 2 tabs x3 daily:  (06 Feb 2024 09:15)  sodium polystyrene sulfonate 15 g/60 mL oral and rectal suspension: 30 milliliter(s) orally 3 times a day (06 Feb 2024 17:23)  Timoptic 0.5% ophthalmic solution: 1 drop(s) in each affected eye 2 times a day (06 Feb 2024 16:21)  valsartan 160 mg oral tablet: 1 tab(s) orally once a day (06 Feb 2024 16:21)    MEDICATIONS:  MEDICATIONS  (STANDING):  atorvastatin 10 milliGRAM(s) Oral at bedtime  calcitriol   Capsule 0.25 MICROGram(s) Oral daily  dextrose 5%. 1000 milliLiter(s) (50 mL/Hr) IV Continuous <Continuous>  dextrose 5%. 1000 milliLiter(s) (100 mL/Hr) IV Continuous <Continuous>  dextrose 50% Injectable 25 Gram(s) IV Push once  dextrose 50% Injectable 12.5 Gram(s) IV Push once  dextrose 50% Injectable 25 Gram(s) IV Push once  furosemide    Tablet 80 milliGRAM(s) Oral every 8 hours  glucagon  Injectable 1 milliGRAM(s) IntraMuscular once  influenza  Vaccine (HIGH DOSE) 0.7 milliLiter(s) IntraMuscular once  insulin glargine Injectable (LANTUS) 20 Unit(s) SubCutaneous at bedtime  insulin lispro (ADMELOG) corrective regimen sliding scale   SubCutaneous Before meals and at bedtime  NIFEdipine XL 60 milliGRAM(s) Oral every 12 hours  pantoprazole  Injectable 40 milliGRAM(s) IV Push every 12 hours  polyethylene glycol 3350 17 Gram(s) Oral at bedtime  PPD  5 Tuberculin Unit(s) Injectable 5 Unit(s) IntraDermal once  senna 2 Tablet(s) Oral at bedtime  sevelamer carbonate 800 milliGRAM(s) Oral three times a day with meals  valsartan 160 milliGRAM(s) Oral every 24 hours    MEDICATIONS  (PRN):  acetaminophen     Tablet .. 650 milliGRAM(s) Oral every 6 hours PRN Temp greater or equal to 38C (100.4F), Moderate Pain (4 - 6)  dextrose Oral Gel 15 Gram(s) Oral once PRN Blood Glucose LESS THAN 70 milliGRAM(s)/deciliter  HYDROmorphone  Injectable 0.25 milliGRAM(s) IV Push every 15 minutes PRN breakthrough pain in the PACU  melatonin 3 milliGRAM(s) Oral at bedtime PRN Insomnia  ondansetron Injectable 4 milliGRAM(s) IV Push every 6 hours PRN Nausea and/or Vomiting    PAST MEDICAL & SURGICAL HISTORY:  HTN (hypertension)      HLD (hyperlipidemia)      DM (diabetes mellitus)      Chronic kidney disease (CKD)  stage V      Anemia      Glaucoma      Mobitz type 1 second degree AV block      H/O shoulder surgery  left      H/O knee surgery  left      H/O eye surgery  b/l glaucoma        FAMILY HISTORY:    SOCIAL HISTORY:  [place]: [ ] Current, [ ] Former, [ ] Never; Pack Years:  Alcohol:  Illicit Drugs:    REVIEW OF SYSTEMS:  All other 10 review of systems is negative unless indicated above.    Vital Signs Last 24 Hrs  T(C): 36.9 (12 Feb 2024 11:38), Max: 37.8 (11 Feb 2024 22:55)  T(F): 98.5 (12 Feb 2024 11:38), Max: 100.1 (11 Feb 2024 22:55)  HR: 55 (12 Feb 2024 11:38) (55 - 89)  BP: 129/60 (12 Feb 2024 11:38) (129/60 - 167/78)  BP(mean): 88 (12 Feb 2024 05:55) (88 - 88)  RR: 18 (12 Feb 2024 11:38) (17 - 18)  SpO2: 94% (12 Feb 2024 11:38) (92% - 94%)    Parameters below as of 12 Feb 2024 11:38  Patient On (Oxygen Delivery Method): room air      PHYSICAL EXAM:    General: Well developed; well nourished; in no acute distress  Eyes: Anicteric sclerae, moist conjunctivae  HENT: Moist mucous membranes  Neck: Trachea midline, supple  Lungs: Normal respiratory effort and no intercostal retractions  Cardiovascular: RRR  Abdomen: Soft, non-tender non-distended; Normal bowel sounds; No rebound or guarding  Extremities: Normal range of motion, No clubbing, cyanosis or edema  Neurological: Alert and oriented x3  Skin: Warm and dry. No obvious rash    LABS:                        8.9    13.84 )-----------( 364      ( 12 Feb 2024 05:30 )             28.3     02-12    137  |  97  |  66<H>  ----------------------------<  152<H>  4.4   |  28  |  6.87<H>    Ca    8.8      12 Feb 2024 05:30  Phos  6.0     02-12  Mg     2.4     02-12    TPro  6.7  /  Alb  2.4<L>  /  TBili  0.2  /  DBili  x   /  AST  11  /  ALT  <5<L>  /  AlkPhos  73  02-11      RADIOLOGY & ADDITIONAL STUDIES:    HPI:   Mr. Anguiano is an 82yo M w/ a PMH of HTN, DM, HLD, CKD Stage 5, who presented with hyperkalemia found on labwork. He was scheduled to receive an elective LUE AVF creation last week in the setting of his dialysis but was found to be Mobitz type 1 and admitted for further management. Pt denied any associated symptoms, denied N/V/D. Admitted to constipation since Tuesday 02/06. Pt. denied any fevers, chills, increased fatigue or weakness or any chest pain or abdominal pain. Pt. denied any previous episodes of hematemesis, melena or hematochezia in the past. Pt denied any recent NSAID use due to CKD.     Home Medications:  atorvastatin 10 mg oral tablet: 1 tab(s) orally once a day (06 Feb 2024 09:15)  calcitriol 0.25 mcg oral capsule: 1 cap(s) orally once a day (06 Feb 2024 09:15)  Lantus 100 units/mL subcutaneous solution: 40 unit(s) subcutaneous once a day (at bedtime) (06 Feb 2024 16:42)  Lantus 100 units/mL subcutaneous solution: 40 unit(s) subcutaneous once a day (at bedtime) (06 Feb 2024 17:36)  Lasix 80 mg oral tablet: 1 tab(s) orally 3 times a day (06 Feb 2024 16:19)  NIFEdipine 60 mg oral tablet, extended release: 1 tab(s) orally 2 times a day (06 Feb 2024 16:18)  sevelamer carbonate 800 mg oral tablet: 2 tab(s) orally 3 times a day (06 Feb 2024 16:19)  sodium bicarbonate 650 mg PO 2 tabs x3 daily:  (06 Feb 2024 09:15)  sodium polystyrene sulfonate 15 g/60 mL oral and rectal suspension: 30 milliliter(s) orally 3 times a day (06 Feb 2024 17:23)  Timoptic 0.5% ophthalmic solution: 1 drop(s) in each affected eye 2 times a day (06 Feb 2024 16:21)  valsartan 160 mg oral tablet: 1 tab(s) orally once a day (06 Feb 2024 16:21)    MEDICATIONS:  MEDICATIONS  (STANDING):  atorvastatin 10 milliGRAM(s) Oral at bedtime  calcitriol   Capsule 0.25 MICROGram(s) Oral daily  dextrose 5%. 1000 milliLiter(s) (50 mL/Hr) IV Continuous <Continuous>  dextrose 5%. 1000 milliLiter(s) (100 mL/Hr) IV Continuous <Continuous>  dextrose 50% Injectable 25 Gram(s) IV Push once  dextrose 50% Injectable 12.5 Gram(s) IV Push once  dextrose 50% Injectable 25 Gram(s) IV Push once  furosemide    Tablet 80 milliGRAM(s) Oral every 8 hours  glucagon  Injectable 1 milliGRAM(s) IntraMuscular once  influenza  Vaccine (HIGH DOSE) 0.7 milliLiter(s) IntraMuscular once  insulin glargine Injectable (LANTUS) 20 Unit(s) SubCutaneous at bedtime  insulin lispro (ADMELOG) corrective regimen sliding scale   SubCutaneous Before meals and at bedtime  NIFEdipine XL 60 milliGRAM(s) Oral every 12 hours  pantoprazole  Injectable 40 milliGRAM(s) IV Push every 12 hours  polyethylene glycol 3350 17 Gram(s) Oral at bedtime  PPD  5 Tuberculin Unit(s) Injectable 5 Unit(s) IntraDermal once  senna 2 Tablet(s) Oral at bedtime  sevelamer carbonate 800 milliGRAM(s) Oral three times a day with meals  valsartan 160 milliGRAM(s) Oral every 24 hours    MEDICATIONS  (PRN):  acetaminophen     Tablet .. 650 milliGRAM(s) Oral every 6 hours PRN Temp greater or equal to 38C (100.4F), Moderate Pain (4 - 6)  dextrose Oral Gel 15 Gram(s) Oral once PRN Blood Glucose LESS THAN 70 milliGRAM(s)/deciliter  HYDROmorphone  Injectable 0.25 milliGRAM(s) IV Push every 15 minutes PRN breakthrough pain in the PACU  melatonin 3 milliGRAM(s) Oral at bedtime PRN Insomnia  ondansetron Injectable 4 milliGRAM(s) IV Push every 6 hours PRN Nausea and/or Vomiting    PAST MEDICAL & SURGICAL HISTORY:  HTN (hypertension)      HLD (hyperlipidemia)      DM (diabetes mellitus)      Chronic kidney disease (CKD)  stage V      Anemia      Glaucoma      Mobitz type 1 second degree AV block      H/O shoulder surgery  left      H/O knee surgery  left      H/O eye surgery  b/l glaucoma        FAMILY HISTORY:    SOCIAL HISTORY:  [place]: [ ] Current, [ ] Former, [ ] Never; Pack Years:  Alcohol:  Illicit Drugs:    REVIEW OF SYSTEMS:  All other 10 review of systems is negative unless indicated above.    Vital Signs Last 24 Hrs  T(C): 36.9 (12 Feb 2024 11:38), Max: 37.8 (11 Feb 2024 22:55)  T(F): 98.5 (12 Feb 2024 11:38), Max: 100.1 (11 Feb 2024 22:55)  HR: 55 (12 Feb 2024 11:38) (55 - 89)  BP: 129/60 (12 Feb 2024 11:38) (129/60 - 167/78)  BP(mean): 88 (12 Feb 2024 05:55) (88 - 88)  RR: 18 (12 Feb 2024 11:38) (17 - 18)  SpO2: 94% (12 Feb 2024 11:38) (92% - 94%)    Parameters below as of 12 Feb 2024 11:38  Patient On (Oxygen Delivery Method): room air      PHYSICAL EXAM:    General: Well developed; well nourished; in no acute distress  Eyes: Anicteric sclerae, moist conjunctivae  HENT: Moist mucous membranes  Neck: Trachea midline, supple  Lungs: Normal respiratory effort and no intercostal retractions  Cardiovascular: RRR  Abdomen: Soft, non-tender non-distended; Normal bowel sounds; No rebound or guarding  Extremities: Normal range of motion, No clubbing, cyanosis or edema  Neurological: Alert and oriented x3  Skin: Warm and dry. No obvious rash    LABS:                        8.9    13.84 )-----------( 364      ( 12 Feb 2024 05:30 )             28.3     02-12    137  |  97  |  66<H>  ----------------------------<  152<H>  4.4   |  28  |  6.87<H>    Ca    8.8      12 Feb 2024 05:30  Phos  6.0     02-12  Mg     2.4     02-12    TPro  6.7  /  Alb  2.4<L>  /  TBili  0.2  /  DBili  x   /  AST  11  /  ALT  <5<L>  /  AlkPhos  73  02-11      RADIOLOGY & ADDITIONAL STUDIES:      Initial GI Consult Note:     HPI:   Mr. Anguiano is an 82yo M w/ a PMH of HTN, DM, HLD, CKD Stage 5, who presented with hyperkalemia found on labwork. He was scheduled to receive an elective LUE AVF creation last week in the setting of his dialysis but was found to be Mobitz type 1 and admitted for further management. Pt denied any associated symptoms, denied N/V/D. Admitted to constipation since Tuesday 02/06. Pt. denied any fevers, chills, increased fatigue or weakness or any chest pain or abdominal pain. Pt. denied any previous episodes of hematemesis, melena or hematochezia in the past. Pt denied any recent NSAID use due to CKD.     Home Medications:  atorvastatin 10 mg oral tablet: 1 tab(s) orally once a day (06 Feb 2024 09:15)  calcitriol 0.25 mcg oral capsule: 1 cap(s) orally once a day (06 Feb 2024 09:15)  Lantus 100 units/mL subcutaneous solution: 40 unit(s) subcutaneous once a day (at bedtime) (06 Feb 2024 16:42)  Lantus 100 units/mL subcutaneous solution: 40 unit(s) subcutaneous once a day (at bedtime) (06 Feb 2024 17:36)  Lasix 80 mg oral tablet: 1 tab(s) orally 3 times a day (06 Feb 2024 16:19)  NIFEdipine 60 mg oral tablet, extended release: 1 tab(s) orally 2 times a day (06 Feb 2024 16:18)  sevelamer carbonate 800 mg oral tablet: 2 tab(s) orally 3 times a day (06 Feb 2024 16:19)  sodium bicarbonate 650 mg PO 2 tabs x3 daily:  (06 Feb 2024 09:15)  sodium polystyrene sulfonate 15 g/60 mL oral and rectal suspension: 30 milliliter(s) orally 3 times a day (06 Feb 2024 17:23)  Timoptic 0.5% ophthalmic solution: 1 drop(s) in each affected eye 2 times a day (06 Feb 2024 16:21)  valsartan 160 mg oral tablet: 1 tab(s) orally once a day (06 Feb 2024 16:21)    MEDICATIONS:  MEDICATIONS  (STANDING):  atorvastatin 10 milliGRAM(s) Oral at bedtime  calcitriol   Capsule 0.25 MICROGram(s) Oral daily  dextrose 5%. 1000 milliLiter(s) (50 mL/Hr) IV Continuous <Continuous>  dextrose 5%. 1000 milliLiter(s) (100 mL/Hr) IV Continuous <Continuous>  dextrose 50% Injectable 25 Gram(s) IV Push once  dextrose 50% Injectable 12.5 Gram(s) IV Push once  dextrose 50% Injectable 25 Gram(s) IV Push once  furosemide    Tablet 80 milliGRAM(s) Oral every 8 hours  glucagon  Injectable 1 milliGRAM(s) IntraMuscular once  influenza  Vaccine (HIGH DOSE) 0.7 milliLiter(s) IntraMuscular once  insulin glargine Injectable (LANTUS) 20 Unit(s) SubCutaneous at bedtime  insulin lispro (ADMELOG) corrective regimen sliding scale   SubCutaneous Before meals and at bedtime  NIFEdipine XL 60 milliGRAM(s) Oral every 12 hours  pantoprazole  Injectable 40 milliGRAM(s) IV Push every 12 hours  polyethylene glycol 3350 17 Gram(s) Oral at bedtime  PPD  5 Tuberculin Unit(s) Injectable 5 Unit(s) IntraDermal once  senna 2 Tablet(s) Oral at bedtime  sevelamer carbonate 800 milliGRAM(s) Oral three times a day with meals  valsartan 160 milliGRAM(s) Oral every 24 hours    MEDICATIONS  (PRN):  acetaminophen     Tablet .. 650 milliGRAM(s) Oral every 6 hours PRN Temp greater or equal to 38C (100.4F), Moderate Pain (4 - 6)  dextrose Oral Gel 15 Gram(s) Oral once PRN Blood Glucose LESS THAN 70 milliGRAM(s)/deciliter  HYDROmorphone  Injectable 0.25 milliGRAM(s) IV Push every 15 minutes PRN breakthrough pain in the PACU  melatonin 3 milliGRAM(s) Oral at bedtime PRN Insomnia  ondansetron Injectable 4 milliGRAM(s) IV Push every 6 hours PRN Nausea and/or Vomiting    PAST MEDICAL & SURGICAL HISTORY:  HTN (hypertension)      HLD (hyperlipidemia)      DM (diabetes mellitus)      Chronic kidney disease (CKD)  stage V      Anemia      Glaucoma      Mobitz type 1 second degree AV block      H/O shoulder surgery  left      H/O knee surgery  left      H/O eye surgery  b/l glaucoma        FAMILY HISTORY:    SOCIAL HISTORY:  [place]: [ ] Current, [ ] Former, [ ] Never; Pack Years:  Alcohol:  Illicit Drugs:    REVIEW OF SYSTEMS:  All other 10 review of systems is negative unless indicated above.    Vital Signs Last 24 Hrs  T(C): 36.9 (12 Feb 2024 11:38), Max: 37.8 (11 Feb 2024 22:55)  T(F): 98.5 (12 Feb 2024 11:38), Max: 100.1 (11 Feb 2024 22:55)  HR: 55 (12 Feb 2024 11:38) (55 - 89)  BP: 129/60 (12 Feb 2024 11:38) (129/60 - 167/78)  BP(mean): 88 (12 Feb 2024 05:55) (88 - 88)  RR: 18 (12 Feb 2024 11:38) (17 - 18)  SpO2: 94% (12 Feb 2024 11:38) (92% - 94%)    Parameters below as of 12 Feb 2024 11:38  Patient On (Oxygen Delivery Method): room air      PHYSICAL EXAM:  General: Well developed; well nourished; in no acute distress  Eyes: Anicteric sclerae, moist conjunctivae  HENT: Moist mucous membranes  Neck: Trachea midline, supple  Lungs: Normal respiratory effort and no intercostal retractions  Cardiovascular: RRR  Abdomen: Soft, non-tender non-distended; Normal bowel sounds; No rebound or guarding  Extremities: Normal range of motion, No clubbing, cyanosis or edema  Neurological: Alert and oriented x3  Skin: Warm and dry. No obvious rash    LABS:                        8.9    13.84 )-----------( 364      ( 12 Feb 2024 05:30 )             28.3     02-12    137  |  97  |  66<H>  ----------------------------<  152<H>  4.4   |  28  |  6.87<H>    Ca    8.8      12 Feb 2024 05:30  Phos  6.0     02-12  Mg     2.4     02-12    TPro  6.7  /  Alb  2.4<L>  /  TBili  0.2  /  DBili  x   /  AST  11  /  ALT  <5<L>  /  AlkPhos  73  02-11      RADIOLOGY & ADDITIONAL STUDIES:

## 2024-02-12 NOTE — CONSULT NOTE ADULT - CONSULT REASON
Coffee ground emesis
Mobitz Type I
Mobitz type 1 AV block, hyperkalemia
Hyperkalemia, CKD
bradycardia

## 2024-02-13 LAB
ANION GAP SERPL CALC-SCNC: 15 MMOL/L — SIGNIFICANT CHANGE UP (ref 5–17)
APTT BLD: 28.2 SEC — SIGNIFICANT CHANGE UP (ref 24.5–35.6)
BASOPHILS # BLD AUTO: 0.05 K/UL — SIGNIFICANT CHANGE UP (ref 0–0.2)
BASOPHILS NFR BLD AUTO: 0.4 % — SIGNIFICANT CHANGE UP (ref 0–2)
BLD GP AB SCN SERPL QL: NEGATIVE — SIGNIFICANT CHANGE UP
BUN SERPL-MCNC: 80 MG/DL — HIGH (ref 7–23)
CALCIUM SERPL-MCNC: 9 MG/DL — SIGNIFICANT CHANGE UP (ref 8.4–10.5)
CHLORIDE SERPL-SCNC: 98 MMOL/L — SIGNIFICANT CHANGE UP (ref 96–108)
CK MB CFR SERPL CALC: 1.1 NG/ML — SIGNIFICANT CHANGE UP (ref 0–6.7)
CK SERPL-CCNC: 33 U/L — SIGNIFICANT CHANGE UP (ref 30–200)
CO2 SERPL-SCNC: 25 MMOL/L — SIGNIFICANT CHANGE UP (ref 22–31)
CREAT SERPL-MCNC: 7.93 MG/DL — HIGH (ref 0.5–1.3)
EGFR: 6 ML/MIN/1.73M2 — LOW
EOSINOPHIL # BLD AUTO: 0.34 K/UL — SIGNIFICANT CHANGE UP (ref 0–0.5)
EOSINOPHIL NFR BLD AUTO: 2.9 % — SIGNIFICANT CHANGE UP (ref 0–6)
GLUCOSE BLDC GLUCOMTR-MCNC: 110 MG/DL — HIGH (ref 70–99)
GLUCOSE BLDC GLUCOMTR-MCNC: 221 MG/DL — HIGH (ref 70–99)
GLUCOSE BLDC GLUCOMTR-MCNC: 70 MG/DL — SIGNIFICANT CHANGE UP (ref 70–99)
GLUCOSE BLDC GLUCOMTR-MCNC: 74 MG/DL — SIGNIFICANT CHANGE UP (ref 70–99)
GLUCOSE SERPL-MCNC: 73 MG/DL — SIGNIFICANT CHANGE UP (ref 70–99)
HCT VFR BLD CALC: 29.2 % — LOW (ref 39–50)
HGB BLD-MCNC: 9.3 G/DL — LOW (ref 13–17)
IMM GRANULOCYTES NFR BLD AUTO: 0.4 % — SIGNIFICANT CHANGE UP (ref 0–0.9)
INR BLD: 0.99 — SIGNIFICANT CHANGE UP (ref 0.85–1.18)
LYMPHOCYTES # BLD AUTO: 0.76 K/UL — LOW (ref 1–3.3)
LYMPHOCYTES # BLD AUTO: 6.4 % — LOW (ref 13–44)
MAGNESIUM SERPL-MCNC: 2.5 MG/DL — SIGNIFICANT CHANGE UP (ref 1.6–2.6)
MCHC RBC-ENTMCNC: 30 PG — SIGNIFICANT CHANGE UP (ref 27–34)
MCHC RBC-ENTMCNC: 31.8 GM/DL — LOW (ref 32–36)
MCV RBC AUTO: 94.2 FL — SIGNIFICANT CHANGE UP (ref 80–100)
MONOCYTES # BLD AUTO: 1.67 K/UL — HIGH (ref 0–0.9)
MONOCYTES NFR BLD AUTO: 14.1 % — HIGH (ref 2–14)
NEUTROPHILS # BLD AUTO: 8.97 K/UL — HIGH (ref 1.8–7.4)
NEUTROPHILS NFR BLD AUTO: 75.8 % — SIGNIFICANT CHANGE UP (ref 43–77)
NRBC # BLD: 0 /100 WBCS — SIGNIFICANT CHANGE UP (ref 0–0)
PHOSPHATE SERPL-MCNC: 6.2 MG/DL — HIGH (ref 2.5–4.5)
PLATELET # BLD AUTO: 400 K/UL — SIGNIFICANT CHANGE UP (ref 150–400)
POTASSIUM SERPL-MCNC: 4.4 MMOL/L — SIGNIFICANT CHANGE UP (ref 3.5–5.3)
POTASSIUM SERPL-SCNC: 4.4 MMOL/L — SIGNIFICANT CHANGE UP (ref 3.5–5.3)
PROTHROM AB SERPL-ACNC: 11.3 SEC — SIGNIFICANT CHANGE UP (ref 9.5–13)
RBC # BLD: 3.1 M/UL — LOW (ref 4.2–5.8)
RBC # FLD: 12.5 % — SIGNIFICANT CHANGE UP (ref 10.3–14.5)
RH IG SCN BLD-IMP: NEGATIVE — SIGNIFICANT CHANGE UP
SODIUM SERPL-SCNC: 138 MMOL/L — SIGNIFICANT CHANGE UP (ref 135–145)
TROPONIN T, HIGH SENSITIVITY RESULT: 153 NG/L — CRITICAL HIGH (ref 0–51)
TROPONIN T, HIGH SENSITIVITY RESULT: 169 NG/L — CRITICAL HIGH (ref 0–51)
WBC # BLD: 11.84 K/UL — HIGH (ref 3.8–10.5)
WBC # FLD AUTO: 11.84 K/UL — HIGH (ref 3.8–10.5)

## 2024-02-13 PROCEDURE — 99232 SBSQ HOSP IP/OBS MODERATE 35: CPT

## 2024-02-13 PROCEDURE — 93010 ELECTROCARDIOGRAM REPORT: CPT

## 2024-02-13 RX ORDER — ERYTHROPOIETIN 10000 [IU]/ML
8000 INJECTION, SOLUTION INTRAVENOUS; SUBCUTANEOUS ONCE
Refills: 0 | Status: COMPLETED | OUTPATIENT
Start: 2024-02-13 | End: 2024-02-13

## 2024-02-13 RX ORDER — IRON SUCROSE 20 MG/ML
200 INJECTION, SOLUTION INTRAVENOUS EVERY 24 HOURS
Refills: 0 | Status: DISCONTINUED | OUTPATIENT
Start: 2024-02-13 | End: 2024-02-17

## 2024-02-13 RX ORDER — DEXTROSE 50 % IN WATER 50 %
25 SYRINGE (ML) INTRAVENOUS ONCE
Refills: 0 | Status: COMPLETED | OUTPATIENT
Start: 2024-02-13 | End: 2024-02-13

## 2024-02-13 RX ORDER — ALTEPLASE 100 MG
2 KIT INTRAVENOUS ONCE
Refills: 0 | Status: COMPLETED | OUTPATIENT
Start: 2024-02-13 | End: 2024-02-13

## 2024-02-13 RX ADMIN — IRON SUCROSE 110 MILLIGRAM(S): 20 INJECTION, SOLUTION INTRAVENOUS at 18:59

## 2024-02-13 RX ADMIN — PANTOPRAZOLE SODIUM 40 MILLIGRAM(S): 20 TABLET, DELAYED RELEASE ORAL at 18:00

## 2024-02-13 RX ADMIN — Medication 60 MILLIGRAM(S): at 06:30

## 2024-02-13 RX ADMIN — SEVELAMER CARBONATE 800 MILLIGRAM(S): 2400 POWDER, FOR SUSPENSION ORAL at 15:20

## 2024-02-13 RX ADMIN — ATORVASTATIN CALCIUM 10 MILLIGRAM(S): 80 TABLET, FILM COATED ORAL at 22:42

## 2024-02-13 RX ADMIN — VALSARTAN 160 MILLIGRAM(S): 80 TABLET ORAL at 15:20

## 2024-02-13 RX ADMIN — ALTEPLASE 2 MILLIGRAM(S): KIT at 10:26

## 2024-02-13 RX ADMIN — PANTOPRAZOLE SODIUM 40 MILLIGRAM(S): 20 TABLET, DELAYED RELEASE ORAL at 06:29

## 2024-02-13 RX ADMIN — Medication 80 MILLIGRAM(S): at 06:30

## 2024-02-13 RX ADMIN — CALCITRIOL 0.25 MICROGRAM(S): 0.5 CAPSULE ORAL at 15:20

## 2024-02-13 RX ADMIN — ERYTHROPOIETIN 8000 UNIT(S): 10000 INJECTION, SOLUTION INTRAVENOUS; SUBCUTANEOUS at 12:31

## 2024-02-13 RX ADMIN — LIDOCAINE 1 PATCH: 4 CREAM TOPICAL at 04:30

## 2024-02-13 RX ADMIN — SEVELAMER CARBONATE 800 MILLIGRAM(S): 2400 POWDER, FOR SUSPENSION ORAL at 09:02

## 2024-02-13 RX ADMIN — SEVELAMER CARBONATE 800 MILLIGRAM(S): 2400 POWDER, FOR SUSPENSION ORAL at 18:00

## 2024-02-13 RX ADMIN — INSULIN GLARGINE 20 UNIT(S): 100 INJECTION, SOLUTION SUBCUTANEOUS at 22:48

## 2024-02-13 RX ADMIN — Medication 4: at 22:42

## 2024-02-13 RX ADMIN — POLYETHYLENE GLYCOL 3350 17 GRAM(S): 17 POWDER, FOR SOLUTION ORAL at 22:42

## 2024-02-13 RX ADMIN — SENNA PLUS 2 TABLET(S): 8.6 TABLET ORAL at 22:42

## 2024-02-13 RX ADMIN — Medication 60 MILLIGRAM(S): at 18:00

## 2024-02-13 NOTE — PHYSICAL THERAPY INITIAL EVALUATION ADULT - ADDITIONAL COMMENTS
Pt currently resides w/ wife and son in elevator apt, no FRANKIE. Daughter lives next door. Denies HHA. Experienced 1 fall within past 6 months. Prior to [facility] admission, pt was independent w/ ADL tasks.

## 2024-02-13 NOTE — PROGRESS NOTE ADULT - ASSESSMENT
82 yo M w/ PMH of CKD5, HTN, HLD, DM2 presenting for creation of AVF in preparation for dialysis. Pre-op labs revealed K 6 and procedure was canceled. Patient given lokelma and insulin, transferred to ED. Reports missing medications last night and this morning. Repeat K 5. Patient comfortable, offers no complaints. Nephrology consulted for further management of CKD5 and hyperkalemia.    Tolerating HD with the following parameters:   Hemoglobin: 9.3 g/dL (24 @ 05:30)  Phosphorus: 6.2 mg/dL (24 @ 05:30)  Hemoglobin: 8.9 g/dL (24 @ 05:30)  Phosphorus: 6.0 mg/dL (24 @ 05:30)  epoetin alba-epbx (RETACRIT) Injectable 8000 Unit(s) IV Push once, 24 @ 08:02, Routine, Stop order after: 1 Doses    Hemodialysis Treatment.:     Schedule: Once, Modality: Hemodialysis, Access: Internal Jugular Central Venous Catheter    Dialyzer: Optiflux [id number], Time: 180 Min    Blood Flow: 400 mL/Min , Dialysate Flow: 500 mL/Min, Dialysate Temp: 36.5, Tubinmm (Adult)    Target Fluid Removal: 0 Liters    Dialysate Electrolytes (mEq/L): Potassium 2, Calcium 2.5, Sodium 138, Bicarbonate 35 (24 @ 08:19) [Completed]    ** HD catheter with increased resistance to blood flow, TPA performed, still with increased resistance, unable to complete HD.   Pte needs IR consult for HD cath eval.     ESRD on HD.   Pte needs IR consult for HD cath eval.   NPO AM for IR eval and possible catheter replacement.   HD TTS while admitted  Renal diet  daily standing weights      #Access  AVF formed   RIJ TDC with increased resistance, s/p CathFlo (TPA), still non functional  Pte needs IR consult for HD cath eval.   NPO AM for IR eval and possible catheter replacement.    #Anemia  Hb 8.8  Iron sat 22%; Ferritin wnl  Epo w/ HD  Start loading dose of IV Fe, 200mg x5 doses.  Bowel regimen while on IV iron supplement    BMD-CKD  Ca 8.5  Phos 4.6  C/w Sevelamer 800mg TID  Renal diet

## 2024-02-13 NOTE — PROGRESS NOTE ADULT - SUBJECTIVE AND OBJECTIVE BOX
OVERNIGHT EVENTS: pt complained of mild left-sided chest pain, seen at bedside, reportedly then resolved, repeat ekg unchanged from prior, no acute ischemic changes    SUBJECTIVE:  Patient seen and examined at bedside, comfortable, NAD. Denied fever, chest pain, dyspnea, abdominal pain.     Vital Signs Last 12 Hrs  T(F): 98.4 (02-13-24 @ 06:24), Max: 98.4 (02-13-24 @ 06:24)  HR: 71 (02-13-24 @ 06:24) (69 - 76)  BP: 124/61 (02-13-24 @ 06:24) (124/61 - 137/89)  BP(mean): --  RR: 17 (02-13-24 @ 06:24) (17 - 17)  SpO2: 95% (02-13-24 @ 06:24) (94% - 96%)  I&O's Summary      PHYSICAL EXAM:  Constitutional: NAD, comfortable in bed.  HEENT: NC/AT, PERRLA, EOMI, no conjunctival pallor or scleral icterus, MMM  Neck: Supple, no JVD  Respiratory: CTA B/L. No w/r/r.   Cardiovascular: RRR, normal S1 and S2, no m/r/g.   Gastrointestinal: +BS, soft NT, ND but with fullness of lower abdomen, no guarding or rebound tenderness, no palpable masses   Extremities: wwp; no cyanosis, clubbing or edema. R knee swollen w overlying, healing superficial skin wound s/p fall; full rom, sensation intact, mildly ttp  Vascular: Pulses equal and strong throughout.   Neurological: AAOx3, no CN deficits, strength and sensation intact throughout.   Skin: No gross skin abnormalities or rashes          LABS:                        8.9    13.84 )-----------( 364      ( 12 Feb 2024 05:30 )             28.3     02-12    137  |  97  |  66<H>  ----------------------------<  152<H>  4.4   |  28  |  6.87<H>    Ca    8.8      12 Feb 2024 05:30  Phos  6.0     02-12  Mg     2.4     02-12        Urinalysis Basic - ( 12 Feb 2024 05:30 )    Color: x / Appearance: x / SG: x / pH: x  Gluc: 152 mg/dL / Ketone: x  / Bili: x / Urobili: x   Blood: x / Protein: x / Nitrite: x   Leuk Esterase: x / RBC: x / WBC x   Sq Epi: x / Non Sq Epi: x / Bacteria: x          RADIOLOGY & ADDITIONAL TESTS:    MEDICATIONS  (STANDING):  atorvastatin 10 milliGRAM(s) Oral at bedtime  calcitriol   Capsule 0.25 MICROGram(s) Oral daily  dextrose 5%. 1000 milliLiter(s) (50 mL/Hr) IV Continuous <Continuous>  dextrose 5%. 1000 milliLiter(s) (100 mL/Hr) IV Continuous <Continuous>  dextrose 50% Injectable 12.5 Gram(s) IV Push once  dextrose 50% Injectable 25 Gram(s) IV Push once  dextrose 50% Injectable 25 Gram(s) IV Push once  furosemide    Tablet 80 milliGRAM(s) Oral every 8 hours  glucagon  Injectable 1 milliGRAM(s) IntraMuscular once  influenza  Vaccine (HIGH DOSE) 0.7 milliLiter(s) IntraMuscular once  insulin glargine Injectable (LANTUS) 20 Unit(s) SubCutaneous at bedtime  insulin lispro (ADMELOG) corrective regimen sliding scale   SubCutaneous Before meals and at bedtime  NIFEdipine XL 60 milliGRAM(s) Oral every 12 hours  pantoprazole  Injectable 40 milliGRAM(s) IV Push every 12 hours  polyethylene glycol 3350 17 Gram(s) Oral at bedtime  PPD  5 Tuberculin Unit(s) Injectable 5 Unit(s) IntraDermal once  senna 2 Tablet(s) Oral at bedtime  sevelamer carbonate 800 milliGRAM(s) Oral three times a day with meals  valsartan 160 milliGRAM(s) Oral every 24 hours    MEDICATIONS  (PRN):  acetaminophen     Tablet .. 650 milliGRAM(s) Oral every 6 hours PRN Temp greater or equal to 38C (100.4F), Moderate Pain (4 - 6)  dextrose Oral Gel 15 Gram(s) Oral once PRN Blood Glucose LESS THAN 70 milliGRAM(s)/deciliter  HYDROmorphone  Injectable 0.25 milliGRAM(s) IV Push every 15 minutes PRN breakthrough pain in the PACU  melatonin 3 milliGRAM(s) Oral at bedtime PRN Insomnia  ondansetron Injectable 4 milliGRAM(s) IV Push every 6 hours PRN Nausea and/or Vomiting

## 2024-02-13 NOTE — PHYSICAL THERAPY INITIAL EVALUATION ADULT - GAIT DEVIATIONS NOTED, PT EVAL
Pt w/ dec weight shifting abilities/slightly unsteady gait however no LOB/falls or knee buckling observed./decreased viri/decreased velocity of limb motion/decreased step length/decreased weight-shifting ability

## 2024-02-13 NOTE — PROGRESS NOTE ADULT - SUBJECTIVE AND OBJECTIVE BOX
GI Consult Progress Note:     OVERNIGHT EVENTS: IZA.    SUBJECTIVE / INTERVAL HPI:   Patient seen and examined at bedside. Complained of some chest pain overnight, but per primary team, EKG showing no signs of ischemia and patient describing more MSK-type pain.       VITAL SIGNS:  Vital Signs Last 24 Hrs  T(C): 36.8 (13 Feb 2024 09:30), Max: 36.9 (13 Feb 2024 06:24)  T(F): 98.2 (13 Feb 2024 09:30), Max: 98.4 (13 Feb 2024 06:24)  HR: 75 (13 Feb 2024 09:30) (55 - 76)  BP: 139/67 (13 Feb 2024 09:30) (124/61 - 144/68)  BP(mean): 88 (13 Feb 2024 08:41) (88 - 88)  RR: 18 (13 Feb 2024 09:30) (17 - 18)  SpO2: 99% (13 Feb 2024 09:30) (94% - 99%)    Parameters below as of 13 Feb 2024 09:30  Patient On (Oxygen Delivery Method): room air      PHYSICAL EXAM:  General: No acute distress  Lungs: Normal respiratory effort and no intercostal retractions  Cardiovascular: RRR  Abdomen: Soft, non-tender, non-distended  Neurological: Alert and oriented x3  Skin: Warm and dry. No obvious rash      MEDICATIONS:  MEDICATIONS  (STANDING):  atorvastatin 10 milliGRAM(s) Oral at bedtime  calcitriol   Capsule 0.25 MICROGram(s) Oral daily  dextrose 5%. 1000 milliLiter(s) (50 mL/Hr) IV Continuous <Continuous>  dextrose 5%. 1000 milliLiter(s) (100 mL/Hr) IV Continuous <Continuous>  dextrose 50% Injectable 25 milliLiter(s) IV Push once  dextrose 50% Injectable 25 Gram(s) IV Push once  dextrose 50% Injectable 25 Gram(s) IV Push once  dextrose 50% Injectable 12.5 Gram(s) IV Push once  furosemide    Tablet 80 milliGRAM(s) Oral every 8 hours  glucagon  Injectable 1 milliGRAM(s) IntraMuscular once  influenza  Vaccine (HIGH DOSE) 0.7 milliLiter(s) IntraMuscular once  insulin glargine Injectable (LANTUS) 20 Unit(s) SubCutaneous at bedtime  insulin lispro (ADMELOG) corrective regimen sliding scale   SubCutaneous Before meals and at bedtime  NIFEdipine XL 60 milliGRAM(s) Oral every 12 hours  pantoprazole  Injectable 40 milliGRAM(s) IV Push every 12 hours  polyethylene glycol 3350 17 Gram(s) Oral at bedtime  PPD  5 Tuberculin Unit(s) Injectable 5 Unit(s) IntraDermal once  senna 2 Tablet(s) Oral at bedtime  sevelamer carbonate 800 milliGRAM(s) Oral three times a day with meals  valsartan 160 milliGRAM(s) Oral every 24 hours    MEDICATIONS  (PRN):  acetaminophen     Tablet .. 650 milliGRAM(s) Oral every 6 hours PRN Temp greater or equal to 38C (100.4F), Moderate Pain (4 - 6)  dextrose Oral Gel 15 Gram(s) Oral once PRN Blood Glucose LESS THAN 70 milliGRAM(s)/deciliter  HYDROmorphone  Injectable 0.25 milliGRAM(s) IV Push every 15 minutes PRN breakthrough pain in the PACU  melatonin 3 milliGRAM(s) Oral at bedtime PRN Insomnia  ondansetron Injectable 4 milliGRAM(s) IV Push every 6 hours PRN Nausea and/or Vomiting      ALLERGIES:  Allergies    No Known Allergies    Intolerances        LABS:                        9.3    11.84 )-----------( 400      ( 13 Feb 2024 05:30 )             29.2     02-13    138  |  98  |  80<H>  ----------------------------<  73  4.4   |  25  |  7.93<H>    Ca    9.0      13 Feb 2024 05:30  Phos  6.2     02-13  Mg     2.5     02-13      PT/INR - ( 13 Feb 2024 05:30 )   PT: 11.3 sec;   INR: 0.99          PTT - ( 13 Feb 2024 05:30 )  PTT:28.2 sec  Urinalysis Basic - ( 13 Feb 2024 05:30 )    Color: x / Appearance: x / SG: x / pH: x  Gluc: 73 mg/dL / Ketone: x  / Bili: x / Urobili: x   Blood: x / Protein: x / Nitrite: x   Leuk Esterase: x / RBC: x / WBC x   Sq Epi: x / Non Sq Epi: x / Bacteria: x      CAPILLARY BLOOD GLUCOSE      POCT Blood Glucose.: 70 mg/dL (13 Feb 2024 09:09)        RADIOLOGY & ADDITIONAL TESTS: Reviewed.

## 2024-02-13 NOTE — PROGRESS NOTE ADULT - PROBLEM SELECTOR PLAN 5
Telemetry reviewed, patient wih prolonged IL interval or episodes of second degree AV block (Wenckebach)  No episodes of Mobitz Type II noted on telemetry.     - outpatient f/u with cardiology, recommend outpatient evaluation inc echo

## 2024-02-13 NOTE — PROGRESS NOTE ADULT - ASSESSMENT
83M with a PMH of HTN, DM, HLD, CKD Stage 5 who was admitted for management of hyperkalemia found during pre-op evaluation for an elective AVF in setting of CKD and was found to be in Mobitz Type 1 on arrival. Course complicated by 3 episodes of coffee ground emesis since yesterday (02/11) and remained hemodynamically stable. GI consulted for UGIB    Recommendations:  - plan for EGD today vs. tomorrow pending schedule availability   - NPO except medications after midnight if cannot be added on the schedule today   - continue PPI 40 mg IV BID   - trend Hb and maintain active type and screen     Case discussed with Dr. Avalos. GI Team will continue to follow.     Lisa Ruiz D.O.   Gastroenterology Fellow  Weekday 7am-5pm Pager: [phone]  Weeknights/Weekend/Holiday Coverage: Please call the  for contact info

## 2024-02-13 NOTE — PROGRESS NOTE ADULT - SUBJECTIVE AND OBJECTIVE BOX
Patient is a 83y Male seen and evaluated at bedside during dialysis. Laying comfortably in bed. Denies any symptoms.  HD catheter with increased resistance, unable to perform HD.     Review of Systems:  ROS negative except as per HPI      PHYSICAL EXAM:  Constitutional: NAD, lying in bed on HD  Head: NCAT, EOMI  Respiratory: CTAB, no crackles anteriorly  Cardiac: Regular rate  Gastrointestinal: abdomen soft, NT/ND  Extremities: trace LE edema  Neurologic: Awake, alert, interactive  Access: Rt TDC accessed for HD showing increased flow resistance    Hospital Medications:   MEDICATIONS  (STANDING):  atorvastatin 10 milliGRAM(s) Oral at bedtime  calcitriol   Capsule 0.25 MICROGram(s) Oral daily  dextrose 5%. 1000 milliLiter(s) (50 mL/Hr) IV Continuous <Continuous>  dextrose 5%. 1000 milliLiter(s) (100 mL/Hr) IV Continuous <Continuous>  dextrose 50% Injectable 25 Gram(s) IV Push once  dextrose 50% Injectable 25 Gram(s) IV Push once  dextrose 50% Injectable 12.5 Gram(s) IV Push once  glucagon  Injectable 1 milliGRAM(s) IntraMuscular once  influenza  Vaccine (HIGH DOSE) 0.7 milliLiter(s) IntraMuscular once  insulin glargine Injectable (LANTUS) 20 Unit(s) SubCutaneous at bedtime  insulin lispro (ADMELOG) corrective regimen sliding scale   SubCutaneous Before meals and at bedtime  NIFEdipine XL 60 milliGRAM(s) Oral every 12 hours  pantoprazole  Injectable 40 milliGRAM(s) IV Push every 12 hours  polyethylene glycol 3350 17 Gram(s) Oral at bedtime  PPD  5 Tuberculin Unit(s) Injectable 5 Unit(s) IntraDermal once  senna 2 Tablet(s) Oral at bedtime  sevelamer carbonate 800 milliGRAM(s) Oral three times a day with meals  valsartan 160 milliGRAM(s) Oral every 24 hours    VITALS:  T(F): 98 (02-13-24 @ 14:15), Max: 98.4 (02-13-24 @ 06:24)  HR: 79 (02-13-24 @ 15:20)  BP: 126/66 (02-13-24 @ 15:20)  RR: 17 (02-13-24 @ 14:15)  SpO2: 95% (02-13-24 @ 14:15)  Wt(kg): --    02-13 @ 07:01 - 02-13 @ 17:00  --------------------------------------------------------  IN: 1400 mL / OUT: 900 mL / NET: 500 mL

## 2024-02-13 NOTE — PHYSICAL THERAPY INITIAL EVALUATION ADULT - PERTINENT HX OF CURRENT PROBLEM, REHAB EVAL
84yo M w/ a PMH of HTN, DM, HLD, CKD Stage 5, who presented with hyperkalemia found on labwork. He was scheduled to receive an elective LUE AVF creation today in preparation for dialysis but was noted to be hyperkalemic. At the facility, he was given insulin, dextrose, and lokelma. At that time, nephrology was called and recommended rechecking a BMP to evaluate hyperK resolution but PACU nurses stated it was against policy and Mr. Anguiano was sent to the ED for further evaluation and management and the procedure was cancelled. In the ED, the patient remained without any symptoms as he did not have complaints of fevers/chills, chest pain/tightness, no N/V and no abdominal pain. He reports he forgot to take his lasix 80mg last night and this morning, also forgot to take his SPS suspension this morning which he takes for chronic hyperkalemia i/s/o CKD5.

## 2024-02-13 NOTE — PROGRESS NOTE ADULT - PROBLEM SELECTOR PLAN 3
reports no bm since admission, but denies any discomfort to fullness  started on bowel regimen, will continue to encourage oobtc    - follow up abd x-ray  - will consider enema if unable to move bowels with increased activity, bowel regimen

## 2024-02-14 LAB
ALBUMIN SERPL ELPH-MCNC: 3.4 G/DL
ALP BLD-CCNC: 84 U/L
ALT SERPL-CCNC: 11 U/L
ANION GAP SERPL CALC-SCNC: 13 MMOL/L — SIGNIFICANT CHANGE UP (ref 5–17)
ANION GAP SERPL CALC-SCNC: 15 MMOL/L
ANISOCYTOSIS BLD QL: SLIGHT — SIGNIFICANT CHANGE UP
AST SERPL-CCNC: 11 U/L
BASOPHILS # BLD AUTO: 0.09 K/UL — SIGNIFICANT CHANGE UP (ref 0–0.2)
BASOPHILS # BLD AUTO: 0.1 K/UL
BASOPHILS NFR BLD AUTO: 0.8 %
BASOPHILS NFR BLD AUTO: 0.9 % — SIGNIFICANT CHANGE UP (ref 0–2)
BILIRUB SERPL-MCNC: 0.2 MG/DL
BUN SERPL-MCNC: 47 MG/DL — HIGH (ref 7–23)
BUN SERPL-MCNC: 77 MG/DL
CALCIUM SERPL-MCNC: 8 MG/DL — LOW (ref 8.4–10.5)
CALCIUM SERPL-MCNC: 8.6 MG/DL
CHLORIDE SERPL-SCNC: 104 MMOL/L
CHLORIDE SERPL-SCNC: 99 MMOL/L — SIGNIFICANT CHANGE UP (ref 96–108)
CO2 SERPL-SCNC: 22 MMOL/L
CO2 SERPL-SCNC: 26 MMOL/L — SIGNIFICANT CHANGE UP (ref 22–31)
CREAT SERPL-MCNC: 5.75 MG/DL — HIGH (ref 0.5–1.3)
CREAT SERPL-MCNC: 8.03 MG/DL
EGFR: 6 ML/MIN/1.73M2
EGFR: 9 ML/MIN/1.73M2 — LOW
EOSINOPHIL # BLD AUTO: 0.27 K/UL — SIGNIFICANT CHANGE UP (ref 0–0.5)
EOSINOPHIL # BLD AUTO: 0.42 K/UL
EOSINOPHIL NFR BLD AUTO: 2.6 % — SIGNIFICANT CHANGE UP (ref 0–6)
EOSINOPHIL NFR BLD AUTO: 3.5 %
GIANT PLATELETS BLD QL SMEAR: PRESENT — SIGNIFICANT CHANGE UP
GLUCOSE BLDC GLUCOMTR-MCNC: 105 MG/DL — HIGH (ref 70–99)
GLUCOSE BLDC GLUCOMTR-MCNC: 165 MG/DL — HIGH (ref 70–99)
GLUCOSE BLDC GLUCOMTR-MCNC: 175 MG/DL — HIGH (ref 70–99)
GLUCOSE BLDC GLUCOMTR-MCNC: 49 MG/DL — CRITICAL LOW (ref 70–99)
GLUCOSE BLDC GLUCOMTR-MCNC: 90 MG/DL — SIGNIFICANT CHANGE UP (ref 70–99)
GLUCOSE BLDC GLUCOMTR-MCNC: 98 MG/DL — SIGNIFICANT CHANGE UP (ref 70–99)
GLUCOSE SERPL-MCNC: 190 MG/DL
GLUCOSE SERPL-MCNC: 49 MG/DL — CRITICAL LOW (ref 70–99)
HCT VFR BLD CALC: 26.1 % — LOW (ref 39–50)
HCT VFR BLD CALC: 35.6 %
HGB BLD-MCNC: 10.7 G/DL
HGB BLD-MCNC: 8.6 G/DL — LOW (ref 13–17)
HYPOCHROMIA BLD QL: SLIGHT — SIGNIFICANT CHANGE UP
IMM GRANULOCYTES NFR BLD AUTO: 0.5 %
INR PPP: 0.89 RATIO
LYMPHOCYTES # BLD AUTO: 0.54 K/UL — LOW (ref 1–3.3)
LYMPHOCYTES # BLD AUTO: 1.31 K/UL
LYMPHOCYTES # BLD AUTO: 5.2 % — LOW (ref 13–44)
LYMPHOCYTES NFR BLD AUTO: 11 %
MAGNESIUM SERPL-MCNC: 2.2 MG/DL — SIGNIFICANT CHANGE UP (ref 1.6–2.6)
MAN DIFF?: NORMAL
MANUAL SMEAR VERIFICATION: SIGNIFICANT CHANGE UP
MCHC RBC-ENTMCNC: 29 PG
MCHC RBC-ENTMCNC: 30.1 GM/DL
MCHC RBC-ENTMCNC: 30.5 PG — SIGNIFICANT CHANGE UP (ref 27–34)
MCHC RBC-ENTMCNC: 33 GM/DL — SIGNIFICANT CHANGE UP (ref 32–36)
MCV RBC AUTO: 92.6 FL — SIGNIFICANT CHANGE UP (ref 80–100)
MCV RBC AUTO: 96.5 FL
MICROCYTES BLD QL: SLIGHT — SIGNIFICANT CHANGE UP
MONOCYTES # BLD AUTO: 1.34 K/UL
MONOCYTES # BLD AUTO: 1.34 K/UL — HIGH (ref 0–0.9)
MONOCYTES NFR BLD AUTO: 11.3 %
MONOCYTES NFR BLD AUTO: 13 % — SIGNIFICANT CHANGE UP (ref 2–14)
NEUTROPHILS # BLD AUTO: 8.08 K/UL — HIGH (ref 1.8–7.4)
NEUTROPHILS # BLD AUTO: 8.67 K/UL
NEUTROPHILS NFR BLD AUTO: 72.9 %
NEUTROPHILS NFR BLD AUTO: 78.3 % — HIGH (ref 43–77)
OVALOCYTES BLD QL SMEAR: SLIGHT — SIGNIFICANT CHANGE UP
PHOSPHATE SERPL-MCNC: 4.6 MG/DL — HIGH (ref 2.5–4.5)
PLAT MORPH BLD: ABNORMAL
PLATELET # BLD AUTO: 395 K/UL — SIGNIFICANT CHANGE UP (ref 150–400)
PLATELET # BLD AUTO: 499 K/UL
POIKILOCYTOSIS BLD QL AUTO: SLIGHT — SIGNIFICANT CHANGE UP
POTASSIUM SERPL-MCNC: 3.9 MMOL/L — SIGNIFICANT CHANGE UP (ref 3.5–5.3)
POTASSIUM SERPL-SCNC: 3.9 MMOL/L — SIGNIFICANT CHANGE UP (ref 3.5–5.3)
POTASSIUM SERPL-SCNC: 5.6 MMOL/L
PROT SERPL-MCNC: 6.5 G/DL
PT BLD: 10.2 SEC
RBC # BLD: 2.82 M/UL — LOW (ref 4.2–5.8)
RBC # BLD: 3.69 M/UL
RBC # FLD: 12.2 % — SIGNIFICANT CHANGE UP (ref 10.3–14.5)
RBC # FLD: 13.1 %
RBC BLD AUTO: ABNORMAL
SODIUM SERPL-SCNC: 138 MMOL/L — SIGNIFICANT CHANGE UP (ref 135–145)
SODIUM SERPL-SCNC: 140 MMOL/L
WBC # BLD: 10.32 K/UL — SIGNIFICANT CHANGE UP (ref 3.8–10.5)
WBC # FLD AUTO: 10.32 K/UL — SIGNIFICANT CHANGE UP (ref 3.8–10.5)
WBC # FLD AUTO: 11.9 K/UL

## 2024-02-14 PROCEDURE — 99233 SBSQ HOSP IP/OBS HIGH 50: CPT | Mod: GC

## 2024-02-14 PROCEDURE — 99232 SBSQ HOSP IP/OBS MODERATE 35: CPT | Mod: GC

## 2024-02-14 RX ORDER — INSULIN GLARGINE 100 [IU]/ML
15 INJECTION, SOLUTION SUBCUTANEOUS AT BEDTIME
Refills: 0 | Status: DISCONTINUED | OUTPATIENT
Start: 2024-02-14 | End: 2024-02-14

## 2024-02-14 RX ORDER — SODIUM CHLORIDE 9 MG/ML
1000 INJECTION, SOLUTION INTRAVENOUS
Refills: 0 | Status: DISCONTINUED | OUTPATIENT
Start: 2024-02-14 | End: 2024-02-15

## 2024-02-14 RX ORDER — DEXTROSE 50 % IN WATER 50 %
25 SYRINGE (ML) INTRAVENOUS ONCE
Refills: 0 | Status: COMPLETED | OUTPATIENT
Start: 2024-02-14 | End: 2024-02-14

## 2024-02-14 RX ADMIN — Medication 60 MILLIGRAM(S): at 18:26

## 2024-02-14 RX ADMIN — Medication 650 MILLIGRAM(S): at 08:58

## 2024-02-14 RX ADMIN — POLYETHYLENE GLYCOL 3350 17 GRAM(S): 17 POWDER, FOR SOLUTION ORAL at 22:15

## 2024-02-14 RX ADMIN — Medication 60 MILLIGRAM(S): at 06:24

## 2024-02-14 RX ADMIN — SEVELAMER CARBONATE 800 MILLIGRAM(S): 2400 POWDER, FOR SUSPENSION ORAL at 18:25

## 2024-02-14 RX ADMIN — VALSARTAN 160 MILLIGRAM(S): 80 TABLET ORAL at 14:37

## 2024-02-14 RX ADMIN — Medication 25 MILLILITER(S): at 09:30

## 2024-02-14 RX ADMIN — ATORVASTATIN CALCIUM 10 MILLIGRAM(S): 80 TABLET, FILM COATED ORAL at 22:15

## 2024-02-14 RX ADMIN — SENNA PLUS 2 TABLET(S): 8.6 TABLET ORAL at 22:15

## 2024-02-14 RX ADMIN — SODIUM CHLORIDE 20 MILLILITER(S): 9 INJECTION, SOLUTION INTRAVENOUS at 10:04

## 2024-02-14 RX ADMIN — IRON SUCROSE 110 MILLIGRAM(S): 20 INJECTION, SOLUTION INTRAVENOUS at 18:26

## 2024-02-14 RX ADMIN — Medication 650 MILLIGRAM(S): at 07:50

## 2024-02-14 RX ADMIN — PANTOPRAZOLE SODIUM 40 MILLIGRAM(S): 20 TABLET, DELAYED RELEASE ORAL at 18:26

## 2024-02-14 RX ADMIN — PANTOPRAZOLE SODIUM 40 MILLIGRAM(S): 20 TABLET, DELAYED RELEASE ORAL at 06:24

## 2024-02-14 RX ADMIN — Medication 2: at 22:15

## 2024-02-14 NOTE — OCCUPATIONAL THERAPY INITIAL EVALUATION ADULT - ADDITIONAL COMMENTS
Pt R handed. Pt live with wife and son in elevator accessible apartment. Pt's daughter lives next door. Pt independent at baseline.

## 2024-02-14 NOTE — PROGRESS NOTE ADULT - PROBLEM SELECTOR PLAN 1
Admission Cr: 8.76 (baseline is unclear, though patient likely at baseline given known CKD stage 5 now requiring chronic HD)  hyperkalemia on admission requiring insulin, dextrose, and lokelma; currently stable    - avoid nephrotoxic agents  - continue lasix 80mg PO TID (home med)  - continue sodium bicarb 1300mg PO TID  - continue vitamin D3 0.25mcg PO qd; follow up vitamin d levels  - continue renvela 1800mg PO TID  - continue renal diet with 1.2L fluid restriction  - regular hemodialysis per nephrology  - follow up PTH, vitamin d, daily bmp Admission Cr: 8.76 (baseline is unclear, though patient likely at baseline given known CKD stage 5 now requiring chronic HD)  hyperkalemia on admission requiring insulin, dextrose, and lokelma; currently stable    - avoid nephrotoxic agents  - continue lasix 80mg PO TID (home med)  - continue sodium bicarb 1300mg PO TID  - continue vitamin D3 0.25mcg PO qd; follow up vitamin d levels  - continue renvela 1800mg PO TID  - continue renal diet with 1.2L fluid restriction  - regular hemodialysis per nephrology; reported tunneled catheter clot 2/13, unable to complete hd session on said date- IR consulted for replacement, saw pt at bedside 2/14 & found catheter to be patent, able to flush appropriately

## 2024-02-14 NOTE — OCCUPATIONAL THERAPY INITIAL EVALUATION ADULT - PERTINENT HX OF CURRENT PROBLEM, REHAB EVAL
Mr. Anguiano is an 84yo M w/ a PMH of HTN, DM, HLD, CKD Stage 5, who presented with hyperkalemia found on labwork. He was scheduled to receive an elective LUE AVF creation today in preparation for dialysis but was noted to be hyperkalemic. At the facility, he was given insulin, dextrose, and lokelma. At that time, nephrology was called and recommended rechecking a BMP to evaluate hyperK resolution but PACU nurses stated it was against policy and Mr. Anguiano was sent to the ED for further evaluation and management and the procedure was cancelled. In the ED, the patient remained without any symptoms as he did not have complaints of fevers/chills, chest pain/tightness, no N/V and no abdominal pain. He reports he forgot to take his lasix 80mg last night and this morning, also forgot to take his SPS suspension this morning which he takes for chronic hyperkalemia i/s/o CKD5.

## 2024-02-14 NOTE — PROGRESS NOTE ADULT - PROBLEM SELECTOR PLAN 6
Known history of T2DM. Insulin lantus 40u qhs as home medication. Last A1c unknown.    - moderate ISF-25 lispro sliding scale AC+qHS  - monitor FS  - consistent carb diet  - lantus 20u SQ qhs given decreased kidney function, adjust as needed Known history of T2DM. Insulin lantus 40u qhs as home medication. Last A1c unknown.    - moderate ISF-25 lispro sliding scale AC+qHS  - monitor FS  - consistent carb diet  - lantus 15u SQ qhs, renally-adjusted (decerased from 20u 2/14, due to recurrent hypoglycemia while npo pending egd) Known history of T2DM. Insulin lantus 40u qhs as home medication. Last A1c unknown.    - moderate ISF-25 lispro sliding scale AC+qHS  - monitor FS  - consistent carb diet  - holding lantus 15u SQ qhs due to recurrent hypoglycemia while npo pending egd)

## 2024-02-14 NOTE — PRE-ANESTHESIA EVALUATION ADULT - NSANTHAIRWAYFT_ENT_ALL_CORE
Moderate neck range of motion. Good mouth opening. Short thyromental distance.
hyomental distance >3 cm, mouth opening 3 FB.

## 2024-02-14 NOTE — OCCUPATIONAL THERAPY INITIAL EVALUATION ADULT - GENERAL OBSERVATIONS, REHAB EVAL
Pt cleared by AVIS Trevino for OOB with OT. Pt received semisupine in bed +IV +NAD; pt left seated in chair with all lines intact and needs met, RN aware.

## 2024-02-14 NOTE — PROGRESS NOTE ADULT - SUBJECTIVE AND OBJECTIVE BOX
**INCOMPLETE NOTE    OVERNIGHT EVENTS: None    SUBJECTIVE:  Patient seen and examined at bedside, comfortable, NAD. Denied fever, chest pain, dyspnea, abdominal pain.     Vital Signs Last 12 Hrs  T(F): 99.1 (02-14-24 @ 06:06), Max: 99.1 (02-14-24 @ 06:06)  HR: 61 (02-14-24 @ 06:06) (61 - 67)  BP: 142/62 (02-14-24 @ 06:06) (142/62 - 150/68)  BP(mean): --  RR: 18 (02-14-24 @ 06:06) (17 - 18)  SpO2: 95% (02-14-24 @ 06:06) (94% - 95%)  I&O's Summary    13 Feb 2024 07:01  -  14 Feb 2024 07:00  --------------------------------------------------------  IN: 1400 mL / OUT: 900 mL / NET: 500 mL        PHYSICAL EXAM:  Constitutional: NAD, comfortable in bed.  HEENT: NC/AT, PERRLA, EOMI, no conjunctival pallor or scleral icterus, MMM  Neck: Supple, no JVD  Respiratory: CTA B/L. No w/r/r.   Cardiovascular: RRR, normal S1 and S2, no m/r/g.   Gastrointestinal: +BS, soft NTND, no guarding or rebound tenderness, no palpable masses   Extremities: wwp; no cyanosis, clubbing or edema.   Vascular: Pulses equal and strong throughout.   Neurological: AAOx3, no CN deficits, strength and sensation intact throughout.   Skin: No gross skin abnormalities or rashes        LABS:                        9.3    11.84 )-----------( 400      ( 13 Feb 2024 05:30 )             29.2     02-13    138  |  98  |  80<H>  ----------------------------<  73  4.4   |  25  |  7.93<H>    Ca    9.0      13 Feb 2024 05:30  Phos  6.2     02-13  Mg     2.5     02-13      PT/INR - ( 13 Feb 2024 05:30 )   PT: 11.3 sec;   INR: 0.99          PTT - ( 13 Feb 2024 05:30 )  PTT:28.2 sec  Urinalysis Basic - ( 13 Feb 2024 05:30 )    Color: x / Appearance: x / SG: x / pH: x  Gluc: 73 mg/dL / Ketone: x  / Bili: x / Urobili: x   Blood: x / Protein: x / Nitrite: x   Leuk Esterase: x / RBC: x / WBC x   Sq Epi: x / Non Sq Epi: x / Bacteria: x          RADIOLOGY & ADDITIONAL TESTS:    MEDICATIONS  (STANDING):  atorvastatin 10 milliGRAM(s) Oral at bedtime  calcitriol   Capsule 0.25 MICROGram(s) Oral daily  dextrose 5%. 1000 milliLiter(s) (50 mL/Hr) IV Continuous <Continuous>  dextrose 5%. 1000 milliLiter(s) (100 mL/Hr) IV Continuous <Continuous>  dextrose 50% Injectable 25 Gram(s) IV Push once  dextrose 50% Injectable 12.5 Gram(s) IV Push once  dextrose 50% Injectable 25 Gram(s) IV Push once  glucagon  Injectable 1 milliGRAM(s) IntraMuscular once  influenza  Vaccine (HIGH DOSE) 0.7 milliLiter(s) IntraMuscular once  insulin glargine Injectable (LANTUS) 20 Unit(s) SubCutaneous at bedtime  insulin lispro (ADMELOG) corrective regimen sliding scale   SubCutaneous Before meals and at bedtime  iron sucrose IVPB 200 milliGRAM(s) IV Intermittent every 24 hours  NIFEdipine XL 60 milliGRAM(s) Oral every 12 hours  pantoprazole  Injectable 40 milliGRAM(s) IV Push every 12 hours  polyethylene glycol 3350 17 Gram(s) Oral at bedtime  PPD  5 Tuberculin Unit(s) Injectable 5 Unit(s) IntraDermal once  senna 2 Tablet(s) Oral at bedtime  sevelamer carbonate 800 milliGRAM(s) Oral three times a day with meals  valsartan 160 milliGRAM(s) Oral every 24 hours    MEDICATIONS  (PRN):  acetaminophen     Tablet .. 650 milliGRAM(s) Oral every 6 hours PRN Temp greater or equal to 38C (100.4F), Moderate Pain (4 - 6)  dextrose Oral Gel 15 Gram(s) Oral once PRN Blood Glucose LESS THAN 70 milliGRAM(s)/deciliter  HYDROmorphone  Injectable 0.25 milliGRAM(s) IV Push every 15 minutes PRN breakthrough pain in the PACU  melatonin 3 milliGRAM(s) Oral at bedtime PRN Insomnia  ondansetron Injectable 4 milliGRAM(s) IV Push every 6 hours PRN Nausea and/or Vomiting   OVERNIGHT EVENTS: hypoglycemic early am, asx, comfortable; D50 25cc bolus and started d5 drip, lantus decreased to 15     SUBJECTIVE:  Patient seen and examined at bedside, comfortable, NAD. Denied fever, chest pain, dyspnea, abdominal pain.    Vital Signs Last 12 Hrs  T(F): 99.1 (02-14-24 @ 06:06), Max: 99.1 (02-14-24 @ 06:06)  HR: 61 (02-14-24 @ 06:06) (61 - 67)  BP: 142/62 (02-14-24 @ 06:06) (142/62 - 150/68)  BP(mean): --  RR: 18 (02-14-24 @ 06:06) (17 - 18)  SpO2: 95% (02-14-24 @ 06:06) (94% - 95%)  I&O's Summary    13 Feb 2024 07:01  -  14 Feb 2024 07:00  --------------------------------------------------------  IN: 1400 mL / OUT: 900 mL / NET: 500 mL        PHYSICAL EXAM:  Constitutional: NAD, comfortable in bed.  HEENT: NC/AT, PERRLA, EOMI, no conjunctival pallor or scleral icterus, MMM  Neck: Supple, no JVD  Respiratory: CTA B/L. No w/r/r.   Cardiovascular: RRR, normal S1 and S2, no m/r/g.   Gastrointestinal: +BS, soft NT, ND but with fullness of lower abdomen, no guarding or rebound tenderness, no palpable masses   Extremities: wwp; no cyanosis, clubbing or edema. R knee swollen w overlying, healing superficial skin wound s/p fall; full rom, sensation intact, mildly ttp  Vascular: Pulses equal and strong throughout.   Neurological: AAOx3, no CN deficits, strength and sensation intact throughout.   Skin: No gross skin abnormalities or rashes          LABS:                        9.3    11.84 )-----------( 400      ( 13 Feb 2024 05:30 )             29.2     02-13    138  |  98  |  80<H>  ----------------------------<  73  4.4   |  25  |  7.93<H>    Ca    9.0      13 Feb 2024 05:30  Phos  6.2     02-13  Mg     2.5     02-13      PT/INR - ( 13 Feb 2024 05:30 )   PT: 11.3 sec;   INR: 0.99          PTT - ( 13 Feb 2024 05:30 )  PTT:28.2 sec  Urinalysis Basic - ( 13 Feb 2024 05:30 )    Color: x / Appearance: x / SG: x / pH: x  Gluc: 73 mg/dL / Ketone: x  / Bili: x / Urobili: x   Blood: x / Protein: x / Nitrite: x   Leuk Esterase: x / RBC: x / WBC x   Sq Epi: x / Non Sq Epi: x / Bacteria: x          RADIOLOGY & ADDITIONAL TESTS:    MEDICATIONS  (STANDING):  atorvastatin 10 milliGRAM(s) Oral at bedtime  calcitriol   Capsule 0.25 MICROGram(s) Oral daily  dextrose 5%. 1000 milliLiter(s) (50 mL/Hr) IV Continuous <Continuous>  dextrose 5%. 1000 milliLiter(s) (100 mL/Hr) IV Continuous <Continuous>  dextrose 50% Injectable 25 Gram(s) IV Push once  dextrose 50% Injectable 12.5 Gram(s) IV Push once  dextrose 50% Injectable 25 Gram(s) IV Push once  glucagon  Injectable 1 milliGRAM(s) IntraMuscular once  influenza  Vaccine (HIGH DOSE) 0.7 milliLiter(s) IntraMuscular once  insulin glargine Injectable (LANTUS) 20 Unit(s) SubCutaneous at bedtime  insulin lispro (ADMELOG) corrective regimen sliding scale   SubCutaneous Before meals and at bedtime  iron sucrose IVPB 200 milliGRAM(s) IV Intermittent every 24 hours  NIFEdipine XL 60 milliGRAM(s) Oral every 12 hours  pantoprazole  Injectable 40 milliGRAM(s) IV Push every 12 hours  polyethylene glycol 3350 17 Gram(s) Oral at bedtime  PPD  5 Tuberculin Unit(s) Injectable 5 Unit(s) IntraDermal once  senna 2 Tablet(s) Oral at bedtime  sevelamer carbonate 800 milliGRAM(s) Oral three times a day with meals  valsartan 160 milliGRAM(s) Oral every 24 hours    MEDICATIONS  (PRN):  acetaminophen     Tablet .. 650 milliGRAM(s) Oral every 6 hours PRN Temp greater or equal to 38C (100.4F), Moderate Pain (4 - 6)  dextrose Oral Gel 15 Gram(s) Oral once PRN Blood Glucose LESS THAN 70 milliGRAM(s)/deciliter  HYDROmorphone  Injectable 0.25 milliGRAM(s) IV Push every 15 minutes PRN breakthrough pain in the PACU  melatonin 3 milliGRAM(s) Oral at bedtime PRN Insomnia  ondansetron Injectable 4 milliGRAM(s) IV Push every 6 hours PRN Nausea and/or Vomiting

## 2024-02-14 NOTE — OCCUPATIONAL THERAPY INITIAL EVALUATION ADULT - DIAGNOSIS, OT EVAL
Pt presents to [facility] with hyperkalemia. OT consulted 2/2 debility impacting ADL and functional mob.

## 2024-02-14 NOTE — PROGRESS NOTE ADULT - PROBLEM SELECTOR PLAN 5
Telemetry reviewed, patient wih prolonged NE interval or episodes of second degree AV block (Wenckebach)  No episodes of Mobitz Type II noted on telemetry.     - outpatient f/u with cardiology, recommend outpatient evaluation inc echo

## 2024-02-14 NOTE — PROGRESS NOTE ADULT - SUBJECTIVE AND OBJECTIVE BOX
GI Consult Progress Note:     OVERNIGHT EVENTS: IZA,    SUBJECTIVE / INTERVAL HPI:   Patient seen and examined at bedside. Reports that he feels well and Hb >9. No acute complaints at this time.     VITAL SIGNS:  Vital Signs Last 24 Hrs  T(C): 37.3 (14 Feb 2024 06:06), Max: 37.3 (14 Feb 2024 06:06)  T(F): 99.1 (14 Feb 2024 06:06), Max: 99.1 (14 Feb 2024 06:06)  HR: 61 (14 Feb 2024 06:06) (58 - 79)  BP: 142/62 (14 Feb 2024 06:06) (124/59 - 150/68)  BP(mean): --  RR: 18 (14 Feb 2024 06:06) (17 - 18)  SpO2: 95% (14 Feb 2024 06:06) (94% - 95%)    Parameters below as of 14 Feb 2024 06:06  Patient On (Oxygen Delivery Method): room air        02-13-24 @ 07:01  -  02-14-24 @ 07:00  --------------------------------------------------------  IN: 1400 mL / OUT: 900 mL / NET: 500 mL      PHYSICAL EXAM:  General: No acute distress  Lungs: Normal respiratory effort and no intercostal retractions  Cardiovascular: RRR  Abdomen: Soft, non-tender, non-distended  Neurological: Alert and oriented x3  Skin: Warm and dry. No obvious rash      MEDICATIONS:  MEDICATIONS  (STANDING):  atorvastatin 10 milliGRAM(s) Oral at bedtime  calcitriol   Capsule 0.25 MICROGram(s) Oral daily  dextrose 5%. 1000 milliLiter(s) (50 mL/Hr) IV Continuous <Continuous>  dextrose 5%. 1000 milliLiter(s) (100 mL/Hr) IV Continuous <Continuous>  dextrose 5%. 1000 milliLiter(s) (20 mL/Hr) IV Continuous <Continuous>  dextrose 50% Injectable 12.5 Gram(s) IV Push once  dextrose 50% Injectable 25 Gram(s) IV Push once  dextrose 50% Injectable 25 Gram(s) IV Push once  glucagon  Injectable 1 milliGRAM(s) IntraMuscular once  influenza  Vaccine (HIGH DOSE) 0.7 milliLiter(s) IntraMuscular once  insulin glargine Injectable (LANTUS) 20 Unit(s) SubCutaneous at bedtime  insulin lispro (ADMELOG) corrective regimen sliding scale   SubCutaneous Before meals and at bedtime  iron sucrose IVPB 200 milliGRAM(s) IV Intermittent every 24 hours  NIFEdipine XL 60 milliGRAM(s) Oral every 12 hours  pantoprazole  Injectable 40 milliGRAM(s) IV Push every 12 hours  polyethylene glycol 3350 17 Gram(s) Oral at bedtime  PPD  5 Tuberculin Unit(s) Injectable 5 Unit(s) IntraDermal once  senna 2 Tablet(s) Oral at bedtime  sevelamer carbonate 800 milliGRAM(s) Oral three times a day with meals  valsartan 160 milliGRAM(s) Oral every 24 hours    MEDICATIONS  (PRN):  acetaminophen     Tablet .. 650 milliGRAM(s) Oral every 6 hours PRN Temp greater or equal to 38C (100.4F), Moderate Pain (4 - 6)  dextrose Oral Gel 15 Gram(s) Oral once PRN Blood Glucose LESS THAN 70 milliGRAM(s)/deciliter  HYDROmorphone  Injectable 0.25 milliGRAM(s) IV Push every 15 minutes PRN breakthrough pain in the PACU  melatonin 3 milliGRAM(s) Oral at bedtime PRN Insomnia  ondansetron Injectable 4 milliGRAM(s) IV Push every 6 hours PRN Nausea and/or Vomiting      ALLERGIES:  Allergies    No Known Allergies    Intolerances        LABS:                        8.6    10.32 )-----------( 395      ( 14 Feb 2024 05:30 )             26.1     02-14    138  |  99  |  47<H>  ----------------------------<  49<LL>  3.9   |  26  |  5.75<H>    Ca    8.0<L>      14 Feb 2024 05:30  Phos  4.6     02-14  Mg     2.2     02-14      PT/INR - ( 13 Feb 2024 05:30 )   PT: 11.3 sec;   INR: 0.99          PTT - ( 13 Feb 2024 05:30 )  PTT:28.2 sec  Urinalysis Basic - ( 14 Feb 2024 05:30 )    Color: x / Appearance: x / SG: x / pH: x  Gluc: 49 mg/dL / Ketone: x  / Bili: x / Urobili: x   Blood: x / Protein: x / Nitrite: x   Leuk Esterase: x / RBC: x / WBC x   Sq Epi: x / Non Sq Epi: x / Bacteria: x      CAPILLARY BLOOD GLUCOSE      POCT Blood Glucose.: 165 mg/dL (14 Feb 2024 09:57)  RADIOLOGY & ADDITIONAL TESTS: Reviewed.

## 2024-02-14 NOTE — PROGRESS NOTE ADULT - ASSESSMENT
83M with a PMH of HTN, DM, HLD, CKD Stage 5 who was admitted for management of hyperkalemia found during pre-op evaluation for an elective AVF in setting of CKD and was found to be in Mobitz Type 1 on arrival. Course complicated by 3 episodes of coffee ground emesis since yesterday (02/11) and remained hemodynamically stable. GI consulted for UGIB    Recommendations:  - plan for EGD today vs. tomorrow pending schedule availability   - NPO except medications after midnight if cannot be added on the schedule today   - continue PPI 40 mg IV BID   - trend Hb and maintain active type and screen     Case discussed with Dr. Avalos. GI Team will continue to follow.     Lisa Ruiz D.O.   Gastroenterology Fellow  Weekday 7am-5pm Pager: [phone]  Weeknights/Weekend/Holiday Coverage: Please call the  for contact info

## 2024-02-14 NOTE — PROGRESS NOTE ADULT - PROBLEM SELECTOR PLAN 2
2/11 new-onset coffee-ground emesis, pts vitals wnl, hds, ordered cbc, t&s, no signs of acute bleeding, additional event around 6:50pm - started pt on iv protonics bid & clear liquid diet  2/11 overnight pt had one episode of coffe-ground emesis, VS stable, comfortable-appearing  gi consulted, recommendations appreciated    - npo  - continue iv protonix bid  - follow up egd, planned for 2/13 2/11 new-onset coffee-ground emesis, pts vitals wnl, hds, ordered cbc, t&s, no signs of acute bleeding, additional event around 6:50pm - started pt on iv protonics bid & clear liquid diet  2/11 overnight pt had one episode of coffee-ground emesis, VS stable, comfortable-appearing  gi consulted, recommendations appreciated    - continue iv protonix bid  - follow up egd (pt to undergo today, 2/14)

## 2024-02-14 NOTE — PROGRESS NOTE ADULT - PROBLEM SELECTOR PLAN 4
r knee pain since fall a few weeks ago, with some swelling/fluid collected on exam, rom, strength, sensation intact    - pt consulted, recommendations appreciated  - follow up r knee x ray r knee pain since fall a few weeks ago, with some swelling/fluid collected on exam, rom, strength, sensation intact  knee xr without acute injury    - pt consulted, recommendations appreciated  - continue acetaminophen for pain relief as needed  - continue to encourage ambulation as tolerated

## 2024-02-14 NOTE — PROGRESS NOTE ADULT - PROBLEM SELECTOR PLAN 3
reports no bm since admission, but denies any discomfort to fullness  started on bowel regimen, will continue to encourage oobtc    - follow up abd x-ray  - will consider enema if unable to move bowels with increased activity, bowel regimen reports no bm since admission, but denies any discomfort  abd xr consistent with constipation  started on bowel regimen, encouraged increased activity as tolerated but pt persistently constipation  will attempt enema today, 2/14, following egd & diet resumption this pm

## 2024-02-15 LAB
ALBUMIN SERPL ELPH-MCNC: 2.1 G/DL — LOW (ref 3.3–5)
ALP SERPL-CCNC: 75 U/L — SIGNIFICANT CHANGE UP (ref 40–120)
ALT FLD-CCNC: 15 U/L — SIGNIFICANT CHANGE UP (ref 10–45)
ANION GAP SERPL CALC-SCNC: 12 MMOL/L — SIGNIFICANT CHANGE UP (ref 5–17)
ANION GAP SERPL CALC-SCNC: 13 MMOL/L — SIGNIFICANT CHANGE UP (ref 5–17)
AST SERPL-CCNC: 23 U/L — SIGNIFICANT CHANGE UP (ref 10–40)
BASOPHILS # BLD AUTO: 0.05 K/UL — SIGNIFICANT CHANGE UP (ref 0–0.2)
BASOPHILS NFR BLD AUTO: 0.5 % — SIGNIFICANT CHANGE UP (ref 0–2)
BILIRUB SERPL-MCNC: <0.2 MG/DL — SIGNIFICANT CHANGE UP (ref 0.2–1.2)
BUN SERPL-MCNC: 25 MG/DL — HIGH (ref 7–23)
BUN SERPL-MCNC: 51 MG/DL — HIGH (ref 7–23)
CALCIUM SERPL-MCNC: 8.2 MG/DL — LOW (ref 8.4–10.5)
CALCIUM SERPL-MCNC: 8.5 MG/DL — SIGNIFICANT CHANGE UP (ref 8.4–10.5)
CHLORIDE SERPL-SCNC: 101 MMOL/L — SIGNIFICANT CHANGE UP (ref 96–108)
CHLORIDE SERPL-SCNC: 92 MMOL/L — LOW (ref 96–108)
CO2 SERPL-SCNC: 23 MMOL/L — SIGNIFICANT CHANGE UP (ref 22–31)
CO2 SERPL-SCNC: 26 MMOL/L — SIGNIFICANT CHANGE UP (ref 22–31)
CREAT SERPL-MCNC: 3.78 MG/DL — HIGH (ref 0.5–1.3)
CREAT SERPL-MCNC: 6.52 MG/DL — HIGH (ref 0.5–1.3)
EGFR: 15 ML/MIN/1.73M2 — LOW
EGFR: 8 ML/MIN/1.73M2 — LOW
EOSINOPHIL # BLD AUTO: 0.46 K/UL — SIGNIFICANT CHANGE UP (ref 0–0.5)
EOSINOPHIL NFR BLD AUTO: 4.3 % — SIGNIFICANT CHANGE UP (ref 0–6)
GLUCOSE BLDC GLUCOMTR-MCNC: 120 MG/DL — HIGH (ref 70–99)
GLUCOSE BLDC GLUCOMTR-MCNC: 269 MG/DL — HIGH (ref 70–99)
GLUCOSE BLDC GLUCOMTR-MCNC: 299 MG/DL — HIGH (ref 70–99)
GLUCOSE BLDC GLUCOMTR-MCNC: 72 MG/DL — SIGNIFICANT CHANGE UP (ref 70–99)
GLUCOSE BLDC GLUCOMTR-MCNC: 76 MG/DL — SIGNIFICANT CHANGE UP (ref 70–99)
GLUCOSE BLDC GLUCOMTR-MCNC: 76 MG/DL — SIGNIFICANT CHANGE UP (ref 70–99)
GLUCOSE BLDC GLUCOMTR-MCNC: 88 MG/DL — SIGNIFICANT CHANGE UP (ref 70–99)
GLUCOSE SERPL-MCNC: 193 MG/DL — HIGH (ref 70–99)
GLUCOSE SERPL-MCNC: 68 MG/DL — LOW (ref 70–99)
HCT VFR BLD CALC: 29.5 % — LOW (ref 39–50)
HCT VFR BLD CALC: 29.7 % — LOW (ref 39–50)
HGB BLD-MCNC: 9.5 G/DL — LOW (ref 13–17)
HGB BLD-MCNC: 9.6 G/DL — LOW (ref 13–17)
IMM GRANULOCYTES NFR BLD AUTO: 1.2 % — HIGH (ref 0–0.9)
LYMPHOCYTES # BLD AUTO: 0.83 K/UL — LOW (ref 1–3.3)
LYMPHOCYTES # BLD AUTO: 7.8 % — LOW (ref 13–44)
MAGNESIUM SERPL-MCNC: 2 MG/DL — SIGNIFICANT CHANGE UP (ref 1.6–2.6)
MAGNESIUM SERPL-MCNC: 2.5 MG/DL — SIGNIFICANT CHANGE UP (ref 1.6–2.6)
MCHC RBC-ENTMCNC: 30.1 PG — SIGNIFICANT CHANGE UP (ref 27–34)
MCHC RBC-ENTMCNC: 30.3 PG — SIGNIFICANT CHANGE UP (ref 27–34)
MCHC RBC-ENTMCNC: 32.2 GM/DL — SIGNIFICANT CHANGE UP (ref 32–36)
MCHC RBC-ENTMCNC: 32.3 GM/DL — SIGNIFICANT CHANGE UP (ref 32–36)
MCV RBC AUTO: 93.1 FL — SIGNIFICANT CHANGE UP (ref 80–100)
MCV RBC AUTO: 93.9 FL — SIGNIFICANT CHANGE UP (ref 80–100)
MONOCYTES # BLD AUTO: 2 K/UL — HIGH (ref 0–0.9)
MONOCYTES NFR BLD AUTO: 18.8 % — HIGH (ref 2–14)
NEUTROPHILS # BLD AUTO: 7.19 K/UL — SIGNIFICANT CHANGE UP (ref 1.8–7.4)
NEUTROPHILS NFR BLD AUTO: 67.4 % — SIGNIFICANT CHANGE UP (ref 43–77)
NRBC # BLD: 0 /100 WBCS — SIGNIFICANT CHANGE UP (ref 0–0)
PHOSPHATE SERPL-MCNC: 3.1 MG/DL — SIGNIFICANT CHANGE UP (ref 2.5–4.5)
PHOSPHATE SERPL-MCNC: 4.6 MG/DL — HIGH (ref 2.5–4.5)
PLATELET # BLD AUTO: 466 K/UL — HIGH (ref 150–400)
PLATELET # BLD AUTO: 483 K/UL — HIGH (ref 150–400)
POTASSIUM SERPL-MCNC: 3.7 MMOL/L — SIGNIFICANT CHANGE UP (ref 3.5–5.3)
POTASSIUM SERPL-MCNC: 4.2 MMOL/L — SIGNIFICANT CHANGE UP (ref 3.5–5.3)
POTASSIUM SERPL-SCNC: 3.7 MMOL/L — SIGNIFICANT CHANGE UP (ref 3.5–5.3)
POTASSIUM SERPL-SCNC: 4.2 MMOL/L — SIGNIFICANT CHANGE UP (ref 3.5–5.3)
PROT SERPL-MCNC: 6.7 G/DL — SIGNIFICANT CHANGE UP (ref 6–8.3)
RBC # BLD: 3.14 M/UL — LOW (ref 4.2–5.8)
RBC # BLD: 3.19 M/UL — LOW (ref 4.2–5.8)
RBC # FLD: 12.1 % — SIGNIFICANT CHANGE UP (ref 10.3–14.5)
RBC # FLD: 12.2 % — SIGNIFICANT CHANGE UP (ref 10.3–14.5)
SODIUM SERPL-SCNC: 130 MMOL/L — LOW (ref 135–145)
SODIUM SERPL-SCNC: 137 MMOL/L — SIGNIFICANT CHANGE UP (ref 135–145)
WBC # BLD: 10.66 K/UL — HIGH (ref 3.8–10.5)
WBC # BLD: 10.91 K/UL — HIGH (ref 3.8–10.5)
WBC # FLD AUTO: 10.66 K/UL — HIGH (ref 3.8–10.5)
WBC # FLD AUTO: 10.91 K/UL — HIGH (ref 3.8–10.5)

## 2024-02-15 PROCEDURE — 99233 SBSQ HOSP IP/OBS HIGH 50: CPT

## 2024-02-15 PROCEDURE — 93010 ELECTROCARDIOGRAM REPORT: CPT

## 2024-02-15 PROCEDURE — 93010 ELECTROCARDIOGRAM REPORT: CPT | Mod: 77

## 2024-02-15 PROCEDURE — 99239 HOSP IP/OBS DSCHRG MGMT >30: CPT

## 2024-02-15 PROCEDURE — 90937 HEMODIALYSIS REPEATED EVAL: CPT

## 2024-02-15 RX ORDER — ERYTHROPOIETIN 10000 [IU]/ML
8000 INJECTION, SOLUTION INTRAVENOUS; SUBCUTANEOUS ONCE
Refills: 0 | Status: COMPLETED | OUTPATIENT
Start: 2024-02-15 | End: 2024-02-15

## 2024-02-15 RX ORDER — SODIUM CHLORIDE 9 MG/ML
1000 INJECTION, SOLUTION INTRAVENOUS
Refills: 0 | Status: DISCONTINUED | OUTPATIENT
Start: 2024-02-15 | End: 2024-02-17

## 2024-02-15 RX ADMIN — Medication 60 MILLIGRAM(S): at 06:32

## 2024-02-15 RX ADMIN — SENNA PLUS 2 TABLET(S): 8.6 TABLET ORAL at 21:56

## 2024-02-15 RX ADMIN — SEVELAMER CARBONATE 800 MILLIGRAM(S): 2400 POWDER, FOR SUSPENSION ORAL at 19:11

## 2024-02-15 RX ADMIN — Medication 650 MILLIGRAM(S): at 08:46

## 2024-02-15 RX ADMIN — ATORVASTATIN CALCIUM 10 MILLIGRAM(S): 80 TABLET, FILM COATED ORAL at 21:56

## 2024-02-15 RX ADMIN — SODIUM CHLORIDE 20 MILLILITER(S): 9 INJECTION, SOLUTION INTRAVENOUS at 09:26

## 2024-02-15 RX ADMIN — Medication 6: at 21:53

## 2024-02-15 RX ADMIN — VALSARTAN 160 MILLIGRAM(S): 80 TABLET ORAL at 17:20

## 2024-02-15 RX ADMIN — Medication 60 MILLIGRAM(S): at 19:11

## 2024-02-15 RX ADMIN — CALCITRIOL 0.25 MICROGRAM(S): 0.5 CAPSULE ORAL at 17:20

## 2024-02-15 RX ADMIN — POLYETHYLENE GLYCOL 3350 17 GRAM(S): 17 POWDER, FOR SOLUTION ORAL at 21:54

## 2024-02-15 RX ADMIN — Medication 6: at 19:09

## 2024-02-15 RX ADMIN — IRON SUCROSE 110 MILLIGRAM(S): 20 INJECTION, SOLUTION INTRAVENOUS at 19:11

## 2024-02-15 RX ADMIN — PANTOPRAZOLE SODIUM 40 MILLIGRAM(S): 20 TABLET, DELAYED RELEASE ORAL at 17:21

## 2024-02-15 RX ADMIN — ERYTHROPOIETIN 8000 UNIT(S): 10000 INJECTION, SOLUTION INTRAVENOUS; SUBCUTANEOUS at 12:07

## 2024-02-15 RX ADMIN — PANTOPRAZOLE SODIUM 40 MILLIGRAM(S): 20 TABLET, DELAYED RELEASE ORAL at 06:33

## 2024-02-15 NOTE — PROGRESS NOTE ADULT - PROBLEM SELECTOR PLAN 3
reports no bm since admission, but denies any discomfort  abd xr consistent with constipation  started on bowel regimen, encouraged increased activity as tolerated  reported bm 2/14, will continue bowel regimen & encourage activity

## 2024-02-15 NOTE — PRE-ANESTHESIA EVALUATION ADULT - NSANTHADDINFOFT_GEN_ALL_CORE
ECG:  EKG: Sinus bradycardia @ 48 bpm, second degree Type I AV Block   Telemetry: Intermittent episodes of either first degree AV block or second degree AV block Type I     EP consult:  - Telemetry reviewed, patient wih prolonged HI interval or episodes of second degree AV block (Wenckebach)  - No episodes of Mobitz Type II noted on telemetry.   - Agree with cardiology, there is no C/I to proceeding with HD catheter procedure based on his Second Degree AV Block (Wenckebach).   - Recommend patient to f/u with cardiology and undergo echocardiogram upon discharge
glucose have been trending down today.  treated in AM.  recent 66.  will place on D5W infusion
Cardiology clearance noted
Will postpone procedure after discussion with Dr. Tom Pandey MD, director of endoscopic anesthesia, and Dr. Roger Avalos MD, gastroenterology attending and Dr. Lynne Ruiz DO. Request for cardiology evaluation and clearance.    Troponin elevated 169 ng/mL 2/13/2024 5:30 AM, drawn after complaint of left sided chest pain. Downtrending but elevated value of  153 ng/mL 2/13/2024 16:50 PM, unclear if post-dialysis or pre-dialysis with possible aborted dialysis session due to increased resistance overnight 2/13/2024 during dialysis attempt. EKG change noted by overnight internal medicine service on patient handoff as follows below:    2/12/2024 "First ECG with T wave inversion in V1 and 1mm ST elevation in V6 only, Repeat ECG same as admission ECG"    Given documented EKG change, chest pain, elevated troponin, and medical history with several risk factors for CAD or cardiac event including pre-existing arrhythmia, CKD stage V on dialysis, DM type II, HTN, HLD, would please request cardiology evaluation and clearance. Esophagogastroduodenoscopy deemed non-emergent by gastroenterology team with Hgb 8.6 with consensus to obtain cardiology clearance prior to proceeding. Patient informed and in agreement with plan.

## 2024-02-15 NOTE — PROGRESS NOTE ADULT - ASSESSMENT
Mr. Anguiano is an 82yo M PMH of HTN, DM, HLD, CKD Stage 5, who presented with hyperkalemia found on lab work during pre-op for an AVF, admitted for management of hyperkalemia & mobitz 1 prior to avf placement for new hd, now sp successful avf placement but w new coffee-ground emesis.

## 2024-02-15 NOTE — PROGRESS NOTE ADULT - PROBLEM SELECTOR PLAN 2
2/11 new-onset coffee-ground emesis, pts vitals wnl, hds, ordered cbc, t&s, no signs of acute bleeding, additional event around 6:50pm - started pt on iv protonics bid & clear liquid diet  2/11 overnight pt had one episode of coffee-ground emesis, VS stable, comfortable-appearing  gi consulted, recommendations appreciated    - continue iv protonix bid  - follow up egd (pt to undergo today, 2/15)

## 2024-02-15 NOTE — PACU DISCHARGE NOTE - COMMENTS
blood glucose=120 (FS), D5 running, floor nurse alerted on RN handoff
Please see preoperative evaluation for procedure postponement.

## 2024-02-15 NOTE — PROGRESS NOTE ADULT - SUBJECTIVE AND OBJECTIVE BOX
Hemoglobin: 9.5 g/dL (02-15-24 @ 05:30)  Phosphorus: 4.6 mg/dL (02-15-24 @ 05:30)  Hemoglobin: 8.6 g/dL (24 @ 05:30)  Phosphorus: 4.6 mg/dL (24 @ 05:30)    Vitamin D, 25-Hydroxy: 11.6 ng/mL (24 @ 10:03)  Albumin: 2.4 g/dL (24 @ 05:30)    T(C): 36.7 (02-15-24 @ 13:15), Max: 37 (24 @ 16:45)  HR: 61 (02-15-24 @ 13:15) (61 - 71)  BP: 136/62 (02-15-24 @ 13:15) (133/70 - 165/67)  RR: 18 (02-15-24 @ 13:15) (17 - 19)  SpO2: 98% (02-15-24 @ 13:15) (93% - 98%)  epoetin alba-epbx (RETACRIT) Injectable 8000 Unit(s) IV Push once, 24 @ 08:02, Routine, Stop order after: 1 Doses  epoetin alba-epbx (RETACRIT) Injectable 8000 Unit(s) IV Push once, 02-10-24 @ 10:15, Routine, Stop order after: 1 Doses  epoetin alba-epbx (RETACRIT) Injectable 8000 Unit(s) IV Push once, 02-15-24 @ 08:44, Routine, Stop order after: 1 Doses  sevelamer carbonate 800 milliGRAM(s) Oral three times a day with meals, 02-10-24 @ 00:37, STAT  sevelamer carbonate 1600 milliGRAM(s) Oral three times a day with meals, 24 @ 16:46,   sevelamer carbonate 800 milliGRAM(s) Oral three times a day with meals, 24 @ 15:43, Routine      Hemodialysis Treatment.:     Schedule: Once, Modality: Hemodialysis, Access: Internal Jugular Central Venous Catheter    Dialyzer: Optiflux [id number], Time: 210 Min    Blood Flow: 400 mL/Min , Dialysate Flow: 500 mL/Min, Dialysate Temp: 36.5, Tubinmm (Adult)    Target Fluid Removal: 1 Liters    Dialysate Electrolytes (mEq/L): Potassium 3, Calcium 2.5, Sodium 138, Bicarbonate 35    Additional Instructions: epo w/ HD  Hold UF if SBP<90 (02-15-24 @ 08:45) [Completed]      Patient reevaluated for hypotension while on HD.  1 hour 30 minutes into treatment, SBP dropped to 70s.  Patient asymptomatic.  UF turned off and saline rinsed back.  Gradual improvement in BP.  Patient remained asymptomatic.  Will continue HD with UF turned off.  Hemodialysis Treatment.:     Schedule: Once, Modality: Hemodialysis, Access: Internal Jugular Central Venous Catheter    Dialyzer: Optiflux [id number], Time: 210 Min    Blood Flow: 400 mL/Min , Dialysate Flow: 500 mL/Min, Dialysate Temp: 36.5, Tubinmm (Adult)    Target Fluid Removal: 2 Liters    Dialysate Electrolytes (mEq/L): Potassium 3, Calcium 2.5, Sodium 138, Bicarbonate 35    Additional Instructions: epo w/ HD (02-15-24 @ 08:42) [Discontinued]

## 2024-02-15 NOTE — PRE-ANESTHESIA EVALUATION ADULT - NSRADCARDRESULTSFT_GEN_ALL_CORE
TTE without Contrast with Doppler 2/7/2024  "CONCLUSIONS:     1. Normal left ventricular size and systolic function.   2. Normal right ventricular size and systolic function.   3. No significant valvular disease.   4. Pulmonary hypertension present, pulmonary artery systolic pressure is   39 mmHg.   5. No pericardial effusion.   6. No prior echo is available for comparison."
EKG Mobitz !,2 degree AV block
TTE 2/7/2024: Normal left ventricular size and systolic function. Normal right ventricular size and systolic function. No significant valvular disease.   Pulmonary hypertension present, pulmonary artery systolic pressure is 39 mmHg. No pericardial effusion.
SB with Per Jean Baptiste

## 2024-02-15 NOTE — CHART NOTE - NSCHARTNOTEFT_GEN_A_CORE
** HD catheter with increased resistance to blood flow, CathFlo(TPA) performed, still with increased resistance, unable to complete HD.   Pte needs IR consult for HD cath eval.   NPO AM for IR eval and possible catheter replacement.
EGD performed with Dr. Ruiz Findings below:    -Normal esophagus.  -A single cratered clean based non-bleeding ulcer was found in the body lesser curve.  -Otherwise normal stomach.  -Erythema of the mucosa with no bleeding was noted in the duodenal bulb. These findings are compatible with duodenitis.    Recommendations:  -Advance diet as tolerated  -Monitor for clinical bleeding; trend hgb  -Protonix 40 mg BID x 2 weeks, the once daily thereafter  -Needs f/u EGD in 8 weeks    Please request that the patient schedule GI follow-up at the clinic located on the fourth floor of [address] [place] [address] by calling the office at [phone]     Christiano Bentley DO  Gastroenterology Fellow  Pager: [phone]  After 5PM or on weekends, please contact [place]  for fellow on call
Patient is in need of a rolling walker due to his diagnosis of weakness and ESRD. He was evaluated by physical therapy while inpatient and deemed to require a rolling walker to complete his MRADL's.
Patient was brought down to endoscopy for EGD on 02/14. However, given patient recently complained of chest pain and had high sensitivity troponins of 169, followed by 153, anesthesia would like to have cardiology team formally risk assess patient prior to EGD. Attending informed, and patient is now scheduled for EGD tomorrow.     Recommendations:   - obtain formal cardiac risk assessment / optimization per anesthesia's request   - patient can have a light meal this after noon   - ensure patient is NPO except medications after midnight tonight   - will plan for EGD tomorrow pending cardiac assessment     Case discussed with Dr. Avalos. GI Team will continue to follow.     Lisa Ruiz D.O.   Gastroenterology Fellow  Weekday 7am-5pm Pager: [phone]  Weeknights/Weekend/Holiday Coverage: Please call the  for contact info
Admitting Diagnosis:   Patient is a 83y old  Male who presents with a chief complaint of Hyperkalemia/Mobitz Type I (15 Feb 2024 16:38)      PAST MEDICAL & SURGICAL HISTORY:  HTN (hypertension)      HLD (hyperlipidemia)      DM (diabetes mellitus)      Chronic kidney disease (CKD)  stage V      Anemia      Glaucoma      Mobitz type 1 second degree AV block      H/O shoulder surgery  left      H/O knee surgery  left      H/O eye surgery  b/l glaucoma          Current Nutrition Order:  NPO MN    PO Intake: Good (%) [   ]  Fair (50-75%) [   ] Poor (<25%) [   ]- NA NPO    GI Issues: No further episodes of emesis recorded  Possible BM 2/14?? but hx of constipation- noted order for senna and miralax    Pain: No pain per RN flowsheet    Skin Integrity: Seb 19, no edema   Intact pressure-wise     Labs:   02-15    137  |  101  |  51<H>  ----------------------------<  68<L>  4.2   |  23  |  6.52<H>    Ca    8.5      15 Feb 2024 05:30  Phos  4.6     02-15  Mg     2.5     02-15      CAPILLARY BLOOD GLUCOSE      POCT Blood Glucose.: 269 mg/dL (15 Feb 2024 18:24)  POCT Blood Glucose.: 120 mg/dL (15 Feb 2024 16:15)  POCT Blood Glucose.: 88 mg/dL (15 Feb 2024 15:51)  POCT Blood Glucose.: 76 mg/dL (15 Feb 2024 15:32)  POCT Blood Glucose.: 76 mg/dL (15 Feb 2024 15:31)  POCT Blood Glucose.: 72 mg/dL (15 Feb 2024 09:11)  POCT Blood Glucose.: 175 mg/dL (14 Feb 2024 22:07)      Medications:  MEDICATIONS  (STANDING):  atorvastatin 10 milliGRAM(s) Oral at bedtime  calcitriol   Capsule 0.25 MICROGram(s) Oral daily  dextrose 5%. 1000 milliLiter(s) (50 mL/Hr) IV Continuous <Continuous>  dextrose 5%. 1000 milliLiter(s) (100 mL/Hr) IV Continuous <Continuous>  dextrose 5%. 1000 milliLiter(s) (20 mL/Hr) IV Continuous <Continuous>  dextrose 50% Injectable 25 Gram(s) IV Push once  dextrose 50% Injectable 25 Gram(s) IV Push once  dextrose 50% Injectable 12.5 Gram(s) IV Push once  glucagon  Injectable 1 milliGRAM(s) IntraMuscular once  influenza  Vaccine (HIGH DOSE) 0.7 milliLiter(s) IntraMuscular once  insulin lispro (ADMELOG) corrective regimen sliding scale   SubCutaneous Before meals and at bedtime  iron sucrose IVPB 200 milliGRAM(s) IV Intermittent every 24 hours  NIFEdipine XL 60 milliGRAM(s) Oral every 12 hours  pantoprazole  Injectable 40 milliGRAM(s) IV Push every 12 hours  polyethylene glycol 3350 17 Gram(s) Oral at bedtime  PPD  5 Tuberculin Unit(s) Injectable 5 Unit(s) IntraDermal once  senna 2 Tablet(s) Oral at bedtime  sevelamer carbonate 800 milliGRAM(s) Oral three times a day with meals  valsartan 160 milliGRAM(s) Oral every 24 hours    MEDICATIONS  (PRN):  acetaminophen     Tablet .. 650 milliGRAM(s) Oral every 6 hours PRN Temp greater or equal to 38C (100.4F), Moderate Pain (4 - 6)  dextrose Oral Gel 15 Gram(s) Oral once PRN Blood Glucose LESS THAN 70 milliGRAM(s)/deciliter  melatonin 3 milliGRAM(s) Oral at bedtime PRN Insomnia  ondansetron Injectable 4 milliGRAM(s) IV Push every 6 hours PRN Nausea and/or Vomiting      Weight: 83.9kg (admit)  74.7kg (dry, 2/13)    Weight Change: wt loss 2/2 fluid loss from HD, diuresis      Nutrition Focused Physical Exam: Completed [   ]  Not Pertinent [ X  ]    Estimated energy needs:   Height for BMI (FEET)	5 Feet  Height for BMI (INCHES)	8 Inch(s)  Height for BMI (CENTIMETERS)	172.72 Centimeter(s)  Weight for BMI (lbs)	185 lb  Weight for BMI (kg)	83.9 kg  Body Mass Index	28.1    IBW (70kg) used to estimate needs 2/2 fluctuating BW (unknown dry wt). Needs estimated for age adjusted for HD.    Calories: 30-35 kcal/kg = 7577-0986 kcal/d  Protein: 1.2-1.5 g/kg = 84-105g pro/d  Fluids per team 2/2 HD       Subjective: 84yo M PMH of HTN, DM, HLD, CKD Stage 5, who presented with hyperkalemia found on lab work during pre-op for an AVF, admitted for management of hyperkalemia & mobitz 1 prior to avf placement for new hd, now sp successful avf placement but w new coffee-ground emesis. S/p EGD 2/15: Normal esophagus, A single cratered clean based non-bleeding ulcer was found in the body lesser curve. Attempted to see patient in room for follow up visit, but was out of room for majority of day at HD and then EGD. Pt noted with constipation on XR- ordered for miralax and senna. Started on D5 @ 20ml/hr for hypoglycemia (intermittently NPO over past 48hrs). Afebrile. Nutritionally pertinent labs: POC BG 72, 175, 98mg/dL, WBC 10.66 (H), H/H 9.5/29.5%, P 4.6 (H), BUN 51/Cr 6.52 (H, pre HD). Will continue to follow per RD protocol.     Previous Nutrition Diagnosis:  Altered Nutrition Related Lab Values related to ESRD, DM as evidenced by elevated BUN/Cr, Phos, POCT    Active [   ]  Resolved [X   ]    If resolved, new PES: Increased protein-calorie needs RT increased demand for nutrient intake AEB ESRD on HD     Goal: Pt will consistently meet % of daily EER    Recommendations:  1. Resume CST CHO/Renal diet as medically feasible. Fluid restriction per team   2. Monitor lytes and replete prn. POC BG q6hrs   3. Pain and bowel regimens per team   4. Trend dry wts  5. Continue phos binder   6. Reinforce diet education     Education: previously educated during initial assessment     Risk Level: High [ X  ] Moderate [   ] Low [   ]

## 2024-02-15 NOTE — PROGRESS NOTE ADULT - PROBLEM SELECTOR PLAN 7
Multi year history of hypertension. On admission, presented with /57. Well controlled on home valsartan 160mg qd, lasix 80mg tid, nifedipine 60mg bid.  lasix discontinued 2/11, per nephrology (given initiation of dialysis)    - c/w nifedipine 60mg PO BID  - c/w lasix 80mg TID   - c/w Valsartan 160mg   - CTM for CP/CT  - encourage low salt in diet

## 2024-02-15 NOTE — PROGRESS NOTE ADULT - PROBLEM SELECTOR PLAN 6
Known history of T2DM. Insulin lantus 40u qhs as home medication. Last A1c unknown.    - moderate ISF-25 lispro sliding scale AC+qHS  - monitor FS  - consistent carb diet  - holding lantus 15u SQ qhs due to recurrent hypoglycemia while npo pending egd)

## 2024-02-15 NOTE — PROGRESS NOTE ADULT - ASSESSMENT
Patient is an 82 yo Male with PMH of HTN, DM, HLD, CKD Stage 5 admitted for hyperkalemia with plan to start HD, Found to have Sinus rhythm with 1st Degree AVB as well as  Mobitz type I AV block for which cardiology had been initially consulted. Now re-called for re- preop eval given 1 episode of CP    Review of studies:   - EKG 2024: SR, Mobitz type 1 AV block   - EK2024: SR, Mobitz Type 1 AV Block   - TTE 2024: Normal left ventricular size and systolic function. Normal right ventricular size and systolic function. No significant valvular disease.   Pulmonary hypertension present, pulmonary artery systolic pressure is 39 mmHg. No pericardial effusion.    # Pre-Operative CV Assessment in patient Mobitz type 1 AVB and CP x1  One time episode on CP on  that resolved and has no recurred a/w elevation of Troponin iso ESRD that has downtrended. Low s/f for active ACS. No active s/s of ADHF. Asx from the Mobitz Type 1 AV block.   - Please obtain AM ECG  - Pt is low-intermediate risk for a low-risk procedure in EGD       Recommendations above are preliminary pending discussion with an attending cardiologist  We'll continue to follow, thank you for the consultation      Param Borrero ([id number])  Cardiovascular Disease Fellow

## 2024-02-15 NOTE — PROGRESS NOTE ADULT - SUBJECTIVE AND OBJECTIVE BOX
MOUNIKA BRIONES  83y  Male    Patient is a 83y old  Male who presents with a chief complaint of Hyperkalemia/Mobitz Type I (14 Feb 2024 10:19)      INTERVAL HPI/OVERNIGHT EVENTS: Cardiology called back for pre-op clearance for EGD on 2/15. According to primary team patient had one episode of CP on 2/13. Upon interviewing the patient ( # [id number]) the patient had one episode of CP around 2-3am on 2/13. The pain was a 6/10 and lasted one hour, it was described as a sharp pain in the L lower chest that did not radiate and has not returned. ECG at that time showing 2/13 0220am showing NSR with Mobitz Type 1 no clear evidence of ischemia.       T(C): 36.5 (02-14-24 @ 20:33), Max: 37.3 (02-14-24 @ 06:06)  HR: 63 (02-14-24 @ 20:33) (60 - 71)  BP: 159/64 (02-14-24 @ 20:33) (136/68 - 165/67)  RR: 17 (02-14-24 @ 20:33) (17 - 18)  SpO2: 95% (02-14-24 @ 20:33) (93% - 95%)  Wt(kg): --Vital Signs Last 24 Hrs  T(C): 36.5 (14 Feb 2024 20:33), Max: 37.3 (14 Feb 2024 06:06)  T(F): 97.7 (14 Feb 2024 20:33), Max: 99.1 (14 Feb 2024 06:06)  HR: 63 (14 Feb 2024 20:33) (60 - 71)  BP: 159/64 (14 Feb 2024 20:33) (136/68 - 165/67)  BP(mean): 100 (14 Feb 2024 18:20) (100 - 100)  RR: 17 (14 Feb 2024 20:33) (17 - 18)  SpO2: 95% (14 Feb 2024 20:33) (93% - 95%)    Parameters below as of 14 Feb 2024 20:33  Patient On (Oxygen Delivery Method): room air        PHYSICAL EXAM:  GENERAL: NAD  NECK: No JVD  NERVOUS SYSTEM:  Alert & Oriented X3  CHEST/LUNG: Clear to auscultation bilaterally  HEART: Regular rate and rhythm; No murmurs, rubs, or gallops  EXTREMITIES:  WWP, No edema      Consultant(s) Notes Reviewed:  [x ] YES  [ ] NO  Care Discussed with Consultants/Other Providers [ x] YES  [ ] NO    LABS:                        8.6    10.32 )-----------( 395      ( 14 Feb 2024 05:30 )             26.1     02-14    138  |  99  |  47<H>  ----------------------------<  49<LL>  3.9   |  26  |  5.75<H>    Ca    8.0<L>      14 Feb 2024 05:30  Phos  4.6     02-14  Mg     2.2     02-14        PT/INR - ( 13 Feb 2024 05:30 )   PT: 11.3 sec;   INR: 0.99          PTT - ( 13 Feb 2024 05:30 )  PTT:28.2 sec  Urinalysis Basic - ( 14 Feb 2024 05:30 )    Color: x / Appearance: x / SG: x / pH: x  Gluc: 49 mg/dL / Ketone: x  / Bili: x / Urobili: x   Blood: x / Protein: x / Nitrite: x   Leuk Esterase: x / RBC: x / WBC x   Sq Epi: x / Non Sq Epi: x / Bacteria: x      CAPILLARY BLOOD GLUCOSE      POCT Blood Glucose.: 175 mg/dL (14 Feb 2024 22:07)  POCT Blood Glucose.: 98 mg/dL (14 Feb 2024 18:38)  POCT Blood Glucose.: 90 mg/dL (14 Feb 2024 13:15)  POCT Blood Glucose.: 105 mg/dL (14 Feb 2024 12:34)  POCT Blood Glucose.: 165 mg/dL (14 Feb 2024 09:57)  POCT Blood Glucose.: 49 mg/dL (14 Feb 2024 09:12)        Urinalysis Basic - ( 14 Feb 2024 05:30 )    Color: x / Appearance: x / SG: x / pH: x  Gluc: 49 mg/dL / Ketone: x  / Bili: x / Urobili: x   Blood: x / Protein: x / Nitrite: x   Leuk Esterase: x / RBC: x / WBC x   Sq Epi: x / Non Sq Epi: x / Bacteria: x        RADIOLOGY & ADDITIONAL TESTS:    Imaging Personally Reviewed:  [ ] YES  [ ] NO    HEALTH ISSUES - PROBLEM Dx:  HTN (hypertension)    DM (diabetes mellitus)    Hyperlipidemia    Chronic kidney disease, stage 5    Hyperkalemia    Normocytic anemia    Mobitz type 1 second degree AV block    Prophylactic measure    Leukocytosis    Right knee pain    Upper GI bleed    Constipation         No

## 2024-02-15 NOTE — PROGRESS NOTE ADULT - PROBLEM SELECTOR PLAN 5
Telemetry reviewed, patient wih prolonged VA interval or episodes of second degree AV block (Wenckebach)  No episodes of Mobitz Type II noted on telemetry.     - outpatient f/u with cardiology, recommend outpatient evaluation inc echo

## 2024-02-15 NOTE — PRE-ANESTHESIA EVALUATION ADULT - NSANTHRISKNONERD_GEN_ALL_CORE
No risk alerts present
No risk alerts present
Risk Alerts:
No risk alerts present
No risk alerts present

## 2024-02-15 NOTE — PROGRESS NOTE ADULT - SUBJECTIVE AND OBJECTIVE BOX
OVERNIGHT EVENTS: None    SUBJECTIVE:  Patient seen and examined at bedside, comfortable, NAD. Denied fever, chest pain, dyspnea, abdominal pain.     Vital Signs Last 12 Hrs  T(F): 98 (02-15-24 @ 13:15), Max: 98.4 (02-15-24 @ 06:27)  HR: 61 (02-15-24 @ 13:15) (61 - 69)  BP: 136/62 (02-15-24 @ 13:15) (133/70 - 141/63)  BP(mean): 100 (02-15-24 @ 12:53) (100 - 100)  RR: 18 (02-15-24 @ 13:15) (17 - 19)  SpO2: 98% (02-15-24 @ 13:15) (95% - 98%)  I&O's Summary    15 Feb 2024 07:01  -  15 Feb 2024 16:38  --------------------------------------------------------  IN: 0 mL / OUT: 0 mL / NET: 0 mL        PHYSICAL EXAM:  Constitutional: NAD, comfortable in bed.  HEENT: NC/AT, PERRLA, EOMI, no conjunctival pallor or scleral icterus, MMM  Neck: Supple, no JVD  Respiratory: CTA B/L. No w/r/r.   Cardiovascular: RRR, normal S1 and S2, no m/r/g.   Gastrointestinal: +BS, soft NT, ND but with fullness of lower abdomen, no guarding or rebound tenderness, no palpable masses   Extremities: wwp; no cyanosis, clubbing or edema  Vascular: Pulses equal and strong throughout.   Neurological: AAOx3, no CN deficits, strength and sensation intact throughout.   Skin: No gross skin abnormalities or rashes        LABS:                        9.5    10.66 )-----------( 466      ( 15 Feb 2024 05:30 )             29.5     02-15    137  |  101  |  51<H>  ----------------------------<  68<L>  4.2   |  23  |  6.52<H>    Ca    8.5      15 Feb 2024 05:30  Phos  4.6     02-15  Mg     2.5     02-15        Urinalysis Basic - ( 15 Feb 2024 05:30 )    Color: x / Appearance: x / SG: x / pH: x  Gluc: 68 mg/dL / Ketone: x  / Bili: x / Urobili: x   Blood: x / Protein: x / Nitrite: x   Leuk Esterase: x / RBC: x / WBC x   Sq Epi: x / Non Sq Epi: x / Bacteria: x          RADIOLOGY & ADDITIONAL TESTS:    MEDICATIONS  (STANDING):  atorvastatin 10 milliGRAM(s) Oral at bedtime  calcitriol   Capsule 0.25 MICROGram(s) Oral daily  dextrose 5%. 1000 milliLiter(s) (50 mL/Hr) IV Continuous <Continuous>  dextrose 5%. 1000 milliLiter(s) (100 mL/Hr) IV Continuous <Continuous>  dextrose 5%. 1000 milliLiter(s) (20 mL/Hr) IV Continuous <Continuous>  dextrose 50% Injectable 25 Gram(s) IV Push once  dextrose 50% Injectable 12.5 Gram(s) IV Push once  dextrose 50% Injectable 25 Gram(s) IV Push once  glucagon  Injectable 1 milliGRAM(s) IntraMuscular once  influenza  Vaccine (HIGH DOSE) 0.7 milliLiter(s) IntraMuscular once  insulin lispro (ADMELOG) corrective regimen sliding scale   SubCutaneous Before meals and at bedtime  iron sucrose IVPB 200 milliGRAM(s) IV Intermittent every 24 hours  NIFEdipine XL 60 milliGRAM(s) Oral every 12 hours  pantoprazole  Injectable 40 milliGRAM(s) IV Push every 12 hours  polyethylene glycol 3350 17 Gram(s) Oral at bedtime  PPD  5 Tuberculin Unit(s) Injectable 5 Unit(s) IntraDermal once  senna 2 Tablet(s) Oral at bedtime  sevelamer carbonate 800 milliGRAM(s) Oral three times a day with meals  valsartan 160 milliGRAM(s) Oral every 24 hours    MEDICATIONS  (PRN):  acetaminophen     Tablet .. 650 milliGRAM(s) Oral every 6 hours PRN Temp greater or equal to 38C (100.4F), Moderate Pain (4 - 6)  dextrose Oral Gel 15 Gram(s) Oral once PRN Blood Glucose LESS THAN 70 milliGRAM(s)/deciliter  melatonin 3 milliGRAM(s) Oral at bedtime PRN Insomnia  ondansetron Injectable 4 milliGRAM(s) IV Push every 6 hours PRN Nausea and/or Vomiting

## 2024-02-15 NOTE — PROGRESS NOTE ADULT - PROBLEM SELECTOR PLAN 4
r knee pain since fall a few weeks ago, with some swelling/fluid collected on exam, rom, strength, sensation intact  knee xr without acute injury    - pt consulted, recommendations appreciated  - continue acetaminophen for pain relief as needed  - continue to encourage ambulation as tolerated

## 2024-02-15 NOTE — PRE-ANESTHESIA EVALUATION ADULT - NSANTHPMHFT_GEN_ALL_CORE
Reviewed
Mr. Anguiano is an 84yo M PMH of HTN, DM, HLD, CKD Stage 5, who presented with hyperkalemia found on lab work during pre-op for an AVF, admitted to [facility] for further management of his hyperkalemia and new found Mobitz Type I, and medical optimization prior to AVF placement.
Cardiac: Positive for HTN, HLD, Mobitz type I Second Degree Heart Block. Denies MI/Angina/Heart Failure, Murmur/Valvular Disorder. >4 METS  Pulmonary: Denies Asthma, COPD, QUAN  Renal: Positive for CKD stage V, dialysis yesterday 2/13/2024 via HD catheter, right subclavian.  Hepatic: Denies liver dysfunction  Gastrointestinal: Positive for upper gastrointestinal bleeding, constipation.  Endocrine: Positive for DM type II. Denies thyroid dysfunction.  Neurologic: Denies stroke/seizure disorder  Hematologic: Positive for anemia and leukocytosis WBC 10.91 on admission, neutrophil predominance. Positive for heparin prophylaxis. Denies blood clotting disorder.    PSH: Eye surgery, knee surgery, shoulder surgery.

## 2024-02-15 NOTE — PRE-ANESTHESIA EVALUATION ADULT - NSPREOPDXFT_GEN_ALL_CORE
17w3d  No C/O's.    Denies known Latex allergy or symptoms of Latex sensitivity.  Medications reviewed and updated.    
She says she is eating well but has lost some weight.  She has not been sick.    She declines MSAFP  Will schedule anatomy u/s    17w3d Patient is here for a routine OB check.  No new significant problems or changes are noted.   - schedule anatomy u/s  - f/u 4 weeks  - eat well--healthy.  Will continue to monitor weight.  
Upper Gastrointestinal Bleeding
GI Bleed
CKD stage 5
CKD
Anemia

## 2024-02-15 NOTE — PROGRESS NOTE ADULT - ASSESSMENT
82 yo M new-start ESRD, HTN, DM2 admitted 2/6 for AVF creation, which was postponed due to pre-op labs with K 6, TDC placed and started HD 2/8. Pt with IDH last few HD sessions, echo w/o pericardial effusion, adjusting BP meds. Access pressures high 2/13, s/p TPA instilled, seen and flushed by IR 2/14, TDC working better now. H/c also c/b new upper GIB 2/11, s/p EGD 2/14, Hb stable.    #ESRD on HD  #Seen on HD  Last HD 2/13; no UF due to IDH, +0.5L by the end of Tx. Also with high access pressures, tPA was instilled  HD today as above. Seen on HD. Tolerating well. Aiming for 1L UF given recent IDH. Pt unable to stand for weight d/t weakness. VS stable now. Access pressures wnl. Continue HD as above  HD TTS while admitted. Next HD 2/17  Renal diet  Hep panel negative. TB Quant negative. SW on board for outpatient HD placement. Being arranged for HD at [facility] for TTS slot.    #HTN  IDH last few session. Echo 2/7 no effusion  Hold BP meds pre-HD on HD days    #Access  AVF formed 2/9  RIJ TDC with increased resistance 2/13, s/p CathFlo (TPA). Seen and flushed by IR 2/14. Working well today    #Anemia  Hb 9.5  Iron sat 22%; Ferritin wnl  IV iron 200mg 2/13-2/17  Epo 8K Units  w/ HD    BMD-CKD  Ca 8.5  . Vit D 11.6. Start Vit D3 5000 U/day  Phos 4.6  C/w Sevelamer 800mg TID. Goal phos 3-5.5  Renal diet

## 2024-02-15 NOTE — PROGRESS NOTE ADULT - PROBLEM SELECTOR PLAN 1
Admission Cr: 8.76 (baseline is unclear, though patient likely at baseline given known CKD stage 5 now requiring chronic HD)  hyperkalemia on admission requiring insulin, dextrose, and lokelma; currently stable    - avoid nephrotoxic agents  - continue lasix 80mg PO TID (home med)  - continue sodium bicarb 1300mg PO TID  - continue vitamin D3 0.25mcg PO qd; follow up vitamin d levels  - continue renvela 1800mg PO TID  - continue renal diet with 1.2L fluid restriction  - regular hemodialysis per nephrology

## 2024-02-15 NOTE — PROGRESS NOTE ADULT - SUBJECTIVE AND OBJECTIVE BOX
Patient is a 83y Male seen and evaluated at bedside during HD. Appears comfortable.       Meds:    acetaminophen     Tablet .. 650 every 6 hours PRN  atorvastatin 10 at bedtime  calcitriol   Capsule 0.25 daily  dextrose 5%. 1000 <Continuous>  dextrose 5%. 1000 <Continuous>  dextrose 5%. 1000 <Continuous>  dextrose 50% Injectable 12.5 once  dextrose 50% Injectable 25 once  dextrose 50% Injectable 25 once  dextrose Oral Gel 15 once PRN  epoetin alba-epbx (RETACRIT) Injectable 8000 once  glucagon  Injectable 1 once  influenza  Vaccine (HIGH DOSE) 0.7 once  insulin lispro (ADMELOG) corrective regimen sliding scale  Before meals and at bedtime  iron sucrose IVPB 200 every 24 hours  melatonin 3 at bedtime PRN  NIFEdipine XL 60 every 12 hours  ondansetron Injectable 4 every 6 hours PRN  pantoprazole  Injectable 40 every 12 hours  polyethylene glycol 3350 17 at bedtime  PPD  5 Tuberculin Unit(s) Injectable 5 once  senna 2 at bedtime  sevelamer carbonate 800 three times a day with meals  valsartan 160 every 24 hours      T(C): , Max: 37 (24 @ 12:01)  T(F): , Max: 98.6 (24 @ 12:01)  HR: 69 (02-15-24 @ 09:45)  BP: 141/63 (02-15-24 @ 09:45)  BP(mean): 100 (24 @ 18:20)  RR: 17 (02-15-24 @ 09:45)  SpO2: 95% (02-15-24 @ 09:45)  Wt(kg): --    Height (cm): 172.7 ( @ 16:45)  Weight (kg): 83.9 ( @ 16:45)  BMI (kg/m2): 28.1 ( @ 16:45)  BSA (m2): 1.98 ( @ 16:45)    Review of Systems:  ROS negative except as per HPI      PHYSICAL EXAM:  Constitutional: NAD, lying in bed on HD  Head: NCAT, EOMI  Respiratory: CTAB, no crackles anteriorly  Cardiac: Regular rate  Gastrointestinal: abdomen soft, NT/ND  Extremities: trace LE edema  Neurologic: Awake, alert, interactive  Access: Rt TDC accessed for HD. Newly created E AVF        LABS:                        9.5    10.66 )-----------( 466      ( 15 Feb 2024 05:30 )             29.5     02-15    137  |  101  |  51<H>  ----------------------------<  68<L>  4.2   |  23  |  6.52<H>    Ca    8.5      15 Feb 2024 05:30  Phos  4.6     02-15  Mg     2.5     02-15          Urinalysis Basic - ( 15 Feb 2024 05:30 )    Color: x / Appearance: x / SG: x / pH: x  Gluc: 68 mg/dL / Ketone: x  / Bili: x / Urobili: x   Blood: x / Protein: x / Nitrite: x   Leuk Esterase: x / RBC: x / WBC x   Sq Epi: x / Non Sq Epi: x / Bacteria: x            RADIOLOGY & ADDITIONAL STUDIES:        Hemoglobin: 9.5 g/dL (02-15-24 @ 05:30)  Phosphorus: 4.6 mg/dL (02-15-24 @ 05:30)  Hemoglobin: 8.6 g/dL (24 @ 05:30)  Phosphorus: 4.6 mg/dL (24 @ 05:30)    Vitamin D, 25-Hydroxy: 11.6 ng/mL (24 @ 10:03)  Albumin: 2.4 g/dL (24 @ 05:30)    T(C): 36.7 (02-15-24 @ 09:45), Max: 37 (24 @ 12:01)  HR: 69 (02-15-24 @ 09:45) (60 - 71)  BP: 141/63 (02-15-24 @ 09:45) (133/70 - 165/67)  RR: 17 (02-15-24 @ 09:45) (17 - 19)  SpO2: 95% (02-15-24 @ 09:45) (93% - 96%)  epoetin alba-epbx (RETACRIT) Injectable 8000 Unit(s) IV Push once, 02-10-24 @ 10:15, Routine, Stop order after: 1 Doses  epoetin alba-epbx (RETACRIT) Injectable 8000 Unit(s) IV Push once, 24 @ 08:02, Routine, Stop order after: 1 Doses  epoetin alba-epbx (RETACRIT) Injectable 8000 Unit(s) IV Push once, 02-15-24 @ 08:44, Routine, Stop order after: 1 Doses  sevelamer carbonate 800 milliGRAM(s) Oral three times a day with meals, 02-10-24 @ 00:37, STAT  sevelamer carbonate 1600 milliGRAM(s) Oral three times a day with meals, 24 @ 16:46,   sevelamer carbonate 800 milliGRAM(s) Oral three times a day with meals, 24 @ 15:43, Routine      Hemodialysis Treatment.:     Schedule: Once, Modality: Hemodialysis, Access: Internal Jugular Central Venous Catheter    Dialyzer: Optiflux E199GWm, Time: 210 Min    Blood Flow: 400 mL/Min , Dialysate Flow: 500 mL/Min, Dialysate Temp: 36.5, Tubinmm (Adult)    Target Fluid Removal: 1 Liters    Dialysate Electrolytes (mEq/L): Potassium 3, Calcium 2.5, Sodium 138, Bicarbonate 35    Additional Instructions: epo w/ HD  Hold UF if SBP<90 (02-15-24 @ 08:45) [Completed]

## 2024-02-15 NOTE — PRE-ANESTHESIA EVALUATION ADULT - NSDENTALSD_ENT_ALL_CORE
Missing all upper teeth, full dentures. Missing majority of lower teeth, all lower molars missing. Remnant lower teeth are heavily damaged with several chipped teeth, stumps, ground/worn teeth, cavities evident./missing teeth
missing teeth
caps/bridge/implants
no upper teeth/missing teeth

## 2024-02-16 LAB
ANION GAP SERPL CALC-SCNC: 10 MMOL/L — SIGNIFICANT CHANGE UP (ref 5–17)
ANISOCYTOSIS BLD QL: SLIGHT — SIGNIFICANT CHANGE UP
ANISOCYTOSIS BLD QL: SLIGHT — SIGNIFICANT CHANGE UP
BASOPHILS # BLD AUTO: 0.12 K/UL — SIGNIFICANT CHANGE UP (ref 0–0.2)
BASOPHILS # BLD AUTO: 0.38 K/UL — HIGH (ref 0–0.2)
BASOPHILS NFR BLD AUTO: 0.9 % — SIGNIFICANT CHANGE UP (ref 0–2)
BASOPHILS NFR BLD AUTO: 3.5 % — HIGH (ref 0–2)
BUN SERPL-MCNC: 32 MG/DL — HIGH (ref 7–23)
CALCIUM SERPL-MCNC: 8.6 MG/DL — SIGNIFICANT CHANGE UP (ref 8.4–10.5)
CHLORIDE SERPL-SCNC: 94 MMOL/L — LOW (ref 96–108)
CO2 SERPL-SCNC: 28 MMOL/L — SIGNIFICANT CHANGE UP (ref 22–31)
CREAT SERPL-MCNC: 4.51 MG/DL — HIGH (ref 0.5–1.3)
EGFR: 12 ML/MIN/1.73M2 — LOW
EOSINOPHIL # BLD AUTO: 0.1 K/UL — SIGNIFICANT CHANGE UP (ref 0–0.5)
EOSINOPHIL # BLD AUTO: 0.46 K/UL — SIGNIFICANT CHANGE UP (ref 0–0.5)
EOSINOPHIL NFR BLD AUTO: 0.9 % — SIGNIFICANT CHANGE UP (ref 0–6)
EOSINOPHIL NFR BLD AUTO: 3.5 % — SIGNIFICANT CHANGE UP (ref 0–6)
GIANT PLATELETS BLD QL SMEAR: PRESENT — SIGNIFICANT CHANGE UP
GIANT PLATELETS BLD QL SMEAR: PRESENT — SIGNIFICANT CHANGE UP
GLUCOSE BLDC GLUCOMTR-MCNC: 137 MG/DL — HIGH (ref 70–99)
GLUCOSE BLDC GLUCOMTR-MCNC: 200 MG/DL — HIGH (ref 70–99)
GLUCOSE BLDC GLUCOMTR-MCNC: 255 MG/DL — HIGH (ref 70–99)
GLUCOSE SERPL-MCNC: 122 MG/DL — HIGH (ref 70–99)
HCT VFR BLD CALC: 30.4 % — LOW (ref 39–50)
HGB BLD-MCNC: 9.7 G/DL — LOW (ref 13–17)
HYPOCHROMIA BLD QL: SLIGHT — SIGNIFICANT CHANGE UP
HYPOCHROMIA BLD QL: SLIGHT — SIGNIFICANT CHANGE UP
LYMPHOCYTES # BLD AUTO: 0.58 K/UL — LOW (ref 1–3.3)
LYMPHOCYTES # BLD AUTO: 0.91 K/UL — LOW (ref 1–3.3)
LYMPHOCYTES # BLD AUTO: 5.3 % — LOW (ref 13–44)
LYMPHOCYTES # BLD AUTO: 7 % — LOW (ref 13–44)
MACROCYTES BLD QL: SLIGHT — SIGNIFICANT CHANGE UP
MACROCYTES BLD QL: SLIGHT — SIGNIFICANT CHANGE UP
MAGNESIUM SERPL-MCNC: 2.1 MG/DL — SIGNIFICANT CHANGE UP (ref 1.6–2.6)
MANUAL SMEAR VERIFICATION: SIGNIFICANT CHANGE UP
MANUAL SMEAR VERIFICATION: SIGNIFICANT CHANGE UP
MCHC RBC-ENTMCNC: 30 PG — SIGNIFICANT CHANGE UP (ref 27–34)
MCHC RBC-ENTMCNC: 31.9 GM/DL — LOW (ref 32–36)
MCV RBC AUTO: 94.1 FL — SIGNIFICANT CHANGE UP (ref 80–100)
MICROCYTES BLD QL: SLIGHT — SIGNIFICANT CHANGE UP
MONOCYTES # BLD AUTO: 0.97 K/UL — HIGH (ref 0–0.9)
MONOCYTES # BLD AUTO: 1.14 K/UL — HIGH (ref 0–0.9)
MONOCYTES NFR BLD AUTO: 8.8 % — SIGNIFICANT CHANGE UP (ref 2–14)
MONOCYTES NFR BLD AUTO: 8.9 % — SIGNIFICANT CHANGE UP (ref 2–14)
MYELOCYTES NFR BLD: 0.9 % — HIGH (ref 0–0)
MYELOCYTES NFR BLD: 1.8 % — HIGH (ref 0–0)
NEUTROPHILS # BLD AUTO: 10.26 K/UL — HIGH (ref 1.8–7.4)
NEUTROPHILS # BLD AUTO: 8.68 K/UL — HIGH (ref 1.8–7.4)
NEUTROPHILS NFR BLD AUTO: 78.9 % — HIGH (ref 43–77)
NEUTROPHILS NFR BLD AUTO: 79.6 % — HIGH (ref 43–77)
OVALOCYTES BLD QL SMEAR: SLIGHT — SIGNIFICANT CHANGE UP
PHOSPHATE SERPL-MCNC: 3.3 MG/DL — SIGNIFICANT CHANGE UP (ref 2.5–4.5)
PLAT MORPH BLD: ABNORMAL
PLAT MORPH BLD: NORMAL — SIGNIFICANT CHANGE UP
PLATELET # BLD AUTO: 466 K/UL — HIGH (ref 150–400)
POIKILOCYTOSIS BLD QL AUTO: SLIGHT — SIGNIFICANT CHANGE UP
POLYCHROMASIA BLD QL SMEAR: SIGNIFICANT CHANGE UP
POTASSIUM SERPL-MCNC: 4.1 MMOL/L — SIGNIFICANT CHANGE UP (ref 3.5–5.3)
POTASSIUM SERPL-SCNC: 4.1 MMOL/L — SIGNIFICANT CHANGE UP (ref 3.5–5.3)
RBC # BLD: 3.23 M/UL — LOW (ref 4.2–5.8)
RBC # FLD: 12 % — SIGNIFICANT CHANGE UP (ref 10.3–14.5)
RBC BLD AUTO: ABNORMAL
RBC BLD AUTO: ABNORMAL
SODIUM SERPL-SCNC: 132 MMOL/L — LOW (ref 135–145)
SPHEROCYTES BLD QL SMEAR: SLIGHT — SIGNIFICANT CHANGE UP
WBC # BLD: 13 K/UL — HIGH (ref 3.8–10.5)
WBC # FLD AUTO: 13 K/UL — HIGH (ref 3.8–10.5)

## 2024-02-16 PROCEDURE — 99232 SBSQ HOSP IP/OBS MODERATE 35: CPT

## 2024-02-16 PROCEDURE — 99233 SBSQ HOSP IP/OBS HIGH 50: CPT | Mod: GC

## 2024-02-16 RX ADMIN — CALCITRIOL 0.25 MICROGRAM(S): 0.5 CAPSULE ORAL at 15:46

## 2024-02-16 RX ADMIN — SEVELAMER CARBONATE 800 MILLIGRAM(S): 2400 POWDER, FOR SUSPENSION ORAL at 08:04

## 2024-02-16 RX ADMIN — SEVELAMER CARBONATE 800 MILLIGRAM(S): 2400 POWDER, FOR SUSPENSION ORAL at 17:08

## 2024-02-16 RX ADMIN — IRON SUCROSE 110 MILLIGRAM(S): 20 INJECTION, SOLUTION INTRAVENOUS at 19:08

## 2024-02-16 RX ADMIN — ATORVASTATIN CALCIUM 10 MILLIGRAM(S): 80 TABLET, FILM COATED ORAL at 22:15

## 2024-02-16 RX ADMIN — SENNA PLUS 2 TABLET(S): 8.6 TABLET ORAL at 22:15

## 2024-02-16 RX ADMIN — PANTOPRAZOLE SODIUM 40 MILLIGRAM(S): 20 TABLET, DELAYED RELEASE ORAL at 19:09

## 2024-02-16 RX ADMIN — SEVELAMER CARBONATE 800 MILLIGRAM(S): 2400 POWDER, FOR SUSPENSION ORAL at 13:33

## 2024-02-16 RX ADMIN — Medication 6: at 22:40

## 2024-02-16 RX ADMIN — POLYETHYLENE GLYCOL 3350 17 GRAM(S): 17 POWDER, FOR SOLUTION ORAL at 22:15

## 2024-02-16 RX ADMIN — Medication 60 MILLIGRAM(S): at 19:09

## 2024-02-16 RX ADMIN — Medication 2: at 19:05

## 2024-02-16 RX ADMIN — PANTOPRAZOLE SODIUM 40 MILLIGRAM(S): 20 TABLET, DELAYED RELEASE ORAL at 06:52

## 2024-02-16 NOTE — PROGRESS NOTE ADULT - ASSESSMENT
83M with a PMH of HTN, DM, HLD, CKD Stage 5 who was admitted for management of hyperkalemia found during pre-op evaluation for an elective AVF in setting of CKD and was found to be in Mobitz Type 1 on arrival. Course complicated by 3 episodes of coffee ground emesis since yesterday (02/11) and remained hemodynamically stable. GI consulted for UGIB.     EGD recommendations:   - Normal esophagus.  - A single cratered clean based non-bleeding ulcer was found in the body lesser curve.  - Otherwise normal stomach.  - Erythema of the mucosa with no bleeding was noted in the duodenal bulb. These findings are compatible with duodenitis.    Recommendations:  - Protonix 40 mg BID x 2 weeks, the once daily thereafter  - Needs f/u EGD in 8 weeks  - Ensure out-patient GI follow-up at [address] [place] [address], [address] [place] [address] (phone: [phone]) with the GI fellows clinic - Please include this information in DC paperwork.     Case discussed with Dr. Avalos. GI team will sign off. Please re-consult with any questions or concerns.     Lisa Ruiz D.O.   Gastroenterology Fellow  Weekday 7am-5pm Pager: [phone]  Weeknights/Weekend/Holiday Coverage: Please call the  for contact info

## 2024-02-16 NOTE — PROGRESS NOTE ADULT - ASSESSMENT
ESRD on HD (TTS)  OP HD arranged.   Last HD 2/15.   Next HD 2/17  Renal diet  Hep panel negative. TB Quant negative. SW on board for outpatient HD placement, arranged for HD at [facility] for TTS slot.    HTN  BP at goal  UF with HD  Hold BP meds pre-HD on HD days  Restart home antihypertensives as tolerated.   .     Access  AVF formed 2/9  RI TDC with increased resistance 2/13, s/p CathFlo (TPA). Seen and flushed by IR 2/14. Working well now    Anemia  Hb 9.5  Iron sat 22%; Ferritin wnl  IV iron 200mg 2/13-2/17  Epo 8K Units  w/ HD    BMD-CKD  Ca 8.5  . Vit D 11.6. Start Vit D3 5000 U/day  Phos 4.6  C/w Sevelamer 800mg TID. Goal phos 3-5.5  Renal diet

## 2024-02-16 NOTE — PROGRESS NOTE ADULT - PROBLEM SELECTOR PLAN 5
Telemetry reviewed, patient wih prolonged OK interval or episodes of second degree AV block (Wenckebach)  No episodes of Mobitz Type II noted on telemetry.     - outpatient f/u with cardiology, recommend outpatient evaluation inc echo

## 2024-02-16 NOTE — PROGRESS NOTE ADULT - SUBJECTIVE AND OBJECTIVE BOX
**INCOMPLETE NOTE    OVERNIGHT EVENTS: None    SUBJECTIVE:  Patient seen and examined at bedside, comfortable, NAD. Denied fever, chest pain, dyspnea, abdominal pain.     Vital Signs Last 12 Hrs  T(F): 98.5 (02-16-24 @ 06:03), Max: 98.5 (02-16-24 @ 06:03)  HR: 64 (02-16-24 @ 06:03) (64 - 64)  BP: 131/58 (02-16-24 @ 06:03) (131/58 - 131/58)  BP(mean): --  RR: 17 (02-16-24 @ 06:03) (17 - 17)  SpO2: 96% (02-16-24 @ 06:03) (96% - 96%)  I&O's Summary    15 Feb 2024 07:01  -  16 Feb 2024 07:00  --------------------------------------------------------  IN: 0 mL / OUT: 0 mL / NET: 0 mL        PHYSICAL EXAM:  Constitutional: NAD, comfortable in bed.  HEENT: NC/AT, PERRLA, EOMI, no conjunctival pallor or scleral icterus, MMM  Neck: Supple, no JVD  Respiratory: CTA B/L. No w/r/r.   Cardiovascular: RRR, normal S1 and S2, no m/r/g.   Gastrointestinal: +BS, soft NTND, no guarding or rebound tenderness, no palpable masses   Extremities: wwp; no cyanosis, clubbing or edema.   Vascular: Pulses equal and strong throughout.   Neurological: AAOx3, no CN deficits, strength and sensation intact throughout.   Skin: No gross skin abnormalities or rashes        LABS:                        9.7    13.00 )-----------( 466      ( 16 Feb 2024 08:14 )             30.4     02-16    132<L>  |  94<L>  |  32<H>  ----------------------------<  122<H>  4.1   |  28  |  4.51<H>    Ca    8.6      16 Feb 2024 08:14  Phos  3.3     02-16  Mg     2.1     02-16    TPro  6.7  /  Alb  2.1<L>  /  TBili  <0.2  /  DBili  x   /  AST  23  /  ALT  15  /  AlkPhos  75  02-15      Urinalysis Basic - ( 16 Feb 2024 08:14 )    Color: x / Appearance: x / SG: x / pH: x  Gluc: 122 mg/dL / Ketone: x  / Bili: x / Urobili: x   Blood: x / Protein: x / Nitrite: x   Leuk Esterase: x / RBC: x / WBC x   Sq Epi: x / Non Sq Epi: x / Bacteria: x          RADIOLOGY & ADDITIONAL TESTS:    MEDICATIONS  (STANDING):  atorvastatin 10 milliGRAM(s) Oral at bedtime  calcitriol   Capsule 0.25 MICROGram(s) Oral daily  dextrose 5%. 1000 milliLiter(s) (20 mL/Hr) IV Continuous <Continuous>  dextrose 5%. 1000 milliLiter(s) (50 mL/Hr) IV Continuous <Continuous>  dextrose 5%. 1000 milliLiter(s) (100 mL/Hr) IV Continuous <Continuous>  dextrose 50% Injectable 25 Gram(s) IV Push once  dextrose 50% Injectable 12.5 Gram(s) IV Push once  dextrose 50% Injectable 25 Gram(s) IV Push once  glucagon  Injectable 1 milliGRAM(s) IntraMuscular once  influenza  Vaccine (HIGH DOSE) 0.7 milliLiter(s) IntraMuscular once  insulin lispro (ADMELOG) corrective regimen sliding scale   SubCutaneous Before meals and at bedtime  iron sucrose IVPB 200 milliGRAM(s) IV Intermittent every 24 hours  NIFEdipine XL 60 milliGRAM(s) Oral every 12 hours  pantoprazole  Injectable 40 milliGRAM(s) IV Push every 12 hours  polyethylene glycol 3350 17 Gram(s) Oral at bedtime  PPD  5 Tuberculin Unit(s) Injectable 5 Unit(s) IntraDermal once  senna 2 Tablet(s) Oral at bedtime  sevelamer carbonate 800 milliGRAM(s) Oral three times a day with meals  valsartan 160 milliGRAM(s) Oral every 24 hours    MEDICATIONS  (PRN):  acetaminophen     Tablet .. 650 milliGRAM(s) Oral every 6 hours PRN Temp greater or equal to 38C (100.4F), Moderate Pain (4 - 6)  dextrose Oral Gel 15 Gram(s) Oral once PRN Blood Glucose LESS THAN 70 milliGRAM(s)/deciliter  melatonin 3 milliGRAM(s) Oral at bedtime PRN Insomnia  ondansetron Injectable 4 milliGRAM(s) IV Push every 6 hours PRN Nausea and/or Vomiting

## 2024-02-16 NOTE — PROGRESS NOTE ADULT - SUBJECTIVE AND OBJECTIVE BOX
GI Consult Progress Note:       OVERNIGHT EVENTS:    SUBJECTIVE / INTERVAL HPI: Patient seen and examined at bedside.     VITAL SIGNS:  Vital Signs Last 24 Hrs  T(C): 36.9 (16 Feb 2024 06:03), Max: 36.9 (16 Feb 2024 06:03)  T(F): 98.5 (16 Feb 2024 06:03), Max: 98.5 (16 Feb 2024 06:03)  HR: 64 (16 Feb 2024 06:03) (48 - 64)  BP: 131/58 (16 Feb 2024 06:03) (131/58 - 156/66)  BP(mean): 88 (15 Feb 2024 20:45) (88 - 88)  RR: 17 (16 Feb 2024 06:03) (16 - 17)  SpO2: 96% (16 Feb 2024 06:03) (95% - 96%)    Parameters below as of 15 Feb 2024 20:45  Patient On (Oxygen Delivery Method): room air        02-15-24 @ 07:01  -  02-16-24 @ 07:00  --------------------------------------------------------  IN: 0 mL / OUT: 0 mL / NET: 0 mL        PHYSICAL EXAM:    General: WDWN  HEENT: NC/AT; PERRL, anicteric sclera; MMM  Neck: supple  Cardiovascular: +S1/S2; RRR  Respiratory: CTA B/L; no W/R/R  Gastrointestinal: soft, NT/ND; +BSx4  Extremities: WWP; no edema, clubbing or cyanosis  Vascular: 2+ radial, DP/PT pulses B/L  Neurological: AAOx3; no focal deficits    MEDICATIONS:  MEDICATIONS  (STANDING):  atorvastatin 10 milliGRAM(s) Oral at bedtime  calcitriol   Capsule 0.25 MICROGram(s) Oral daily  dextrose 5%. 1000 milliLiter(s) (20 mL/Hr) IV Continuous <Continuous>  dextrose 5%. 1000 milliLiter(s) (50 mL/Hr) IV Continuous <Continuous>  dextrose 5%. 1000 milliLiter(s) (100 mL/Hr) IV Continuous <Continuous>  dextrose 50% Injectable 25 Gram(s) IV Push once  dextrose 50% Injectable 12.5 Gram(s) IV Push once  dextrose 50% Injectable 25 Gram(s) IV Push once  glucagon  Injectable 1 milliGRAM(s) IntraMuscular once  influenza  Vaccine (HIGH DOSE) 0.7 milliLiter(s) IntraMuscular once  insulin lispro (ADMELOG) corrective regimen sliding scale   SubCutaneous Before meals and at bedtime  iron sucrose IVPB 200 milliGRAM(s) IV Intermittent every 24 hours  NIFEdipine XL 60 milliGRAM(s) Oral every 12 hours  pantoprazole  Injectable 40 milliGRAM(s) IV Push every 12 hours  polyethylene glycol 3350 17 Gram(s) Oral at bedtime  PPD  5 Tuberculin Unit(s) Injectable 5 Unit(s) IntraDermal once  senna 2 Tablet(s) Oral at bedtime  sevelamer carbonate 800 milliGRAM(s) Oral three times a day with meals  valsartan 160 milliGRAM(s) Oral every 24 hours    MEDICATIONS  (PRN):  acetaminophen     Tablet .. 650 milliGRAM(s) Oral every 6 hours PRN Temp greater or equal to 38C (100.4F), Moderate Pain (4 - 6)  dextrose Oral Gel 15 Gram(s) Oral once PRN Blood Glucose LESS THAN 70 milliGRAM(s)/deciliter  melatonin 3 milliGRAM(s) Oral at bedtime PRN Insomnia  ondansetron Injectable 4 milliGRAM(s) IV Push every 6 hours PRN Nausea and/or Vomiting      ALLERGIES:  Allergies    No Known Allergies    Intolerances        LABS:                        9.7    13.00 )-----------( 466      ( 16 Feb 2024 08:14 )             30.4     02-16    132<L>  |  94<L>  |  32<H>  ----------------------------<  122<H>  4.1   |  28  |  4.51<H>    Ca    8.6      16 Feb 2024 08:14  Phos  3.3     02-16  Mg     2.1     02-16    TPro  6.7  /  Alb  2.1<L>  /  TBili  <0.2  /  DBili  x   /  AST  23  /  ALT  15  /  AlkPhos  75  02-15      Urinalysis Basic - ( 16 Feb 2024 08:14 )    Color: x / Appearance: x / SG: x / pH: x  Gluc: 122 mg/dL / Ketone: x  / Bili: x / Urobili: x   Blood: x / Protein: x / Nitrite: x   Leuk Esterase: x / RBC: x / WBC x   Sq Epi: x / Non Sq Epi: x / Bacteria: x      CAPILLARY BLOOD GLUCOSE      POCT Blood Glucose.: 137 mg/dL (16 Feb 2024 09:25)        RADIOLOGY & ADDITIONAL TESTS: Reviewed.    ASSESSMENT:    PLAN:    GI Consult Progress Note:       OVERNIGHT EVENTS: IZA.    SUBJECTIVE / INTERVAL HPI:   Patient seen and examined at bedside. Reports that he feels well s/p EGD. No acute complaints.     VITAL SIGNS:  Vital Signs Last 24 Hrs  T(C): 36.9 (16 Feb 2024 06:03), Max: 36.9 (16 Feb 2024 06:03)  T(F): 98.5 (16 Feb 2024 06:03), Max: 98.5 (16 Feb 2024 06:03)  HR: 64 (16 Feb 2024 06:03) (48 - 64)  BP: 131/58 (16 Feb 2024 06:03) (131/58 - 156/66)  BP(mean): 88 (15 Feb 2024 20:45) (88 - 88)  RR: 17 (16 Feb 2024 06:03) (16 - 17)  SpO2: 96% (16 Feb 2024 06:03) (95% - 96%)    Parameters below as of 15 Feb 2024 20:45  Patient On (Oxygen Delivery Method): room air        02-15-24 @ 07:01  -  02-16-24 @ 07:00  --------------------------------------------------------  IN: 0 mL / OUT: 0 mL / NET: 0 mL      PHYSICAL EXAM:  General: No acute distress, right HD cath   Lungs: Normal respiratory effort and no intercostal retractions  Cardiovascular: RRR  Abdomen: Soft, non-tender, non-distended  Neurological: Alert and oriented x3  Skin: Warm and dry. No obvious rash      MEDICATIONS:  MEDICATIONS  (STANDING):  atorvastatin 10 milliGRAM(s) Oral at bedtime  calcitriol   Capsule 0.25 MICROGram(s) Oral daily  dextrose 5%. 1000 milliLiter(s) (20 mL/Hr) IV Continuous <Continuous>  dextrose 5%. 1000 milliLiter(s) (50 mL/Hr) IV Continuous <Continuous>  dextrose 5%. 1000 milliLiter(s) (100 mL/Hr) IV Continuous <Continuous>  dextrose 50% Injectable 25 Gram(s) IV Push once  dextrose 50% Injectable 12.5 Gram(s) IV Push once  dextrose 50% Injectable 25 Gram(s) IV Push once  glucagon  Injectable 1 milliGRAM(s) IntraMuscular once  influenza  Vaccine (HIGH DOSE) 0.7 milliLiter(s) IntraMuscular once  insulin lispro (ADMELOG) corrective regimen sliding scale   SubCutaneous Before meals and at bedtime  iron sucrose IVPB 200 milliGRAM(s) IV Intermittent every 24 hours  NIFEdipine XL 60 milliGRAM(s) Oral every 12 hours  pantoprazole  Injectable 40 milliGRAM(s) IV Push every 12 hours  polyethylene glycol 3350 17 Gram(s) Oral at bedtime  PPD  5 Tuberculin Unit(s) Injectable 5 Unit(s) IntraDermal once  senna 2 Tablet(s) Oral at bedtime  sevelamer carbonate 800 milliGRAM(s) Oral three times a day with meals  valsartan 160 milliGRAM(s) Oral every 24 hours    MEDICATIONS  (PRN):  acetaminophen     Tablet .. 650 milliGRAM(s) Oral every 6 hours PRN Temp greater or equal to 38C (100.4F), Moderate Pain (4 - 6)  dextrose Oral Gel 15 Gram(s) Oral once PRN Blood Glucose LESS THAN 70 milliGRAM(s)/deciliter  melatonin 3 milliGRAM(s) Oral at bedtime PRN Insomnia  ondansetron Injectable 4 milliGRAM(s) IV Push every 6 hours PRN Nausea and/or Vomiting      ALLERGIES:  Allergies    No Known Allergies    Intolerances        LABS:                        9.7    13.00 )-----------( 466      ( 16 Feb 2024 08:14 )             30.4     02-16    132<L>  |  94<L>  |  32<H>  ----------------------------<  122<H>  4.1   |  28  |  4.51<H>    Ca    8.6      16 Feb 2024 08:14  Phos  3.3     02-16  Mg     2.1     02-16    TPro  6.7  /  Alb  2.1<L>  /  TBili  <0.2  /  DBili  x   /  AST  23  /  ALT  15  /  AlkPhos  75  02-15      Urinalysis Basic - ( 16 Feb 2024 08:14 )    Color: x / Appearance: x / SG: x / pH: x  Gluc: 122 mg/dL / Ketone: x  / Bili: x / Urobili: x   Blood: x / Protein: x / Nitrite: x   Leuk Esterase: x / RBC: x / WBC x   Sq Epi: x / Non Sq Epi: x / Bacteria: x      CAPILLARY BLOOD GLUCOSE      POCT Blood Glucose.: 137 mg/dL (16 Feb 2024 09:25)        RADIOLOGY & ADDITIONAL TESTS: Reviewed.

## 2024-02-16 NOTE — PROGRESS NOTE ADULT - SUBJECTIVE AND OBJECTIVE BOX
Seen and evaluated at bedside denies complains, no indication for HD today. OP HD arranged for next wk on [address] as per family member.     Review of Systems:  ROS negative except as per HPI    PHYSICAL EXAM:  Constitutional: NAD, lying in bed  Head: NCAT, EOMI  Respiratory: CTAB, no crackles   Cardiac: Regular rate  Gastrointestinal: abdomen soft, NT/ND  Extremities: no LE edema  Neurologic: Awake, alert, interactive  Access: RIJ TDC. Newly created LUE AVF    Allergies:  No Known Allergies    Hospital Medications:   MEDICATIONS  (STANDING):  atorvastatin 10 milliGRAM(s) Oral at bedtime  calcitriol   Capsule 0.25 MICROGram(s) Oral daily  dextrose 5%. 1000 milliLiter(s) (50 mL/Hr) IV Continuous <Continuous>  dextrose 5%. 1000 milliLiter(s) (100 mL/Hr) IV Continuous <Continuous>  dextrose 5%. 1000 milliLiter(s) (20 mL/Hr) IV Continuous <Continuous>  dextrose 50% Injectable 25 Gram(s) IV Push once  dextrose 50% Injectable 25 Gram(s) IV Push once  dextrose 50% Injectable 12.5 Gram(s) IV Push once  glucagon  Injectable 1 milliGRAM(s) IntraMuscular once  influenza  Vaccine (HIGH DOSE) 0.7 milliLiter(s) IntraMuscular once  insulin lispro (ADMELOG) corrective regimen sliding scale   SubCutaneous Before meals and at bedtime  iron sucrose IVPB 200 milliGRAM(s) IV Intermittent every 24 hours  NIFEdipine XL 60 milliGRAM(s) Oral every 12 hours  pantoprazole  Injectable 40 milliGRAM(s) IV Push every 12 hours  polyethylene glycol 3350 17 Gram(s) Oral at bedtime  PPD  5 Tuberculin Unit(s) Injectable 5 Unit(s) IntraDermal once  senna 2 Tablet(s) Oral at bedtime  sevelamer carbonate 800 milliGRAM(s) Oral three times a day with meals  valsartan 160 milliGRAM(s) Oral every 24 hours    VITALS:  T(F): 98 (02-16-24 @ 13:41), Max: 98.5 (02-16-24 @ 06:03)  HR: 64 (02-16-24 @ 13:41)  BP: 101/64 (02-16-24 @ 13:41)  RR: 18 (02-16-24 @ 13:41)  SpO2: 96% (02-16-24 @ 13:41)  Wt(kg): --    02-15 @ 07:01  -  02-16 @ 07:00  --------------------------------------------------------  IN: 0 mL / OUT: 0 mL / NET: 0 mL      LABS:  02-16    132<L>  |  94<L>  |  32<H>  ----------------------------<  122<H>  4.1   |  28  |  4.51<H>    Ca    8.6      16 Feb 2024 08:14  Phos  3.3     02-16  Mg     2.1     02-16    TPro  6.7  /  Alb  2.1<L>  /  TBili  <0.2  /  DBili      /  AST  23  /  ALT  15  /  AlkPhos  75  02-15                          9.7    13.00 )-----------( 466      ( 16 Feb 2024 08:14 )             30.4

## 2024-02-16 NOTE — PROGRESS NOTE ADULT - ASSESSMENT
82 yo man PMHx of HTN, DM2, and CKD who was admitted for hyperkalemia and plan to start HD. Hospital course c/b GIB s/p EGD 2/15.    TTE 2/7/2024: Normal left ventricular size and systolic function. Normal right ventricular size and systolic function. No significant valvular disease. Pulmonary hypertension present, pulmonary artery systolic pressure is 39 mmHg. No pericardial effusion.    Assessment  1. Mobitz type 1 AV block  2. Pulmonary HTN  3. Chest pain - resolved  HsTrop elevation w/ negative CK/CKMB likely in setting of poor renal clearance  4. HTN    Plan  1. Avoid AV alberto blocking agents  2. Daily EKG  3. Ischemic workup with nuclear stress test prior to dc  4. Valsartan and nifedipine at current dose for HTN    During non face-to-face time, I reviewed relevant portions of the patient’s medical record. During face-to-face time, I took a relevant history and examined the patient. I also explained differential diagnoses, relevant cardiac diagnoses, workup, and management plan, which required a moderate level of medical decision making. I answered all questions related to the patient's medical conditions.     Ac Palma M.D.  CARDIOLOGY ATTENDING

## 2024-02-16 NOTE — PROGRESS NOTE ADULT - ATTENDING SUPERVISION STATEMENT
Fellow
Resident

## 2024-02-16 NOTE — PROGRESS NOTE ADULT - ATTENDING COMMENTS
seen and evaluated again while on dialysis  drop inBP-  NAD  given .9NS bolus and paused UF- BP improved  will not restart HD   continue full Rx time for clearances
Fellow's consult note reviewed and confirmed.    Progressive CKD with hyperkalemia in setting of planned fistula placement. Uncontrolled HTN.     Safest course of action would be to start dialysis this admission.    1. Permcath placement.  2. Initation of dialysis. SW consult for outpatient placement.  3. Eventual fistula placement (this admission, if OK with surgery).  4. BP control. UF on HD.    Please call with any questions.    Jair Mike MD, FACP, FASN | kidney.[facility]  Nephrology, Hypertension, and Internal Medicine  Mobile: [phone] (Daytime Hours Only)  Office/Answering Service: [phone]  Asst. Prof. of Medicine, [employer] at [facility] - Nephrology at [address]  [address], [address], [place], [place]
I agree with the fellow's findings and plans as written above with the following additions/amendments:    Seen and examined on HD for second time, tolerating, no BP changes, continue as above, further recs as above
Patient is an 84 yo Male with PMH of HTN, DM, HLD, CKD Stage 5 admitted for hyperkalemia with plan to start HD, Found to have Sinus rhythm with 1st Degree AVB as well as  Mobitz type I AV block for which cardiology was originally consulted. Hospital course complicated by Coffee ground emesis and one episode of substernal chest discomfort that resolved     Review of studies:   - EKG 2/13/2024: SR, Mobitz Type I, NSST  - EKG 2/6/2024: SR, Mobitz type 1 AV block   - Telemetry 2/5-6/2024: HR range 38 - 60, noted to have second degree Type 1 AV block. No sinus pause or 2:1 AV block noted  - TTE 2/7/2024: Normal left ventricular size and systolic function. Normal right ventricular size and systolic function. No significant valvular disease.   Pulmonary hypertension present, pulmonary artery systolic pressure is 39 mmHg. No pericardial effusion.    # Pre-Operative CV Assessment in patient Mobitz type 1 AVB   - Patient noted to have Mobitz Type I on tele on admission in setting of electrolyte derangement that have since normalized  - He was initiated on HD with hospital course complicated by 3 episodes of coffee ground emesis for which he is pending EGD evaluation  - Overnight on 02/12-02/13 patient reported one episode of ''pressure on his chest wall'' 4/10 in intensity that resolved without intervention  - Since, patient denies repeat chest discomfort. Review of symptom negative for dizziness, lightheadedness, angina or repeat substernal chest discomfort  - ECG from 2/13 reviewed showing Mobitz Type I with NSST  - HSTrop T morning of event was 169-->153 with negative CK/CKMB in patient with poor renal clearance  - Echo this hospitalization prior revealed normal Biventricular function without RWMA or valvular heart disease  - At this time there are no active contraindications to proceeding with ECG.   - Patient is considered at low-intermediate risk for low risk procedure. There are no active CV contraindication to proceeding with time sensitive evaluation; Patient not in active ACS, without active anginal symptoms, not in overcompensated HF  - Cont with valsartan 160mg PO qd, nifedipine 60mg po BID,   - Please obtain daily ECGS    Cardiology will cont to follow with you, please call with any questions .
84 yo man admitted to initiate chronic hemodialysis therapy  h/o CKD 5, now ESRD, HTN, DM2 admitted 2/6 for AVF creation, which was postponed due to pre-op labs with K 6,   TDC placed and started HD 2/8. Pt with IDH last few HD sessions, echo w/o pericardial effusion, reduced BP meds.  new GI bleed 2/11  seen and evaluated while on dialysis  tolerating procedure well
84 yo man, newly started on dialysis- now set up in out center for Tuesday 2/20  tolerated dialysis well yesterday  for repeat HD tomorrow, then nephrologically stable for dc
ESRD on HD.  Issues d/w Dr. Cabello re access.  RIJ TDC with increased resistance, s/p CathFlo (TPA), still non functional.  IR consult for HD cath eval.   NPO AM for IR eval and possible catheter replacement.
Pt seen and d/w fellow.  EGD cancelled today secondary to elevated troponins -- will need formal cardiology evaluation.  Possible EGD tomorrow.
events noted, chart reviewed- was admitted for AV access surgery which was canceled because k elevated to 6  84 yo man with CKD 5 presented with hyperkalemia now starting chronic HD- 1st treatment today  h/o HTN, HLD, DM2    will need to prepare for outpatient HD:  Check hepatitis panel and quantiferon  Vascular following for AVF creation  will need HD unit close to his home
Case reviewed.  Issues d/w renal fellow on rounds.  Agree with above:  New start ESRD  - HD#2 today. Next HD tomorrow  - Can discontinue sodium bicarbonate now that patient is on HD  - Renal diet, fluid restriction <1.2L/day, strict I&O, daily standing weights  - Check hepatitis panel and QuantiFeron  - Vascular following for AVF creation
I agree with the fellow's findings and plans as written above with the following additions/amendments:    Seen and examined on HD, tolerating well, continue HD as aboive, further recs as above
83M with PMH of CKD stage 5, DM2, HLD, and HTN, initially admitted with hyperkalemia, admitted with new Mobitz Type 1 block. Cleared by cardiology for AVF.  Nephrology following as well.  Patient was off the floor for surgery, and unable to see but plan discussed extensively with residents who saw patient early in the morning before OR and cardiology consultant.  Continue to monitor after OR.  Appreciate input from nephrology service as well.     35 minutes spent on this encounter, including review of chart, discussion with residents and consultants.
CKD planned for AVF, found to be hyperkalemic  Medically managing hyperkalemia  Plan for tunneled dialysis catheter, HD following catheter placement  cardiology consulted for mobitz I and clearance    Hypertensive this AM after antihypertensive medications held overnight, giving antihypertensives this AM. Will re-eval further need for hydral    Decision making high complexity
CKD planned for AVF, found to be hyperkalemic  Plan for tunneled dialysis catheter, HD following catheter placement    Decision making high complexity.
83M with PMH CKD5, now progressed to ESRD on HD, HTN, DM2, admitted for fistula placement, but required admission to telemetry unit for hyperkalemia, now course complicated by new coffee ground emesis and pending further evaluation.     Had chest pain overnight, with EKG done, reportedly similar to admission.  Troponins checked this morning, and significantly elevated, but patient has ESRD.  Will repeat troponins.  He denies any symptoms right now.   - pending further endoscopic workup with GI  - transfuse for Hb <7, maintain active type and screen  - continue on PPI BID  - continue above therapy for Dm2  - ask nephrology if patient still needs to be on diuretics      35 minutes spent on this encounter, including face to face with patient, care coordination and documentation.
s/p EGD - showed healing ulcers and duodinitis will dc with ppi bid 2 weeks -- fu GI IN 8 WEEKS t repeat   ESRD on new HD- will be sable to discharge tomorrow after HD
Pt is 84 yo man with progression of CKD to ESRD now initiated on HD, DM, HTN, planned for placement to outpatient HD but developed coffee-ground emesis.   ---planned for EGD tomorrow, NPO, On PPI BID  ---for ESRD PPD was planted and to be ready today, Heb labs resulted, currently on T, Th, Sat schedule  ---discuss with renal team de-escalation of diuretic therapy   ---for IDDM cont with insulin therapy, adjust to NPO status
s.p EGD   Rest as above
82 yo M with PMHx HTN, HLD, DM, CKD5 (initiating HD) sent in to ED 2/6 from out ambulatory surgery center for planned AVF due hyperkalemia on labs with bradycardia and MT1 AVB admitted to 7[facility]/telemetry for further evaluation now stable for stepdown to [facility] for ongoing management.      #CKD5 – Known hx of CKD now newly initiated HD this admission (s/p HD#1 completed 2/8) via RIJ-HD catheter (placed by IR 2/8). Post-HD VSS. Trace oozing at HD catheter site. Plan for AVF creation with vascular surgery 2/9. NPO after MN for procedure. F/U Hepatitis panel, QuantiFERON for set-up of outpatient HD. Renal consulted/following. HD moving forward per renal recommendations.      #Mobitz T1 AVB – EP/Cardiology consulted due to concern for sinus bradycardia with intermittent Mobitz T1 AVB while on telemetry. Hemodynamically stable and asx otherwise. TTE 2/7 showing normal LV/RV size/systolic fxn, PASP 39. No significant valvular findings. HR remains 45-55, asx. Repeat EKG qAM. Will need outpatient cardiology F/U on discharge.      Agree with remainder of resident plan as above.
83M with PMH CKD5, now progressed to ESRD on HD, HTN, DM2, admitted for fistula placement, but required admission to telemetry unit for hyperkalemia, now course complicated by new coffee ground emesis and pending further evaluation.     Had chest pain 2/13 with EKG done, reportedly similar to admission.  Troponin trended down.  He denies any symptoms right now.   pending further endoscopic workup with GI today   transfuse for Hb <7, maintain active type and screen  continue on PPI BID  continue above therapy for Dm2- with HSS - holding Lantus 15 u bed time d/t recurrent hypoglycemia   RIJ TDC-- >IR followed up - catheter working
Constitutional: NAD, lying in bed  Head: NCAT,mmm  Respiratory: CTAB, no wheeze  Cardiac: normal s1s2, no mrg  Gastrointestinal: soft, ntnd, normoactive bowel sounds  Extremities: wwp, no edema    83M with PMH of CKD stage 5, DM2, HLD, and HTN, initially admitted with hyperkalemia, admitted with new Mobitz Type 1 block. Cleared by cardiology for AVF.  Nephrology following as well.      Plan  - pending placement at outpatient dialysis center  - PPD to be read tomorrow  - patient's sister in law at bedside state that he coughed a little bit when he was eating, SLP eval tomorrow  - R knee pain - some swelling noted at the knee, without any associated erythema or warmth.  Possible a small effusion, follow up xray.     35 minutes spent on this encounter, including review of chart, discussion with residents and consultants.

## 2024-02-16 NOTE — PROGRESS NOTE ADULT - SUBJECTIVE AND OBJECTIVE BOX
CARDIOLOGY ATTENDING PROGRESS NOTE    Subjective:    No acute events overnight.   ROS negative.       Current Medications:   acetaminophen     Tablet .. 650 milliGRAM(s) Oral every 6 hours PRN  atorvastatin 10 milliGRAM(s) Oral at bedtime  calcitriol   Capsule 0.25 MICROGram(s) Oral daily  dextrose 5%. 1000 milliLiter(s) IV Continuous <Continuous>  dextrose 5%. 1000 milliLiter(s) IV Continuous <Continuous>  dextrose 5%. 1000 milliLiter(s) IV Continuous <Continuous>  dextrose 50% Injectable 12.5 Gram(s) IV Push once  dextrose 50% Injectable 25 Gram(s) IV Push once  dextrose 50% Injectable 25 Gram(s) IV Push once  dextrose Oral Gel 15 Gram(s) Oral once PRN  glucagon  Injectable 1 milliGRAM(s) IntraMuscular once  influenza  Vaccine (HIGH DOSE) 0.7 milliLiter(s) IntraMuscular once  insulin lispro (ADMELOG) corrective regimen sliding scale   SubCutaneous Before meals and at bedtime  iron sucrose IVPB 200 milliGRAM(s) IV Intermittent every 24 hours  melatonin 3 milliGRAM(s) Oral at bedtime PRN  NIFEdipine XL 60 milliGRAM(s) Oral every 12 hours  ondansetron Injectable 4 milliGRAM(s) IV Push every 6 hours PRN  pantoprazole  Injectable 40 milliGRAM(s) IV Push every 12 hours  polyethylene glycol 3350 17 Gram(s) Oral at bedtime  PPD  5 Tuberculin Unit(s) Injectable 5 Unit(s) IntraDermal once  senna 2 Tablet(s) Oral at bedtime  sevelamer carbonate 800 milliGRAM(s) Oral three times a day with meals  valsartan 160 milliGRAM(s) Oral every 24 hours      REVIEW OF SYSTEMS:  CONSTITUTIONAL: No weakness, fevers or chills  EYES/ENT: No visual changes;  No dysphagia  NECK: No pain or stiffness  RESPIRATORY: No cough, wheezing, hemoptysis; No shortness of breath  CARDIOVASCULAR: No chest pain or palpitations; No lower extremity edema  GASTROINTESTINAL: No abdominal or epigastric pain. No nausea, vomiting, or hematemesis; No diarrhea or constipation. No melena or hematochezia.  BACK: No back pain  GENITOURINARY: No dysuria, frequency or hematuria  NEUROLOGICAL: No numbness or weakness  SKIN: No itching, burning, rashes, or lesions   All other review of systems is negative unless indicated above.    Physical Exam:  T(F): 98.5 (02-16), Max: 98.5 (02-16)  HR: 64 (02-16) (48 - 64)  BP: 131/58 (02-16) (131/58 - 156/66)  BP(mean): 88 (02-15) (88 - 88)  ABP: --  ABP(mean): --  RR: 17 (02-16)  SpO2: 96% (02-16)  GENERAL: No acute distress, well-developed  HEAD:  Atraumatic, Normocephalic  ENT: EOMI, PERRLA, conjunctiva and sclera clear, Neck supple, No JVD, moist mucosa  CHEST/LUNG: Clear to auscultation bilaterally; No wheeze, equal breath sounds bilaterally   BACK: No spinal tenderness  HEART: Regular rate and rhythm; No murmurs, rubs, or gallops  ABDOMEN: Soft, Nontender, Nondistended; Bowel sounds present  EXTREMITIES:  No clubbing, cyanosis, or edema  PSYCH: Nl behavior, nl affect  NEUROLOGY: AAOx3, non-focal, cranial nerves intact  SKIN: Normal color, No rashes or lesions      Cardiovascular Diagnostic Testing: personally reviewed    CXR: Personally reviewed    Labs: Personally reviewed                        9.7    13.00 )-----------( 466      ( 16 Feb 2024 08:14 )             30.4     02-16    132<L>  |  94<L>  |  32<H>  ----------------------------<  122<H>  4.1   |  28  |  4.51<H>    Ca    8.6      16 Feb 2024 08:14  Phos  3.3     02-16  Mg     2.1     02-16    TPro  6.7  /  Alb  2.1<L>  /  TBili  <0.2  /  DBili  x   /  AST  23  /  ALT  15  /  AlkPhos  75  02-15        CARDIAC MARKERS ( 13 Feb 2024 16:50 )  153 ng/L / x     / x     / x     / x     / x      CARDIAC MARKERS ( 13 Feb 2024 05:30 )  169 ng/L / x     / x     / 33 U/L / x     / 1.1 ng/mL

## 2024-02-17 ENCOUNTER — TRANSCRIPTION ENCOUNTER (OUTPATIENT)
Age: 84
End: 2024-02-17

## 2024-02-17 VITALS
TEMPERATURE: 98 F | OXYGEN SATURATION: 94 % | SYSTOLIC BLOOD PRESSURE: 145 MMHG | HEART RATE: 63 BPM | DIASTOLIC BLOOD PRESSURE: 66 MMHG | RESPIRATION RATE: 17 BRPM

## 2024-02-17 LAB
ANION GAP SERPL CALC-SCNC: 13 MMOL/L — SIGNIFICANT CHANGE UP (ref 5–17)
BASOPHILS # BLD AUTO: 0.09 K/UL — SIGNIFICANT CHANGE UP (ref 0–0.2)
BASOPHILS NFR BLD AUTO: 0.6 % — SIGNIFICANT CHANGE UP (ref 0–2)
BUN SERPL-MCNC: 52 MG/DL — HIGH (ref 7–23)
CALCIUM SERPL-MCNC: 8.1 MG/DL — LOW (ref 8.4–10.5)
CHLORIDE SERPL-SCNC: 94 MMOL/L — LOW (ref 96–108)
CO2 SERPL-SCNC: 25 MMOL/L — SIGNIFICANT CHANGE UP (ref 22–31)
CREAT SERPL-MCNC: 6.64 MG/DL — HIGH (ref 0.5–1.3)
EGFR: 8 ML/MIN/1.73M2 — LOW
EOSINOPHIL # BLD AUTO: 0.26 K/UL — SIGNIFICANT CHANGE UP (ref 0–0.5)
EOSINOPHIL NFR BLD AUTO: 1.7 % — SIGNIFICANT CHANGE UP (ref 0–6)
GLUCOSE BLDC GLUCOMTR-MCNC: 136 MG/DL — HIGH (ref 70–99)
GLUCOSE BLDC GLUCOMTR-MCNC: 155 MG/DL — HIGH (ref 70–99)
GLUCOSE SERPL-MCNC: 183 MG/DL — HIGH (ref 70–99)
HCT VFR BLD CALC: 26.6 % — LOW (ref 39–50)
HGB BLD-MCNC: 8.6 G/DL — LOW (ref 13–17)
IMM GRANULOCYTES NFR BLD AUTO: 2.6 % — HIGH (ref 0–0.9)
LYMPHOCYTES # BLD AUTO: 1.09 K/UL — SIGNIFICANT CHANGE UP (ref 1–3.3)
LYMPHOCYTES # BLD AUTO: 7.3 % — LOW (ref 13–44)
MAGNESIUM SERPL-MCNC: 2.2 MG/DL — SIGNIFICANT CHANGE UP (ref 1.6–2.6)
MCHC RBC-ENTMCNC: 30.5 PG — SIGNIFICANT CHANGE UP (ref 27–34)
MCHC RBC-ENTMCNC: 32.3 GM/DL — SIGNIFICANT CHANGE UP (ref 32–36)
MCV RBC AUTO: 94.3 FL — SIGNIFICANT CHANGE UP (ref 80–100)
MONOCYTES # BLD AUTO: 2.24 K/UL — HIGH (ref 0–0.9)
MONOCYTES NFR BLD AUTO: 15 % — HIGH (ref 2–14)
NEUTROPHILS # BLD AUTO: 10.9 K/UL — HIGH (ref 1.8–7.4)
NEUTROPHILS NFR BLD AUTO: 72.8 % — SIGNIFICANT CHANGE UP (ref 43–77)
NRBC # BLD: 0 /100 WBCS — SIGNIFICANT CHANGE UP (ref 0–0)
PHOSPHATE SERPL-MCNC: 2.8 MG/DL — SIGNIFICANT CHANGE UP (ref 2.5–4.5)
PLATELET # BLD AUTO: 458 K/UL — HIGH (ref 150–400)
POTASSIUM SERPL-MCNC: 4.3 MMOL/L — SIGNIFICANT CHANGE UP (ref 3.5–5.3)
POTASSIUM SERPL-SCNC: 4.3 MMOL/L — SIGNIFICANT CHANGE UP (ref 3.5–5.3)
RBC # BLD: 2.82 M/UL — LOW (ref 4.2–5.8)
RBC # FLD: 12.5 % — SIGNIFICANT CHANGE UP (ref 10.3–14.5)
SODIUM SERPL-SCNC: 132 MMOL/L — LOW (ref 135–145)
WBC # BLD: 14.97 K/UL — HIGH (ref 3.8–10.5)
WBC # FLD AUTO: 14.97 K/UL — HIGH (ref 3.8–10.5)

## 2024-02-17 PROCEDURE — 82728 ASSAY OF FERRITIN: CPT

## 2024-02-17 PROCEDURE — 82803 BLOOD GASES ANY COMBINATION: CPT

## 2024-02-17 PROCEDURE — 80053 COMPREHEN METABOLIC PANEL: CPT

## 2024-02-17 PROCEDURE — 85025 COMPLETE CBC W/AUTO DIFF WBC: CPT

## 2024-02-17 PROCEDURE — 86480 TB TEST CELL IMMUN MEASURE: CPT

## 2024-02-17 PROCEDURE — 97116 GAIT TRAINING THERAPY: CPT

## 2024-02-17 PROCEDURE — 85045 AUTOMATED RETICULOCYTE COUNT: CPT

## 2024-02-17 PROCEDURE — 85610 PROTHROMBIN TIME: CPT

## 2024-02-17 PROCEDURE — 83036 HEMOGLOBIN GLYCOSYLATED A1C: CPT

## 2024-02-17 PROCEDURE — 83540 ASSAY OF IRON: CPT

## 2024-02-17 PROCEDURE — 84132 ASSAY OF SERUM POTASSIUM: CPT

## 2024-02-17 PROCEDURE — 82962 GLUCOSE BLOOD TEST: CPT

## 2024-02-17 PROCEDURE — 85027 COMPLETE CBC AUTOMATED: CPT

## 2024-02-17 PROCEDURE — 83735 ASSAY OF MAGNESIUM: CPT

## 2024-02-17 PROCEDURE — 77001 FLUOROGUIDE FOR VEIN DEVICE: CPT

## 2024-02-17 PROCEDURE — 97161 PT EVAL LOW COMPLEX 20 MIN: CPT

## 2024-02-17 PROCEDURE — 83615 LACTATE (LD) (LDH) ENZYME: CPT

## 2024-02-17 PROCEDURE — 99291 CRITICAL CARE FIRST HOUR: CPT

## 2024-02-17 PROCEDURE — 84466 ASSAY OF TRANSFERRIN: CPT

## 2024-02-17 PROCEDURE — 80061 LIPID PANEL: CPT

## 2024-02-17 PROCEDURE — 83010 ASSAY OF HAPTOGLOBIN QUANT: CPT

## 2024-02-17 PROCEDURE — 84484 ASSAY OF TROPONIN QUANT: CPT

## 2024-02-17 PROCEDURE — C1750: CPT

## 2024-02-17 PROCEDURE — 96374 THER/PROPH/DIAG INJ IV PUSH: CPT

## 2024-02-17 PROCEDURE — 36415 COLL VENOUS BLD VENIPUNCTURE: CPT

## 2024-02-17 PROCEDURE — 80074 ACUTE HEPATITIS PANEL: CPT

## 2024-02-17 PROCEDURE — C1769: CPT

## 2024-02-17 PROCEDURE — 82746 ASSAY OF FOLIC ACID SERUM: CPT

## 2024-02-17 PROCEDURE — 71045 X-RAY EXAM CHEST 1 VIEW: CPT

## 2024-02-17 PROCEDURE — 86850 RBC ANTIBODY SCREEN: CPT

## 2024-02-17 PROCEDURE — 82310 ASSAY OF CALCIUM: CPT

## 2024-02-17 PROCEDURE — 82553 CREATINE MB FRACTION: CPT

## 2024-02-17 PROCEDURE — 82607 VITAMIN B-12: CPT

## 2024-02-17 PROCEDURE — 82947 ASSAY GLUCOSE BLOOD QUANT: CPT

## 2024-02-17 PROCEDURE — C1889: CPT

## 2024-02-17 PROCEDURE — 84295 ASSAY OF SERUM SODIUM: CPT

## 2024-02-17 PROCEDURE — 85730 THROMBOPLASTIN TIME PARTIAL: CPT

## 2024-02-17 PROCEDURE — 99239 HOSP IP/OBS DSCHRG MGMT >30: CPT

## 2024-02-17 PROCEDURE — 85014 HEMATOCRIT: CPT

## 2024-02-17 PROCEDURE — 90935 HEMODIALYSIS ONE EVALUATION: CPT

## 2024-02-17 PROCEDURE — 93005 ELECTROCARDIOGRAM TRACING: CPT

## 2024-02-17 PROCEDURE — 83970 ASSAY OF PARATHORMONE: CPT

## 2024-02-17 PROCEDURE — 76937 US GUIDE VASCULAR ACCESS: CPT

## 2024-02-17 PROCEDURE — 82306 VITAMIN D 25 HYDROXY: CPT

## 2024-02-17 PROCEDURE — 73560 X-RAY EXAM OF KNEE 1 OR 2: CPT

## 2024-02-17 PROCEDURE — 97165 OT EVAL LOW COMPLEX 30 MIN: CPT

## 2024-02-17 PROCEDURE — 84100 ASSAY OF PHOSPHORUS: CPT

## 2024-02-17 PROCEDURE — 84443 ASSAY THYROID STIM HORMONE: CPT

## 2024-02-17 PROCEDURE — 82330 ASSAY OF CALCIUM: CPT

## 2024-02-17 PROCEDURE — 36558 INSERT TUNNELED CV CATH: CPT

## 2024-02-17 PROCEDURE — 93306 TTE W/DOPPLER COMPLETE: CPT

## 2024-02-17 PROCEDURE — 86901 BLOOD TYPING SEROLOGIC RH(D): CPT

## 2024-02-17 PROCEDURE — 82550 ASSAY OF CK (CPK): CPT

## 2024-02-17 PROCEDURE — 83550 IRON BINDING TEST: CPT

## 2024-02-17 PROCEDURE — 86900 BLOOD TYPING SEROLOGIC ABO: CPT

## 2024-02-17 PROCEDURE — 80048 BASIC METABOLIC PNL TOTAL CA: CPT

## 2024-02-17 PROCEDURE — 74018 RADEX ABDOMEN 1 VIEW: CPT

## 2024-02-17 RX ORDER — ERYTHROPOIETIN 10000 [IU]/ML
8000 INJECTION, SOLUTION INTRAVENOUS; SUBCUTANEOUS ONCE
Refills: 0 | Status: DISCONTINUED | OUTPATIENT
Start: 2024-02-17 | End: 2024-02-17

## 2024-02-17 RX ORDER — FUROSEMIDE 40 MG
1 TABLET ORAL
Refills: 0 | DISCHARGE

## 2024-02-17 RX ORDER — SODIUM POLYSTYRENE SULFONATE 4.1 MEQ/G
30 POWDER, FOR SUSPENSION ORAL
Refills: 0 | DISCHARGE

## 2024-02-17 RX ORDER — INSULIN GLARGINE 100 [IU]/ML
40 INJECTION, SOLUTION SUBCUTANEOUS
Refills: 0 | DISCHARGE

## 2024-02-17 RX ORDER — PANTOPRAZOLE SODIUM 20 MG/1
1 TABLET, DELAYED RELEASE ORAL
Qty: 28 | Refills: 0
Start: 2024-02-17 | End: 2024-03-01

## 2024-02-17 RX ORDER — PANTOPRAZOLE SODIUM 20 MG/1
1 TABLET, DELAYED RELEASE ORAL
Qty: 60 | Refills: 1
Start: 2024-02-17 | End: 2024-06-15

## 2024-02-17 RX ORDER — ERYTHROPOIETIN 10000 [IU]/ML
8000 INJECTION, SOLUTION INTRAVENOUS; SUBCUTANEOUS ONCE
Refills: 0 | Status: COMPLETED | OUTPATIENT
Start: 2024-02-17 | End: 2024-02-17

## 2024-02-17 RX ADMIN — SEVELAMER CARBONATE 800 MILLIGRAM(S): 2400 POWDER, FOR SUSPENSION ORAL at 14:44

## 2024-02-17 RX ADMIN — ERYTHROPOIETIN 8000 UNIT(S): 10000 INJECTION, SOLUTION INTRAVENOUS; SUBCUTANEOUS at 12:19

## 2024-02-17 RX ADMIN — PANTOPRAZOLE SODIUM 40 MILLIGRAM(S): 20 TABLET, DELAYED RELEASE ORAL at 07:57

## 2024-02-17 RX ADMIN — Medication 2: at 08:35

## 2024-02-17 RX ADMIN — Medication 60 MILLIGRAM(S): at 06:24

## 2024-02-17 RX ADMIN — CALCITRIOL 0.25 MICROGRAM(S): 0.5 CAPSULE ORAL at 14:43

## 2024-02-17 RX ADMIN — SEVELAMER CARBONATE 800 MILLIGRAM(S): 2400 POWDER, FOR SUSPENSION ORAL at 08:38

## 2024-02-17 RX ADMIN — VALSARTAN 160 MILLIGRAM(S): 80 TABLET ORAL at 14:43

## 2024-02-17 NOTE — PROGRESS NOTE ADULT - PROBLEM SELECTOR PROBLEM 9
Hyperlipidemia
Prophylactic measure
Hyperlipidemia
Prophylactic measure
Hyperlipidemia
Prophylactic measure

## 2024-02-17 NOTE — PROGRESS NOTE ADULT - PROBLEM SELECTOR PLAN 2
2/11 new-onset coffee-ground emesis, pts vitals wnl, hds, ordered cbc, t&s, no signs of acute bleeding, additional event around 6:50pm - started pt on iv protonics bid & clear liquid diet  2/11 overnight pt had one episode of coffee-ground emesis, VS stable, comfortable-appearing  gi consulted, recommendations appreciated    - continue iv protonix bid  - EGD w/ a single cratered clean based non-bleeding ulcer was found in the body lesser curve.  - c/w Protonix 40 mg BID x 2 weeks, the once daily thereafter  - Needs f/u EGD in 8 weeks  - outpatient GI f/u with [facility]

## 2024-02-17 NOTE — PROGRESS NOTE ADULT - PROBLEM SELECTOR PLAN 7
Multi year history of hypertension. On admission, presented with /57. Well controlled on home valsartan 160mg qd, lasix 80mg tid, nifedipine 60mg bid.  lasix discontinued 2/11, per nephrology (given initiation of dialysis)    - c/w nifedipine 60mg PO BID  - c/w Valsartan 160mg   - CTM for CP/CT  - encourage low salt in diet

## 2024-02-17 NOTE — PROGRESS NOTE ADULT - SUBJECTIVE AND OBJECTIVE BOX
INTERVAL EVENTS: No o/n events. Denies CP, dyspnea, palpitations, presyncope, syncope, f/c/n/v. S/p HD today.    REVIEW OF SYSTEMS:  Constitutional:     [X] negative [ ] fevers [ ] chills [ ] weight loss [ ] weight gain  HEENT:                  [X] negative [ ] dry eyes [ ] eye irritation [ ] postnasal drip [ ] nasal congestion  CV:                         [X] negative  [ ] chest pain [ ] orthopnea [ ] palpitations [ ] murmur  Resp:                     [X] negative [ ] cough [ ] shortness of breath [ ] wheezing [ ] sputum [ ] hemoptysis  GI:                          [X] negative [ ] nausea [ ] vomiting [ ] diarrhea [ ] constipation [ ] abd pain [ ] dysphagia   :                        [X] negative [ ] dysuria [ ] nocturia [ ] hematuria [ ] increased urinary frequency  MSK:                      [X] negative [ ] back pain [ ] myalgias [ ] arthralgias [ ] fracture  Skin:                       [X] negative [ ] rash [ ] itch  Neuro:                   [X] negative [ ] headache [ ] dizziness [ ] syncope [ ] weakness [ ] numbness  Psych:                    [X] negative [ ] anxiety [ ] depression  Endo:                     [X] negative [ ] diabetes [ ] thyroid problem  Heme/Lymph:      [X] negative [ ] anemia [ ] bleeding problem  Allergic/Immune: [X] negative [ ] itchy eyes [ ] nasal discharge [ ] hives [ ] angioedema    [X] All other systems negative or otherwise described above.  [ ] Unable to assess ROS due to ________.    PAST MEDICAL & SURGICAL HISTORY:  H/O shoulder surgery    H/O shoulder surgery    HTN (hypertension)    HLD (hyperlipidemia)    DM (diabetes mellitus)    Chronic kidney disease (CKD)  stage V    Anemia    Glaucoma    Mobitz type 1 second degree AV block    H/O shoulder surgery  left    H/O knee surgery  left    H/O eye surgery  b/l glaucoma      MEDICATIONS  (STANDING):  atorvastatin 10 milliGRAM(s) Oral at bedtime  calcitriol   Capsule 0.25 MICROGram(s) Oral daily  dextrose 5%. 1000 milliLiter(s) (50 mL/Hr) IV Continuous <Continuous>  dextrose 5%. 1000 milliLiter(s) (100 mL/Hr) IV Continuous <Continuous>  dextrose 5%. 1000 milliLiter(s) (20 mL/Hr) IV Continuous <Continuous>  dextrose 50% Injectable 25 Gram(s) IV Push once  dextrose 50% Injectable 12.5 Gram(s) IV Push once  dextrose 50% Injectable 25 Gram(s) IV Push once  epoetin alba (EPOGEN) Injectable 8000 Unit(s) IV Push once  glucagon  Injectable 1 milliGRAM(s) IntraMuscular once  influenza  Vaccine (HIGH DOSE) 0.7 milliLiter(s) IntraMuscular once  insulin lispro (ADMELOG) corrective regimen sliding scale   SubCutaneous Before meals and at bedtime  iron sucrose IVPB 200 milliGRAM(s) IV Intermittent every 24 hours  NIFEdipine XL 60 milliGRAM(s) Oral every 12 hours  pantoprazole  Injectable 40 milliGRAM(s) IV Push every 12 hours  polyethylene glycol 3350 17 Gram(s) Oral at bedtime  PPD  5 Tuberculin Unit(s) Injectable 5 Unit(s) IntraDermal once  senna 2 Tablet(s) Oral at bedtime  sevelamer carbonate 800 milliGRAM(s) Oral three times a day with meals  valsartan 160 milliGRAM(s) Oral every 24 hours    MEDICATIONS  (PRN):  acetaminophen     Tablet .. 650 milliGRAM(s) Oral every 6 hours PRN Temp greater or equal to 38C (100.4F), Moderate Pain (4 - 6)  dextrose Oral Gel 15 Gram(s) Oral once PRN Blood Glucose LESS THAN 70 milliGRAM(s)/deciliter  melatonin 3 milliGRAM(s) Oral at bedtime PRN Insomnia  ondansetron Injectable 4 milliGRAM(s) IV Push every 6 hours PRN Nausea and/or Vomiting    ICU Vital Signs Last 24 Hrs  T(C): 36.8 (2024 14:30), Max: 37.3 (2024 20:26)  T(F): 98.2 (2024 14:30), Max: 99.2 (2024 09:15)  HR: 63 (2024 14:30) (63 - 80)  BP: 145/66 (2024 14:30) (126/61 - 166/77)  BP(mean): 99 (2024 20:26) (99 - 99)  ABP: --  ABP(mean): --  RR: 17 (2024 14:30) (17 - 18)  SpO2: 94% (2024 14:30) (94% - 98%)    O2 Parameters below as of 2024 14:30  Patient On (Oxygen Delivery Method): room air          Orthostatic VS    Daily     Daily Weight in k.6 (2024 12:15)  I&O's Summary    2024 07:01  -  2024 18:04  --------------------------------------------------------  IN: 0 mL / OUT: 500 mL / NET: -500 mL        PHYSICAL EXAM:  GENERAL: No acute distress, well-developed  ENT: EOMI, conjunctiva and sclera clear, Neck supple, No JVD, moist mucosa  CHEST/LUNG: Clear to auscultation bilaterally; No wheeze, equal breath sounds bilaterally  HEART: Regular rate and rhythm; No murmurs, rubs, or gallops, radial and DP 2+ b/l, euvolemic  ABDOMEN: Soft, Nontender, Nondistended  EXTREMITIES:  No clubbing, cyanosis, or edema  NEUROLOGY: AAOx3, non-focal  SKIN: Normal color, mild edema of the R forearm    LABS:                        8.6    14.97 )-----------( 458      ( 2024 10:34 )             26.6       02-17    132<L>  |  94<L>  |  52<H>  ----------------------------<  183<H>  4.3   |  25  |  6.64<H>    Ca    8.1<L>      2024 10:34  Phos  2.8     02-17  Mg     2.2     02-17    TPro  6.7  /  Alb  2.1<L>  /  TBili  <0.2  /  DBili  x   /  AST  23  /  ALT  15  /  AlkPhos  75  02-15      Urinalysis Basic - ( 2024 10:34 )    Color: x / Appearance: x / SG: x / pH: x  Gluc: 183 mg/dL / Ketone: x  / Bili: x / Urobili: x   Blood: x / Protein: x / Nitrite: x   Leuk Esterase: x / RBC: x / WBC x   Sq Epi: x / Non Sq Epi: x / Bacteria: x          RADIOLOGY & ADDITIONAL STUDIES:    Other misc imaging:   < from: Xray Abdomen 1 View PORTABLE -Urgent (Xray Abdomen 1 View PORTABLE -Urgent .) (24 @ 16:41) >  Impression:  1. Nonobstructive bowel gas pattern.  2. Increased colonic stool burden compatible with constipation.    --- End of Report ---    < end of copied text >

## 2024-02-17 NOTE — DISCHARGE NOTE NURSING/CASE MANAGEMENT/SOCIAL WORK - PATIENT PORTAL LINK FT
You can access the FollowMyHealth Patient Portal offered by [facility] by registering at the following website: [email or web address] By joining [employer]’s FollowMyHealth portal, you will also be able to view your health information using other applications (apps) compatible with our system.

## 2024-02-17 NOTE — PROGRESS NOTE ADULT - PROBLEM/PLAN-8
DISPLAY PLAN FREE TEXT
No
DISPLAY PLAN FREE TEXT

## 2024-02-17 NOTE — PROGRESS NOTE ADULT - PROBLEM SELECTOR PLAN 6
Known history of T2DM. Insulin lantus 40u qhs as home medication. Last A1c unknown.    - moderate ISF-25 lispro sliding scale AC+qHS  - monitor FS  - consistent carb diet

## 2024-02-17 NOTE — PROGRESS NOTE ADULT - ASSESSMENT
Mr. Anguiano is an 82yo M PMH of HTN, DM, HLD, CKD5, who presented with hyperkalemia found on lab work during pre-op for an AVF, admitted for management of hyperkalemia & mobitz 1 prior to avf placement for new hd, now sp successful avf placement but w new coffee-ground emesis.

## 2024-02-17 NOTE — PROGRESS NOTE ADULT - PROVIDER SPECIALTY LIST ADULT
Cardiology
Internal Medicine
Nephrology
Vascular Surgery
Gastroenterology
Nephrology
Cardiology
Gastroenterology
Nephrology
Gastroenterology
Nephrology
Hospitalist
Internal Medicine
Hospitalist
Internal Medicine

## 2024-02-17 NOTE — PROGRESS NOTE ADULT - PROBLEM SELECTOR PLAN 8
Labile WBC (initially uptrending, then downtrending, now uptrending again). No systemic symptoms, patient denies fever, chills, nausea, vomiting, weight loss, wheezing, night sweats  Protein normal, albumin low    - continue to monitor  - DEBBI  - patient to follow up with outpatient PCP for continued monitoring

## 2024-02-17 NOTE — PROGRESS NOTE ADULT - PROBLEM SELECTOR PLAN 1
now dialysis dependent  for hemodialysis today for clearance and minimal UF (~500cc) as tolerated  EPO 8000U IV with hemodialysis  hold anti-hypertensives pre dialysis

## 2024-02-17 NOTE — DISCHARGE NOTE NURSING/CASE MANAGEMENT/SOCIAL WORK - NSDCPEFALRISK_GEN_ALL_CORE
For information on Fall & Injury Prevention, visit: [email or web address] OR  [email or web address] OR  https://www.cdc.gov/steadi/patient.html

## 2024-02-17 NOTE — DISCHARGE NOTE NURSING/CASE MANAGEMENT/SOCIAL WORK - NSDCFUADDAPPT_GEN_ALL_CORE_FT
Gastroenterology Clinic  [address] Floor, [place] [address] (phone: [phone])    Please follow up in 2 months, to repeat Endoscopy & ensure resolution of your stomach ulcer.          Please bring your Insurance card, Photo ID and Discharge paperwork to the following appointment:    (1) Please follow up with your Vascular Surgery Provider, Dr. Dc Lai at [address], [place] [address] on 2/26/2024 at 10:15am.    Appointment was scheduled by Ms. DAWSON Donnelly, Referral Coordinator.

## 2024-02-17 NOTE — PROGRESS NOTE ADULT - PROBLEM SELECTOR PLAN 1
Admission Cr: 8.76 (baseline is unclear, though patient likely at baseline given known CKD stage 5 now requiring chronic HD)  hyperkalemia on admission requiring insulin, dextrose, and lokelma; currently stable    - avoid nephrotoxic agents  - continue sodium bicarb 1300mg PO TID  - continue vitamin D3 0.25mcg PO qd; follow up vitamin d levels  - continue renvela 1800mg PO TID  - continue renal diet with 1.2L fluid restriction  - regular hemodialysis per nephrology; s/p HD today

## 2024-02-17 NOTE — PROGRESS NOTE ADULT - PROBLEM SELECTOR PLAN 5
Telemetry reviewed, patient wih prolonged HI interval or episodes of second degree AV block (Wenckebach)  No episodes of Mobitz Type II noted on telemetry.     - outpatient f/u with cardiology, recommend outpatient evaluation inc echo

## 2024-02-17 NOTE — PROGRESS NOTE ADULT - SUBJECTIVE AND OBJECTIVE BOX
CC: HYPERKALEMIA        INTERVAL HISTORY: sitting up in bed eating breakfast  offers no complaints      ROS: No chest pain, no sob, no abd pain. No n/v/d    PAST MEDICAL & SURGICAL HISTORY:  H/O shoulder surgery    H/O shoulder surgery    HTN (hypertension)    HLD (hyperlipidemia)    DM (diabetes mellitus)    Chronic kidney disease (CKD)  stage V    Anemia    Glaucoma    Mobitz type 1 second degree AV block    H/O shoulder surgery  left    H/O knee surgery  left    H/O eye surgery  b/l glaucoma        PHYSICAL EXAM:  T(C): 37.3 (02-17-24 @ 06:08), Max: 37.3 (02-16-24 @ 20:26)  HR: 77 (02-17-24 @ 06:08)  BP: 166/77 (02-17-24 @ 06:08) (101/64 - 166/77)  RR: 17 (02-17-24 @ 06:08)  SpO2: 96% (02-17-24 @ 06:08)  Wt(kg): --  I&O's Summary    Weight 83.9 (02-15 @ 12:53)  General: AAO x 3,  NAD.  HEENT: moist mucous membranes, no pallor/cyanosis.  Neck: no JVD visible.  Cardiac: S1, S2. RRR. No murmurs   Respratory: CTA b/l, no access muscle use.   Abdomen: soft. nontender. nondistended  Skin: no rashes.  Extremities: no LE edema b/l  Access: R PC      DATA:                        9.7<L>  13.00<H> )-----------( 466<H>    ( 16 Feb 2024 08:14 )             30.4<L>    Ferritin: 345 ng/mL (02-07 @ 05:30)      132<L>  |  94<L>  |  32<H>  ----------------------------<  122<H>  Ca:8.6   (16 Feb 2024 08:14)  4.1     |  28     |  4.51<H>        TPro  6.7    /  Alb  2.1<L>  /  TBili  <0.2   /  DBili  x      /  AST  23     /  ALT  15     /  AlkPhos  75     15 Feb 2024 23:00      Vitamin D, 25-Hydroxy: 11.6 ng/mL *L* [30.0 - 80.0] (02-11 @ 10:03)    Urinalysis Basic - ( 16 Feb 2024 08:14 )  Color: x / Appearance: x / SG: x / pH: x  Gluc: 122 mg/dL<H> / Ketone: x  / Bili: x / Urobili: x   Blood: x / Protein: x / Nitrite: x   Leuk Esterase: x / RBC: x / WBC x   Sq Epi: x / Non Sq Epi: x / Bacteria: x                MEDICATIONS  (STANDING):  atorvastatin 10 milliGRAM(s) Oral at bedtime  calcitriol   Capsule 0.25 MICROGram(s) Oral daily  dextrose 5%. 1000 milliLiter(s) (50 mL/Hr) IV Continuous <Continuous>  dextrose 5%. 1000 milliLiter(s) (100 mL/Hr) IV Continuous <Continuous>  dextrose 5%. 1000 milliLiter(s) (20 mL/Hr) IV Continuous <Continuous>  dextrose 50% Injectable 25 Gram(s) IV Push once  dextrose 50% Injectable 12.5 Gram(s) IV Push once  dextrose 50% Injectable 25 Gram(s) IV Push once  glucagon  Injectable 1 milliGRAM(s) IntraMuscular once  influenza  Vaccine (HIGH DOSE) 0.7 milliLiter(s) IntraMuscular once  insulin lispro (ADMELOG) corrective regimen sliding scale   SubCutaneous Before meals and at bedtime  iron sucrose IVPB 200 milliGRAM(s) IV Intermittent every 24 hours  NIFEdipine XL 60 milliGRAM(s) Oral every 12 hours  pantoprazole  Injectable 40 milliGRAM(s) IV Push every 12 hours  polyethylene glycol 3350 17 Gram(s) Oral at bedtime  PPD  5 Tuberculin Unit(s) Injectable 5 Unit(s) IntraDermal once  senna 2 Tablet(s) Oral at bedtime  sevelamer carbonate 800 milliGRAM(s) Oral three times a day with meals  valsartan 160 milliGRAM(s) Oral every 24 hours    MEDICATIONS  (PRN):  acetaminophen     Tablet .. 650 milliGRAM(s) Oral every 6 hours PRN Temp greater or equal to 38C (100.4F), Moderate Pain (4 - 6)  dextrose Oral Gel 15 Gram(s) Oral once PRN Blood Glucose LESS THAN 70 milliGRAM(s)/deciliter  melatonin 3 milliGRAM(s) Oral at bedtime PRN Insomnia  ondansetron Injectable 4 milliGRAM(s) IV Push every 6 hours PRN Nausea and/or Vomiting

## 2024-02-17 NOTE — PROGRESS NOTE ADULT - REASON FOR ADMISSION
Hyperkalemia/Mobitz Type I

## 2024-02-17 NOTE — PROGRESS NOTE ADULT - ASSESSMENT
84yo M PMH of HTN, DM, HLD, CKD Stage 5, who presented with hyperkalemia found on lab work during pre-op for an AVF, admitted for management of hyperkalemia & mobitz 1 prior to avf placement for new hd, now sp successful avf placement but w new coffee-ground emesis.

## 2024-02-22 DIAGNOSIS — K25.4 CHRONIC OR UNSPECIFIED GASTRIC ULCER WITH HEMORRHAGE: ICD-10-CM

## 2024-02-22 DIAGNOSIS — Z79.4 LONG TERM (CURRENT) USE OF INSULIN: ICD-10-CM

## 2024-02-22 DIAGNOSIS — T82.868A THROMBOSIS DUE TO VASCULAR PROSTHETIC DEVICES, IMPLANTS AND GRAFTS, INITIAL ENCOUNTER: ICD-10-CM

## 2024-02-22 DIAGNOSIS — K59.00 CONSTIPATION, UNSPECIFIED: ICD-10-CM

## 2024-02-22 DIAGNOSIS — R26.0 ATAXIC GAIT: ICD-10-CM

## 2024-02-22 DIAGNOSIS — E11.649 TYPE 2 DIABETES MELLITUS WITH HYPOGLYCEMIA WITHOUT COMA: ICD-10-CM

## 2024-02-22 DIAGNOSIS — I44.1 ATRIOVENTRICULAR BLOCK, SECOND DEGREE: ICD-10-CM

## 2024-02-22 DIAGNOSIS — N18.6 END STAGE RENAL DISEASE: ICD-10-CM
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
## 2024-02-22 DIAGNOSIS — I12.0 HYPERTENSIVE CHRONIC KIDNEY DISEASE WITH STAGE 5 CHRONIC KIDNEY DISEASE OR END STAGE RENAL DISEASE: ICD-10-CM

## 2024-02-22 DIAGNOSIS — E11.22 TYPE 2 DIABETES MELLITUS WITH DIABETIC CHRONIC KIDNEY DISEASE: ICD-10-CM

## 2024-02-22 DIAGNOSIS — E78.5 HYPERLIPIDEMIA, UNSPECIFIED: ICD-10-CM

## 2024-02-22 DIAGNOSIS — K29.81 DUODENITIS WITH BLEEDING: ICD-10-CM

## 2024-02-22 DIAGNOSIS — E87.5 HYPERKALEMIA: ICD-10-CM

## 2024-02-22 DIAGNOSIS — D63.1 ANEMIA IN CHRONIC KIDNEY DISEASE: ICD-10-CM
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
## 2024-02-26 ENCOUNTER — APPOINTMENT (OUTPATIENT)
Dept: VASCULAR SURGERY | Facility: CLINIC | Age: 84
End: 2024-02-26
Payer: MEDICARE

## 2024-02-26 VITALS — HEART RATE: 93 BPM | SYSTOLIC BLOOD PRESSURE: 104 MMHG | DIASTOLIC BLOOD PRESSURE: 57 MMHG

## 2024-02-26 PROCEDURE — 93990 DOPPLER FLOW TESTING: CPT

## 2024-02-26 PROCEDURE — 99024 POSTOP FOLLOW-UP VISIT: CPT

## 2024-02-27 NOTE — PHYSICAL EXAM
[2+] : left 2+ [Alert] : alert [Calm] : calm [de-identified] : NAD [de-identified] : grossly intact [FreeTextEntry1] : LUE with well healed incision, no thrill appreciated, no neurologic deficits.

## 2024-02-27 NOTE — ADDENDUM
[FreeTextEntry1] : I, Dr. Dc Lai, personally performed the evaluation and management (E/M) services for this established patient who presents today with (a) new problem(s)/exacerbation of (an) existing condition(s).  That E/M includes conducting the examination, assessing all new/exacerbated conditions, and establishing a new plan of care.  Today, my ACP, Odalys RAYA, was here to observe my evaluation and management services for this new problem/exacerbated condition to be followed going forward.

## 2024-02-27 NOTE — DISCUSSION/SUMMARY
[FreeTextEntry1] : 83yoM, Lithuanian speaking w/ hx of HTN, T2DM, HLD, ESRD on HD via permcath, now s/p L AVF creation on 2/9/24 returns for a post-op visit. He is doing well, denies left arm pain, edema, neurologic deficits, fever, chills. On exam, LUE with well healed incision, no thrill appreciated, no neurologic deficits. LUE US was done in the office that demonstrated no flow at the anastomosis, outflow cephalic vein is patent, inflow brachial artery with pulsatile flow. We discussed the findings and recommended to proceed with fistulogram/thrombectomy later this week, explained also that he might need open revision as well. RABs were discussed, all questions were answered. Patient is in agreement and would like to proceed.

## 2024-02-27 NOTE — REASON FOR VISIT
[de-identified] : Creation of a left brachiocephalic arteriovenous fistula. [de-identified] : 2/9/24 [Spouse] : spouse [de-identified] : 83yoM, who was electively scheduled for creation of a left arm AV fistula on 2/6/24 was noted to have hyperkalemia.  He was then admitted for dialysis initiation, now s/p a tunneled dialysis catheter. On 2/9/24 he underwent L AVF creation and returns today for a post-op visit. He is feeling well, denies left arm pain, edema, neurologic deficits, fever, chills.

## 2024-02-28 ENCOUNTER — APPOINTMENT (OUTPATIENT)
Dept: NEPHROLOGY | Facility: CLINIC | Age: 84
End: 2024-02-28

## 2024-03-02 ENCOUNTER — INPATIENT (INPATIENT)
Facility: HOSPITAL | Age: 84
LOS: 4 days | Discharge: HOME HEALTH | DRG: 228 | End: 2024-03-07
Attending: INTERNAL MEDICINE | Admitting: INTERNAL MEDICINE
Payer: MEDICARE

## 2024-03-02 VITALS
OXYGEN SATURATION: 94 % | WEIGHT: 150.58 LBS | RESPIRATION RATE: 16 BRPM | HEIGHT: 78 IN | TEMPERATURE: 98 F | SYSTOLIC BLOOD PRESSURE: 191 MMHG | HEART RATE: 42 BPM | DIASTOLIC BLOOD PRESSURE: 81 MMHG

## 2024-03-02 DIAGNOSIS — Z98.890 OTHER SPECIFIED POSTPROCEDURAL STATES: Chronic | ICD-10-CM

## 2024-03-02 DIAGNOSIS — E78.5 HYPERLIPIDEMIA, UNSPECIFIED: ICD-10-CM

## 2024-03-02 DIAGNOSIS — N18.6 END STAGE RENAL DISEASE: ICD-10-CM

## 2024-03-02 DIAGNOSIS — Z29.9 ENCOUNTER FOR PROPHYLACTIC MEASURES, UNSPECIFIED: ICD-10-CM

## 2024-03-02 DIAGNOSIS — D64.9 ANEMIA, UNSPECIFIED: ICD-10-CM

## 2024-03-02 DIAGNOSIS — E11.9 TYPE 2 DIABETES MELLITUS WITHOUT COMPLICATIONS: ICD-10-CM

## 2024-03-02 DIAGNOSIS — I16.0 HYPERTENSIVE URGENCY: ICD-10-CM

## 2024-03-02 DIAGNOSIS — I44.1 ATRIOVENTRICULAR BLOCK, SECOND DEGREE: ICD-10-CM

## 2024-03-02 LAB
ADD ON TEST-SPECIMEN IN LAB: SIGNIFICANT CHANGE UP
ANION GAP SERPL CALC-SCNC: 10 MMOL/L — SIGNIFICANT CHANGE UP (ref 5–17)
BASE EXCESS BLDV CALC-SCNC: 5.3 MMOL/L — HIGH (ref -2–3)
BUN SERPL-MCNC: 19 MG/DL — SIGNIFICANT CHANGE UP (ref 7–23)
CA-I SERPL-SCNC: SIGNIFICANT CHANGE UP MMOL/L (ref 1.15–1.33)
CALCIUM SERPL-MCNC: 8.7 MG/DL — SIGNIFICANT CHANGE UP (ref 8.4–10.5)
CHLORIDE SERPL-SCNC: 97 MMOL/L — SIGNIFICANT CHANGE UP (ref 96–108)
CO2 BLDV-SCNC: 32.6 MMOL/L — HIGH (ref 22–26)
CO2 SERPL-SCNC: 29 MMOL/L — SIGNIFICANT CHANGE UP (ref 22–31)
CREAT SERPL-MCNC: 2.89 MG/DL — HIGH (ref 0.5–1.3)
EGFR: 21 ML/MIN/1.73M2 — LOW
GAS PNL BLDV: 133 MMOL/L — LOW (ref 136–145)
GAS PNL BLDV: SIGNIFICANT CHANGE UP
GLUCOSE BLDC GLUCOMTR-MCNC: 220 MG/DL — HIGH (ref 70–99)
GLUCOSE SERPL-MCNC: 132 MG/DL — HIGH (ref 70–99)
HCO3 BLDV-SCNC: 31 MMOL/L — HIGH (ref 22–29)
HCT VFR BLD CALC: 30.5 % — LOW (ref 39–50)
HGB BLD-MCNC: 9.6 G/DL — LOW (ref 13–17)
LACTATE SERPL-SCNC: 0.9 MMOL/L — SIGNIFICANT CHANGE UP (ref 0.5–2)
MAGNESIUM SERPL-MCNC: 1.9 MG/DL — SIGNIFICANT CHANGE UP (ref 1.6–2.6)
MCHC RBC-ENTMCNC: 30.2 PG — SIGNIFICANT CHANGE UP (ref 27–34)
MCHC RBC-ENTMCNC: 31.5 GM/DL — LOW (ref 32–36)
MCV RBC AUTO: 95.9 FL — SIGNIFICANT CHANGE UP (ref 80–100)
NRBC # BLD: 0 /100 WBCS — SIGNIFICANT CHANGE UP (ref 0–0)
PCO2 BLDV: 49 MMHG — SIGNIFICANT CHANGE UP (ref 42–55)
PH BLDV: 7.41 — SIGNIFICANT CHANGE UP (ref 7.32–7.43)
PHOSPHATE SERPL-MCNC: 1.7 MG/DL — LOW (ref 2.5–4.5)
PLATELET # BLD AUTO: 493 K/UL — HIGH (ref 150–400)
PO2 BLDV: <33 MMHG — LOW (ref 25–45)
POTASSIUM BLDV-SCNC: 4.1 MMOL/L — SIGNIFICANT CHANGE UP (ref 3.5–5.1)
POTASSIUM SERPL-MCNC: 4.2 MMOL/L — SIGNIFICANT CHANGE UP (ref 3.5–5.3)
POTASSIUM SERPL-SCNC: 4.2 MMOL/L — SIGNIFICANT CHANGE UP (ref 3.5–5.3)
RBC # BLD: 3.18 M/UL — LOW (ref 4.2–5.8)
RBC # FLD: 13.2 % — SIGNIFICANT CHANGE UP (ref 10.3–14.5)
SAO2 % BLDV: 20.4 % — LOW (ref 67–88)
SODIUM SERPL-SCNC: 136 MMOL/L — SIGNIFICANT CHANGE UP (ref 135–145)
WBC # BLD: 11.83 K/UL — HIGH (ref 3.8–10.5)
WBC # FLD AUTO: 11.83 K/UL — HIGH (ref 3.8–10.5)

## 2024-03-02 PROCEDURE — 99291 CRITICAL CARE FIRST HOUR: CPT

## 2024-03-02 PROCEDURE — 71045 X-RAY EXAM CHEST 1 VIEW: CPT | Mod: 26

## 2024-03-02 RX ORDER — SODIUM CHLORIDE 9 MG/ML
1000 INJECTION, SOLUTION INTRAVENOUS
Refills: 0 | Status: DISCONTINUED | OUTPATIENT
Start: 2024-03-02 | End: 2024-03-07

## 2024-03-02 RX ORDER — DEXTROSE 50 % IN WATER 50 %
15 SYRINGE (ML) INTRAVENOUS ONCE
Refills: 0 | Status: DISCONTINUED | OUTPATIENT
Start: 2024-03-02 | End: 2024-03-07

## 2024-03-02 RX ORDER — HEPARIN SODIUM 5000 [USP'U]/ML
5000 INJECTION INTRAVENOUS; SUBCUTANEOUS EVERY 8 HOURS
Refills: 0 | Status: DISCONTINUED | OUTPATIENT
Start: 2024-03-02 | End: 2024-03-07

## 2024-03-02 RX ORDER — SODIUM,POTASSIUM PHOSPHATES 278-250MG
1 POWDER IN PACKET (EA) ORAL ONCE
Refills: 0 | Status: COMPLETED | OUTPATIENT
Start: 2024-03-02 | End: 2024-03-02

## 2024-03-02 RX ORDER — ATORVASTATIN CALCIUM 80 MG/1
10 TABLET, FILM COATED ORAL AT BEDTIME
Refills: 0 | Status: DISCONTINUED | OUTPATIENT
Start: 2024-03-02 | End: 2024-03-07

## 2024-03-02 RX ORDER — DEXTROSE 50 % IN WATER 50 %
12.5 SYRINGE (ML) INTRAVENOUS ONCE
Refills: 0 | Status: DISCONTINUED | OUTPATIENT
Start: 2024-03-02 | End: 2024-03-07

## 2024-03-02 RX ORDER — HYDRALAZINE HCL 50 MG
5 TABLET ORAL EVERY 6 HOURS
Refills: 0 | Status: DISCONTINUED | OUTPATIENT
Start: 2024-03-02 | End: 2024-03-03

## 2024-03-02 RX ORDER — INSULIN LISPRO 100/ML
VIAL (ML) SUBCUTANEOUS
Refills: 0 | Status: DISCONTINUED | OUTPATIENT
Start: 2024-03-02 | End: 2024-03-07

## 2024-03-02 RX ORDER — GLUCAGON INJECTION, SOLUTION 0.5 MG/.1ML
1 INJECTION, SOLUTION SUBCUTANEOUS ONCE
Refills: 0 | Status: DISCONTINUED | OUTPATIENT
Start: 2024-03-02 | End: 2024-03-07

## 2024-03-02 RX ORDER — HYDRALAZINE HCL 50 MG
25 TABLET ORAL ONCE
Refills: 0 | Status: COMPLETED | OUTPATIENT
Start: 2024-03-02 | End: 2024-03-02

## 2024-03-02 RX ORDER — DEXTROSE 50 % IN WATER 50 %
25 SYRINGE (ML) INTRAVENOUS ONCE
Refills: 0 | Status: DISCONTINUED | OUTPATIENT
Start: 2024-03-02 | End: 2024-03-07

## 2024-03-02 RX ORDER — SEVELAMER CARBONATE 2400 MG/1
800 POWDER, FOR SUSPENSION ORAL
Refills: 0 | Status: DISCONTINUED | OUTPATIENT
Start: 2024-03-02 | End: 2024-03-07

## 2024-03-02 RX ORDER — INSULIN GLARGINE 100 [IU]/ML
15 INJECTION, SOLUTION SUBCUTANEOUS AT BEDTIME
Refills: 0 | Status: DISCONTINUED | OUTPATIENT
Start: 2024-03-02 | End: 2024-03-07

## 2024-03-02 RX ORDER — VALSARTAN 80 MG/1
160 TABLET ORAL EVERY 24 HOURS
Refills: 0 | Status: DISCONTINUED | OUTPATIENT
Start: 2024-03-03 | End: 2024-03-06

## 2024-03-02 RX ORDER — PANTOPRAZOLE SODIUM 20 MG/1
40 TABLET, DELAYED RELEASE ORAL
Refills: 0 | Status: DISCONTINUED | OUTPATIENT
Start: 2024-03-02 | End: 2024-03-07

## 2024-03-02 RX ORDER — VALSARTAN 80 MG/1
160 TABLET ORAL EVERY 24 HOURS
Refills: 0 | Status: DISCONTINUED | OUTPATIENT
Start: 2024-03-02 | End: 2024-03-02

## 2024-03-02 RX ADMIN — ATORVASTATIN CALCIUM 10 MILLIGRAM(S): 80 TABLET, FILM COATED ORAL at 23:43

## 2024-03-02 RX ADMIN — Medication 1 PACKET(S): at 21:53

## 2024-03-02 RX ADMIN — HEPARIN SODIUM 5000 UNIT(S): 5000 INJECTION INTRAVENOUS; SUBCUTANEOUS at 23:43

## 2024-03-02 RX ADMIN — Medication 25 MILLIGRAM(S): at 21:00

## 2024-03-02 RX ADMIN — Medication 4: at 23:48

## 2024-03-02 RX ADMIN — Medication 5 MILLIGRAM(S): at 23:38

## 2024-03-02 RX ADMIN — Medication 25 MILLIGRAM(S): at 19:41

## 2024-03-02 NOTE — PATIENT PROFILE ADULT - FALL HARM RISK - HARM RISK INTERVENTIONS
Assistance with ambulation/Assistance OOB with selected safe patient handling equipment/Communicate Risk of Fall with Harm to all staff/Discuss with provider need for PT consult/Monitor gait and stability/Provide patient with walking aids - walker, cane, crutches/Reinforce activity limits and safety measures with patient and family/Review medications for side effects contributing to fall risk/Sit up slowly, dangle for a short time, stand at bedside before walking/Tailored Fall Risk Interventions/Toileting schedule using arm’s reach rule for commode and bathroom/Visual Cue: Yellow wristband and red socks/Bed in lowest position, wheels locked, appropriate side rails in place/Call bell, personal items and telephone in reach/Instruct patient to call for assistance before getting out of bed or chair/Non-slip footwear when patient is out of bed/[place] to call system/Physically safe environment - no spills, clutter or unnecessary equipment/Purposeful Proactive Rounding/Room/bathroom lighting operational, light cord in reach

## 2024-03-02 NOTE — H&P ADULT - PROBLEM SELECTOR PLAN 2
Presents w/ hypertensive urgency w/ SBP >180. S/p hydralazine 20mg x2. Asymptomatic, lungs clear.   Home meds: Valsartan 160mg, Nifedipine 60mg BID    Plan:  - Start valsartan 160 mg   - Holding nifedipine 60mg BID  - hydralazine 5mg IVP prn SBP >180   - TTE Presents w/ hypertensive urgency w/ SBP >180. S/p hydralazine 20mg x2. Asymptomatic, lungs clear.   Home meds: Valsartan 160mg, Nifedipine 60mg BID    Plan:  - Start valsartan 160 mg   - Holding nifedipine 60mg BID  - hydralazine 25mg PO q8   - TTE

## 2024-03-02 NOTE — H&P ADULT - ASSESSMENT
Mr. Anguiano is an 84yo M w/ a PMH of HTN, DM, HLD, ESRD with recent initiation of HD (T/TH/Saturday) via tunneled cath who presents to the ED from HD center for asymptomatic bradycardia found to have Mobitz type 2 AV block and hypertensive urgency.

## 2024-03-02 NOTE — ED ADULT TRIAGE NOTE - CHIEF COMPLAINT QUOTE
as per EMS patient sent for low heart rate and high blood pressure, Patient HR in the 30's-40's and blood pressure in 190's systolic. Patient denies sob, dizziness, weakness, chest pain, patient had dialysis and completed tx today. Sister in law and son state patient had low heart rate on tuesday as well and was sent to [facility] but they "did nothing".

## 2024-03-02 NOTE — H&P ADULT - HISTORY OF PRESENT ILLNESS
Mr. Anguiano is an 82yo M w/ a PMH of HTN, DM, HLD, ESRD with recent initiation of HD (T/TH/Saturday) via tunneled cath who presents to the ED from HD center for asymptomatic bradycardia. Of note Mr. Anguiano was recently hospitalized 02/06/2024 - 02/17/2024 admitted for hyperkalemia, w/ AVF placement completed, course was c/b coffee ground emesis w/ a clean based non bleeding ulcer seen. Today patient was at HD center and completed session of dialysis w/o complications After completion he was noted to have HR in the 30's. Patient is asymptomatic denying chest pain, palpitations, dyspnea, lightheadedness, confusion, nausea, vomiting, constipation or diarrhea. He has not taken any of his medications today including Valsartan 160mg, nifedipine 60mg BID. Found in the ED to be in hypertensive urgency w/ /80. S/p Hydralazine PO 25mg x2. Otherwise patient denies headache, vision changes, fever, chills, cough or leg swelling.     ED; Afebrile, HR 40, /81, 94% RA  Labs s/f: WBC 11.83, Hgb 9.6, Lactate 0.9, Na 136, K+ 4.2, BUN 19/Cr 2.89, LFTs normal  CXR: unremarkable for acute disease   EKG: [place]z type 2 AV block  Interventions: Hydral 25mg PO x2

## 2024-03-02 NOTE — H&P ADULT - PROBLEM SELECTOR PLAN 7
F: None  E: Caution repletion, ESRD  Diet: Renal  GI: Protonix 40mg pre breakfast  DVT: heparin subq  Dispo: Cardiac tele

## 2024-03-02 NOTE — ED PROVIDER NOTE - CLINICAL SUMMARY MEDICAL DECISION MAKING FREE TEXT BOX
ekg with mobitz 2. transcutaneous pads placed. hypertensive, asymptomatic, as such will not intervene for now.

## 2024-03-02 NOTE — H&P ADULT - PROBLEM SELECTOR PLAN 4
History of type 2 DM w/ A1c 7.7% in 02/24. d/c'ed on Lantus 35units per patient and wife. Has not taken in a few days, endorses non compliance.     Plan:   - will give around 50% of home Lantus - 15U   - mISS   - diabetes education in patient

## 2024-03-02 NOTE — ED PROVIDER NOTE - PHYSICAL EXAMINATION
General: Awake, alert and oriented. No acute distress.   Skin:  Appropriate color for ethnicity  HENMT: head normocephalic and atraumatic  EYES: Conjunctiva clear. nonicteric sclera  Cardiac: well perfused  Respiratory: breathing comfortably on room air, tunneled catheter in right chest wall.   Abdominal: nondistended  MSK:  no visualized external signs of trauma. no apparent deficits in ROM of any extremity  Neurological: The patient is awake, alert and oriented with normal speech. CN 2-12 grossly intact. no apparent deficits. Memory is normal and thought process is intact. moving all extremities spontaneously   Psychiatric: Appropriate mood and affect. Good judgement and insight

## 2024-03-02 NOTE — H&P ADULT - NSHPLABSRESULTS_GEN_ALL_CORE
.  LABS:                         9.6    11.83 )-----------( 493      ( 02 Mar 2024 18:37 )             30.5     03-02    136  |  97  |  19  ----------------------------<  132<H>  4.2   |  29  |  2.89<H>    Ca    8.7      02 Mar 2024 18:37  Phos  1.7     03-02  Mg     1.9     03-02    TPro  7.4  /  Alb  3.3  /  TBili  <0.2  /  DBili  < 0.2  /  AST  22  /  ALT  17  /  AlkPhos  112  03-02      Urinalysis Basic - ( 02 Mar 2024 18:37 )    Color: x / Appearance: x / SG: x / pH: x  Gluc: 132 mg/dL / Ketone: x  / Bili: x / Urobili: x   Blood: x / Protein: x / Nitrite: x   Leuk Esterase: x / RBC: x / WBC x   Sq Epi: x / Non Sq Epi: x / Bacteria: x            Lactate, Blood: 0.9 mmol/L (03-02 @ 20:50)      RADIOLOGY, EKG & ADDITIONAL TESTS: Reviewed.

## 2024-03-02 NOTE — ED PROVIDER NOTE - OBJECTIVE STATEMENT
84yo M PMH of HTN (valsartan, nifedipine) DM, HLD, CKD Stage 5 on dialysis, last dialyzed today. presenting with bradycardia. patient went to [facility] today and was noted to be bradycardic. patient with no complaints states he is in usual state of health.

## 2024-03-02 NOTE — H&P ADULT - NSHPSOCIALHISTORY_GEN_ALL_CORE
Living situation: lives in a home with his wife and his son  Functional status: fully independent, no assistive walking devices  Recent travel: no  Occupation: retired   Tobacco use: none  EtOH use: has a beer at social events maybe once per month  Illicit drug use: none

## 2024-03-02 NOTE — ED PROVIDER NOTE - NSICDXPASTMEDICALHX_GEN_ALL_CORE_FT
PAST MEDICAL HISTORY:  Anemia     Chronic kidney disease (CKD) stage V    DM (diabetes mellitus)     Glaucoma     HLD (hyperlipidemia)     HTN (hypertension)     Mobitz type 1 second degree AV block

## 2024-03-02 NOTE — H&P ADULT - PROBLEM SELECTOR PLAN 3
ESRD on HD T/TH/Sat via tunnelled HD cath. Has YAMILETH AVF recently placed 02/2024. Last session today 3/2. Euvolemic on exam. Electrolytes stable.     Plan:  - HD per renal as scheduled   - c/w sevelamer TID  - Renal diet   - EPO per renal

## 2024-03-02 NOTE — H&P ADULT - PROBLEM SELECTOR PLAN 1
History of Mobitz type 1 block on previous admission. Presents with asymptomatic Mobitz type 2 block with rates in 30-40's. Unclear etiology. Non ischemic EKG. Lactate normal. No AV alberto blocking medications, on CCB however nifedipine.     Plan:  - Continuous tele monitoring w/ pacer pads on patient   - If unstable - transcutaneous pacing to temporize, caution w/ atropine   - Avoid alberto blocking agents  - On nifedipine 60mg BID, hold for now  - Check TSH

## 2024-03-02 NOTE — H&P ADULT - PROBLEM SELECTOR PLAN 5
Hx of anemia on EPO w/ HD. Iron studies done last admission. No c/f active bleeding.     Plan:  - EPO per HD  - Transfuse <7  - maintain active T&S

## 2024-03-02 NOTE — H&P ADULT - NSHPPHYSICALEXAM_GEN_ALL_CORE
.  VITAL SIGNS:  T(C): 36.7 (03-02-24 @ 21:28), Max: 36.7 (03-02-24 @ 21:28)  T(F): 98 (03-02-24 @ 21:28), Max: 98 (03-02-24 @ 21:28)  HR: 42 (03-02-24 @ 22:28) (35 - 42)  BP: 161/89 (03-02-24 @ 22:28) (158/78 - 200/95)  BP(mean): --  RR: 16 (03-02-24 @ 22:28) (16 - 16)  SpO2: 99% (03-02-24 @ 22:28) (94% - 99%)  Wt(kg): --    PHYSICAL EXAM:    Constitutional: resting comfortably in bed; NAD, speaking full sentences   Head: NC/AT  Eyes: PERRL, EOMI, anicteric sclera  ENT: no nasal discharge; uvula midline, no oropharyngeal erythema or exudates; MMM  Neck: supple; no JVD or thyromegaly  Respiratory: CTA B/L; no W/R/R, no retractions  Cardiac: +S1/S2; bradycardia; no M/R/G;   Gastrointestinal: abdomen soft, NT/ND; no rebound or guarding; +BSx4  Back: spine midline, no bony tenderness or step-offs; no CVAT B/L  Extremities: WWP, no clubbing or cyanosis; no peripheral edema  Musculoskeletal: NROM x4; no joint swelling, tenderness or erythema, tunneled cath in right upper chest wall   Vascular: 2+ radial, DP pulses B/L, LUE AC w/ palpable thrill   Dermatologic: skin warm, dry and intact; no rashes, wounds, or scars  Lymphatic: no submandibular or cervical LAD  Neurologic: AAOx3; CNII-XII grossly intact; no focal deficits  Psychiatric: affect and characteristics of appearance, verbalizations, behaviors are appropriate

## 2024-03-03 LAB
ALBUMIN SERPL ELPH-MCNC: 2.8 G/DL — LOW (ref 3.3–5)
ALP SERPL-CCNC: 92 U/L — SIGNIFICANT CHANGE UP (ref 40–120)
ALT FLD-CCNC: 12 U/L — SIGNIFICANT CHANGE UP (ref 10–45)
ANION GAP SERPL CALC-SCNC: 10 MMOL/L — SIGNIFICANT CHANGE UP (ref 5–17)
ANISOCYTOSIS BLD QL: SLIGHT — SIGNIFICANT CHANGE UP
AST SERPL-CCNC: 15 U/L — SIGNIFICANT CHANGE UP (ref 10–40)
BASOPHILS # BLD AUTO: 0.09 K/UL — SIGNIFICANT CHANGE UP (ref 0–0.2)
BASOPHILS NFR BLD AUTO: 0.9 % — SIGNIFICANT CHANGE UP (ref 0–2)
BILIRUB SERPL-MCNC: 0.2 MG/DL — SIGNIFICANT CHANGE UP (ref 0.2–1.2)
BLD GP AB SCN SERPL QL: NEGATIVE — SIGNIFICANT CHANGE UP
BUN SERPL-MCNC: 26 MG/DL — HIGH (ref 7–23)
CALCIUM SERPL-MCNC: 8.4 MG/DL — SIGNIFICANT CHANGE UP (ref 8.4–10.5)
CHLORIDE SERPL-SCNC: 100 MMOL/L — SIGNIFICANT CHANGE UP (ref 96–108)
CO2 SERPL-SCNC: 28 MMOL/L — SIGNIFICANT CHANGE UP (ref 22–31)
CREAT SERPL-MCNC: 3.8 MG/DL — HIGH (ref 0.5–1.3)
EGFR: 15 ML/MIN/1.73M2 — LOW
EOSINOPHIL # BLD AUTO: 0.61 K/UL — HIGH (ref 0–0.5)
EOSINOPHIL NFR BLD AUTO: 6.1 % — HIGH (ref 0–6)
GIANT PLATELETS BLD QL SMEAR: PRESENT — SIGNIFICANT CHANGE UP
GLUCOSE BLDC GLUCOMTR-MCNC: 168 MG/DL — HIGH (ref 70–99)
GLUCOSE BLDC GLUCOMTR-MCNC: 189 MG/DL — HIGH (ref 70–99)
GLUCOSE BLDC GLUCOMTR-MCNC: 56 MG/DL — LOW (ref 70–99)
GLUCOSE BLDC GLUCOMTR-MCNC: 88 MG/DL — SIGNIFICANT CHANGE UP (ref 70–99)
GLUCOSE BLDC GLUCOMTR-MCNC: 93 MG/DL — SIGNIFICANT CHANGE UP (ref 70–99)
GLUCOSE SERPL-MCNC: 56 MG/DL — LOW (ref 70–99)
HCT VFR BLD CALC: 28.7 % — LOW (ref 39–50)
HGB BLD-MCNC: 9.1 G/DL — LOW (ref 13–17)
LYMPHOCYTES # BLD AUTO: 1.21 K/UL — SIGNIFICANT CHANGE UP (ref 1–3.3)
LYMPHOCYTES # BLD AUTO: 12.2 % — LOW (ref 13–44)
MACROCYTES BLD QL: SLIGHT — SIGNIFICANT CHANGE UP
MAGNESIUM SERPL-MCNC: 2 MG/DL — SIGNIFICANT CHANGE UP (ref 1.6–2.6)
MANUAL SMEAR VERIFICATION: SIGNIFICANT CHANGE UP
MCHC RBC-ENTMCNC: 29.7 PG — SIGNIFICANT CHANGE UP (ref 27–34)
MCHC RBC-ENTMCNC: 31.7 GM/DL — LOW (ref 32–36)
MCV RBC AUTO: 93.8 FL — SIGNIFICANT CHANGE UP (ref 80–100)
MONOCYTES # BLD AUTO: 1.12 K/UL — HIGH (ref 0–0.9)
MONOCYTES NFR BLD AUTO: 11.3 % — SIGNIFICANT CHANGE UP (ref 2–14)
NEUTROPHILS # BLD AUTO: 6.9 K/UL — SIGNIFICANT CHANGE UP (ref 1.8–7.4)
NEUTROPHILS NFR BLD AUTO: 69.5 % — SIGNIFICANT CHANGE UP (ref 43–77)
OVALOCYTES BLD QL SMEAR: SLIGHT — SIGNIFICANT CHANGE UP
PHOSPHATE SERPL-MCNC: 3.3 MG/DL — SIGNIFICANT CHANGE UP (ref 2.5–4.5)
PLAT MORPH BLD: ABNORMAL
PLATELET # BLD AUTO: 459 K/UL — HIGH (ref 150–400)
POLYCHROMASIA BLD QL SMEAR: SLIGHT — SIGNIFICANT CHANGE UP
POTASSIUM SERPL-MCNC: 4.2 MMOL/L — SIGNIFICANT CHANGE UP (ref 3.5–5.3)
POTASSIUM SERPL-SCNC: 4.2 MMOL/L — SIGNIFICANT CHANGE UP (ref 3.5–5.3)
PROT SERPL-MCNC: 6.1 G/DL — SIGNIFICANT CHANGE UP (ref 6–8.3)
RBC # BLD: 3.06 M/UL — LOW (ref 4.2–5.8)
RBC # FLD: 13.2 % — SIGNIFICANT CHANGE UP (ref 10.3–14.5)
RBC BLD AUTO: ABNORMAL
RH IG SCN BLD-IMP: NEGATIVE — SIGNIFICANT CHANGE UP
SODIUM SERPL-SCNC: 138 MMOL/L — SIGNIFICANT CHANGE UP (ref 135–145)
TSH SERPL-MCNC: 1.31 UIU/ML — SIGNIFICANT CHANGE UP (ref 0.27–4.2)
WBC # BLD: 9.93 K/UL — SIGNIFICANT CHANGE UP (ref 3.8–10.5)
WBC # FLD AUTO: 9.93 K/UL — SIGNIFICANT CHANGE UP (ref 3.8–10.5)

## 2024-03-03 PROCEDURE — 99024 POSTOP FOLLOW-UP VISIT: CPT

## 2024-03-03 PROCEDURE — 99223 1ST HOSP IP/OBS HIGH 75: CPT

## 2024-03-03 RX ORDER — HYDRALAZINE HCL 50 MG
10 TABLET ORAL ONCE
Refills: 0 | Status: COMPLETED | OUTPATIENT
Start: 2024-03-03 | End: 2024-03-03

## 2024-03-03 RX ORDER — HYDRALAZINE HCL 50 MG
25 TABLET ORAL ONCE
Refills: 0 | Status: COMPLETED | OUTPATIENT
Start: 2024-03-03 | End: 2024-03-03

## 2024-03-03 RX ORDER — HYDRALAZINE HCL 50 MG
25 TABLET ORAL EVERY 8 HOURS
Refills: 0 | Status: DISCONTINUED | OUTPATIENT
Start: 2024-03-03 | End: 2024-03-07

## 2024-03-03 RX ADMIN — VALSARTAN 160 MILLIGRAM(S): 80 TABLET ORAL at 07:06

## 2024-03-03 RX ADMIN — HEPARIN SODIUM 5000 UNIT(S): 5000 INJECTION INTRAVENOUS; SUBCUTANEOUS at 13:15

## 2024-03-03 RX ADMIN — SEVELAMER CARBONATE 800 MILLIGRAM(S): 2400 POWDER, FOR SUSPENSION ORAL at 09:05

## 2024-03-03 RX ADMIN — ATORVASTATIN CALCIUM 10 MILLIGRAM(S): 80 TABLET, FILM COATED ORAL at 21:33

## 2024-03-03 RX ADMIN — HEPARIN SODIUM 5000 UNIT(S): 5000 INJECTION INTRAVENOUS; SUBCUTANEOUS at 21:32

## 2024-03-03 RX ADMIN — Medication 25 MILLIGRAM(S): at 03:25

## 2024-03-03 RX ADMIN — Medication 25 MILLIGRAM(S): at 18:41

## 2024-03-03 RX ADMIN — Medication 25 MILLIGRAM(S): at 21:33

## 2024-03-03 RX ADMIN — PANTOPRAZOLE SODIUM 40 MILLIGRAM(S): 20 TABLET, DELAYED RELEASE ORAL at 07:06

## 2024-03-03 RX ADMIN — INSULIN GLARGINE 15 UNIT(S): 100 INJECTION, SOLUTION SUBCUTANEOUS at 22:19

## 2024-03-03 RX ADMIN — SEVELAMER CARBONATE 800 MILLIGRAM(S): 2400 POWDER, FOR SUSPENSION ORAL at 13:11

## 2024-03-03 RX ADMIN — INSULIN GLARGINE 15 UNIT(S): 100 INJECTION, SOLUTION SUBCUTANEOUS at 00:41

## 2024-03-03 RX ADMIN — Medication 25 MILLIGRAM(S): at 11:05

## 2024-03-03 RX ADMIN — SEVELAMER CARBONATE 800 MILLIGRAM(S): 2400 POWDER, FOR SUSPENSION ORAL at 17:15

## 2024-03-03 RX ADMIN — Medication 2: at 17:17

## 2024-03-03 RX ADMIN — HEPARIN SODIUM 5000 UNIT(S): 5000 INJECTION INTRAVENOUS; SUBCUTANEOUS at 07:06

## 2024-03-03 RX ADMIN — Medication 10 MILLIGRAM(S): at 22:48

## 2024-03-03 NOTE — PROGRESS NOTE ADULT - PROBLEM SELECTOR PLAN 2
Presents w/ hypertensive urgency w/ SBP >180s asymptomatic. S/p hydralazine 20mg x2 in ER. Lungs clear.   Home meds: Valsartan 160mg, Nifedipine 60mg BID    Plan:  - c/w valsartan 160 mg   - Holding nifedipine 60mg BID  - start hydralazine 25mg PO q8   - TTE ordered Presented w/ hypertensive urgency w/ SBP >180s asymptomatic. S/p hydralazine 20mg x2 in ER. Lungs clear.   Home meds: Valsartan 160mg, Nifedipine 60mg BID    Plan:  - c/w valsartan 160 mg   - Holding nifedipine 60mg BID  - start hydralazine 25mg PO q8   - TTE ordered

## 2024-03-03 NOTE — PROGRESS NOTE ADULT - PROBLEM SELECTOR PLAN 5
Hx of anemia on EPO w/ HD. Iron studies done last admission. No c/f active bleeding.     Plan:  - EPO per HD  - Transfuse <7  - maintain active T&S Hx of anemia on EPO w/ HD. Iron studies done last admission. No sx active bleeding.     Plan:  - EPO per HD  - Transfuse <7  - maintain active T&S  - renal following

## 2024-03-03 NOTE — CONSULT NOTE ADULT - ASSESSMENT
82 yo M PMH HTN, DM, HLD, ESRD admitted to [facility] on 3/2 for asymptomatic bradycardia noted during HD. The patient was recently admitted on 2/6 for hyperkalemia, during this admission he had an left arm AV fistula creation with Dr. Lai (2/9/24), he tolerated the procedure without complication. Vascular Surgery was consulted for evaluation with possible revision of the patient's AVF.    Recs:  Possible revision of AVF during this admission once cleared from a medical standpoint   Vascular Surgery will continue to follow    Final recs pending discussion with attending 84 yo M PMH HTN, DM, HLD, ESRD admitted to [facility] on 3/2 for asymptomatic bradycardia noted during HD. The patient was recently admitted on 2/6 for hyperkalemia, during this admission he had an left arm AV fistula creation with Dr. Lai (2/9/24), he tolerated the procedure without complication. Vascular Surgery was consulted for evaluation with possible revision of the patient's AVF.    Recs:  Possible revision of AVF 3/5 vs 3/6 once cleared from a medical standpoint   Vascular Surgery will continue to follow    Final recs pending discussion with attending

## 2024-03-03 NOTE — PROGRESS NOTE ADULT - PROBLEM SELECTOR PLAN 4
History of type 2 DM w/ A1c 7.7% in 02/24. d/c'ed on Lantus 35units per patient and wife. Has not taken in a few days, endorses non compliance.     Plan:   - will give around 50% of home Lantus - 15U   - mISS   - diabetes education in patient History of type 2 DM w/ A1c 7.7% in 02/24. d/c'ed on Lantus 35units per patient and wife. Has not taken in a few days, endorses non compliance.     Plan:   - ordered for around 50% of home Lantus - 15U   - mISS   - diabetes education in patient

## 2024-03-03 NOTE — PROGRESS NOTE ADULT - PROBLEM SELECTOR PLAN 1
History of Mobitz type 1 block on previous admission. Presents with asymptomatic Mobitz type 2 block with rates in 30-40's. Unclear etiology. Non ischemic EKG. Lactate normal. No AV alberto blocking medications, on CCB however nifedipine.     Plan:  - Continuous tele monitoring w/ pacer pads on patient and zoll at bedside   - If unstable - transcutaneous pacing to temporize, caution w/ atropine   - Avoid alberto blocking agents  - On nifedipine 60mg BID, hold for now  - TFTs wnl  - tte ordered History of Mobitz type 1 block on previous admission. Presents with asymptomatic Mobitz type 2 block with rates in 30-40's. Unclear etiology. Non ischemic EKG. Lactate normal. No AV alberto blocking medications, on CCB however nifedipine.     Plan:  - Continuous tele monitoring w/ pacer pads on patient and zoll at bedside   - If unstable - transcutaneous pacing to temporize, caution w/ atropine   - Avoid alberto blocking agents  - On nifedipine 60mg BID, hold for now  - TFTs wnl  - tte ordered  - EP consulted f/u recs for ?PPM vs MICRA

## 2024-03-03 NOTE — PROGRESS NOTE ADULT - PROBLEM SELECTOR PLAN 6
Atorvastatin 10mg qhs lipid panel wnl   c/w home Atorvastatin 10mg qhs    F: None  E: Replete if K<4 or Mag<2  N: DASH Diet  VTEppx:  heparin sq   Dispo: pending clinical course   PT: ordered

## 2024-03-03 NOTE — CONSULT NOTE ADULT - SUBJECTIVE AND OBJECTIVE BOX
VASCULAR SURGERY CONSULT  ==============================================================================================================  HPI:  Mr. Anguiano is an 82yo M w/ a PMH of HTN, DM, HLD, ESRD with recent initiation of HD (T/TH/Saturday) via tunneled cath who presents to the ED from HD center for asymptomatic bradycardia. Of note Mr. Anguiano was recently hospitalized 02/06/2024 - 02/17/2024 admitted for hyperkalemia, w/ AVF placement completed, course was c/b coffee ground emesis w/ a clean based non bleeding ulcer seen. Today patient was at HD center and completed session of dialysis w/o complications After completion he was noted to have HR in the 30's. Patient is asymptomatic denying chest pain, palpitations, dyspnea, lightheadedness, confusion, nausea, vomiting, constipation or diarrhea. He has not taken any of his medications today including Valsartan 160mg, nifedipine 60mg BID. Found in the ED to be in hypertensive urgency w/ /80. S/p Hydralazine PO 25mg x2. Otherwise patient denies headache, vision changes, fever, chills, cough or leg swelling.     ED; Afebrile, HR 40, /81, 94% RA  Labs s/f: WBC 11.83, Hgb 9.6, Lactate 0.9, Na 136, K+ 4.2, BUN 19/Cr 2.89, LFTs normal  CXR: unremarkable for acute disease   EKG: [place] type 2 AV block  Interventions: Hydral 25mg PO x2  (02 Mar 2024 22:28)    VASCULAR SURGERY ADDENDUM  82 yo M PMH HTN, DM, HLD, ESRD admitted to [facility] on 3/2 for asymptomatic bradycardia noted during HD. The patient was recently admitted on 2/6 for hyperkalemia, during this admission he had an left arm AV fistula creation with Dr. Lai (2/9/24), he tolerated the procedure without complication. He was seen in the office on 2/28 for a post-op assessment and the incision was noted to be healing well, however there was no thrill noted. LUE US done in the office demonstrated no flow at the anastomosis, outflow cephalic vein patent, inflow brachial artery with pulsatile flow. The patient agreed with the plan of fistulogram/thrombectomy on 3/8. Vascular Surgery was consulted for evaluation with possible revision of the patient's AVF, given his current admission.    PAST MEDICAL & SURGICAL HISTORY:  HTN (hypertension)      HLD (hyperlipidemia)      DM (diabetes mellitus)      Chronic kidney disease (CKD)  stage V      Anemia      Glaucoma      Mobitz type 1 second degree AV block      H/O shoulder surgery  left      H/O knee surgery  left      H/O eye surgery  b/l glaucoma        Home Meds: Home Medications:  atorvastatin 10 mg oral tablet: 1 tab(s) orally once a day (06 Feb 2024 09:15)  calcitriol 0.25 mcg oral capsule: 1 cap(s) orally once a day (06 Feb 2024 09:15)  Lantus 100 units/mL subcutaneous solution: 40 unit(s) subcutaneous once a day (at bedtime) (06 Feb 2024 16:42)  NIFEdipine 60 mg oral tablet, extended release: 1 tab(s) orally 2 times a day (06 Feb 2024 16:18)  sevelamer carbonate 800 mg oral tablet: 2 tab(s) orally 3 times a day (06 Feb 2024 16:19)  sodium bicarbonate 650 mg PO 2 tabs x3 daily:  (06 Feb 2024 09:15)  Timoptic 0.5% ophthalmic solution: 1 drop(s) in each affected eye 2 times a day (06 Feb 2024 16:21)  valsartan 160 mg oral tablet: 1 tab(s) orally once a day (06 Feb 2024 16:21)    Allergies: Allergies    No Known Allergies    Intolerances      Soc:   Advanced Directives: Presumed Full Code     CURRENT MEDICATIONS:   --------------------------------------------------------------------------------------  Neurologic Medications    Respiratory Medications    Cardiovascular Medications  hydrALAZINE 25 milliGRAM(s) Oral every 8 hours  valsartan 160 milliGRAM(s) Oral every 24 hours    Gastrointestinal Medications  dextrose 5%. 1000 milliLiter(s) IV Continuous <Continuous>  dextrose 5%. 1000 milliLiter(s) IV Continuous <Continuous>  pantoprazole    Tablet 40 milliGRAM(s) Oral before breakfast    Genitourinary Medications    Hematologic/Oncologic Medications  heparin   Injectable 5000 Unit(s) SubCutaneous every 8 hours    Antimicrobial/Immunologic Medications    Endocrine/Metabolic Medications  atorvastatin 10 milliGRAM(s) Oral at bedtime  dextrose 50% Injectable 12.5 Gram(s) IV Push once  dextrose 50% Injectable 25 Gram(s) IV Push once  dextrose 50% Injectable 25 Gram(s) IV Push once  dextrose Oral Gel 15 Gram(s) Oral once PRN Blood Glucose LESS THAN 70 milliGRAM(s)/deciliter  glucagon  Injectable 1 milliGRAM(s) IntraMuscular once  insulin glargine Injectable (LANTUS) 15 Unit(s) SubCutaneous at bedtime  insulin lispro (ADMELOG) corrective regimen sliding scale   SubCutaneous three times a day before meals    Topical/Other Medications  sevelamer carbonate 800 milliGRAM(s) Oral three times a day with meals    --------------------------------------------------------------------------------------    VITAL SIGNS, INS/OUTS (last 24 hours):  --------------------------------------------------------------------------------------  ICU Vital Signs Last 24 Hrs  T(C): 36.7 (03 Mar 2024 15:30), Max: 36.7 (02 Mar 2024 21:28)  T(F): 98 (03 Mar 2024 15:30), Max: 98 (02 Mar 2024 21:28)  HR: 67 (03 Mar 2024 15:30) (35 - 67)  BP: 151/70 (03 Mar 2024 15:30) (151/70 - 200/95)  BP(mean): 101 (03 Mar 2024 15:30) (101 - 112)  ABP: --  ABP(mean): --  RR: 18 (03 Mar 2024 15:30) (16 - 18)  SpO2: 97% (03 Mar 2024 15:30) (94% - 99%)    O2 Parameters below as of 03 Mar 2024 15:30  Patient On (Oxygen Delivery Method): room air          I&O's Summary    02 Mar 2024 07:01  -  03 Mar 2024 07:00  --------------------------------------------------------  IN: 0 mL / OUT: 400 mL / NET: -400 mL    03 Mar 2024 07:01  -  03 Mar 2024 16:33  --------------------------------------------------------  IN: 240 mL / OUT: 450 mL / NET: -210 mL      --------------------------------------------------------------------------------------    EXAM:  T(C): 36.7 (03-03-24 @ 15:30), Max: 36.7 (03-02-24 @ 21:28)  HR: 67 (03-03-24 @ 15:30) (35 - 67)  BP: 151/70 (03-03-24 @ 15:30) (151/70 - 200/95)  RR: 18 (03-03-24 @ 15:30) (16 - 18)  SpO2: 97% (03-03-24 @ 15:30) (94% - 99%)    CONSTITUTIONAL: Well groomed, no apparent distress  EYES: PERRLA and symmetric, EOMI, No conjunctival or scleral injection, non-icteric  RESP: No respiratory distress, no use of accessory muscles; CTA b/l, no WRR  CV: +S1S2, no MRG; no JVD; no peripheral edema  GI: Soft, NT, ND, no rebound, no guarding; no palpable masses; no hepatosplenomegaly; no hernia palpated  LYMPH: No cervical LAD or tenderness; no axillary LAD or tenderness; no inguinal LAD or tenderness  EXTREM: LUE AVF with well healing incision, no thrill palpable at AVF  SKIN: No rashes or ulcers noted; no subcutaneous nodules or induration palpable  NEURO: CN II-XII intact; normal reflexes in upper and lower extremities, sensation intact in upper and lower extremities b/l to light touch   PSYCH: Appropriate insight/judgment; A+O x 3, mood and affect appropriate, recent/remote memory intact      LABS  --------------------------------------------------------------------------------------  Labs:  CAPILLARY BLOOD GLUCOSE      POCT Blood Glucose.: 88 mg/dL (03 Mar 2024 11:50)  POCT Blood Glucose.: 93 mg/dL (03 Mar 2024 08:24)  POCT Blood Glucose.: 56 mg/dL (03 Mar 2024 07:45)  POCT Blood Glucose.: 220 mg/dL (02 Mar 2024 23:38)  POCT Blood Glucose.: 129 mg/dL (02 Mar 2024 18:23)                          9.1    9.93  )-----------( 459      ( 03 Mar 2024 05:30 )             28.7       Auto Neutrophil %: 69.5 % (03-03-24 @ 05:30)    03-03    138  |  100  |  26<H>  ----------------------------<  56<L>  4.2   |  28  |  3.80<H>      Calcium: 8.4 mg/dL (03-03-24 @ 05:30)      LFTs:             6.1  | 0.2  | 15       ------------------[92      ( 03 Mar 2024 05:30 )  2.8  | x    | 12          Lipase:x      Amylase:x         Lactate, Blood: 0.9 mmol/L (03-02-24 @ 20:50)      Coags:            Urinalysis Basic - ( 03 Mar 2024 05:30 )    Color: x / Appearance: x / SG: x / pH: x  Gluc: 56 mg/dL / Ketone: x  / Bili: x / Urobili: x   Blood: x / Protein: x / Nitrite: x   Leuk Esterase: x / RBC: x / WBC x   Sq Epi: x / Non Sq Epi: x / Bacteria: x

## 2024-03-03 NOTE — PROGRESS NOTE ADULT - SUBJECTIVE AND OBJECTIVE BOX
Cardiology PA Adult Progress Note    Subjective Assessment:     ROS negative except as noted above.  	  MEDICATIONS:  hydrALAZINE 25 milliGRAM(s) Oral every 8 hours  valsartan 160 milliGRAM(s) Oral every 24 hours  pantoprazole    Tablet 40 milliGRAM(s) Oral before breakfast   atorvastatin 10 milliGRAM(s) Oral at bedtime  dextrose 50% Injectable 12.5 Gram(s) IV Push once  dextrose 50% Injectable 25 Gram(s) IV Push once  dextrose 50% Injectable 25 Gram(s) IV Push once  dextrose Oral Gel 15 Gram(s) Oral once PRN  glucagon  Injectable 1 milliGRAM(s) IntraMuscular once  insulin glargine Injectable (LANTUS) 15 Unit(s) SubCutaneous at bedtime  insulin lispro (ADMELOG) corrective regimen sliding scale   SubCutaneous three times a day before meals   dextrose 5%. 1000 milliLiter(s) IV Continuous <Continuous>  dextrose 5%. 1000 milliLiter(s) IV Continuous <Continuous>  heparin   Injectable 5000 Unit(s) SubCutaneous every 8 hours      	    [PHYSICAL EXAM:  TELEMETRY:  T(C): 36.7 (03-03-24 @ 07:04), Max: 36.7 (03-02-24 @ 21:28)  HR: 38 (03-03-24 @ 07:04) (35 - 42)  BP: 165/74 (03-03-24 @ 07:04) (158/78 - 200/95)  RR: 18 (03-03-24 @ 07:04) (16 - 18)  SpO2: 98% (03-03-24 @ 07:04) (94% - 99%)  Wt(kg): --  I&O's Summary    02 Mar 2024 07:01  -  03 Mar 2024 07:00  --------------------------------------------------------  IN: 0 mL / OUT: 400 mL / NET: -400 mL      Height (cm): 264.2 (03-02 @ 18:27)  Weight (kg): 68.3 (03-02 @ 18:27)  BMI (kg/m2): 9.8 (03-02 @ 18:27)  BSA (m2): 2.47 (03-02 @ 18:27)                                        Appearance: Normal	  HEENT:   Normal oral mucosa, PERRLA, EOMI	  Neck: Supple, + JVD/ - JVD; Carotid Bruit   Cardiovascular: Normal S1 S2, No JVD, No murmurs,   Respiratory: Lungs clear to auscultation/Decreased Breath Sounds/No Rales, Rhonchi, Wheezing	  Gastrointestinal:  Soft, Non-tender, + BS	  Skin: No rashes, No ecchymoses, No cyanosis  Extremities: Normal range of motion, No clubbing, cyanosis or edema  Vascular: Peripheral pulses palpable 2+ bilaterally  Neurologic: Non-focal  Psychiatry: A & O x 3, Mood & affect appropriate      	    ECG:  	  RADIOLOGY:   DIAGNOSTIC TESTING:  [ ] Echocardiogram:   [ ]  Catheterization:  [ ] Stress Test:    [ ] AKIRA  OTHER: 	    LABS:	 	  CARDIAC MARKERS:                                  9.1    9.93  )-----------( 459      ( 03 Mar 2024 05:30 )             28.7     03-03    138  |  100  |  26<H>  ----------------------------<  56<L>  4.2   |  28  |  3.80<H>    Ca    8.4      03 Mar 2024 05:30  Phos  3.3     03-03  Mg     2.0     03-03    TPro  6.1  /  Alb  2.8<L>  /  TBili  0.2  /  DBili  x   /  AST  15  /  ALT  12  /  AlkPhos  92  03-03    proBNP:   Lipid Profile:   HgA1c:   TSH: Thyroid Stimulating Hormone, Serum: 1.310 uIU/mL (03-03 @ 05:30)     Cardiology PA Adult Progress Note    Subjective Assessment: patient seen and examined at bedside. Family present at bedside. Patient denies chest pain, SOB, ELIZABETH, palpitations, dizziness, LOC, N/V/D, fever/chills/sick contact, diaphoresis, orthopnea/PND, and leg swelling.    ROS negative except as noted above.  	  MEDICATIONS:  hydrALAZINE 25 milliGRAM(s) Oral every 8 hours  valsartan 160 milliGRAM(s) Oral every 24 hours  pantoprazole    Tablet 40 milliGRAM(s) Oral before breakfast   atorvastatin 10 milliGRAM(s) Oral at bedtime  dextrose 50% Injectable 12.5 Gram(s) IV Push once  dextrose 50% Injectable 25 Gram(s) IV Push once  dextrose 50% Injectable 25 Gram(s) IV Push once  dextrose Oral Gel 15 Gram(s) Oral once PRN  glucagon  Injectable 1 milliGRAM(s) IntraMuscular once  insulin glargine Injectable (LANTUS) 15 Unit(s) SubCutaneous at bedtime  insulin lispro (ADMELOG) corrective regimen sliding scale   SubCutaneous three times a day before meals   dextrose 5%. 1000 milliLiter(s) IV Continuous <Continuous>  dextrose 5%. 1000 milliLiter(s) IV Continuous <Continuous>  heparin   Injectable 5000 Unit(s) SubCutaneous every 8 hours      [PHYSICAL EXAM:  TELEMETRY:  T(C): 36.7 (03-03-24 @ 07:04), Max: 36.7 (03-02-24 @ 21:28)  HR: 38 (03-03-24 @ 07:04) (35 - 42)  BP: 165/74 (03-03-24 @ 07:04) (158/78 - 200/95)  RR: 18 (03-03-24 @ 07:04) (16 - 18)  SpO2: 98% (03-03-24 @ 07:04) (94% - 99%)  Wt(kg): --  I&O's Summary    02 Mar 2024 07:01  -  03 Mar 2024 07:00  --------------------------------------------------------  IN: 0 mL / OUT: 400 mL / NET: -400 mL      Height (cm): 264.2 (03-02 @ 18:27)  Weight (kg): 68.3 (03-02 @ 18:27)  BMI (kg/m2): 9.8 (03-02 @ 18:27)  BSA (m2): 2.47 (03-02 @ 18:27)                                        Appearance: Normal	  HEENT:   Normal oral mucosa, PERRLA, EOMI	  Neck: Supple, - JVD;   Cardiovascular: Normal S1 S2, No JVD, No murmurs,   Respiratory: Lungs clear to auscultation	  Gastrointestinal:  Soft, Non-tender, + BS	  Skin: No rashes, No ecchymoses, No cyanosis  Extremities: Normal range of motion, No clubbing, cyanosis or edema  Vascular: Peripheral pulses palpable 2+ bilaterally  Neurologic: Non-focal  Psychiatry: A & O x 3, Mood & affect appropriate      	    ECG:  	  RADIOLOGY:   DIAGNOSTIC TESTING:  [ ] Echocardiogram:   [ ]  Catheterization:  [ ] Stress Test:    [ ] AKIRA  OTHER: 	    LABS:	 	  CARDIAC MARKERS:                              9.1    9.93  )-----------( 459      ( 03 Mar 2024 05:30 )             28.7     03-03    138  |  100  |  26<H>  ----------------------------<  56<L>  4.2   |  28  |  3.80<H>    Ca    8.4      03 Mar 2024 05:30  Phos  3.3     03-03  Mg     2.0     03-03    TPro  6.1  /  Alb  2.8<L>  /  TBili  0.2  /  DBili  x   /  AST  15  /  ALT  12  /  AlkPhos  92  03-03    proBNP:   Lipid Profile:   HgA1c:   TSH: Thyroid Stimulating Hormone, Serum: 1.310 uIU/mL (03-03 @ 05:30)

## 2024-03-03 NOTE — PROGRESS NOTE ADULT - PROBLEM SELECTOR PLAN 3
ESRD on HD T/TH/Sat via tunnelled HD cath. Has YAMILETH AVF recently placed 02/2024. Last session today 3/2. Euvolemic on exam. Electrolytes stable.     Plan:  - HD per renal as scheduled   - c/w sevelamer TID  - Renal diet   - EPO per renal ESRD on HD T/TH/Sat via tunnelled HD cath. Has LUE AVF recently placed 02/2024. Last session 3/2/24. Euvolemic on exam. Electrolytes stable.     Plan:  - HD per renal as scheduled   - c/w sevelamer TID  - Renal diet   - EPO per renal  - renal and vascular following for revision of AVF

## 2024-03-03 NOTE — CONSULT NOTE ADULT - ASSESSMENT
83Y w/hx of ESRD admitted for asymptomatic bradycardia Mobitz type 2 AV block and hypertensive urgency, consulted for further management       ESRD:  Last HD 3/2 with no complications   EDW: Does not know will call HD center and obtain   Rx: 3.5hrs   Stable electrolytes and volume status     Plan:  No indication for urgent HD at this time   Will reassess in AM need for RRT   Encourage PO intake   Fluid Restriction to <1.5L/day       Access:  R TDC functional   Failed LUE AVF  Vascular consult for AVF creation and follow up     Anemia:  Hgb 9.1  Please obtain iron studies   EPO with HD     HTN:  BP above goal   Continue home BP meds   UF with HD as tolerated     MBD:  Calcium: 8.4  Phos: 3.3  Discontinue phos binders for now

## 2024-03-03 NOTE — PROGRESS NOTE ADULT - ASSESSMENT
Mr. Anguiano is an 84yo M w/ a PMH of HTN, DM, HLD, ESRD with recent initiation of HD (T/TH/Saturday) via tunneled cath who presented overnight on 3/3/24 to the [facility] ED from HD center for asymptomatic bradycardia found to be in Mobitz type 2 AV block and hypertensive urgency. In the ER: patient given Hydral 25mg PO x2 with SBPs now in the 160s-180s. EP following for possible PPM implant, renal following and vascular following for AVF creation (followed outpatient).    Mr. Anguiano is an 82yo M w/ a PMH of HTN, DM, HLD, ESRD with recent initiation of HD (T/TH/Saturday) via tunneled cath who presented overnight on 3/3/24 to the [facility] ED from HD center for asymptomatic bradycardia found to be in Mobitz type 2 AV block and hypertensive urgency. In the ER: patient given Hydral 25mg PO x2 with SBPs now in the 160s-180s. EP following for ? PPM implant, renal following and vascular following for revision of AVF (followed last admission).

## 2024-03-03 NOTE — CONSULT NOTE ADULT - SUBJECTIVE AND OBJECTIVE BOX
HPI:  Mr. Anguiano is an 84yo M w/ a PMH of HTN, DM, HLD, ESRD with recent initiation of HD (T/TH/Saturday) via tunneled cath who presents to the ED from HD center for asymptomatic bradycardia. Of note Mr. Anguiano was recently hospitalized 02/06/2024 - 02/17/2024 admitted for hyperkalemia, w/ AVF placement completed, course was c/b coffee ground emesis w/ a clean based non bleeding ulcer seen. Today patient was at HD center and completed session of dialysis w/o complications After completion he was noted to have HR in the 30's. Patient is asymptomatic denying chest pain, palpitations, dyspnea, lightheadedness, confusion, nausea, vomiting, constipation or diarrhea. He has not taken any of his medications today including Valsartan 160mg, nifedipine 60mg BID. Found in the ED to be in hypertensive urgency w/ /80. S/p Hydralazine PO 25mg x2. Otherwise patient denies headache, vision changes, fever, chills, cough or leg swelling.  Nephrology consulted for in patient HD management       PAST MEDICAL & SURGICAL HISTORY:  HTN (hypertension)      HLD (hyperlipidemia)      DM (diabetes mellitus)      Chronic kidney disease (CKD)  stage V      Anemia      Glaucoma      Mobitz type 1 second degree AV block      H/O shoulder surgery  left      H/O knee surgery  left      H/O eye surgery  b/l glaucoma            Allergies:  No Known Allergies      Home Medications:   atorvastatin 10 mg oral tablet: 1 tab(s) orally once a day  calcitriol 0.25 mcg oral capsule: 1 cap(s) orally once a day  Lantus 100 units/mL subcutaneous solution: 40 unit(s) subcutaneous once a day (at bedtime)  NIFEdipine 60 mg oral tablet, extended release: 1 tab(s) orally 2 times a day  Protonix 40 mg oral delayed release tablet: 1 tab(s) orally 2 times a day  Protonix 40 mg oral granule, delayed release: 1 each orally once a day  Rolling Walker: In need of Rolling Walker  sevelamer carbonate 800 mg oral tablet: 2 tab(s) orally 3 times a day  sodium bicarbonate 650 mg PO 2 tabs x3 daily:   Timoptic 0.5% ophthalmic solution: 1 drop(s) in each affected eye 2 times a day  valsartan 160 mg oral tablet: 1 tab(s) orally once a day      Hospital Medications:   MEDICATIONS  (STANDING):  atorvastatin 10 milliGRAM(s) Oral at bedtime  dextrose 5%. 1000 milliLiter(s) (50 mL/Hr) IV Continuous <Continuous>  dextrose 5%. 1000 milliLiter(s) (100 mL/Hr) IV Continuous <Continuous>  dextrose 50% Injectable 12.5 Gram(s) IV Push once  dextrose 50% Injectable 25 Gram(s) IV Push once  dextrose 50% Injectable 25 Gram(s) IV Push once  glucagon  Injectable 1 milliGRAM(s) IntraMuscular once  heparin   Injectable 5000 Unit(s) SubCutaneous every 8 hours  hydrALAZINE 25 milliGRAM(s) Oral every 8 hours  insulin glargine Injectable (LANTUS) 15 Unit(s) SubCutaneous at bedtime  insulin lispro (ADMELOG) corrective regimen sliding scale   SubCutaneous three times a day before meals  pantoprazole    Tablet 40 milliGRAM(s) Oral before breakfast  sevelamer carbonate 800 milliGRAM(s) Oral three times a day with meals  valsartan 160 milliGRAM(s) Oral every 24 hours      SOCIAL HISTORY:  Denies ETOh, Smoking    Family History:  FAMILY HISTORY:        VITALS:  T(F): 97.3 (03-03-24 @ 11:30), Max: 98 (03-02-24 @ 21:28)  HR: 36 (03-03-24 @ 11:30)  BP: 199/78 (03-03-24 @ 11:30)  RR: 17 (03-03-24 @ 11:30)  SpO2: 98% (03-03-24 @ 11:30)  Wt(kg): --    03-02 @ 07:01  -  03-03 @ 07:00  --------------------------------------------------------  IN: 0 mL / OUT: 400 mL / NET: -400 mL    03-03 @ 07:01  -  03-03 @ 14:55  --------------------------------------------------------  IN: 240 mL / OUT: 250 mL / NET: -10 mL      Height (cm): 264.2 (03-02 @ 18:27)  Weight (kg): 68.3 (03-02 @ 18:27)  BMI (kg/m2): 9.8 (03-02 @ 18:27)  BSA (m2): 2.47 (03-02 @ 18:27)  CAPILLARY BLOOD GLUCOSE      POCT Blood Glucose.: 88 mg/dL (03 Mar 2024 11:50)  POCT Blood Glucose.: 93 mg/dL (03 Mar 2024 08:24)  POCT Blood Glucose.: 56 mg/dL (03 Mar 2024 07:45)  POCT Blood Glucose.: 220 mg/dL (02 Mar 2024 23:38)  POCT Blood Glucose.: 129 mg/dL (02 Mar 2024 18:23)      Review of Systems:  CONSTITUTIONAL: No fever or chills.  RESPIRATORY: No shortness of breath, cough  CARDIOVASCULAR: No Chest pain, shortness of breath, palpitations  GASTROINTESTINAL: No abdominal pain, nausea, vomiting, diarrhea  GENITOURINARY: No urinary frequency, gross hematuria, dysuria  NEUROLOGICAL: No headache, weakness  SKIN: No rash or skin lesion  MUSCULOSKELETAL: No swelling  Psych: Denies suicidal or homicidal ideation    PHYSICAL EXAM:  GENERAL: Alert, awake, oriented x3   HEENT: JAYME, EOMI, neck supple, no JVP  CHEST/LUNG: Bilateral clear breath sounds  HEART: Regular rate and rhythm, no murmur, no gallops, no rub   ABDOMEN: Soft, nontender, non distended  : No flank or supra pubic tenderness.  EXTREMITIES: no pedal edema  Neurology: AAOx3, no focal neurological deficit  SKIN: No rash or skin lesion   Access: Right TDC c/d/i     LABS:  03-03    138  |  100  |  26<H>  ----------------------------<  56<L>  4.2   |  28  |  3.80<H>    Ca    8.4      03 Mar 2024 05:30  Phos  3.3     03-03  Mg     2.0     03-03    TPro  6.1  /  Alb  2.8<L>  /  TBili  0.2  /  DBili      /  AST  15  /  ALT  12  /  AlkPhos  92  03-03    Creatinine Trend: 3.80 <--, 2.89 <--                        9.1    9.93  )-----------( 459      ( 03 Mar 2024 05:30 )             28.7     Urine Studies:  Urinalysis Basic - ( 03 Mar 2024 05:30 )    Color:  / Appearance:  / SG:  / pH:   Gluc: 56 mg/dL / Ketone:   / Bili:  / Urobili:    Blood:  / Protein:  / Nitrite:    Leuk Esterase:  / RBC:  / WBC    Sq Epi:  / Non Sq Epi:  / Bacteria:

## 2024-03-04 ENCOUNTER — RESULT REVIEW (OUTPATIENT)
Age: 84
End: 2024-03-04

## 2024-03-04 LAB
ALBUMIN SERPL ELPH-MCNC: 2.7 G/DL — LOW (ref 3.3–5)
ALP SERPL-CCNC: 92 U/L — SIGNIFICANT CHANGE UP (ref 40–120)
ALT FLD-CCNC: 10 U/L — SIGNIFICANT CHANGE UP (ref 10–45)
ANION GAP SERPL CALC-SCNC: 12 MMOL/L — SIGNIFICANT CHANGE UP (ref 5–17)
APPEARANCE UR: CLEAR — SIGNIFICANT CHANGE UP
AST SERPL-CCNC: 12 U/L — SIGNIFICANT CHANGE UP (ref 10–40)
BACTERIA # UR AUTO: NEGATIVE /HPF — SIGNIFICANT CHANGE UP
BILIRUB SERPL-MCNC: 0.2 MG/DL — SIGNIFICANT CHANGE UP (ref 0.2–1.2)
BILIRUB UR-MCNC: NEGATIVE — SIGNIFICANT CHANGE UP
BUN SERPL-MCNC: 44 MG/DL — HIGH (ref 7–23)
CALCIUM SERPL-MCNC: 8.5 MG/DL — SIGNIFICANT CHANGE UP (ref 8.4–10.5)
CHLORIDE SERPL-SCNC: 99 MMOL/L — SIGNIFICANT CHANGE UP (ref 96–108)
CO2 SERPL-SCNC: 23 MMOL/L — SIGNIFICANT CHANGE UP (ref 22–31)
COLOR SPEC: YELLOW — SIGNIFICANT CHANGE UP
CREAT SERPL-MCNC: 5.39 MG/DL — HIGH (ref 0.5–1.3)
DIFF PNL FLD: NEGATIVE — SIGNIFICANT CHANGE UP
EGFR: 10 ML/MIN/1.73M2 — LOW
GLUCOSE BLDC GLUCOMTR-MCNC: 100 MG/DL — HIGH (ref 70–99)
GLUCOSE BLDC GLUCOMTR-MCNC: 132 MG/DL — HIGH (ref 70–99)
GLUCOSE BLDC GLUCOMTR-MCNC: 132 MG/DL — HIGH (ref 70–99)
GLUCOSE BLDC GLUCOMTR-MCNC: 159 MG/DL — HIGH (ref 70–99)
GLUCOSE SERPL-MCNC: 106 MG/DL — HIGH (ref 70–99)
GLUCOSE UR QL: 100 MG/DL
HCT VFR BLD CALC: 27.7 % — LOW (ref 39–50)
HGB BLD-MCNC: 8.9 G/DL — LOW (ref 13–17)
KETONES UR-MCNC: NEGATIVE MG/DL — SIGNIFICANT CHANGE UP
LEUKOCYTE ESTERASE UR-ACNC: NEGATIVE — SIGNIFICANT CHANGE UP
MAGNESIUM SERPL-MCNC: 2.1 MG/DL — SIGNIFICANT CHANGE UP (ref 1.6–2.6)
MCHC RBC-ENTMCNC: 30.1 PG — SIGNIFICANT CHANGE UP (ref 27–34)
MCHC RBC-ENTMCNC: 32.1 GM/DL — SIGNIFICANT CHANGE UP (ref 32–36)
MCV RBC AUTO: 93.6 FL — SIGNIFICANT CHANGE UP (ref 80–100)
NITRITE UR-MCNC: NEGATIVE — SIGNIFICANT CHANGE UP
NRBC # BLD: 0 /100 WBCS — SIGNIFICANT CHANGE UP (ref 0–0)
PH UR: 8 — SIGNIFICANT CHANGE UP (ref 5–8)
PHOSPHATE SERPL-MCNC: 3.6 MG/DL — SIGNIFICANT CHANGE UP (ref 2.5–4.5)
PLATELET # BLD AUTO: 465 K/UL — HIGH (ref 150–400)
POTASSIUM SERPL-MCNC: 4.5 MMOL/L — SIGNIFICANT CHANGE UP (ref 3.5–5.3)
POTASSIUM SERPL-SCNC: 4.5 MMOL/L — SIGNIFICANT CHANGE UP (ref 3.5–5.3)
PROT SERPL-MCNC: 6.1 G/DL — SIGNIFICANT CHANGE UP (ref 6–8.3)
PROT UR-MCNC: 300 MG/DL
RAPID RVP RESULT: SIGNIFICANT CHANGE UP
RBC # BLD: 2.96 M/UL — LOW (ref 4.2–5.8)
RBC # FLD: 13.2 % — SIGNIFICANT CHANGE UP (ref 10.3–14.5)
RBC CASTS # UR COMP ASSIST: 0 /HPF — SIGNIFICANT CHANGE UP (ref 0–4)
SARS-COV-2 RNA SPEC QL NAA+PROBE: SIGNIFICANT CHANGE UP
SODIUM SERPL-SCNC: 134 MMOL/L — LOW (ref 135–145)
SP GR SPEC: 1.01 — SIGNIFICANT CHANGE UP (ref 1–1.03)
UROBILINOGEN FLD QL: 0.2 MG/DL — SIGNIFICANT CHANGE UP (ref 0.2–1)
WBC # BLD: 12.6 K/UL — HIGH (ref 3.8–10.5)
WBC # FLD AUTO: 12.6 K/UL — HIGH (ref 3.8–10.5)
WBC UR QL: 0 /HPF — SIGNIFICANT CHANGE UP (ref 0–5)

## 2024-03-04 PROCEDURE — 99233 SBSQ HOSP IP/OBS HIGH 50: CPT

## 2024-03-04 PROCEDURE — 93306 TTE W/DOPPLER COMPLETE: CPT | Mod: 26

## 2024-03-04 PROCEDURE — 99222 1ST HOSP IP/OBS MODERATE 55: CPT

## 2024-03-04 RX ORDER — ACETAMINOPHEN 500 MG
650 TABLET ORAL EVERY 6 HOURS
Refills: 0 | Status: DISCONTINUED | OUTPATIENT
Start: 2024-03-04 | End: 2024-03-07

## 2024-03-04 RX ORDER — LIDOCAINE 4 G/100G
1 CREAM TOPICAL ONCE
Refills: 0 | Status: COMPLETED | OUTPATIENT
Start: 2024-03-04 | End: 2024-03-04

## 2024-03-04 RX ADMIN — SEVELAMER CARBONATE 800 MILLIGRAM(S): 2400 POWDER, FOR SUSPENSION ORAL at 17:28

## 2024-03-04 RX ADMIN — SEVELAMER CARBONATE 800 MILLIGRAM(S): 2400 POWDER, FOR SUSPENSION ORAL at 12:28

## 2024-03-04 RX ADMIN — Medication 25 MILLIGRAM(S): at 21:09

## 2024-03-04 RX ADMIN — INSULIN GLARGINE 15 UNIT(S): 100 INJECTION, SOLUTION SUBCUTANEOUS at 22:27

## 2024-03-04 RX ADMIN — Medication 650 MILLIGRAM(S): at 23:27

## 2024-03-04 RX ADMIN — HEPARIN SODIUM 5000 UNIT(S): 5000 INJECTION INTRAVENOUS; SUBCUTANEOUS at 06:15

## 2024-03-04 RX ADMIN — Medication 650 MILLIGRAM(S): at 22:27

## 2024-03-04 RX ADMIN — LIDOCAINE 1 PATCH: 4 CREAM TOPICAL at 22:26

## 2024-03-04 RX ADMIN — Medication 2: at 17:23

## 2024-03-04 RX ADMIN — ATORVASTATIN CALCIUM 10 MILLIGRAM(S): 80 TABLET, FILM COATED ORAL at 21:09

## 2024-03-04 RX ADMIN — SEVELAMER CARBONATE 800 MILLIGRAM(S): 2400 POWDER, FOR SUSPENSION ORAL at 07:47

## 2024-03-04 RX ADMIN — PANTOPRAZOLE SODIUM 40 MILLIGRAM(S): 20 TABLET, DELAYED RELEASE ORAL at 06:13

## 2024-03-04 RX ADMIN — Medication 25 MILLIGRAM(S): at 16:22

## 2024-03-04 RX ADMIN — HEPARIN SODIUM 5000 UNIT(S): 5000 INJECTION INTRAVENOUS; SUBCUTANEOUS at 21:09

## 2024-03-04 RX ADMIN — HEPARIN SODIUM 5000 UNIT(S): 5000 INJECTION INTRAVENOUS; SUBCUTANEOUS at 16:22

## 2024-03-04 RX ADMIN — VALSARTAN 160 MILLIGRAM(S): 80 TABLET ORAL at 06:14

## 2024-03-04 RX ADMIN — Medication 25 MILLIGRAM(S): at 06:15

## 2024-03-04 NOTE — PROGRESS NOTE ADULT - PROBLEM SELECTOR PLAN 1
History of Mobitz type 1 block on previous admission. Presents with asymptomatic Mobitz type 2 block with rates in 30-40's. Unclear etiology. Non ischemic EKG. Lactate normal. No AV alberto blocking medications, on CCB however nifedipine.     Plan:  - Continuous tele monitoring w/ pacer pads on patient and zoll at bedside   - If unstable - transcutaneous pacing to temporize, caution w/ atropine   - Avoid alberto blocking agents  - On nifedipine 60mg BID, hold for now  - TFTs wnl  - tte ordered  - EP consulted f/u recs for PPM vs MICRA--> to be done Wednesday 3/26/24 if PT consents-- EP needs to f/u final recs tomorrow

## 2024-03-04 NOTE — PROGRESS NOTE ADULT - PROBLEM SELECTOR PLAN 4
History of type 2 DM w/ A1c 7.7% in 02/24. d/c'ed on Lantus 35units per patient and wife. Has not taken in a few days, endorses non compliance.     Plan:   - ordered for around 50% of home Lantus - 15U   - mISS   - diabetes education in patient

## 2024-03-04 NOTE — PROGRESS NOTE ADULT - PROBLEM SELECTOR PLAN 2
Presented w/ hypertensive urgency w/ SBP >180s asymptomatic. S/p hydralazine 20mg x2 in ER. Lungs clear.   Home meds: Valsartan 160mg, Nifedipine 60mg BID    Plan:  - c/w valsartan 160 mg   - Holding nifedipine 60mg BID  - start hydralazine 25mg PO q8   - TTE 3/4/24:  normal LV size and function EF 62%, LV wall thickness borderline, mildly dilated LA, pHTN presents PASP 36 mmHg, no effusion

## 2024-03-04 NOTE — PROGRESS NOTE ADULT - SUBJECTIVE AND OBJECTIVE BOX
SUBJECTIVE: Pt seen and examined at bedside this am by surgery team. Patient is lying comfortably in bed with no complaints. Denies pain. Patient denies fever, nausea, vomiting, chest pain, and shortness of breath.      MEDICATIONS  (STANDING):  atorvastatin 10 milliGRAM(s) Oral at bedtime  dextrose 5%. 1000 milliLiter(s) (50 mL/Hr) IV Continuous <Continuous>  dextrose 5%. 1000 milliLiter(s) (100 mL/Hr) IV Continuous <Continuous>  dextrose 50% Injectable 12.5 Gram(s) IV Push once  dextrose 50% Injectable 25 Gram(s) IV Push once  dextrose 50% Injectable 25 Gram(s) IV Push once  glucagon  Injectable 1 milliGRAM(s) IntraMuscular once  heparin   Injectable 5000 Unit(s) SubCutaneous every 8 hours  hydrALAZINE 25 milliGRAM(s) Oral every 8 hours  insulin glargine Injectable (LANTUS) 15 Unit(s) SubCutaneous at bedtime  insulin lispro (ADMELOG) corrective regimen sliding scale   SubCutaneous three times a day before meals  pantoprazole    Tablet 40 milliGRAM(s) Oral before breakfast  sevelamer carbonate 800 milliGRAM(s) Oral three times a day with meals  valsartan 160 milliGRAM(s) Oral every 24 hours    MEDICATIONS  (PRN):  dextrose Oral Gel 15 Gram(s) Oral once PRN Blood Glucose LESS THAN 70 milliGRAM(s)/deciliter      Vital Signs Last 24 Hrs  T(C): 36.7 (04 Mar 2024 08:37), Max: 37.3 (04 Mar 2024 06:05)  T(F): 98 (04 Mar 2024 08:37), Max: 99.1 (04 Mar 2024 06:05)  HR: 48 (04 Mar 2024 08:37) (36 - 75)  BP: 177/73 (04 Mar 2024 08:37) (151/70 - 212/83)  BP(mean): 105 (04 Mar 2024 08:37) (88 - 126)  RR: 18 (04 Mar 2024 08:37) (17 - 18)  SpO2: 96% (04 Mar 2024 08:37) (96% - 99%)    Parameters below as of 04 Mar 2024 08:37  Patient On (Oxygen Delivery Method): room air    Physical Exam  General: Patient is doing well and lying in bed comfortably  Constitutional: alert and oriented   Pulm: Nonlabored breathing, no respiratory distress  CV: Regular rate and rhythm, normal sinus rhythm  Abd:  soft, nontender, nondistended. No rebound, no guarding.   Extremities: LUE AVF with well healing incision, no palpable thrill      I&O's Detail    03 Mar 2024 07:01  -  04 Mar 2024 07:00  --------------------------------------------------------  IN:    Oral Fluid: 690 mL  Total IN: 690 mL    OUT:    Voided (mL): 1170 mL  Total OUT: 1170 mL    Total NET: -480 mL      04 Mar 2024 07:01  -  04 Mar 2024 09:25  --------------------------------------------------------  IN:    Oral Fluid: 120 mL  Total IN: 120 mL    OUT:    Voided (mL): 300 mL  Total OUT: 300 mL    Total NET: -180 mL        LABS:                        8.9    12.60 )-----------( 465      ( 04 Mar 2024 05:30 )             27.7     03-04    134<L>  |  99  |  44<H>  ----------------------------<  106<H>  4.5   |  23  |  5.39<H>    Ca    8.5      04 Mar 2024 05:30  Phos  3.6     03-04  Mg     2.1     03-04    TPro  6.1  /  Alb  2.7<L>  /  TBili  0.2  /  DBili  x   /  AST  12  /  ALT  10  /  AlkPhos  92  03-04      Urinalysis Basic - ( 04 Mar 2024 05:30 )    Color: x / Appearance: x / SG: x / pH: x  Gluc: 106 mg/dL / Ketone: x  / Bili: x / Urobili: x   Blood: x / Protein: x / Nitrite: x   Leuk Esterase: x / RBC: x / WBC x   Sq Epi: x / Non Sq Epi: x / Bacteria: x

## 2024-03-04 NOTE — PROGRESS NOTE ADULT - PROBLEM SELECTOR PLAN 5
Hx of anemia on EPO w/ HD. Iron studies done last admission. No sx active bleeding.     Plan:  - EPO per HD  - Transfuse <7  - maintain active T&S  - renal following

## 2024-03-04 NOTE — PROGRESS NOTE ADULT - PROBLEM SELECTOR PLAN 3
ESRD on HD T/TH/Sat via tunnelled HD cath. Has CHAOE AVF recently placed 02/2024. Last session 3/2/24. Euvolemic on exam. Electrolytes stable.     Plan:  - HD per renal as scheduled=-- needs to be monitored   - c/w sevelamer TID  - Renal diet   - EPO per renal  - renal and vascular following for revision of AVF

## 2024-03-04 NOTE — CONSULT NOTE ADULT - SUBJECTIVE AND OBJECTIVE BOX
HPI:    PAST MEDICAL & SURGICAL HISTORY:  HTN (hypertension)  HLD (hyperlipidemia)  DM (diabetes mellitus)  Chronic kidney disease (CKD) stage V  Anemia  Glaucoma  Mobitz type 1 second degree AV block  H/O shoulder surgery  H/O knee surgery  H/O eye surgery- b/l glaucoma        Social History:no smoking, no drugs    Inpatient Medications:   atorvastatin 10 milliGRAM(s) Oral at bedtime  heparin   Injectable 5000 Unit(s) SubCutaneous every 8 hours  hydrALAZINE 25 milliGRAM(s) Oral every 8 hours  insulin glargine Injectable (LANTUS) 15 Unit(s) SubCutaneous at bedtime  insulin lispro (ADMELOG) corrective regimen sliding scale   SubCutaneous three times a day before meals  pantoprazole    Tablet 40 milliGRAM(s) Oral before breakfast  sevelamer carbonate 800 milliGRAM(s) Oral three times a day with meals  valsartan 160 milliGRAM(s) Oral every 24 hours      Allergies: No Known Allergies      ROS:   CONSTITUTIONAL: No fever, weight loss + fatigue  EYES: Pt denies  RESPIRATORY: No cough, wheezing, chills or hemoptysis; No Shortness of Breath  CARDIOVASCULAR: see HPI  GASTROINTESTINAL: Pt denies  NEUROLOGICAL: Pt denies  SKIN: Pt denies   PSYCHIATRIC: Pt denies  HEME/LYMPH: Pt denies    PHYSICAL:  T(C): 36.7 (03-04-24 @ 08:37), Max: 37.3 (03-04-24 @ 06:05)  HR: 48 (03-04-24 @ 08:37) (41 - 75)  BP: 177/73 (03-04-24 @ 08:37) (151/70 - 212/83)  RR: 18 (03-04-24 @ 08:37) (17 - 18)  SpO2: 96% (03-04-24 @ 08:37) (96% - 99%)    Appearance: No acute distress, well developed  Eyes: normal appearing conjunctiva, pupils and eyelids  Cardiovascular: Normal S1 S2, No JVD   Respiratory: Lungs clear to auscultation   Gastrointestinal:  Soft 	  Neurologic:  No deficit noted  Psych: A&Ox3, normal mood/affect  Musculoskeletal: no deformity noted   Skin: no rash noted, normal color and pigmentation.        LABS:                        8.9    12.60 )-----------( 465      ( 04 Mar 2024 05:30 )             27.7       134<L>  |  99  |  44<H>  ----------------------------<  106<H>  4.5   |  23  |  5.39<H>    Ca    8.5    Phos  3.6     Mg     2.1        TPro  6.1  /  Alb  2.7<L>  /  TBili  0.2  /  DBili  x   /  AST  12  /  ALT  10  /  AlkPhos  92       TSH  Troponin    EKG:    Telemetry:    ECHO:    Prior EP procedures:    Cath / stress / Cardiac CTa:    Assessment Plan:         HPI:  Mr. Anguiano is an 83 year old male with HTN, DM, HLD, ESRD with recent initiation of HD (T/TH/Saturday) via tunneled cath who presented to the ED from HD center for asymptomatic bradycardia.     Of note Mr. Anguiano was recently hospitalized 02/06/2024 - 02/17/2024 admitted for hyperkalemia, w/ AVF placement completed, course was c/b coffee ground emesis w/ a clean based non bleeding ulcer seen. 3/2/24 he was in dialysis and noted to have HR in the 30s without symptoms. Also hypertensive.  He denies any syncope or near syncope.  No dizziness, SOB, chest pain, palpitations.  NOtes say mobitz second degree type 2 - though tele shows sinus rosangela, episodes of Wenckebach and 2:1 heart block.         PAST MEDICAL & SURGICAL HISTORY:  HTN (hypertension)  HLD (hyperlipidemia)  DM (diabetes mellitus)  Chronic kidney disease (CKD) stage V  Anemia  Glaucoma  Mobitz type 1 second degree AV block  H/O shoulder surgery  H/O knee surgery  H/O eye surgery- b/l glaucoma        Social History: no smoking, no drugs    Inpatient Medications:   atorvastatin 10 milliGRAM(s) Oral at bedtime  heparin   Injectable 5000 Unit(s) SubCutaneous every 8 hours  hydrALAZINE 25 milliGRAM(s) Oral every 8 hours  insulin glargine Injectable (LANTUS) 15 Unit(s) SubCutaneous at bedtime  insulin lispro (ADMELOG) corrective regimen sliding scale   SubCutaneous three times a day before meals  pantoprazole    Tablet 40 milliGRAM(s) Oral before breakfast  sevelamer carbonate 800 milliGRAM(s) Oral three times a day with meals  valsartan 160 milliGRAM(s) Oral every 24 hours      Allergies: No Known Allergies      ROS:   CONSTITUTIONAL: No fever, weight loss    EYES: Pt denies  RESPIRATORY: No cough, wheezing, chills or hemoptysis; No Shortness of Breath  CARDIOVASCULAR: see HPI  GASTROINTESTINAL: Pt denies  NEUROLOGICAL: Pt denies  SKIN: Pt denies   PSYCHIATRIC: Pt denies  HEME/LYMPH: Pt denies    PHYSICAL:  T(C): 36.7 (03-04-24 @ 08:37), Max: 37.3 (03-04-24 @ 06:05)  HR: 48 (03-04-24 @ 08:37) (41 - 75)  BP: 177/73 (03-04-24 @ 08:37) (151/70 - 212/83)  RR: 18 (03-04-24 @ 08:37) (17 - 18)  SpO2: 96% (03-04-24 @ 08:37) (96% - 99%)    Appearance: No acute distress, well developed  Eyes: normal appearing conjunctiva, pupils and eyelids  Cardiovascular: Normal S1 S2, No JVD   Respiratory: Lungs clear to auscultation   Gastrointestinal:  Soft 	  Neurologic:  No deficit noted  Psych: A&Ox3, normal mood/affect  Musculoskeletal: no deformity noted   Skin: no rash noted, normal color and pigmentation.        LABS:                        8.9    12.60 )-----------( 465      ( 04 Mar 2024 05:30 )             27.7       134<L>  |  99  |  44<H>  ----------------------------<  106<H>  4.5   |  23  |  5.39<H>    Ca    8.5    Phos  3.6     Mg     2.1        TPro  6.1  /  Alb  2.7<L>  /  TBili  0.2  /  DBili  x   /  AST  12  /  ALT  10  /  AlkPhos  92       TSH 1.3          Telemetry: NSR sinus rosangela to 30s, Wenckebach and some 2:1 heart block.  No EKG in paper chart.  IN Er 2:1 heart block with narrow QRS    ECHO:   < from: TTE Echo Complete w/o Contrast w/ Doppler (02.07.24 @ 14:35) >   1. Normal left ventricular size and systolic function.   2. Normal right ventricular size and systolic function.   3. No significant valvular disease.   4. Pulmonary hypertension present, pulmonary artery systolic pressure is 39 mmHg.   5. No pericardial effusion.   6. No prior echo is available for comparison.           Prior EP procedures: denies       Assessment Plan:  Mr. Anguiano is an 83 year old male with HTN, DM, HLD, ESRD with recent initiation of HD (T/TH/Saturday) via tunneled cath who presented to the ED from HD center for asymptomatic bradycardia.  TELE and EKGs reviewed - He has sinus bradycardia while at rest.  He has an episode of Wenckebach and then multiple episodes of 2:! heart block.  He notes NO symptoms (asked extensively).  We discussed the need for a pacemaker (concepcion MICRA given dialysis) vs. transvenous.  He currently has no symptoms and no evidence of CHB or Mibitz type 2.  He is not interested in a pacemaker unless he is told he definitely needs one.  We discussed bradycardia may limit his dialysis but currently with no symptoms no definite indication for pacing.  Will continue to [facility] tele while in house.     AWAITING MD ROUNDING PRELIM RECCS       HPI:  Mr. Anguiano is an 83 year old male with HTN, DM, HLD, ESRD with recent initiation of HD (T/TH/Saturday) via tunneled cath who presented to the ED from HD center for asymptomatic bradycardia.     Of note Mr. Anguiano was recently hospitalized 02/06/2024 - 02/17/2024 admitted for hyperkalemia, w/ AVF placement completed, course was c/b coffee ground emesis w/ a clean based non bleeding ulcer seen. 3/2/24 he was in dialysis and noted to have HR in the 30s without symptoms. Also hypertensive.  He denies any syncope or near syncope.  No dizziness, SOB, chest pain, palpitations.  NOtes say mobitz second degree type 2 - though tele shows sinus rosangela, episodes of Wenckebach and 2:1 heart block.         PAST MEDICAL & SURGICAL HISTORY:  HTN (hypertension)  HLD (hyperlipidemia)  DM (diabetes mellitus)  Chronic kidney disease (CKD) stage V  Anemia  Glaucoma  Mobitz type 1 second degree AV block  H/O shoulder surgery  H/O knee surgery  H/O eye surgery- b/l glaucoma        Social History: no smoking, no drugs    Inpatient Medications:   atorvastatin 10 milliGRAM(s) Oral at bedtime  heparin   Injectable 5000 Unit(s) SubCutaneous every 8 hours  hydrALAZINE 25 milliGRAM(s) Oral every 8 hours  insulin glargine Injectable (LANTUS) 15 Unit(s) SubCutaneous at bedtime  insulin lispro (ADMELOG) corrective regimen sliding scale   SubCutaneous three times a day before meals  pantoprazole    Tablet 40 milliGRAM(s) Oral before breakfast  sevelamer carbonate 800 milliGRAM(s) Oral three times a day with meals  valsartan 160 milliGRAM(s) Oral every 24 hours      Allergies: No Known Allergies      ROS:   CONSTITUTIONAL: No fever, weight loss    EYES: Pt denies  RESPIRATORY: No cough, wheezing, chills or hemoptysis; No Shortness of Breath  CARDIOVASCULAR: see HPI  GASTROINTESTINAL: Pt denies  NEUROLOGICAL: Pt denies  SKIN: Pt denies   PSYCHIATRIC: Pt denies  HEME/LYMPH: Pt denies    PHYSICAL:  T(C): 36.7 (03-04-24 @ 08:37), Max: 37.3 (03-04-24 @ 06:05)  HR: 48 (03-04-24 @ 08:37) (41 - 75)  BP: 177/73 (03-04-24 @ 08:37) (151/70 - 212/83)  RR: 18 (03-04-24 @ 08:37) (17 - 18)  SpO2: 96% (03-04-24 @ 08:37) (96% - 99%)    Appearance: No acute distress, well developed  Eyes: normal appearing conjunctiva, pupils and eyelids  Cardiovascular: Normal S1 S2, No JVD   Respiratory: Lungs clear to auscultation   Gastrointestinal:  Soft 	  Neurologic:  No deficit noted  Psych: A&Ox3, normal mood/affect  Musculoskeletal: no deformity noted   Skin: no rash noted, normal color and pigmentation.        LABS:                        8.9    12.60 )-----------( 465      ( 04 Mar 2024 05:30 )             27.7       134<L>  |  99  |  44<H>  ----------------------------<  106<H>  4.5   |  23  |  5.39<H>    Ca    8.5    Phos  3.6     Mg     2.1        TPro  6.1  /  Alb  2.7<L>  /  TBili  0.2  /  DBili  x   /  AST  12  /  ALT  10  /  AlkPhos  92       TSH 1.3          Telemetry: NSR sinus rosangela to 30s, Wenckebach and some 2:1 heart block.  No EKG in paper chart.  IN Er 2:1 heart block with narrow QRS    ECHO:   < from: TTE Echo Complete w/o Contrast w/ Doppler (02.07.24 @ 14:35) >   1. Normal left ventricular size and systolic function.   2. Normal right ventricular size and systolic function.   3. No significant valvular disease.   4. Pulmonary hypertension present, pulmonary artery systolic pressure is 39 mmHg.   5. No pericardial effusion.   6. No prior echo is available for comparison.           Prior EP procedures: denies       Assessment Plan:  Mr. Anguiano is an 83 year old male with HTN, DM, HLD, ESRD with recent initiation of HD (T/TH/Saturday) via tunneled cath who presented to the ED from HD center for asymptomatic bradycardia.  TELE and EKGs reviewed - He has sinus bradycardia while at rest.  He has an episode of Wenckebach and then multiple episodes of 2:! heart block.  He notes NO symptoms (asked extensively).  We discussed the need for a pacemaker (concepcion HUGO given dialysis) vs. transvenous.  He currently has no symptoms and no evidence of CHB or Mibitz type 2.  He is not interested in a pacemaker unless he is told he definitely needs one.  We discussed bradycardia may limit his dialysis but currently with no symptoms no definite indication for pacing.  Given need for dialysis a  pacemaker is likely necessary- however unlikely can schedule until Wednesday (no room 3/5 unless there is a cancellation).  Will cfollow with you

## 2024-03-04 NOTE — PROGRESS NOTE ADULT - SUBJECTIVE AND OBJECTIVE BOX
Cardiology PA Adult Progress Note    Subjective Assessment:    ROS negative except as noted above.  	  MEDICATIONS:  hydrALAZINE 25 milliGRAM(s) Oral every 8 hours  valsartan 160 milliGRAM(s) Oral every 24 hours  pantoprazole    Tablet 40 milliGRAM(s) Oral before breakfast  atorvastatin 10 milliGRAM(s) Oral at bedtime  dextrose 50% Injectable 12.5 Gram(s) IV Push once  dextrose 50% Injectable 25 Gram(s) IV Push once  dextrose 50% Injectable 25 Gram(s) IV Push once  dextrose Oral Gel 15 Gram(s) Oral once PRN  glucagon  Injectable 1 milliGRAM(s) IntraMuscular once  insulin glargine Injectable (LANTUS) 15 Unit(s) SubCutaneous at bedtime  insulin lispro (ADMELOG) corrective regimen sliding scale   SubCutaneous three times a day before meals  dextrose 5%. 1000 milliLiter(s) IV Continuous <Continuous>  dextrose 5%. 1000 milliLiter(s) IV Continuous <Continuous>  heparin   Injectable 5000 Unit(s) SubCutaneous every 8 hours    	    [PHYSICAL EXAM:  TELEMETRY:  T(C): 37 (03-04-24 @ 07:48), Max: 37.3 (03-04-24 @ 06:05)  HR: 41 (03-04-24 @ 06:05) (36 - 75)  BP: 157/69 (03-04-24 @ 06:05) (151/70 - 212/83)  RR: 17 (03-04-24 @ 06:05) (17 - 18)  SpO2: 97% (03-04-24 @ 06:05) (97% - 99%)  Wt(kg): --  I&O's Summary    03 Mar 2024 07:01  -  04 Mar 2024 07:00  --------------------------------------------------------  IN: 590 mL / OUT: 970 mL / NET: -380 mL                                              Appearance: Normal	  HEENT:   Normal oral mucosa, PERRLA, EOMI	  Neck: Supple, + JVD/ - JVD; Carotid Bruit   Cardiovascular: Normal S1 S2, No JVD, No murmurs,   Respiratory: Lungs clear to auscultation/Decreased Breath Sounds/No Rales, Rhonchi, Wheezing	  Gastrointestinal:  Soft, Non-tender, + BS	  Skin: No rashes, No ecchymoses, No cyanosis  Extremities: Normal range of motion, No clubbing, cyanosis or edema  Vascular: Peripheral pulses palpable 2+ bilaterally  Neurologic: Non-focal  Psychiatry: A & O x 3, Mood & affect appropriate      	    ECG:  	  RADIOLOGY:   DIAGNOSTIC TESTING:  [ ] Echocardiogram:   [ ]  Catheterization:  [ ] Stress Test:    [ ] AKIRA  OTHER: 	    LABS:	 	  CARDIAC MARKERS:                            8.9    12.60 )-----------( 465      ( 04 Mar 2024 05:30 )             27.7     03-04    134<L>  |  99  |  44<H>  ----------------------------<  106<H>  4.5   |  23  |  5.39<H>    Ca    8.5      04 Mar 2024 05:30  Phos  3.6     03-04  Mg     2.1     03-04    TPro  6.1  /  Alb  2.7<L>  /  TBili  0.2  /  DBili  x   /  AST  12  /  ALT  10  /  AlkPhos  92  03-04    proBNP:   Lipid Profile:   HgA1c:   TSH:    Cardiology PA Adult Progress Note    Subjective Assessment: patient seen and examined at bedside this am. no acute events overnight. Today patient denies chest pain, SOB, ELIZABETH, palpitations, dizziness, LOC, N/V/D, fever/chills/sick contact, diaphoresis, orthopnea/PND, and leg swelling.    ROS negative except as noted above.  	  MEDICATIONS:  hydrALAZINE 25 milliGRAM(s) Oral every 8 hours  valsartan 160 milliGRAM(s) Oral every 24 hours  pantoprazole    Tablet 40 milliGRAM(s) Oral before breakfast  atorvastatin 10 milliGRAM(s) Oral at bedtime  dextrose 50% Injectable 12.5 Gram(s) IV Push once  dextrose 50% Injectable 25 Gram(s) IV Push once  dextrose 50% Injectable 25 Gram(s) IV Push once  dextrose Oral Gel 15 Gram(s) Oral once PRN  glucagon  Injectable 1 milliGRAM(s) IntraMuscular once  insulin glargine Injectable (LANTUS) 15 Unit(s) SubCutaneous at bedtime  insulin lispro (ADMELOG) corrective regimen sliding scale   SubCutaneous three times a day before meals  dextrose 5%. 1000 milliLiter(s) IV Continuous <Continuous>  dextrose 5%. 1000 milliLiter(s) IV Continuous <Continuous>  heparin   Injectable 5000 Unit(s) SubCutaneous every 8 hours    	    [PHYSICAL EXAM:  TELEMETRY:  T(C): 37 (03-04-24 @ 07:48), Max: 37.3 (03-04-24 @ 06:05)  HR: 41 (03-04-24 @ 06:05) (36 - 75)  BP: 157/69 (03-04-24 @ 06:05) (151/70 - 212/83)  RR: 17 (03-04-24 @ 06:05) (17 - 18)  SpO2: 97% (03-04-24 @ 06:05) (97% - 99%)  Wt(kg): --  I&O's Summary    03 Mar 2024 07:01  -  04 Mar 2024 07:00  --------------------------------------------------------  IN: 590 mL / OUT: 970 mL / NET: -380 mL                                              Appearance: Normal	  HEENT:   Normal oral mucosa, PERRLA, EOMI	  Neck: Supple, + JVD/ - JVD; Carotid Bruit   Cardiovascular: Normal S1 S2, No JVD, No murmurs,   Respiratory: Lungs clear to auscultation/Decreased Breath Sounds/No Rales, Rhonchi, Wheezing	  Gastrointestinal:  Soft, Non-tender, + BS	  Skin: No rashes, No ecchymoses, No cyanosis  Extremities: Normal range of motion, No clubbing, cyanosis or edema  Vascular: Peripheral pulses palpable 2+ bilaterally  Neurologic: Non-focal  Psychiatry: A & O x 3, Mood & affect appropriate      	    ECG:  	  RADIOLOGY:   DIAGNOSTIC TESTING:  [ ] Echocardiogram:   [ ]  Catheterization:  [ ] Stress Test:    [ ] AKIRA  OTHER: 	    LABS:	 	  CARDIAC MARKERS:                            8.9    12.60 )-----------( 465      ( 04 Mar 2024 05:30 )             27.7     03-04    134<L>  |  99  |  44<H>  ----------------------------<  106<H>  4.5   |  23  |  5.39<H>    Ca    8.5      04 Mar 2024 05:30  Phos  3.6     03-04  Mg     2.1     03-04    TPro  6.1  /  Alb  2.7<L>  /  TBili  0.2  /  DBili  x   /  AST  12  /  ALT  10  /  AlkPhos  92  03-04    proBNP:   Lipid Profile:   HgA1c:   TSH:

## 2024-03-04 NOTE — CONSULT NOTE ADULT - REASON FOR ADMISSION
Hypertensive urgency,  Type 2 AV block

## 2024-03-04 NOTE — PROGRESS NOTE ADULT - ASSESSMENT
Mr. Anguiano is an 84yo M w/ a PMH of HTN, DM, HLD, ESRD with recent initiation of HD (T/TH/Saturday) via tunneled cath who presented overnight on 3/3/24 to the [facility] ED from HD center for asymptomatic bradycardia found to be in Mobitz type 2 AV block and hypertensive urgency. In the ER: patient given Hydral 25mg PO x2 with SBPs now in the 160s-180s. EP following for PPM implant vs MICRA (would not be scheduled until Wednesday 3/6/24 if going to be done), renal following and vascular following for revision of AVF (followed last admission).

## 2024-03-04 NOTE — PROGRESS NOTE ADULT - ASSESSMENT
84 yo M PMH HTN, DM, HLD, ESRD admitted to [facility] on 3/2 for asymptomatic bradycardia noted during HD. The patient was recently admitted on 2/6 for hyperkalemia, during this admission he had an left arm AV fistula creation with Dr. Lai (2/9/24), he tolerated the procedure without complication. Vascular Surgery was consulted for evaluation with possible revision of the patient's AVF.    Recommendations  Possible revision of AVF 3/5 vs 3/6 once cleared from a medical standpoint   Ensure patient has preoperative labs including recent coags and active T&S (currently active from 3/3)  Rest of care per primary  Vascular Surgery will continue to follow   82 yo M PMH HTN, DM, HLD, ESRD admitted to [facility] on 3/2 for asymptomatic bradycardia noted during HD. The patient was recently admitted on 2/6 for hyperkalemia, during this admission he had an left arm AV fistula creation with Dr. Lai (2/9/24), he tolerated the procedure without complication. Vascular Surgery was consulted for evaluation with possible revision of the patient's AVF.    Recommendations  Possible revision of AVF 3/5 vs 3/6 once cleared from a medical standpoint and EP - appreciate EP recs for poss PPM  Ensure patient has preoperative labs including recent coags and active T&S (currently active from 3/3)  Rest of care per primary  Vascular Surgery will continue to follow

## 2024-03-04 NOTE — PROGRESS NOTE ADULT - PROBLEM SELECTOR PLAN 6
lipid panel wnl   c/w home Atorvastatin 10mg qhs    F: None  E: Replete if K<4 or Mag<2  N: DASH Diet  VTEppx:  heparin sq   Dispo: pending clinical course   PT: ordered

## 2024-03-05 LAB
ANION GAP SERPL CALC-SCNC: 14 MMOL/L — SIGNIFICANT CHANGE UP (ref 5–17)
BLD GP AB SCN SERPL QL: NEGATIVE — SIGNIFICANT CHANGE UP
BUN SERPL-MCNC: 61 MG/DL — HIGH (ref 7–23)
CALCIUM SERPL-MCNC: 8.5 MG/DL — SIGNIFICANT CHANGE UP (ref 8.4–10.5)
CHLORIDE SERPL-SCNC: 100 MMOL/L — SIGNIFICANT CHANGE UP (ref 96–108)
CO2 SERPL-SCNC: 23 MMOL/L — SIGNIFICANT CHANGE UP (ref 22–31)
CREAT SERPL-MCNC: 6.4 MG/DL — HIGH (ref 0.5–1.3)
EGFR: 8 ML/MIN/1.73M2 — LOW
GLUCOSE BLDC GLUCOMTR-MCNC: 104 MG/DL — HIGH (ref 70–99)
GLUCOSE BLDC GLUCOMTR-MCNC: 108 MG/DL — HIGH (ref 70–99)
GLUCOSE BLDC GLUCOMTR-MCNC: 117 MG/DL — HIGH (ref 70–99)
GLUCOSE BLDC GLUCOMTR-MCNC: 122 MG/DL — HIGH (ref 70–99)
GLUCOSE BLDC GLUCOMTR-MCNC: 75 MG/DL — SIGNIFICANT CHANGE UP (ref 70–99)
GLUCOSE SERPL-MCNC: 100 MG/DL — HIGH (ref 70–99)
HBV SURFACE AB SER-ACNC: SIGNIFICANT CHANGE UP
HBV SURFACE AG SER-ACNC: SIGNIFICANT CHANGE UP
HCT VFR BLD CALC: 29.1 % — LOW (ref 39–50)
HCV AB S/CO SERPL IA: 0.03 S/CO — SIGNIFICANT CHANGE UP
HCV AB SERPL-IMP: SIGNIFICANT CHANGE UP
HGB BLD-MCNC: 9.1 G/DL — LOW (ref 13–17)
LACTATE SERPL-SCNC: 0.8 MMOL/L — SIGNIFICANT CHANGE UP (ref 0.5–2)
MAGNESIUM SERPL-MCNC: 2.1 MG/DL — SIGNIFICANT CHANGE UP (ref 1.6–2.6)
MCHC RBC-ENTMCNC: 29.6 PG — SIGNIFICANT CHANGE UP (ref 27–34)
MCHC RBC-ENTMCNC: 31.3 GM/DL — LOW (ref 32–36)
MCV RBC AUTO: 94.8 FL — SIGNIFICANT CHANGE UP (ref 80–100)
NRBC # BLD: 0 /100 WBCS — SIGNIFICANT CHANGE UP (ref 0–0)
PLATELET # BLD AUTO: 467 K/UL — HIGH (ref 150–400)
POTASSIUM SERPL-MCNC: 4.8 MMOL/L — SIGNIFICANT CHANGE UP (ref 3.5–5.3)
POTASSIUM SERPL-SCNC: 4.8 MMOL/L — SIGNIFICANT CHANGE UP (ref 3.5–5.3)
RBC # BLD: 3.07 M/UL — LOW (ref 4.2–5.8)
RBC # FLD: 13.7 % — SIGNIFICANT CHANGE UP (ref 10.3–14.5)
RH IG SCN BLD-IMP: NEGATIVE — SIGNIFICANT CHANGE UP
SODIUM SERPL-SCNC: 137 MMOL/L — SIGNIFICANT CHANGE UP (ref 135–145)
WBC # BLD: 12.1 K/UL — HIGH (ref 3.8–10.5)
WBC # FLD AUTO: 12.1 K/UL — HIGH (ref 3.8–10.5)

## 2024-03-05 PROCEDURE — 99232 SBSQ HOSP IP/OBS MODERATE 35: CPT | Mod: GC,24

## 2024-03-05 PROCEDURE — 99233 SBSQ HOSP IP/OBS HIGH 50: CPT

## 2024-03-05 PROCEDURE — 90937 HEMODIALYSIS REPEATED EVAL: CPT

## 2024-03-05 PROCEDURE — 33274 TCAT INSJ/RPL PERM LDLS PM: CPT | Mod: Q0

## 2024-03-05 RX ORDER — INSULIN GLARGINE 100 [IU]/ML
7 INJECTION, SOLUTION SUBCUTANEOUS ONCE
Refills: 0 | Status: COMPLETED | OUTPATIENT
Start: 2024-03-05 | End: 2024-03-05

## 2024-03-05 RX ORDER — ERYTHROPOIETIN 10000 [IU]/ML
7000 INJECTION, SOLUTION INTRAVENOUS; SUBCUTANEOUS ONCE
Refills: 0 | Status: COMPLETED | OUTPATIENT
Start: 2024-03-05 | End: 2024-03-05

## 2024-03-05 RX ADMIN — VALSARTAN 160 MILLIGRAM(S): 80 TABLET ORAL at 05:52

## 2024-03-05 RX ADMIN — HEPARIN SODIUM 5000 UNIT(S): 5000 INJECTION INTRAVENOUS; SUBCUTANEOUS at 05:51

## 2024-03-05 RX ADMIN — LIDOCAINE 1 PATCH: 4 CREAM TOPICAL at 06:30

## 2024-03-05 RX ADMIN — SEVELAMER CARBONATE 800 MILLIGRAM(S): 2400 POWDER, FOR SUSPENSION ORAL at 22:07

## 2024-03-05 RX ADMIN — ERYTHROPOIETIN 7000 UNIT(S): 10000 INJECTION, SOLUTION INTRAVENOUS; SUBCUTANEOUS at 13:14

## 2024-03-05 RX ADMIN — ATORVASTATIN CALCIUM 10 MILLIGRAM(S): 80 TABLET, FILM COATED ORAL at 22:07

## 2024-03-05 RX ADMIN — Medication 25 MILLIGRAM(S): at 05:51

## 2024-03-05 RX ADMIN — HEPARIN SODIUM 5000 UNIT(S): 5000 INJECTION INTRAVENOUS; SUBCUTANEOUS at 22:07

## 2024-03-05 RX ADMIN — LIDOCAINE 1 PATCH: 4 CREAM TOPICAL at 11:28

## 2024-03-05 RX ADMIN — SEVELAMER CARBONATE 800 MILLIGRAM(S): 2400 POWDER, FOR SUSPENSION ORAL at 11:32

## 2024-03-05 RX ADMIN — INSULIN GLARGINE 7 UNIT(S): 100 INJECTION, SOLUTION SUBCUTANEOUS at 22:40

## 2024-03-05 RX ADMIN — Medication 25 MILLIGRAM(S): at 20:47

## 2024-03-05 RX ADMIN — PANTOPRAZOLE SODIUM 40 MILLIGRAM(S): 20 TABLET, DELAYED RELEASE ORAL at 05:53

## 2024-03-05 RX ADMIN — HEPARIN SODIUM 5000 UNIT(S): 5000 INJECTION INTRAVENOUS; SUBCUTANEOUS at 15:52

## 2024-03-05 RX ADMIN — SEVELAMER CARBONATE 800 MILLIGRAM(S): 2400 POWDER, FOR SUSPENSION ORAL at 07:59

## 2024-03-05 NOTE — PROGRESS NOTE ADULT - PROBLEM SELECTOR PLAN 2
Presented w/ hypertensive urgency w/ SBP >180s asymptomatic. S/p hydralazine 20mg x2 in ER. Lungs clear.   Home meds: Valsartan 160mg, Nifedipine 60mg BID    Plan:  - c/w valsartan 160 mg   - Holding nifedipine 60mg BID  - start hydralazine 25mg PO q8   - TTE 3/4/24:  normal LV size and function EF 62%, LV wall thickness borderline, mildly dilated LA, pHTN presents PASP 36 mmHg, no effusion Presented w/ hypertensive urgency w/ SBP >180s asymptomatic. S/p hydralazine 20mg x2 in ER. Lungs clear.   Home meds: Valsartan 160mg, Nifedipine 60mg BID  - TTE 3/4/24:  normal LV size and function EF 62%, LV wall thickness borderline, mildly dilated LA, pHTN presents PASP 36 mmHg, no effusion  - CONT valsartan 160 mg, hydralazine 25mg PO q8

## 2024-03-05 NOTE — PROGRESS NOTE ADULT - PROBLEM SELECTOR PLAN 1
History of Mobitz type 1 block on previous admission. Presents with asymptomatic Mobitz type 2 block with rates in 30-40's. Unclear etiology. Non ischemic EKG. Lactate normal. No AV alberto blocking medications, on CCB however nifedipine.     Plan:  - Continuous tele monitoring w/ pacer pads on patient and zoll at bedside   - If unstable - transcutaneous pacing to temporize, caution w/ atropine   - Avoid alberto blocking agents  - On nifedipine 60mg BID, hold for now  - TFTs wnl  - tte ordered  - EP consulted f/u recs for PPM vs MICRA--> to be done Wednesday 3/26/24 if PT consents-- EP needs to f/u final recs tomorrow History of Mobitz type 1 block on previous admission. Presents with asymptomatic Mobitz type 2 block with rates in 30-40's. Unclear etiology. Non ischemic EKG. Lactate normal.   - s/p MICRA 03/05/24  - f/u CXR and EP device interrogation in AM

## 2024-03-05 NOTE — PROGRESS NOTE ADULT - PROBLEM SELECTOR PLAN 4
History of type 2 DM w/ A1c 7.7% in 02/24. d/c'ed on Lantus 35units per patient and wife. Has not taken in a few days, endorses non compliance.     Plan:   - ordered for around 50% of home Lantus - 15U   - mISS   - diabetes education in patient History of type 2 DM w/ A1c 7.7% in 02/24. d/c'ed on Lantus 35units per patient and wife. Has not taken in a few days, endorses non compliance  - CONT Lantus 15U and mISS   - diabetes education in patient

## 2024-03-05 NOTE — PROGRESS NOTE ADULT - SUBJECTIVE AND OBJECTIVE BOX
SUBJECTIVE: Pt seen and examined at bedside this am by vascular team. Patient is lying comfortably in bed with no complaints. Tolerating diet, but has been NPO for possible pacemaker today. Patient denies fever, nausea, vomiting, chest pain, and shortness of breath.     MEDICATIONS  (STANDING):  atorvastatin 10 milliGRAM(s) Oral at bedtime  dextrose 5%. 1000 milliLiter(s) (50 mL/Hr) IV Continuous <Continuous>  dextrose 5%. 1000 milliLiter(s) (100 mL/Hr) IV Continuous <Continuous>  dextrose 50% Injectable 12.5 Gram(s) IV Push once  dextrose 50% Injectable 25 Gram(s) IV Push once  dextrose 50% Injectable 25 Gram(s) IV Push once  epoetin alba-epbx (RETACRIT) Injectable 7000 Unit(s) IV Push once  glucagon  Injectable 1 milliGRAM(s) IntraMuscular once  heparin   Injectable 5000 Unit(s) SubCutaneous every 8 hours  hydrALAZINE 25 milliGRAM(s) Oral every 8 hours  insulin glargine Injectable (LANTUS) 15 Unit(s) SubCutaneous at bedtime  insulin lispro (ADMELOG) corrective regimen sliding scale   SubCutaneous Before meals and at bedtime  pantoprazole    Tablet 40 milliGRAM(s) Oral before breakfast  sevelamer carbonate 800 milliGRAM(s) Oral three times a day with meals  valsartan 160 milliGRAM(s) Oral every 24 hours    MEDICATIONS  (PRN):  acetaminophen     Tablet .. 650 milliGRAM(s) Oral every 6 hours PRN Mild Pain (1 - 3), Moderate Pain (4 - 6)  dextrose Oral Gel 15 Gram(s) Oral once PRN Blood Glucose LESS THAN 70 milliGRAM(s)/deciliter      Vital Signs Last 24 Hrs  T(C): 37.1 (05 Mar 2024 09:05), Max: 37.2 (04 Mar 2024 15:50)  T(F): 98.8 (05 Mar 2024 09:05), Max: 98.9 (04 Mar 2024 15:50)  HR: 63 (05 Mar 2024 09:05) (44 - 63)  BP: 155/72 (05 Mar 2024 09:05) (132/57 - 183/74)  BP(mean): 93 (05 Mar 2024 05:45) (82 - 107)  RR: 18 (05 Mar 2024 09:05) (17 - 617)  SpO2: 98% (05 Mar 2024 09:05) (96% - 99%)    Parameters below as of 05 Mar 2024 09:05  Patient On (Oxygen Delivery Method): room air        Physical Exam  General: Patient is doing well and lying in bed comfortably  Constitutional: alert and oriented   Pulm: Nonlabored breathing, no respiratory distress  CV: sinus bradycardia, normal rhythm   Abd:  soft, nontender, nondistended. No rebound, no guarding.   Extremities: LUE AVF with well healing incision, no palpable thrill; palpable radial pulses b/l     I&O's Detail    04 Mar 2024 07:01  -  05 Mar 2024 07:00  --------------------------------------------------------  IN:    Oral Fluid: 590 mL  Total IN: 590 mL    OUT:    Voided (mL): 925 mL  Total OUT: 925 mL    Total NET: -335 mL        LABS:                        9.1    12.10 )-----------( 467      ( 05 Mar 2024 05:30 )             29.1     03-05    137  |  100  |  61<H>  ----------------------------<  100<H>  4.8   |  23  |  6.40<H>    Ca    8.5      05 Mar 2024 05:30  Phos  3.6     03-04  Mg     2.1     03-05    TPro  6.1  /  Alb  2.7<L>  /  TBili  0.2  /  DBili  x   /  AST  12  /  ALT  10  /  AlkPhos  92  03-04      Urinalysis Basic - ( 05 Mar 2024 05:30 )    Color: x / Appearance: x / SG: x / pH: x  Gluc: 100 mg/dL / Ketone: x  / Bili: x / Urobili: x   Blood: x / Protein: x / Nitrite: x   Leuk Esterase: x / RBC: x / WBC x   Sq Epi: x / Non Sq Epi: x / Bacteria: x

## 2024-03-05 NOTE — PROGRESS NOTE ADULT - PROBLEM SELECTOR PLAN 5
Hx of anemia on EPO w/ HD. Iron studies done last admission. No sx active bleeding.     Plan:  - EPO per HD  - Transfuse <7  - maintain active T&S  - renal following Hx of anemia on EPO w/ HD. Iron studies done last admission. No sx active bleeding.   - Transfuse <7  - maintain active T&S  - renal following

## 2024-03-05 NOTE — PROGRESS NOTE ADULT - SUBJECTIVE AND OBJECTIVE BOX
Nephrology progress note    Seen at bedside. Feeling well, no complaints. Remains bradycardic. Planned for HD today.     Allergies:  No Known Allergies    Hospital Medications:   MEDICATIONS  (STANDING):  atorvastatin 10 milliGRAM(s) Oral at bedtime  dextrose 5%. 1000 milliLiter(s) (100 mL/Hr) IV Continuous <Continuous>  dextrose 5%. 1000 milliLiter(s) (50 mL/Hr) IV Continuous <Continuous>  dextrose 50% Injectable 12.5 Gram(s) IV Push once  dextrose 50% Injectable 25 Gram(s) IV Push once  dextrose 50% Injectable 25 Gram(s) IV Push once  epoetin alba-epbx (RETACRIT) Injectable 7000 Unit(s) IV Push once  glucagon  Injectable 1 milliGRAM(s) IntraMuscular once  heparin   Injectable 5000 Unit(s) SubCutaneous every 8 hours  hydrALAZINE 25 milliGRAM(s) Oral every 8 hours  insulin glargine Injectable (LANTUS) 15 Unit(s) SubCutaneous at bedtime  insulin lispro (ADMELOG) corrective regimen sliding scale   SubCutaneous Before meals and at bedtime  pantoprazole    Tablet 40 milliGRAM(s) Oral before breakfast  sevelamer carbonate 800 milliGRAM(s) Oral three times a day with meals  valsartan 160 milliGRAM(s) Oral every 24 hours    REVIEW OF SYSTEMS:  All other review of systems is negative unless indicated above.    VITALS:  T(F): 98.8 (03-05-24 @ 09:05), Max: 98.9 (03-04-24 @ 15:50)  HR: 63 (03-05-24 @ 09:05)  BP: 155/72 (03-05-24 @ 09:05)  RR: 18 (03-05-24 @ 09:05)  SpO2: 98% (03-05-24 @ 09:05)  Wt(kg): --    03-03 @ 07:01  -  03-04 @ 07:00  --------------------------------------------------------  IN: 690 mL / OUT: 1170 mL / NET: -480 mL    03-04 @ 07:01  -  03-05 @ 07:00  --------------------------------------------------------  IN: 590 mL / OUT: 925 mL / NET: -335 mL        PHYSICAL EXAM:  GENERAL: NAD, lying in bed  HEENT: NCAT, EOMI  CHEST/LUNG: No respiratory distress  HEART: Bradycardic  ABDOMEN: Soft, nontender, non distended  EXTREMITIES: trace LE edema  Neurology: Awake, alert, interactive  SKIN: No rash on exposed skin  Access: Right TDC c/d/i, LUE AVF without thrill or bruit    LABS:  03-05    137  |  100  |  61<H>  ----------------------------<  100<H>  4.8   |  23  |  6.40<H>    Ca    8.5      05 Mar 2024 05:30  Phos  3.6     03-04  Mg     2.1     03-05    TPro  6.1  /  Alb  2.7<L>  /  TBili  0.2  /  DBili      /  AST  12  /  ALT  10  /  AlkPhos  92  03-04                          9.1    12.10 )-----------( 467      ( 05 Mar 2024 05:30 )             29.1       Urine Studies:  Creatinine Trend: 6.40<--, 5.39<--, 3.80<--, 2.89<--, 6.64<--, 4.51<--  Urinalysis Basic - ( 05 Mar 2024 05:30 )    Color:  / Appearance:  / SG:  / pH:   Gluc: 100 mg/dL / Ketone:   / Bili:  / Urobili:    Blood:  / Protein:  / Nitrite:    Leuk Esterase:  / RBC:  / WBC    Sq Epi:  / Non Sq Epi:  / Bacteria:         RADIOLOGY & ADDITIONAL STUDIES:  Reviewed Nephrology progress note    Seen at bedside. Feeling well, no complaints. Remains bradycardic. Planned for HD today.     Allergies:  No Known Allergies    Hospital Medications:   MEDICATIONS  (STANDING):  atorvastatin 10 milliGRAM(s) Oral at bedtime  dextrose 5%. 1000 milliLiter(s) (100 mL/Hr) IV Continuous <Continuous>  dextrose 5%. 1000 milliLiter(s) (50 mL/Hr) IV Continuous <Continuous>  dextrose 50% Injectable 12.5 Gram(s) IV Push once  dextrose 50% Injectable 25 Gram(s) IV Push once  dextrose 50% Injectable 25 Gram(s) IV Push once  epoetin alba-epbx (RETACRIT) Injectable 7000 Unit(s) IV Push once  glucagon  Injectable 1 milliGRAM(s) IntraMuscular once  heparin   Injectable 5000 Unit(s) SubCutaneous every 8 hours  hydrALAZINE 25 milliGRAM(s) Oral every 8 hours  insulin glargine Injectable (LANTUS) 15 Unit(s) SubCutaneous at bedtime  insulin lispro (ADMELOG) corrective regimen sliding scale   SubCutaneous Before meals and at bedtime  pantoprazole    Tablet 40 milliGRAM(s) Oral before breakfast  sevelamer carbonate 800 milliGRAM(s) Oral three times a day with meals  valsartan 160 milliGRAM(s) Oral every 24 hours    REVIEW OF SYSTEMS:  All other review of systems is negative unless indicated above.    VITALS:  T(F): 98.8 (03-05-24 @ 09:05), Max: 98.9 (03-04-24 @ 15:50)  HR: 63 (03-05-24 @ 09:05)  BP: 155/72 (03-05-24 @ 09:05)  RR: 18 (03-05-24 @ 09:05)  SpO2: 98% (03-05-24 @ 09:05)  Wt(kg): --    03-03 @ 07:01  -  03-04 @ 07:00  --------------------------------------------------------  IN: 690 mL / OUT: 1170 mL / NET: -480 mL    03-04 @ 07:01  -  03-05 @ 07:00  --------------------------------------------------------  IN: 590 mL / OUT: 925 mL / NET: -335 mL        PHYSICAL EXAM:  GENERAL: NAD, lying in bed  HEENT: NCAT, EOMI  CHEST/LUNG: No respiratory distress  HEART: Bradycardic  ABDOMEN: Soft, nontender, non distended  EXTREMITIES: trace LE edema  Neurology: Awake, alert, interactive  SKIN: No rash on exposed skin  Access: Right TDC c/d/i, LUE AVF without thrill or bruit    LABS:  03-05    137  |  100  |  61<H>  ----------------------------<  100<H>  4.8   |  23  |  6.40<H>    Ca    8.5      05 Mar 2024 05:30  Phos  3.6     03-04  Mg     2.1     03-05    TPro  6.1  /  Alb  2.7<L>  /  TBili  0.2  /  DBili      /  AST  12  /  ALT  10  /  AlkPhos  92  03-04                          9.1    12.10 )-----------( 467      ( 05 Mar 2024 05:30 )             29.1

## 2024-03-05 NOTE — PROGRESS NOTE ADULT - ASSESSMENT
84 yo M PMH HTN, DM, HLD, ESRD admitted to [facility] on 3/2 for asymptomatic bradycardia noted during HD. The patient was recently admitted on 2/6 for hyperkalemia, during this admission he had an left arm AV fistula creation with Dr. Lai (2/9/24), he tolerated the procedure without complication. Vascular Surgery was consulted for evaluation with possible revision of the patient's AVF.    Recommendations  Tentative plan for pacemaker today with EP  Possible revision of AVF once cleared from a medical standpoint and EP  Ensure patient has preoperative labs including recent coags and active T&S  Rest of care per primary  Vascular Surgery will continue to follow

## 2024-03-05 NOTE — PROGRESS NOTE ADULT - SUBJECTIVE AND OBJECTIVE BOX
Interventional Cardiology PA Adult Progress Note    C.C.:     Subjective Assessment:      ROS Negative except as per Subjective and HPI  	  MEDICATIONS:  hydrALAZINE 25 milliGRAM(s) Oral every 8 hours  valsartan 160 milliGRAM(s) Oral every 24 hours        acetaminophen     Tablet .. 650 milliGRAM(s) Oral every 6 hours PRN    pantoprazole    Tablet 40 milliGRAM(s) Oral before breakfast    atorvastatin 10 milliGRAM(s) Oral at bedtime  dextrose 50% Injectable 12.5 Gram(s) IV Push once  dextrose 50% Injectable 25 Gram(s) IV Push once  dextrose 50% Injectable 25 Gram(s) IV Push once  dextrose Oral Gel 15 Gram(s) Oral once PRN  glucagon  Injectable 1 milliGRAM(s) IntraMuscular once  insulin glargine Injectable (LANTUS) 15 Unit(s) SubCutaneous at bedtime  insulin lispro (ADMELOG) corrective regimen sliding scale   SubCutaneous Before meals and at bedtime    dextrose 5%. 1000 milliLiter(s) IV Continuous <Continuous>  dextrose 5%. 1000 milliLiter(s) IV Continuous <Continuous>  heparin   Injectable 5000 Unit(s) SubCutaneous every 8 hours      	    [PHYSICAL EXAM:  TELEMETRY:  T(C): 36.7 (03-05-24 @ 17:23), Max: 37.1 (03-04-24 @ 21:05)  HR: 39 (03-05-24 @ 17:23) (39 - 74)  BP: 119/59 (03-05-24 @ 17:23) (119/59 - 193/79)  RR: 16 (03-05-24 @ 17:23) (16 - 617)  SpO2: 98% (03-05-24 @ 17:23) (97% - 99%)  Wt(kg): --  I&O's Summary    04 Mar 2024 07:01  -  05 Mar 2024 07:00  --------------------------------------------------------  IN: 590 mL / OUT: 925 mL / NET: -335 mL    05 Mar 2024 07:01  -  05 Mar 2024 18:38  --------------------------------------------------------  IN: 0 mL / OUT: 2500 mL / NET: -2500 mL      Height (cm): 264.2 (03-05 @ 17:23)  Weight (kg): 68.3 (03-05 @ 17:23)  BMI (kg/m2): 9.8 (03-05 @ 17:23)  BSA (m2): 2.47 (03-05 @ 17:23)  Gamino:  Central/PICC/Mid Line:                                         Appearance: Normal	  HEENT:   Normal oral mucosa, PERRL, EOMI	  Neck: Supple, JVD  Cardiovascular: No murmurs,   Respiratory: Lungs clear to auscultation  Gastrointestinal:  Soft, Non-tender, + BS	  Skin: No rashes, No ecchymoses, No cyanosis  Extremities: Normal range of motion, No clubbing, cyanosis or edema  Vascular: Peripheral pulses palpable 2+ bilaterally  Neurologic: Non-focal  Psychiatry: A & O x 3, Mood & affect appropriate                          9.1    12.10 )-----------( 467      ( 05 Mar 2024 05:30 )             29.1     03-05    137  |  100  |  61<H>  ----------------------------<  100<H>  4.8   |  23  |  6.40<H>    Ca    8.5      05 Mar 2024 05:30  Phos  3.6     03-04  Mg     2.1     03-05    TPro  6.1  /  Alb  2.7<L>  /  TBili  0.2  /  DBili  x   /  AST  12  /  ALT  10  /  AlkPhos  92  03-04   Interventional Cardiology PA Adult Progress Note    Subjective Assessment:  Patient seen and examined at bedside. Patient denied chest pain, palpitations, shortness of breath, abdominal pain, fatigue, fever, N/V/D and lightheadeness    ROS Negative except as per Subjective and HPI  	  MEDICATIONS:  hydrALAZINE 25 milliGRAM(s) Oral every 8 hours  valsartan 160 milliGRAM(s) Oral every 24 hours  acetaminophen     Tablet .. 650 milliGRAM(s) Oral every 6 hours PRN  pantoprazole    Tablet 40 milliGRAM(s) Oral before breakfast  atorvastatin 10 milliGRAM(s) Oral at bedtime  dextrose 50% Injectable 12.5 Gram(s) IV Push once  dextrose 50% Injectable 25 Gram(s) IV Push once  dextrose 50% Injectable 25 Gram(s) IV Push once  dextrose Oral Gel 15 Gram(s) Oral once PRN  glucagon  Injectable 1 milliGRAM(s) IntraMuscular once  insulin glargine Injectable (LANTUS) 15 Unit(s) SubCutaneous at bedtime  insulin lispro (ADMELOG) corrective regimen sliding scale   SubCutaneous Before meals and at bedtime  dextrose 5%. 1000 milliLiter(s) IV Continuous <Continuous>  dextrose 5%. 1000 milliLiter(s) IV Continuous <Continuous>  heparin   Injectable 5000 Unit(s) SubCutaneous every 8 hours      	    [PHYSICAL EXAM:  TELEMETRY:  T(C): 36.7 (03-05-24 @ 17:23), Max: 37.1 (03-04-24 @ 21:05)  HR: 39 (03-05-24 @ 17:23) (39 - 74)  BP: 119/59 (03-05-24 @ 17:23) (119/59 - 193/79)  RR: 16 (03-05-24 @ 17:23) (16 - 617)  SpO2: 98% (03-05-24 @ 17:23) (97% - 99%)  Wt(kg): --  I&O's Summary    04 Mar 2024 07:01  -  05 Mar 2024 07:00  --------------------------------------------------------  IN: 590 mL / OUT: 925 mL / NET: -335 mL    05 Mar 2024 07:01  -  05 Mar 2024 18:38  --------------------------------------------------------  IN: 0 mL / OUT: 2500 mL / NET: -2500 mL      Height (cm): 264.2 (03-05 @ 17:23)  Weight (kg): 68.3 (03-05 @ 17:23)  BMI (kg/m2): 9.8 (03-05 @ 17:23)  BSA (m2): 2.47 (03-05 @ 17:23)  Gamino:  Central/PICC/Mid Line:                                         Appearance: Normal	  HEENT:   Normal oral mucosa, PERRL, EOMI	  Neck: Supple, JVD  Cardiovascular: No murmurs,   Respiratory: Lungs clear to auscultation  Gastrointestinal:  Soft, Non-tender, + BS	  Skin: No rashes, No ecchymoses, No cyanosis  Extremities: Normal range of motion, No clubbing, cyanosis or edema  Vascular: Peripheral pulses palpable 2+ bilaterally  Neurologic: Non-focal  Psychiatry: A & O x 3, Mood & affect appropriate                          9.1    12.10 )-----------( 467      ( 05 Mar 2024 05:30 )             29.1     03-05    137  |  100  |  61<H>  ----------------------------<  100<H>  4.8   |  23  |  6.40<H>    Ca    8.5      05 Mar 2024 05:30  Phos  3.6     03-04  Mg     2.1     03-05    TPro  6.1  /  Alb  2.7<L>  /  TBili  0.2  /  DBili  x   /  AST  12  /  ALT  10  /  AlkPhos  92  03-04

## 2024-03-05 NOTE — PROGRESS NOTE ADULT - PROBLEM SELECTOR PLAN 6
lipid panel wnl   c/w home Atorvastatin 10mg qhs    F: None  E: Replete if K<4 or Mag<2  N: DASH Diet  VTEppx:  heparin sq   Dispo: pending clinical course   PT: ordered lipid panel wnl   c/w home Atorvastatin 10mg qhs    F: None  E: Replete if K<4 or Mag<2  N: Renal diet  VTEppx:  heparin sq   Dispo: pending clinical course   PT: ordered

## 2024-03-05 NOTE — PROGRESS NOTE ADULT - PROBLEM SELECTOR PLAN 3
ESRD on HD T/TH/Sat via tunnelled HD cath. Has CHAOE AVF recently placed 02/2024. Last session 3/2/24. Euvolemic on exam. Electrolytes stable.     Plan:  - HD per renal as scheduled=-- needs to be monitored   - c/w sevelamer TID  - Renal diet   - EPO per renal  - renal and vascular following for revision of AVF ESRD on HD T/TH/Sat via tunnelled HD cath  LAST HD: 03/05/24  VIA: YAMILETH AVF (placed 02/2024)  - c/w sevelamer TID, renal diet   - EPO per renal  - renal and vascular following for revision of AVF

## 2024-03-05 NOTE — PROGRESS NOTE ADULT - ASSESSMENT
83Y w/hx of ESRD admitted for asymptomatic bradycardia Mobitz type 2 AV block and hypertensive urgency, consulted for further management       ESRD:  HD today per schedule  Encourage PO intake   Fluid Restriction to <1.5L/day   Renal diet  Strict I&O, daily weights    Access:  R TDC functional   Failed LUE AVF  Vascular following for possible AVF revision     Anemia:  Hgb 9.1  tsat 22%  EPO with HD     HTN:  BP above goal   Continue home BP meds   UF with HD as tolerated     MBD:  Phos within goal, cont sevelamer 800mg with meals 83Y w/hx of ESRD admitted for asymptomatic bradycardia Mobitz type 2 AV block and hypertensive urgency, consulted for further management       ESRD:  HD today per schedule. HD rx: 3.5hr, F180, / , 3 K bath, UF 2.5L or as tolerated by BP.   Encourage PO intake   Fluid Restriction to <1.5L/day   Renal diet  Strict I&O, daily weights    Access:  R TDC functional   Failed LUE AVF  Vascular following for possible AVF revision.   Avoid trauma at the level of LUE    Anemia: Hgb 9.1. tsat 22%  EPO with HD     HTN:  BP above goal   Continue with home BP meds   UF with HD as tolerated     MBD: Phos within goal, cont sevelamer 800mg with meals

## 2024-03-05 NOTE — PROGRESS NOTE ADULT - SUBJECTIVE AND OBJECTIVE BOX
Patient s/p successful AV Micra implantation via right femoral vein approach.  Patient received MAC.  Hemostasis achieved via pursestring suture at access site.  EBL < 10mL.  Device parameters within normal limits    Plans:  Bedrest for 5 hours; suture will be removed in the morning  PA and lateral CXR in the AM  Device interrogation in the AM  Resume all home medications

## 2024-03-05 NOTE — PROGRESS NOTE ADULT - ASSESSMENT
Mr. Anguiano is an 84yo M w/ a PMH of HTN, DM, HLD, ESRD with recent initiation of HD (T/TH/Saturday) via tunneled cath who presented overnight on 3/3/24 to the [facility] ED from HD center for asymptomatic bradycardia, Mobitz type 2 AV block and hypertensive urgency. EP following for PPM implant vs MICRA (03/05/24 pending __), renal following and vascular following for revision of AVF (followed last admission). PT Consulted.   Mr. Anguiano is an 84yo M w/ a PMH of HTN, DM, HLD, ESRD with recent initiation of HD (T/TH/Saturday) via tunneled cath who presented overnight on 3/3/24 to the [facility] ED from HD center for asymptomatic bradycardia, Mobitz type 2 AV block and hypertensive urgency. EP following, MICRA placed 03/05/24. Renal following and vascular following for revision of AVF (followed last admission). PT Consulted.

## 2024-03-06 ENCOUNTER — TRANSCRIPTION ENCOUNTER (OUTPATIENT)
Age: 84
End: 2024-03-06

## 2024-03-06 LAB
ALBUMIN SERPL ELPH-MCNC: 2.8 G/DL — LOW (ref 3.3–5)
ALP SERPL-CCNC: 87 U/L — SIGNIFICANT CHANGE UP (ref 40–120)
ALT FLD-CCNC: <5 U/L — LOW (ref 10–45)
ANION GAP SERPL CALC-SCNC: 12 MMOL/L — SIGNIFICANT CHANGE UP (ref 5–17)
ANION GAP SERPL CALC-SCNC: 12 MMOL/L — SIGNIFICANT CHANGE UP (ref 5–17)
APTT BLD: 27.8 SEC — SIGNIFICANT CHANGE UP (ref 24.5–35.6)
AST SERPL-CCNC: 12 U/L — SIGNIFICANT CHANGE UP (ref 10–40)
BILIRUB SERPL-MCNC: 0.2 MG/DL — SIGNIFICANT CHANGE UP (ref 0.2–1.2)
BUN SERPL-MCNC: 34 MG/DL — HIGH (ref 7–23)
BUN SERPL-MCNC: 49 MG/DL — HIGH (ref 7–23)
CALCIUM SERPL-MCNC: 8.1 MG/DL — LOW (ref 8.4–10.5)
CALCIUM SERPL-MCNC: 8.4 MG/DL — SIGNIFICANT CHANGE UP (ref 8.4–10.5)
CHLORIDE SERPL-SCNC: 100 MMOL/L — SIGNIFICANT CHANGE UP (ref 96–108)
CHLORIDE SERPL-SCNC: 99 MMOL/L — SIGNIFICANT CHANGE UP (ref 96–108)
CO2 SERPL-SCNC: 24 MMOL/L — SIGNIFICANT CHANGE UP (ref 22–31)
CO2 SERPL-SCNC: 25 MMOL/L — SIGNIFICANT CHANGE UP (ref 22–31)
CREAT SERPL-MCNC: 4.78 MG/DL — HIGH (ref 0.5–1.3)
CREAT SERPL-MCNC: 6.15 MG/DL — HIGH (ref 0.5–1.3)
EGFR: 12 ML/MIN/1.73M2 — LOW
EGFR: 9 ML/MIN/1.73M2 — LOW
GLUCOSE BLDC GLUCOMTR-MCNC: 104 MG/DL — HIGH (ref 70–99)
GLUCOSE BLDC GLUCOMTR-MCNC: 107 MG/DL — HIGH (ref 70–99)
GLUCOSE BLDC GLUCOMTR-MCNC: 170 MG/DL — HIGH (ref 70–99)
GLUCOSE BLDC GLUCOMTR-MCNC: 224 MG/DL — HIGH (ref 70–99)
GLUCOSE SERPL-MCNC: 110 MG/DL — HIGH (ref 70–99)
GLUCOSE SERPL-MCNC: 167 MG/DL — HIGH (ref 70–99)
HCT VFR BLD CALC: 27.9 % — LOW (ref 39–50)
HGB BLD-MCNC: 9 G/DL — LOW (ref 13–17)
INR BLD: 1.03 — SIGNIFICANT CHANGE UP (ref 0.85–1.18)
MAGNESIUM SERPL-MCNC: 2.1 MG/DL — SIGNIFICANT CHANGE UP (ref 1.6–2.6)
MCHC RBC-ENTMCNC: 30.1 PG — SIGNIFICANT CHANGE UP (ref 27–34)
MCHC RBC-ENTMCNC: 32.3 GM/DL — SIGNIFICANT CHANGE UP (ref 32–36)
MCV RBC AUTO: 93.3 FL — SIGNIFICANT CHANGE UP (ref 80–100)
NRBC # BLD: 0 /100 WBCS — SIGNIFICANT CHANGE UP (ref 0–0)
PLATELET # BLD AUTO: 406 K/UL — HIGH (ref 150–400)
POTASSIUM SERPL-MCNC: 4.6 MMOL/L — SIGNIFICANT CHANGE UP (ref 3.5–5.3)
POTASSIUM SERPL-MCNC: 5 MMOL/L — SIGNIFICANT CHANGE UP (ref 3.5–5.3)
POTASSIUM SERPL-SCNC: 4.6 MMOL/L — SIGNIFICANT CHANGE UP (ref 3.5–5.3)
POTASSIUM SERPL-SCNC: 5 MMOL/L — SIGNIFICANT CHANGE UP (ref 3.5–5.3)
PROT SERPL-MCNC: 6.5 G/DL — SIGNIFICANT CHANGE UP (ref 6–8.3)
PROTHROM AB SERPL-ACNC: 11.7 SEC — SIGNIFICANT CHANGE UP (ref 9.5–13)
RBC # BLD: 2.99 M/UL — LOW (ref 4.2–5.8)
RBC # FLD: 13.9 % — SIGNIFICANT CHANGE UP (ref 10.3–14.5)
SODIUM SERPL-SCNC: 136 MMOL/L — SIGNIFICANT CHANGE UP (ref 135–145)
SODIUM SERPL-SCNC: 136 MMOL/L — SIGNIFICANT CHANGE UP (ref 135–145)
WBC # BLD: 9.43 K/UL — SIGNIFICANT CHANGE UP (ref 3.8–10.5)
WBC # FLD AUTO: 9.43 K/UL — SIGNIFICANT CHANGE UP (ref 3.8–10.5)

## 2024-03-06 PROCEDURE — 99231 SBSQ HOSP IP/OBS SF/LOW 25: CPT

## 2024-03-06 PROCEDURE — 99233 SBSQ HOSP IP/OBS HIGH 50: CPT

## 2024-03-06 PROCEDURE — 93279 PRGRMG DEV EVAL PM/LDLS PM: CPT | Mod: 26

## 2024-03-06 PROCEDURE — 71046 X-RAY EXAM CHEST 2 VIEWS: CPT | Mod: 26

## 2024-03-06 PROCEDURE — 99232 SBSQ HOSP IP/OBS MODERATE 35: CPT | Mod: GC,24,57

## 2024-03-06 RX ADMIN — Medication 25 MILLIGRAM(S): at 13:28

## 2024-03-06 RX ADMIN — INSULIN GLARGINE 15 UNIT(S): 100 INJECTION, SOLUTION SUBCUTANEOUS at 21:48

## 2024-03-06 RX ADMIN — HEPARIN SODIUM 5000 UNIT(S): 5000 INJECTION INTRAVENOUS; SUBCUTANEOUS at 21:47

## 2024-03-06 RX ADMIN — ATORVASTATIN CALCIUM 10 MILLIGRAM(S): 80 TABLET, FILM COATED ORAL at 21:48

## 2024-03-06 RX ADMIN — Medication 4: at 17:23

## 2024-03-06 RX ADMIN — HEPARIN SODIUM 5000 UNIT(S): 5000 INJECTION INTRAVENOUS; SUBCUTANEOUS at 13:28

## 2024-03-06 RX ADMIN — Medication 25 MILLIGRAM(S): at 06:01

## 2024-03-06 RX ADMIN — PANTOPRAZOLE SODIUM 40 MILLIGRAM(S): 20 TABLET, DELAYED RELEASE ORAL at 06:01

## 2024-03-06 RX ADMIN — HEPARIN SODIUM 5000 UNIT(S): 5000 INJECTION INTRAVENOUS; SUBCUTANEOUS at 06:01

## 2024-03-06 RX ADMIN — SEVELAMER CARBONATE 800 MILLIGRAM(S): 2400 POWDER, FOR SUSPENSION ORAL at 17:23

## 2024-03-06 RX ADMIN — Medication 2: at 21:47

## 2024-03-06 RX ADMIN — SEVELAMER CARBONATE 800 MILLIGRAM(S): 2400 POWDER, FOR SUSPENSION ORAL at 13:29

## 2024-03-06 RX ADMIN — VALSARTAN 160 MILLIGRAM(S): 80 TABLET ORAL at 06:01

## 2024-03-06 RX ADMIN — Medication 25 MILLIGRAM(S): at 21:47

## 2024-03-06 NOTE — PROGRESS NOTE ADULT - SUBJECTIVE AND OBJECTIVE BOX
**INCOMPLETE**   Interventional Cardiology PA Adult Progress Note    C.C.:     Subjective Assessment:      ROS Negative except as per Subjective and HPI  	  MEDICATIONS:  hydrALAZINE 25 milliGRAM(s) Oral every 8 hours  valsartan 160 milliGRAM(s) Oral every 24 hours        acetaminophen     Tablet .. 650 milliGRAM(s) Oral every 6 hours PRN    pantoprazole    Tablet 40 milliGRAM(s) Oral before breakfast    atorvastatin 10 milliGRAM(s) Oral at bedtime  dextrose 50% Injectable 12.5 Gram(s) IV Push once  dextrose 50% Injectable 25 Gram(s) IV Push once  dextrose 50% Injectable 25 Gram(s) IV Push once  dextrose Oral Gel 15 Gram(s) Oral once PRN  glucagon  Injectable 1 milliGRAM(s) IntraMuscular once  insulin glargine Injectable (LANTUS) 15 Unit(s) SubCutaneous at bedtime  insulin lispro (ADMELOG) corrective regimen sliding scale   SubCutaneous Before meals and at bedtime    dextrose 5%. 1000 milliLiter(s) IV Continuous <Continuous>  dextrose 5%. 1000 milliLiter(s) IV Continuous <Continuous>  heparin   Injectable 5000 Unit(s) SubCutaneous every 8 hours      	    [PHYSICAL EXAM:  TELEMETRY:  T(C): 37.4 (03-06-24 @ 06:10), Max: 37.4 (03-06-24 @ 05:04)  HR: 69 (03-06-24 @ 05:58) (39 - 74)  BP: 148/69 (03-06-24 @ 05:58) (119/59 - 193/79)  RR: 18 (03-06-24 @ 05:58) (16 - 18)  SpO2: 98% (03-06-24 @ 05:58) (96% - 100%)  Wt(kg): --  I&O's Summary    05 Mar 2024 07:01  -  06 Mar 2024 07:00  --------------------------------------------------------  IN: 60 mL / OUT: 2900 mL / NET: -2840 mL      Height (cm): 264.2 (03-05 @ 17:23)  Weight (kg): 68.3 (03-05 @ 17:23)  BMI (kg/m2): 9.8 (03-05 @ 17:23)  BSA (m2): 2.47 (03-05 @ 17:23)  Gamino:  Central/PICC/Mid Line:                                         Appearance: Normal	  HEENT:   Normal oral mucosa, PERRL, EOMI	  Neck: Supple, + JVD/ - JVD; Carotid Bruit   Cardiovascular: Normal S1 S2, No JVD, No murmurs,   Respiratory: Lungs clear to auscultation/Decreased Breath Sounds/No Rales, Rhonchi, Wheezing	  Gastrointestinal:  Soft, Non-tender, + BS	  Skin: No rashes, No ecchymoses, No cyanosis  Extremities: Normal range of motion, No clubbing, cyanosis or edema  Vascular: Peripheral pulses palpable 2+ bilaterally  Neurologic: Non-focal  Psychiatry: A & O x 3, Mood & affect appropriate      	    ECG:  	  RADIOLOGY:   DIAGNOSTIC TESTING:  [ ] Echocardiogram:  [ ]  Catheterization:  [ ] Stress Test:    [ ] AKIRA  OTHER: 	    LABS:	 	  CARDIAC MARKERS:                                  9.0    9.43  )-----------( 406      ( 06 Mar 2024 05:30 )             27.9     03-06    136  |  100  |  34<H>  ----------------------------<  110<H>  4.6   |  24  |  4.78<H>    Ca    8.4      06 Mar 2024 05:30  Mg     2.1     03-06    TPro  6.5  /  Alb  2.8<L>  /  TBili  0.2  /  DBili  x   /  AST  12  /  ALT  <5<L>  /  AlkPhos  87  03-06    proBNP:   Lipid Profile:   HgA1c:   TSH:   PT/INR - ( 06 Mar 2024 05:30 )   PT: 11.7 sec;   INR: 1.03          PTT - ( 06 Mar 2024 05:30 )  PTT:27.8 sec    ASSESSMENT/PLAN: 	        DVT ppx:  Dispo:     Cardiology PA Adult Progress Note    Subjective Assessment:  Patient seen and examined at bedside this AM denies CP, SOB, palpitations, abd pain, N/V/D, s/s of bleeding, or LE swelling.     ROS Negative except as per Subjective and HPI  	  MEDICATIONS:  hydrALAZINE 25 milliGRAM(s) Oral every 8 hours  valsartan 160 milliGRAM(s) Oral every 24 hours  acetaminophen     Tablet .. 650 milliGRAM(s) Oral every 6 hours PRN  pantoprazole    Tablet 40 milliGRAM(s) Oral before breakfast  atorvastatin 10 milliGRAM(s) Oral at bedtime  dextrose 50% Injectable 12.5 Gram(s) IV Push once  dextrose 50% Injectable 25 Gram(s) IV Push once  dextrose 50% Injectable 25 Gram(s) IV Push once  dextrose Oral Gel 15 Gram(s) Oral once PRN  glucagon  Injectable 1 milliGRAM(s) IntraMuscular once  insulin glargine Injectable (LANTUS) 15 Unit(s) SubCutaneous at bedtime  insulin lispro (ADMELOG) corrective regimen sliding scale   SubCutaneous Before meals and at bedtime    dextrose 5%. 1000 milliLiter(s) IV Continuous <Continuous>  dextrose 5%. 1000 milliLiter(s) IV Continuous <Continuous>  heparin   Injectable 5000 Unit(s) SubCutaneous every 8 hours      	    [PHYSICAL EXAM:  TELEMETRY:  T(C): 37.4 (03-06-24 @ 06:10), Max: 37.4 (03-06-24 @ 05:04)  HR: 69 (03-06-24 @ 05:58) (39 - 74)  BP: 148/69 (03-06-24 @ 05:58) (119/59 - 193/79)  RR: 18 (03-06-24 @ 05:58) (16 - 18)  SpO2: 98% (03-06-24 @ 05:58) (96% - 100%)  Wt(kg): --  I&O's Summary    05 Mar 2024 07:01  -  06 Mar 2024 07:00  --------------------------------------------------------  IN: 60 mL / OUT: 2900 mL / NET: -2840 mL      Height (cm): 264.2 (03-05 @ 17:23)  Weight (kg): 68.3 (03-05 @ 17:23)  BMI (kg/m2): 9.8 (03-05 @ 17:23)  BSA (m2): 2.47 (03-05 @ 17:23)  Gamino:  Central/PICC/Mid Line:                                         Appearance: Normal	  HEENT:   Normal oral mucosa, PERRL, EOMI	  Neck: Supple, + JVD/ - JVD; Carotid Bruit   Cardiovascular: Normal S1 S2, No JVD, No murmurs,   Respiratory: Lungs clear to auscultation/Decreased Breath Sounds/No Rales, Rhonchi, Wheezing	  Gastrointestinal:  Soft, Non-tender, + BS	  Skin: No rashes, No ecchymoses, No cyanosis  Extremities: Normal range of motion, No clubbing, cyanosis or edema  Vascular: Peripheral pulses palpable 2+ bilaterally  Neurologic: Non-focal  Psychiatry: A & O x 3, Mood & affect appropriate      	    ECG:  	  RADIOLOGY:   DIAGNOSTIC TESTING:  [ ] Echocardiogram:  [ ]  Catheterization:  [ ] Stress Test:    [ ] AKIRA  OTHER: 	    LABS:	 	  CARDIAC MARKERS:                                  9.0    9.43  )-----------( 406      ( 06 Mar 2024 05:30 )             27.9     03-06    136  |  100  |  34<H>  ----------------------------<  110<H>  4.6   |  24  |  4.78<H>    Ca    8.4      06 Mar 2024 05:30  Mg     2.1     03-06    TPro  6.5  /  Alb  2.8<L>  /  TBili  0.2  /  DBili  x   /  AST  12  /  ALT  <5<L>  /  AlkPhos  87  03-06    proBNP:   Lipid Profile:   HgA1c:   TSH:   PT/INR - ( 06 Mar 2024 05:30 )   PT: 11.7 sec;   INR: 1.03          PTT - ( 06 Mar 2024 05:30 )  PTT:27.8 sec    ASSESSMENT/PLAN: 	        DVT ppx:  Dispo:     Cardiology PA Adult Progress Note    Subjective Assessment:  Patient seen and examined at bedside this AM denies CP, SOB, palpitations, abd pain, N/V/D, s/s of bleeding, LE swelling, or groin pain.     ROS Negative except as per Subjective and HPI  	  MEDICATIONS:  hydrALAZINE 25 milliGRAM(s) Oral every 8 hours  valsartan 160 milliGRAM(s) Oral every 24 hours  acetaminophen     Tablet .. 650 milliGRAM(s) Oral every 6 hours PRN  pantoprazole    Tablet 40 milliGRAM(s) Oral before breakfast  atorvastatin 10 milliGRAM(s) Oral at bedtime  dextrose 50% Injectable 12.5 Gram(s) IV Push once  dextrose 50% Injectable 25 Gram(s) IV Push once  dextrose 50% Injectable 25 Gram(s) IV Push once  dextrose Oral Gel 15 Gram(s) Oral once PRN  glucagon  Injectable 1 milliGRAM(s) IntraMuscular once  insulin glargine Injectable (LANTUS) 15 Unit(s) SubCutaneous at bedtime  insulin lispro (ADMELOG) corrective regimen sliding scale   SubCutaneous Before meals and at bedtime  dextrose 5%. 1000 milliLiter(s) IV Continuous <Continuous>  dextrose 5%. 1000 milliLiter(s) IV Continuous <Continuous>  heparin   Injectable 5000 Unit(s) SubCutaneous every 8 hours      	    [PHYSICAL EXAM:  TELEMETRY:  T(C): 37.4 (03-06-24 @ 06:10), Max: 37.4 (03-06-24 @ 05:04)  HR: 69 (03-06-24 @ 05:58) (39 - 74)  BP: 148/69 (03-06-24 @ 05:58) (119/59 - 193/79)  RR: 18 (03-06-24 @ 05:58) (16 - 18)  SpO2: 98% (03-06-24 @ 05:58) (96% - 100%)  Wt(kg): --  I&O's Summary    05 Mar 2024 07:01  -  06 Mar 2024 07:00  --------------------------------------------------------  IN: 60 mL / OUT: 2900 mL / NET: -2840 mL      Height (cm): 264.2 (03-05 @ 17:23)  Weight (kg): 68.3 (03-05 @ 17:23)  BMI (kg/m2): 9.8 (03-05 @ 17:23)  BSA (m2): 2.47 (03-05 @ 17:23)  Gamino:  Central/PICC/Mid Line:                                         Appearance: Normal		  Neck: Supple  Cardiovascular: Normal S1 S2  Respiratory: Lungs clear to auscultation	  Gastrointestinal:  Soft, Non-tender, + BS	  Skin: No rashes, No ecchymoses, No cyanosis  Extremities: Normal range of motion, No clubbing, cyanosis or edema  LUE w/ fistula  Vascular: Peripheral pulses palpable 2+ bilaterally  R groin no bruit, suture intact, dressing C/D/I   No B/L LE edema, warm, well perfused   Neurologic: Non-focal  Psychiatry: Mood & affect appropriate      	      RADIOLOGY:   CXR 03/06/24: Interval pacemaker placement within the apex of the right ventricle. Right large bore IJV catheter the tip remaining at the level of the superior cavoatrial junction and has been proximally repositioned. No acute pulmonary pathology.    DIAGNOSTIC TESTING:  [ x] Echocardiogram 03/04/24:   1. Normal left and right ventricular size and systolic function.   2. LV wall thickness is borderline increased.   3. Mildly dilated left atrium.   4. No significant valvular disease.   5. Pulmonary hypertension present, pulmonary artery systolic pressure is   36 mmHg.   6. No pericardial effusion.   7. Compared to the previous TTE performed on 2/7/2024, there have been   no significant interval changes.  [ ]  Catheterization:  [ ] Stress Test:    [ ] AKIRA  OTHER: 	    LABS:	 	  CARDIAC MARKERS:                                  9.0    9.43  )-----------( 406      ( 06 Mar 2024 05:30 )             27.9     03-06    136  |  100  |  34<H>  ----------------------------<  110<H>  4.6   |  24  |  4.78<H>    Ca    8.4      06 Mar 2024 05:30  Mg     2.1     03-06    TPro  6.5  /  Alb  2.8<L>  /  TBili  0.2  /  DBili  x   /  AST  12  /  ALT  <5<L>  /  AlkPhos  87  03-06    proBNP:   Lipid Profile:   HgA1c:   TSH:   PT/INR - ( 06 Mar 2024 05:30 )   PT: 11.7 sec;   INR: 1.03          PTT - ( 06 Mar 2024 05:30 )  PTT:27.8 sec    ASSESSMENT/PLAN: 	        DVT ppx:  Dispo:

## 2024-03-06 NOTE — PROGRESS NOTE ADULT - ASSESSMENT
Mr. Anguiano is an 84yo M w/ a PMH of HTN, DM, HLD, ESRD with recent initiation of HD (T/TH/Saturday) via tunneled cath who presented overnight on 3/3/24 to the [facility] ED from HD center for asymptomatic bradycardia, Mobitz type 2 AV block and hypertensive urgency. Patient status post Micra PPM  placed 03/05/24. Renal following and vascular following with plan for  revision of AVF on 03/07 vs 03/08.

## 2024-03-06 NOTE — PHYSICAL THERAPY INITIAL EVALUATION ADULT - PERTINENT HX OF CURRENT PROBLEM, REHAB EVAL
Pt is an 82 yo male w/ a PMH of HTN, DM, HLD, ESRD with recent initiation of HD (T/TH/Saturday) via tunneled cath who presented overnight on 3/3/24 to the [facility] ED from HD center for asymptomatic bradycardia, Mobitz type 2 AV block and hypertensive urgency. EP following, MICRA placed 03/05/24.

## 2024-03-06 NOTE — PHYSICAL THERAPY INITIAL EVALUATION ADULT - GAIT DEVIATIONS NOTED, PT EVAL
fairly steady gait, no lose of balance, decreased heel strike and hip flexion bilaterally./decreased viri/increased time in double stance/decreased step length

## 2024-03-06 NOTE — PROGRESS NOTE ADULT - ASSESSMENT
84 yo M PMH HTN, DM, HLD, ESRD admitted to [facility] on 3/2 for asymptomatic bradycardia noted during HD. The patient was recently admitted on 2/6 for hyperkalemia, during this admission he had an left arm AV fistula creation with Dr. Lai (2/9/24), he tolerated the procedure without complication. Vascular Surgery was consulted for evaluation with possible revision of the patient's AVF. Now s/p PPM placement by EP on 3/5    Recommendations  Possible revision of AVF vs fistulagram - will need clearance from cardiology/EP  Ensure patient has preoperative labs including recent coags and active T&S  Rest of care per primary  Vascular Surgery will continue to follow

## 2024-03-06 NOTE — PROGRESS NOTE ADULT - NS ATTEND AMEND GEN_ALL_CORE FT
83M with Essential HTN, DM, HLD, ESRD with recent initiation of HD (T/TH/Saturday) via R chest tunneled cath who presented 3/3/24 to the [facility] ED from HD center for asymptomatic bradycardia found to be in Mobitz type 2 AV block and hypertensive urgency. In the ER: patient given Hydralazine 25mg PO x2 with SBPs now in the 160s-180s. EP consulted for PPM evaluation, renal following and vascular following for revision of AVF.   Vascular surgery considering revision of AVF 3/5 vs 3/6 pending final EP plan.  EP team reviewed findings and noted SB, Wenckebach, and 2:1 AVB. Patient currently not in favor of PPM unless there is absolute indication, per EP no definite indication for PPM.     Plan for:  BP control with Hydralazine 25mg TID, Valsartan 160 daily  PPI for GI protection  Final EP recs pending attending evaluation  Next HD session scheduled 3.5 per [facility] HD team, will need monitored bed for HD  Vascular surgery AVF revision pending final EP plans  PT evaluation pending  R-M.Joe Conrad M.D.  Cardiology Attending  During non face-to-face time, I reviewed relevant portions of the patient’s medical record; including vitals, labs, medications, cardiac studies, remaining additional imaging. During face-to-face time, I took a relevant history and examined the patient. I also explained to the patient their diagnoses, and specific cardiopulmonary management plan, which required a high level of medical decision making.  I answered all questions related to the patient's medical conditions. I spent 55 minutes on total encounter; more than 50% of the visit was spent counseling and/or coordinating care by the attending physician, with plan of care discussed with the patient, and cardiac care team.
83M with Essential HTN, DM, HLD, ESRD with recent initiation of HD (T/TH/Saturday) via R chest tunneled cath who presented 3/3/24 to the [facility] ED from HD center for asymptomatic bradycardia found to be in Mobitz type 2 AV block and hypertensive urgency. In the ER: patient given Hydralazine 25mg PO x2 with SBPs now in the 160s-180s. EP consulted for PPM evaluation, renal following and vascular following for revision of AVF.   Vascular surgery considering revision of AVF 3/5 vs 3/6 pending final EP plan.  EP team reviewed findings and noted SB, Wenckebach, and 2:1 AVB.   Patient s/p micra PPM with EPS on 3.5.24.     Plan for:  CXR PA Lateral post micra implant  BP control with Hydralazine 25mg TID, Valsartan 160 daily  PPI for GI protection  Vascular surgery AVF revision potentially 3.7 vs 3.8  PT rec home PT  Patient is at elevated cardiac risk for vascular surgery 3 points Class IV Risk 15.0 %30-day risk of death, MI, or cardiac arrest. He is optimized from a cardiac standpoint with no absolute cardiac contraindications to proceed with vascular surgery.   Donna Conrad M.D.  Cardiology Attending  During non face-to-face time, I reviewed relevant portions of the patient’s medical record; including vitals, labs, medications, cardiac studies, remaining additional imaging. During face-to-face time, I took a relevant history and examined the patient. I also explained to the patient their diagnoses, and specific cardiopulmonary management plan, which required a high level of medical decision making.  I answered all questions related to the patient's medical conditions. I spent 55 minutes on total encounter; more than 50% of the visit was spent counseling and/or coordinating care by the attending physician, with plan of care discussed with the patient, multiple family members at bedside and cardiac care team.
83M with Essential HTN, DM, HLD, ESRD with recent initiation of HD (T/TH/Saturday) via R chest tunneled cath who presented 3/3/24 to the [facility] ED from HD center for asymptomatic bradycardia found to be in Mobitz type 2 AV block and hypertensive urgency. In the ER: patient given Hydralazine 25mg PO x2 with SBPs now in the 160s-180s. EP consulted for PPM evaluation, renal following and vascular following for revision of AVF.   Vascular surgery considering revision of AVF 3/5 vs 3/6 pending final EP plan.  EP team reviewed findings and noted SB, Wenckebach, and 2:1 AVB.     Plan for:  Plan for micra PPM with EPS, patient amenable, NPO for procedure  BP control with Hydralazine 25mg TID, Valsartan 160 daily  PPI for GI protection  Next HD planned 3.5 per [facility] HD team  Vascular surgery AVF revision pending PPM implantation  PT evaluation pending  R-M.Joe Conrad M.D.  Cardiology Attending  During non face-to-face time, I reviewed relevant portions of the patient’s medical record; including vitals, labs, medications, cardiac studies, remaining additional imaging. During face-to-face time, I took a relevant history and examined the patient. I also explained to the patient their diagnoses, and specific cardiopulmonary management plan, which required a high level of medical decision making.  I answered all questions related to the patient's medical conditions. I spent 55 minutes on total encounter; more than 50% of the visit was spent counseling and/or coordinating care by the attending physician, with plan of care discussed with the patient, and cardiac care team.

## 2024-03-06 NOTE — PROGRESS NOTE ADULT - PROBLEM SELECTOR PLAN 1
History of Mobitz type 1 block on previous admission. Presents with asymptomatic Mobitz type 2 block with rates in 30-40's. Unclear etiology. Non ischemic EKG. Lactate normal.   - s/p MICRA 03/05/24.  Interrogation by EP this AM with normal device function.   - CXR 03/06/24: Interval pacemaker placement within the apex of the right ventricle.

## 2024-03-06 NOTE — PROGRESS NOTE ADULT - PROBLEM SELECTOR PLAN 2
Presented w/ hypertensive urgency w/ SBP >180s asymptomatic. SBP now in 140s   - C/w Valsartan 160mg daily and Hydralazine 25mg TID  - TTE 3/4/24:  normal LV size and function EF 62%, LV wall thickness borderline, mildly dilated LA, pHTN presents PASP 36 mmHg, no effusion

## 2024-03-06 NOTE — PROGRESS NOTE ADULT - PROBLEM SELECTOR PLAN 5
Hx of anemia on EPO w/ HD. Iron studies done last admission. No sx active bleeding.   - Hgb this AM 9.  Transfuse <7  - Maintain active T&S (next due 03/08/24)   - Renal following, recs appreciated.

## 2024-03-06 NOTE — PROGRESS NOTE ADULT - PROBLEM SELECTOR PLAN 6
lipid panel wnl   c/w home Atorvastatin 10mg qhs    F: None  E: Replete if K<4 or Mag<2  N: Renal diet  VTEppx:  heparin sq   Dispo: pending clinical course   PT: Home PT x2-3 week     Case discussed with Dr. Diaz

## 2024-03-06 NOTE — PROGRESS NOTE ADULT - SUBJECTIVE AND OBJECTIVE BOX
SUBJECTIVE: Pt seen and examined at bedside this am by surgery team. Patient is lying comfortably in bed with no complaints. Tolerating diet, pain well controlled with current regimen. Patient denies fever, nausea, vomiting, chest pain, and shortness of breath.     MEDICATIONS  (STANDING):  atorvastatin 10 milliGRAM(s) Oral at bedtime  dextrose 5%. 1000 milliLiter(s) (50 mL/Hr) IV Continuous <Continuous>  dextrose 5%. 1000 milliLiter(s) (100 mL/Hr) IV Continuous <Continuous>  dextrose 50% Injectable 12.5 Gram(s) IV Push once  dextrose 50% Injectable 25 Gram(s) IV Push once  dextrose 50% Injectable 25 Gram(s) IV Push once  glucagon  Injectable 1 milliGRAM(s) IntraMuscular once  heparin   Injectable 5000 Unit(s) SubCutaneous every 8 hours  hydrALAZINE 25 milliGRAM(s) Oral every 8 hours  insulin glargine Injectable (LANTUS) 15 Unit(s) SubCutaneous at bedtime  insulin lispro (ADMELOG) corrective regimen sliding scale   SubCutaneous Before meals and at bedtime  pantoprazole    Tablet 40 milliGRAM(s) Oral before breakfast  sevelamer carbonate 800 milliGRAM(s) Oral three times a day with meals  valsartan 160 milliGRAM(s) Oral every 24 hours    MEDICATIONS  (PRN):  acetaminophen     Tablet .. 650 milliGRAM(s) Oral every 6 hours PRN Mild Pain (1 - 3), Moderate Pain (4 - 6)  dextrose Oral Gel 15 Gram(s) Oral once PRN Blood Glucose LESS THAN 70 milliGRAM(s)/deciliter      Vital Signs Last 24 Hrs  T(C): 36.9 (06 Mar 2024 16:39), Max: 37.4 (06 Mar 2024 05:04)  T(F): 98.4 (06 Mar 2024 16:39), Max: 99.3 (06 Mar 2024 05:04)  HR: 70 (06 Mar 2024 16:39) (39 - 88)  BP: 118/58 (06 Mar 2024 16:39) (118/58 - 168/78)  BP(mean): 99 (06 Mar 2024 05:58) (86 - 110)  RR: 18 (06 Mar 2024 16:39) (16 - 18)  SpO2: 98% (06 Mar 2024 16:39) (96% - 100%)    Parameters below as of 06 Mar 2024 16:39  Patient On (Oxygen Delivery Method): room air        Physical Exam  General: Patient is doing well and lying in bed comfortably  Constitutional: alert and oriented   Pulm: Nonlabored breathing, no respiratory distress  CV: sinus bradycardia, normal rhythm   Abd:  soft, nontender, nondistended. No rebound, no guarding.   Extremities: LUE AVF with well healing incision, no palpable thrill; palpable radial pulses b/l     I&O's Detail    05 Mar 2024 07:01  -  06 Mar 2024 07:00  --------------------------------------------------------  IN:    Oral Fluid: 60 mL  Total IN: 60 mL    OUT:    Other (mL): 2500 mL    Voided (mL): 400 mL  Total OUT: 2900 mL    Total NET: -2840 mL      06 Mar 2024 07:01  -  06 Mar 2024 17:20  --------------------------------------------------------  IN:    Oral Fluid: 120 mL  Total IN: 120 mL    OUT:    Voided (mL): 200 mL  Total OUT: 200 mL    Total NET: -80 mL        LABS:                        9.0    9.43  )-----------( 406      ( 06 Mar 2024 05:30 )             27.9     03-06    136  |  100  |  34<H>  ----------------------------<  110<H>  4.6   |  24  |  4.78<H>    Ca    8.4      06 Mar 2024 05:30  Mg     2.1     03-06    TPro  6.5  /  Alb  2.8<L>  /  TBili  0.2  /  DBili  x   /  AST  12  /  ALT  <5<L>  /  AlkPhos  87  03-06    PT/INR - ( 06 Mar 2024 05:30 )   PT: 11.7 sec;   INR: 1.03          PTT - ( 06 Mar 2024 05:30 )  PTT:27.8 sec  Urinalysis Basic - ( 06 Mar 2024 05:30 )    Color: x / Appearance: x / SG: x / pH: x  Gluc: 110 mg/dL / Ketone: x  / Bili: x / Urobili: x   Blood: x / Protein: x / Nitrite: x   Leuk Esterase: x / RBC: x / WBC x   Sq Epi: x / Non Sq Epi: x / Bacteria: x     SUBJECTIVE: Pt seen and examined at bedside this am by surgery team. Patient is lying comfortably in bed with no complaints. Tolerating diet, pain well controlled with current regimen. Patient denies fever, nausea, vomiting, chest pain, and shortness of breath.     MEDICATIONS  (STANDING):  atorvastatin 10 milliGRAM(s) Oral at bedtime  dextrose 5%. 1000 milliLiter(s) (50 mL/Hr) IV Continuous <Continuous>  dextrose 5%. 1000 milliLiter(s) (100 mL/Hr) IV Continuous <Continuous>  dextrose 50% Injectable 12.5 Gram(s) IV Push once  dextrose 50% Injectable 25 Gram(s) IV Push once  dextrose 50% Injectable 25 Gram(s) IV Push once  glucagon  Injectable 1 milliGRAM(s) IntraMuscular once  heparin   Injectable 5000 Unit(s) SubCutaneous every 8 hours  hydrALAZINE 25 milliGRAM(s) Oral every 8 hours  insulin glargine Injectable (LANTUS) 15 Unit(s) SubCutaneous at bedtime  insulin lispro (ADMELOG) corrective regimen sliding scale   SubCutaneous Before meals and at bedtime  pantoprazole    Tablet 40 milliGRAM(s) Oral before breakfast  sevelamer carbonate 800 milliGRAM(s) Oral three times a day with meals  valsartan 160 milliGRAM(s) Oral every 24 hours    MEDICATIONS  (PRN):  acetaminophen     Tablet .. 650 milliGRAM(s) Oral every 6 hours PRN Mild Pain (1 - 3), Moderate Pain (4 - 6)  dextrose Oral Gel 15 Gram(s) Oral once PRN Blood Glucose LESS THAN 70 milliGRAM(s)/deciliter      Vital Signs Last 24 Hrs  T(C): 36.9 (06 Mar 2024 16:39), Max: 37.4 (06 Mar 2024 05:04)  T(F): 98.4 (06 Mar 2024 16:39), Max: 99.3 (06 Mar 2024 05:04)  HR: 70 (06 Mar 2024 16:39) (39 - 88)  BP: 118/58 (06 Mar 2024 16:39) (118/58 - 168/78)  BP(mean): 99 (06 Mar 2024 05:58) (86 - 110)  RR: 18 (06 Mar 2024 16:39) (16 - 18)  SpO2: 98% (06 Mar 2024 16:39) (96% - 100%)    Parameters below as of 06 Mar 2024 16:39  Patient On (Oxygen Delivery Method): room air        Physical Exam  General: Patient is doing well and lying in bed comfortably  Constitutional: alert and oriented   Pulm: Nonlabored breathing, no respiratory distress  CV: sinus rhythm, normal rhythm   Abd:  soft, nontender, nondistended. No rebound, no guarding.   Extremities: LUE AVF with well healing incision, no palpable thrill; palpable radial pulses b/l     I&O's Detail    05 Mar 2024 07:01  -  06 Mar 2024 07:00  --------------------------------------------------------  IN:    Oral Fluid: 60 mL  Total IN: 60 mL    OUT:    Other (mL): 2500 mL    Voided (mL): 400 mL  Total OUT: 2900 mL    Total NET: -2840 mL      06 Mar 2024 07:01  -  06 Mar 2024 17:20  --------------------------------------------------------  IN:    Oral Fluid: 120 mL  Total IN: 120 mL    OUT:    Voided (mL): 200 mL  Total OUT: 200 mL    Total NET: -80 mL        LABS:                        9.0    9.43  )-----------( 406      ( 06 Mar 2024 05:30 )             27.9     03-06    136  |  100  |  34<H>  ----------------------------<  110<H>  4.6   |  24  |  4.78<H>    Ca    8.4      06 Mar 2024 05:30  Mg     2.1     03-06    TPro  6.5  /  Alb  2.8<L>  /  TBili  0.2  /  DBili  x   /  AST  12  /  ALT  <5<L>  /  AlkPhos  87  03-06    PT/INR - ( 06 Mar 2024 05:30 )   PT: 11.7 sec;   INR: 1.03          PTT - ( 06 Mar 2024 05:30 )  PTT:27.8 sec  Urinalysis Basic - ( 06 Mar 2024 05:30 )    Color: x / Appearance: x / SG: x / pH: x  Gluc: 110 mg/dL / Ketone: x  / Bili: x / Urobili: x   Blood: x / Protein: x / Nitrite: x   Leuk Esterase: x / RBC: x / WBC x   Sq Epi: x / Non Sq Epi: x / Bacteria: x

## 2024-03-06 NOTE — PROGRESS NOTE ADULT - SUBJECTIVE AND OBJECTIVE BOX
Pre-op Diagnosis: LUE AVF dysfunction  Procedure: LUE AVF revision  Surgeon: Dr. Lai     Consent: pending                           9.0    9.43  )-----------( 406      ( 06 Mar 2024 05:30 )             27.9     03-06    136  |  100  |  34<H>  ----------------------------<  110<H>  4.6   |  24  |  4.78<H>    Ca    8.4      06 Mar 2024 05:30  Mg     2.1     03-06    TPro  6.5  /  Alb  2.8<L>  /  TBili  0.2  /  DBili  x   /  AST  12  /  ALT  <5<L>  /  AlkPhos  87  03-06    PT/INR - ( 06 Mar 2024 05:30 )   PT: 11.7 sec;   INR: 1.03          PTT - ( 06 Mar 2024 05:30 )  PTT:27.8 sec  Urinalysis Basic - ( 06 Mar 2024 05:30 )    Color: x / Appearance: x / SG: x / pH: x  Gluc: 110 mg/dL / Ketone: x  / Bili: x / Urobili: x   Blood: x / Protein: x / Nitrite: x   Leuk Esterase: x / RBC: x / WBC x   Sq Epi: x / Non Sq Epi: x / Bacteria: x        Type & Screen: 3/5/2024     (*With most recent within 72hrs of OR)  CXR: 3/6/2024  EKG: done       Is patient on ACE/ARB? [ ]No [x ]Yes   *If yes, please hold any ACE/ARB the day of surgery    Is patient on Lantus at bedtime?  [ ]No [x ]Yes   *If yes, please half the dose the night before OR since patient will be NPO    Does patient have a contrast allergy? [ x]No [ ]Yes  *If yes, please pre-medicate per protocol    Is patient on anticoagulation? [x ]No [ ] Yes  *If yes, please discuss with team when to hold it    Is the patient Female and <56yo [ x]No [ ] Yes  If yes, pregnancy test must be documented in the chart    Is patient on dialysis? [ ]No [ x]Yes  *If yes, please obtain all labs including K level EARLY the day of surgery   *Also, will NOT require IVF past midnight    A/P: 83yMale pre-op for above procedure  1. NPO past midnight, except medications  - please DC valsartan and resume after OR   - please obtain BMP at 10 PM to eval for K   - rest of care as per primary team

## 2024-03-06 NOTE — PHYSICAL THERAPY INITIAL EVALUATION ADULT - GENERAL OBSERVATIONS, REHAB EVAL
Pt received semi supine in bed with +hep-lock, +EKG, +R groin dressing C/D/I with suture, family present, NAD. Pt left as found, NAD, call bell in reach, +bed alarm, AVIS vega., family present, EP team present.

## 2024-03-06 NOTE — PHYSICAL THERAPY INITIAL EVALUATION ADULT - ADDITIONAL COMMENTS
Pt lives with family in an apt, denies stairs. Prior to admission, pt ambulated independently with rolling walker, outdoor with wheelchair. Pt denies recent fall.

## 2024-03-06 NOTE — PROGRESS NOTE ADULT - SUBJECTIVE AND OBJECTIVE BOX
EPS Progress Note    S:     O: T(C): 36.7 (03-06-24 @ 13:22), Max: 37.4 (03-06-24 @ 05:04)  HR: 70 (03-06-24 @ 13:22) (39 - 73)  BP: 160/72 (03-06-24 @ 13:22) (119/59 - 193/79)  RR: 18 (03-06-24 @ 13:22) (16 - 18)  SpO2: 98% (03-06-24 @ 13:22) (96% - 100%)  Wt(kg): --    TELE:    PHYSICAL  General:  NAD        Chest:  CTA B/L, no w/r/r  Cardiac:  RRR, + s1/s2 , no m/g/r  Abdomen:   soft ND/NT  Extremities: No edema, b/l groin no hematoma/bleeding/oozing  Skin: no rash noted, normal color and pigmentation  Psych: A&Ox3, normal affect and mood  Neuro: no deficit noted     LABS:                        9.0    9.43  )-----------( 406      ( 06 Mar 2024 05:30 )             27.9     03-06    136  |  100  |  34<H>  ----------------------------<  110<H>  4.6   |  24  |  4.78<H>    Ca    8.4      06 Mar 2024 05:30  Mg     2.1     03-06    TPro  6.5  /  Alb  2.8<L>  /  TBili  0.2  /  DBili  x   /  AST  12  /  ALT  <5<L>  /  AlkPhos  87  03-06    PT/INR - ( 06 Mar 2024 05:30 )   PT: 11.7 sec;   INR: 1.03          PTT - ( 06 Mar 2024 05:30 )  PTT:27.8 sec      MEDICATIONS:  acetaminophen     Tablet .. 650 milliGRAM(s) Oral every 6 hours PRN  atorvastatin 10 milliGRAM(s) Oral at bedtime  dextrose 5%. 1000 milliLiter(s) IV Continuous <Continuous>  dextrose 5%. 1000 milliLiter(s) IV Continuous <Continuous>  dextrose 50% Injectable 25 Gram(s) IV Push once  dextrose 50% Injectable 12.5 Gram(s) IV Push once  dextrose 50% Injectable 25 Gram(s) IV Push once  dextrose Oral Gel 15 Gram(s) Oral once PRN  glucagon  Injectable 1 milliGRAM(s) IntraMuscular once  heparin   Injectable 5000 Unit(s) SubCutaneous every 8 hours  hydrALAZINE 25 milliGRAM(s) Oral every 8 hours  insulin glargine Injectable (LANTUS) 15 Unit(s) SubCutaneous at bedtime  insulin lispro (ADMELOG) corrective regimen sliding scale   SubCutaneous Before meals and at bedtime  pantoprazole    Tablet 40 milliGRAM(s) Oral before breakfast  sevelamer carbonate 800 milliGRAM(s) Oral three times a day with meals  valsartan 160 milliGRAM(s) Oral every 24 hours      ASSESSMENT/PLAN           EPS Progress Note    S: s/p successful micra implant, R groin suture removal uncomplicated                                                                                Electrophysiology Device Interrogation       Indication: intermittent high degree av block and bradycardia    Device model: 	Medtronic Micra AV leadless ppm	                            Functioning Mode: VDD 50    Underlying Rhythm:  sr     Pacemaker dependency: no    Battery status: 10 years    Interrogating parameters:   				RV  Sense:                                    19.8mV                    Threshold:                          0.25V@0.4ms                                                                              Pacing Impedance:                 650 ohms                                                                                                                                                        O: T(C): 36.7 (03-06-24 @ 13:22), Max: 37.4 (03-06-24 @ 05:04)  HR: 70 (03-06-24 @ 13:22) (39 - 73)  BP: 160/72 (03-06-24 @ 13:22) (119/59 - 193/79)  RR: 18 (03-06-24 @ 13:22) (16 - 18)  SpO2: 98% (03-06-24 @ 13:22) (96% - 100%)    TELE: sr, rare v-pacing    PHYSICAL  General:  NAD        Chest:  CTA B/L, no w/r/r  Cardiac:  RRR, + s1/s2 , no m/g/r  Abdomen:   soft ND/NT  Extremities: No edema,  Skin: no rash noted, normal color and pigmentation  Psych: A&Ox3, normal affect and mood  Neuro: no deficit noted     LABS:                        9.0    9.43  )-----------( 406      ( 06 Mar 2024 05:30 )             27.9     03-06    136  |  100  |  34<H>  ----------------------------<  110<H>  4.6   |  24  |  4.78<H>    Ca    8.4      06 Mar 2024 05:30  Mg     2.1     03-06    TPro  6.5  /  Alb  2.8<L>  /  TBili  0.2  /  DBili  x   /  AST  12  /  ALT  <5<L>  /  AlkPhos  87  03-06    PT/INR - ( 06 Mar 2024 05:30 )   PT: 11.7 sec;   INR: 1.03          PTT - ( 06 Mar 2024 05:30 )  PTT:27.8 sec      MEDICATIONS:  acetaminophen     Tablet .. 650 milliGRAM(s) Oral every 6 hours PRN  atorvastatin 10 milliGRAM(s) Oral at bedtime  glucagon  Injectable 1 milliGRAM(s) IntraMuscular once  heparin   Injectable 5000 Unit(s) SubCutaneous every 8 hours  hydrALAZINE 25 milliGRAM(s) Oral every 8 hours  insulin glargine Injectable (LANTUS) 15 Unit(s) SubCutaneous at bedtime  insulin lispro (ADMELOG) corrective regimen sliding scale   SubCutaneous Before meals and at bedtime  pantoprazole    Tablet 40 milliGRAM(s) Oral before breakfast  sevelamer carbonate 800 milliGRAM(s) Oral three times a day with meals  valsartan 160 milliGRAM(s) Oral every 24 hours      ASSESSMENT/PLAN  Assessment Plan:  Mr. Anguiano is an 83 year old male with HTN, DM, HLD, ESRD with recent initiation of HD (T/TH/Saturday) via tunneled cath who presented to the ED from HD center for asymptomatic bradycardia.  TELE and EKGs reviewed - He has sinus bradycardia while at rest.  He has an episode of Wenckebach and then multiple episodes of 2:1, although asymptomatic is precluding his ability to have dialysis sessions, so s/p AV micra implant for backup pacing for bradycardia and intermittent AV block.    -groin check WNL, device check WNL, CXR WNL  -f/u with EP in 4-6 weeks for follow up, pt aware  -post-implant instructions provided to the patient and sister in law at bedside

## 2024-03-06 NOTE — PROGRESS NOTE ADULT - PROBLEM SELECTOR PLAN 3
ESRD on HD T/TH/Sat via tunnelled HD cath  LAST HD: 03/05/24  VIA: YAMILE[facility] AVF (placed 02/2024)  - c/w sevelamer TID, renal diet   - EPO per renal  - renal and vascular following for revision of AVF

## 2024-03-06 NOTE — PROGRESS NOTE ADULT - PROBLEM SELECTOR PLAN 4
History of type 2 DM w/ A1c 7.7% in 02/24. d/c'ed on Lantus 35units per patient and wife. Has not taken in a few days, endorses non compliance  - CONT Lantus 15U and mISS   - diabetes education in patient

## 2024-03-07 ENCOUNTER — TRANSCRIPTION ENCOUNTER (OUTPATIENT)
Age: 84
End: 2024-03-07

## 2024-03-07 VITALS
WEIGHT: 147.93 LBS | DIASTOLIC BLOOD PRESSURE: 54 MMHG | SYSTOLIC BLOOD PRESSURE: 113 MMHG | HEART RATE: 75 BPM | TEMPERATURE: 97 F | RESPIRATION RATE: 16 BRPM | OXYGEN SATURATION: 97 %

## 2024-03-07 DIAGNOSIS — Z98.890 OTHER SPECIFIED POSTPROCEDURAL STATES: ICD-10-CM

## 2024-03-07 LAB
ALBUMIN SERPL ELPH-MCNC: 2.6 G/DL — LOW (ref 3.3–5)
ALP SERPL-CCNC: 84 U/L — SIGNIFICANT CHANGE UP (ref 40–120)
ALT FLD-CCNC: <5 U/L — LOW (ref 10–45)
ANION GAP SERPL CALC-SCNC: 13 MMOL/L — SIGNIFICANT CHANGE UP (ref 5–17)
APTT BLD: 32.6 SEC — SIGNIFICANT CHANGE UP (ref 24.5–35.6)
AST SERPL-CCNC: 10 U/L — SIGNIFICANT CHANGE UP (ref 10–40)
BILIRUB SERPL-MCNC: <0.2 MG/DL — SIGNIFICANT CHANGE UP (ref 0.2–1.2)
BLD GP AB SCN SERPL QL: NEGATIVE — SIGNIFICANT CHANGE UP
BUN SERPL-MCNC: 52 MG/DL — HIGH (ref 7–23)
CALCIUM SERPL-MCNC: 8.8 MG/DL — SIGNIFICANT CHANGE UP (ref 8.4–10.5)
CHLORIDE SERPL-SCNC: 101 MMOL/L — SIGNIFICANT CHANGE UP (ref 96–108)
CO2 SERPL-SCNC: 24 MMOL/L — SIGNIFICANT CHANGE UP (ref 22–31)
CREAT SERPL-MCNC: 6.38 MG/DL — HIGH (ref 0.5–1.3)
EGFR: 8 ML/MIN/1.73M2 — LOW
GLUCOSE BLDC GLUCOMTR-MCNC: 77 MG/DL — SIGNIFICANT CHANGE UP (ref 70–99)
GLUCOSE BLDC GLUCOMTR-MCNC: 79 MG/DL — SIGNIFICANT CHANGE UP (ref 70–99)
GLUCOSE SERPL-MCNC: 85 MG/DL — SIGNIFICANT CHANGE UP (ref 70–99)
HCT VFR BLD CALC: 26.9 % — LOW (ref 39–50)
HGB BLD-MCNC: 8.3 G/DL — LOW (ref 13–17)
INR BLD: 1.04 — SIGNIFICANT CHANGE UP (ref 0.85–1.18)
LACTATE SERPL-SCNC: 0.6 MMOL/L — SIGNIFICANT CHANGE UP (ref 0.5–2)
MAGNESIUM SERPL-MCNC: 2.2 MG/DL — SIGNIFICANT CHANGE UP (ref 1.6–2.6)
MCHC RBC-ENTMCNC: 29.5 PG — SIGNIFICANT CHANGE UP (ref 27–34)
MCHC RBC-ENTMCNC: 30.9 GM/DL — LOW (ref 32–36)
MCV RBC AUTO: 95.7 FL — SIGNIFICANT CHANGE UP (ref 80–100)
NRBC # BLD: 0 /100 WBCS — SIGNIFICANT CHANGE UP (ref 0–0)
PLATELET # BLD AUTO: 435 K/UL — HIGH (ref 150–400)
POTASSIUM SERPL-MCNC: 4.9 MMOL/L — SIGNIFICANT CHANGE UP (ref 3.5–5.3)
POTASSIUM SERPL-SCNC: 4.9 MMOL/L — SIGNIFICANT CHANGE UP (ref 3.5–5.3)
PREALB SERPL-MCNC: 15 MG/DL — LOW (ref 20–40)
PROT SERPL-MCNC: 6.3 G/DL — SIGNIFICANT CHANGE UP (ref 6–8.3)
PROTHROM AB SERPL-ACNC: 11.8 SEC — SIGNIFICANT CHANGE UP (ref 9.5–13)
RBC # BLD: 2.81 M/UL — LOW (ref 4.2–5.8)
RBC # FLD: 13.6 % — SIGNIFICANT CHANGE UP (ref 10.3–14.5)
RH IG SCN BLD-IMP: NEGATIVE — SIGNIFICANT CHANGE UP
SODIUM SERPL-SCNC: 138 MMOL/L — SIGNIFICANT CHANGE UP (ref 135–145)
WBC # BLD: 10.39 K/UL — SIGNIFICANT CHANGE UP (ref 3.8–10.5)
WBC # FLD AUTO: 10.39 K/UL — SIGNIFICANT CHANGE UP (ref 3.8–10.5)

## 2024-03-07 PROCEDURE — 86901 BLOOD TYPING SEROLOGIC RH(D): CPT

## 2024-03-07 PROCEDURE — 0225U NFCT DS DNA&RNA 21 SARSCOV2: CPT

## 2024-03-07 PROCEDURE — 90935 HEMODIALYSIS ONE EVALUATION: CPT

## 2024-03-07 PROCEDURE — 80076 HEPATIC FUNCTION PANEL: CPT

## 2024-03-07 PROCEDURE — 99152 MOD SED SAME PHYS/QHP 5/>YRS: CPT | Mod: GC,79

## 2024-03-07 PROCEDURE — 37246 TRLUML BALO ANGIOP 1ST ART: CPT | Mod: GC,79

## 2024-03-07 PROCEDURE — 84295 ASSAY OF SERUM SODIUM: CPT

## 2024-03-07 PROCEDURE — 71045 X-RAY EXAM CHEST 1 VIEW: CPT

## 2024-03-07 PROCEDURE — 84443 ASSAY THYROID STIM HORMONE: CPT

## 2024-03-07 PROCEDURE — C2623: CPT

## 2024-03-07 PROCEDURE — 86900 BLOOD TYPING SEROLOGIC ABO: CPT

## 2024-03-07 PROCEDURE — C1769: CPT

## 2024-03-07 PROCEDURE — 82962 GLUCOSE BLOOD TEST: CPT

## 2024-03-07 PROCEDURE — 84132 ASSAY OF SERUM POTASSIUM: CPT

## 2024-03-07 PROCEDURE — 80048 BASIC METABOLIC PNL TOTAL CA: CPT

## 2024-03-07 PROCEDURE — 82330 ASSAY OF CALCIUM: CPT

## 2024-03-07 PROCEDURE — 85730 THROMBOPLASTIN TIME PARTIAL: CPT

## 2024-03-07 PROCEDURE — 81001 URINALYSIS AUTO W/SCOPE: CPT

## 2024-03-07 PROCEDURE — 87340 HEPATITIS B SURFACE AG IA: CPT

## 2024-03-07 PROCEDURE — 76000 FLUOROSCOPY <1 HR PHYS/QHP: CPT

## 2024-03-07 PROCEDURE — 86850 RBC ANTIBODY SCREEN: CPT

## 2024-03-07 PROCEDURE — C1786: CPT

## 2024-03-07 PROCEDURE — 90937 HEMODIALYSIS REPEATED EVAL: CPT

## 2024-03-07 PROCEDURE — C1725: CPT

## 2024-03-07 PROCEDURE — 80053 COMPREHEN METABOLIC PANEL: CPT

## 2024-03-07 PROCEDURE — 85027 COMPLETE CBC AUTOMATED: CPT

## 2024-03-07 PROCEDURE — 85025 COMPLETE CBC W/AUTO DIFF WBC: CPT

## 2024-03-07 PROCEDURE — 83605 ASSAY OF LACTIC ACID: CPT

## 2024-03-07 PROCEDURE — 85610 PROTHROMBIN TIME: CPT

## 2024-03-07 PROCEDURE — C1894: CPT

## 2024-03-07 PROCEDURE — C8929: CPT

## 2024-03-07 PROCEDURE — 84100 ASSAY OF PHOSPHORUS: CPT

## 2024-03-07 PROCEDURE — 71046 X-RAY EXAM CHEST 2 VIEWS: CPT

## 2024-03-07 PROCEDURE — 75710 ARTERY X-RAYS ARM/LEG: CPT | Mod: 26,GC

## 2024-03-07 PROCEDURE — 97530 THERAPEUTIC ACTIVITIES: CPT

## 2024-03-07 PROCEDURE — 97110 THERAPEUTIC EXERCISES: CPT

## 2024-03-07 PROCEDURE — 99239 HOSP IP/OBS DSCHRG MGMT >30: CPT

## 2024-03-07 PROCEDURE — 86803 HEPATITIS C AB TEST: CPT

## 2024-03-07 PROCEDURE — 36902 INTRO CATH DIALYSIS CIRCUIT: CPT | Mod: GC,79

## 2024-03-07 PROCEDURE — 99285 EMERGENCY DEPT VISIT HI MDM: CPT

## 2024-03-07 PROCEDURE — 83735 ASSAY OF MAGNESIUM: CPT

## 2024-03-07 PROCEDURE — 97162 PT EVAL MOD COMPLEX 30 MIN: CPT

## 2024-03-07 PROCEDURE — 82803 BLOOD GASES ANY COMBINATION: CPT

## 2024-03-07 PROCEDURE — 84134 ASSAY OF PREALBUMIN: CPT

## 2024-03-07 PROCEDURE — C1887: CPT

## 2024-03-07 PROCEDURE — 86706 HEP B SURFACE ANTIBODY: CPT

## 2024-03-07 PROCEDURE — 36415 COLL VENOUS BLD VENIPUNCTURE: CPT

## 2024-03-07 RX ORDER — VALSARTAN 80 MG/1
160 TABLET ORAL DAILY
Refills: 0 | Status: DISCONTINUED | OUTPATIENT
Start: 2024-03-07 | End: 2024-03-07

## 2024-03-07 RX ORDER — HYDRALAZINE HCL 50 MG
50 TABLET ORAL EVERY 8 HOURS
Refills: 0 | Status: DISCONTINUED | OUTPATIENT
Start: 2024-03-07 | End: 2024-03-07

## 2024-03-07 RX ORDER — ERYTHROPOIETIN 10000 [IU]/ML
6000 INJECTION, SOLUTION INTRAVENOUS; SUBCUTANEOUS ONCE
Refills: 0 | Status: COMPLETED | OUTPATIENT
Start: 2024-03-07 | End: 2024-03-07

## 2024-03-07 RX ORDER — NIFEDIPINE 30 MG
1 TABLET, EXTENDED RELEASE 24 HR ORAL
Refills: 0 | DISCHARGE

## 2024-03-07 RX ORDER — HYDRALAZINE HCL 50 MG
1 TABLET ORAL
Qty: 90 | Refills: 2
Start: 2024-03-07 | End: 2024-06-04

## 2024-03-07 RX ADMIN — Medication 25 MILLIGRAM(S): at 05:22

## 2024-03-07 RX ADMIN — PANTOPRAZOLE SODIUM 40 MILLIGRAM(S): 20 TABLET, DELAYED RELEASE ORAL at 05:22

## 2024-03-07 RX ADMIN — ERYTHROPOIETIN 6000 UNIT(S): 10000 INJECTION, SOLUTION INTRAVENOUS; SUBCUTANEOUS at 17:46

## 2024-03-07 NOTE — PROGRESS NOTE ADULT - REASON FOR ADMISSION
Hypertensive urgency,  Type 2 AV block

## 2024-03-07 NOTE — PROGRESS NOTE ADULT - ASSESSMENT
83Y w/hx of ESRD admitted for asymptomatic bradycardia Mobitz type 2 AV block and hypertensive urgency, consulted for further management     ESRD:  HD today per schedule.  Encourage PO intake   Fluid Restriction to <1.5L/day   Renal diet  Strict I&O, daily weights    Hemodialysis Treatment.:     Schedule: Once, Modality: Hemodialysis, Access: Internal Jugular Central Venous Catheter    Dialyzer: Optiflux [id number], Time: 210 Min    Blood Flow: 400 mL/Min , Dialysate Flow: 500 mL/Min, Dialysate Temp: 36.5, Tubinmm (Adult)    Target Fluid Removal: 2 Liters    Dialysate Electrolytes (mEq/L): Potassium 2, Calcium 2.5, Sodium 138, Bicarbonate 35    Access:  R TDC functional   Failed LUE AVF - now s/p fistulogram and angioplasty however fistula remains inaccessible. Will need continued Vascular follow up.  Vascular following for possible AVF revision.   Avoid trauma at the level of LUE    Anemia: Hgb 8.3. tsat 22%  EPO with HD     HTN:  BP above goal   Continue with home BP meds - can increase hydralazine if needed, but hold BP meds prior to HD on HD days. Expect that as patient has volume removed with HD, antihypertensive requirement will decrease.  UF with HD as tolerated     MBD: Phos within goal, cont sevelamer 800mg with meals 83Y w/hx of ESRD admitted for asymptomatic bradycardia Mobitz type 2 AV block and hypertensive urgency, consulted for further management     ESRD:  HD today per schedule.  Encourage PO intake   Fluid Restriction to <1.5L/day   Renal diet  Strict I&O, daily weights    Hemodialysis Treatment.:     Schedule: Once, Modality: Hemodialysis, Access: Internal Jugular Central Venous Catheter    Dialyzer: Optiflux [id number], Time: 210 Min    Blood Flow: 400 mL/Min , Dialysate Flow: 500 mL/Min, Dialysate Temp: 36.5, Tubinmm (Adult)    Target Fluid Removal: 2 Liters    Dialysate Electrolytes (mEq/L): Potassium 2, Calcium 2.5, Sodium 138, Bicarbonate 35    Access:  R TDC functional   Failed LUE AVF - now s/p fistulogram and angioplasty however fistula remains inaccessible. Will need continued Vascular follow up.  Vascular following for possible AVF revision.   Avoid trauma at the level of LUE    Anemia: Hgb 8.3. tsat 22%  EPO with HD     HTN:  BP above goal   Continue with home BP meds - can increase hydralazine if needed, but hold BP meds prior to HD on HD days. Expect that as patient has volume removed with HD, antihypertensive requirement will decrease.  UF with HD as tolerated     MBD: Phos within goal, cont sevelamer 800mg with meals    Hep B serologies non reactive

## 2024-03-07 NOTE — BRIEF OPERATIVE NOTE - OPERATION/FINDINGS
LUE fistulogram performed via L radial access and L cephalic access with 4Fr sheath. Stenosis noted just distal to anastamosis. This was ballooned with a 3x40 juan c then 5x40 ranger. Completion showed resolution of stenosis and good thrill.  Contrast: 35cc

## 2024-03-07 NOTE — DISCHARGE NOTE PROVIDER - NSDCMRMEDTOKEN_GEN_ALL_CORE_FT
atorvastatin 10 mg oral tablet: 1 tab(s) orally once a day  calcitriol 0.25 mcg oral capsule: 1 cap(s) orally once a day  Lantus 100 units/mL subcutaneous solution: 40 unit(s) subcutaneous once a day (at bedtime)  NIFEdipine 60 mg oral tablet, extended release: 1 tab(s) orally 2 times a day  Protonix 40 mg oral delayed release tablet: 1 tab(s) orally 2 times a day  Protonix 40 mg oral granule, delayed release: 1 each orally once a day  Rolling Walker: In need of Rolling Walker  sevelamer carbonate 800 mg oral tablet: 2 tab(s) orally 3 times a day  sodium bicarbonate 650 mg PO 2 tabs x3 daily:   Timoptic 0.5% ophthalmic solution: 1 drop(s) in each affected eye 2 times a day  valsartan 160 mg oral tablet: 1 tab(s) orally once a day   atorvastatin 10 mg oral tablet: 1 tab(s) orally once a day  calcitriol 0.25 mcg oral capsule: 1 cap(s) orally once a day  hydrALAZINE 50 mg oral tablet: 1 tab(s) orally every 8 hours  Lantus 100 units/mL subcutaneous solution: 40 unit(s) subcutaneous once a day (at bedtime)  Protonix 40 mg oral delayed release tablet: 1 tab(s) orally 2 times a day  Protonix 40 mg oral granule, delayed release: 1 each orally once a day  Rolling Walker: In need of Rolling Walker  sevelamer carbonate 800 mg oral tablet: 2 tab(s) orally 3 times a day  sodium bicarbonate 650 mg PO 2 tabs x3 daily:   Timoptic 0.5% ophthalmic solution: 1 drop(s) in each affected eye 2 times a day  valsartan 160 mg oral tablet: 1 tab(s) orally once a day

## 2024-03-07 NOTE — DISCHARGE NOTE PROVIDER - NSDCFUSCHEDAPPT_GEN_ALL_CORE_FT
Elvis Tabor  [facility]  [address] [facility] [address] [facility]  Scheduled Appointment: 03/11/2024

## 2024-03-07 NOTE — PROGRESS NOTE ADULT - PROBLEM SELECTOR PROBLEM 1
Mobitz type II block

## 2024-03-07 NOTE — PROGRESS NOTE ADULT - ASSESSMENT
Mr. Anguiano is an 82yo M w/ a PMH of HTN, DM, HLD, ESRD with recent initiation of HD (T/TH/Saturday) via tunneled cath who presented overnight on 3/3/24 to the [facility] ED from HD center for asymptomatic bradycardia, Mobitz type 2 AV block and hypertensive urgency. Patient status post Micra PPM  placed 03/05/24. Renal following and vascular following with plan for  revision of AVF on 03/07 vs 03/08.

## 2024-03-07 NOTE — PROGRESS NOTE ADULT - SUBJECTIVE AND OBJECTIVE BOX
Nephrology progress note    Seen at bedside. S/p fistulogram, angioplasty of distal stenosis however per Vascular fistula should not be used. Planned for HD today via TDC. HR improved s/p PPM.    Allergies:  No Known Allergies    Hospital Medications:   MEDICATIONS  (STANDING):  atorvastatin 10 milliGRAM(s) Oral at bedtime  dextrose 5%. 1000 milliLiter(s) (100 mL/Hr) IV Continuous <Continuous>  dextrose 5%. 1000 milliLiter(s) (50 mL/Hr) IV Continuous <Continuous>  dextrose 50% Injectable 25 Gram(s) IV Push once  dextrose 50% Injectable 12.5 Gram(s) IV Push once  dextrose 50% Injectable 25 Gram(s) IV Push once  epoetin alba-epbx (RETACRIT) Injectable 6000 Unit(s) IV Push once  glucagon  Injectable 1 milliGRAM(s) IntraMuscular once  hydrALAZINE 25 milliGRAM(s) Oral every 8 hours  insulin glargine Injectable (LANTUS) 15 Unit(s) SubCutaneous at bedtime  insulin lispro (ADMELOG) corrective regimen sliding scale   SubCutaneous Before meals and at bedtime  pantoprazole    Tablet 40 milliGRAM(s) Oral before breakfast  sevelamer carbonate 800 milliGRAM(s) Oral three times a day with meals    REVIEW OF SYSTEMS:  All other review of systems is negative unless indicated above.    VITALS:  T(F): 98.3 (03-07-24 @ 13:13), Max: 98.4 (03-06-24 @ 16:39)  HR: 66 (03-07-24 @ 13:13)  BP: 129/63 (03-07-24 @ 13:13)  RR: 17 (03-07-24 @ 13:13)  SpO2: 99% (03-07-24 @ 13:13)  Wt(kg): --    03-05 @ 07:01  -  03-06 @ 07:00  --------------------------------------------------------  IN: 60 mL / OUT: 2900 mL / NET: -2840 mL    03-06 @ 07:01  -  03-07 @ 07:00  --------------------------------------------------------  IN: 220 mL / OUT: 400 mL / NET: -180 mL      Height (cm): 172.7 (03-07 @ 06:03)  Weight (kg): 63.3 (03-07 @ 00:38)  BMI (kg/m2): 21.2 (03-07 @ 06:03)  BSA (m2): 1.75 (03-07 @ 06:03)    PHYSICAL EXAM:  GENERAL: NAD, lying in bed  HEENT: NCAT, EOMI  CHEST/LUNG: No respiratory distress  HEART: Regular rate  ABDOMEN: Soft, nontender, non distended  EXTREMITIES: no LE edema  Neurology: Awake, alert, interactive  SKIN: No rash on exposed skin  Access: Right TDC c/d/i, LUE AVF pulsatile    LABS:  03-07    138  |  101  |  52<H>  ----------------------------<  85  4.9   |  24  |  6.38<H>    Ca    8.8      07 Mar 2024 05:18  Mg     2.2     03-07    TPro  6.3  /  Alb  2.6<L>  /  TBili  <0.2  /  DBili      /  AST  10  /  ALT  <5<L>  /  AlkPhos  84  03-07                          8.3    10.39 )-----------( 435      ( 07 Mar 2024 05:18 )             26.9       Urine Studies:  Creatinine Trend: 6.38<--, 6.15<--, 4.78<--, 6.40<--, 5.39<--, 3.80<--  Urinalysis Basic - ( 07 Mar 2024 05:18 )    Color:  / Appearance:  / SG:  / pH:   Gluc: 85 mg/dL / Ketone:   / Bili:  / Urobili:    Blood:  / Protein:  / Nitrite:    Leuk Esterase:  / RBC:  / WBC    Sq Epi:  / Non Sq Epi:  / Bacteria:         RADIOLOGY & ADDITIONAL STUDIES:  Reviewed

## 2024-03-07 NOTE — PROGRESS NOTE ADULT - SUBJECTIVE AND OBJECTIVE BOX
SUBJECTIVE: Pt seen and examined at bedside this am by surgery team. Patient is lying comfortably in bed with no complaints. Denies pain. Patient denies fever, nausea, vomiting, chest pain, and shortness of breath. Plan for fistulogram today to assess LUE AVF    MEDICATIONS  (STANDING):  atorvastatin 10 milliGRAM(s) Oral at bedtime  dextrose 5%. 1000 milliLiter(s) (100 mL/Hr) IV Continuous <Continuous>  dextrose 5%. 1000 milliLiter(s) (50 mL/Hr) IV Continuous <Continuous>  dextrose 50% Injectable 25 Gram(s) IV Push once  dextrose 50% Injectable 12.5 Gram(s) IV Push once  dextrose 50% Injectable 25 Gram(s) IV Push once  epoetin alba-epbx (RETACRIT) Injectable 6000 Unit(s) IV Push once  glucagon  Injectable 1 milliGRAM(s) IntraMuscular once  hydrALAZINE 25 milliGRAM(s) Oral every 8 hours  insulin glargine Injectable (LANTUS) 15 Unit(s) SubCutaneous at bedtime  insulin lispro (ADMELOG) corrective regimen sliding scale   SubCutaneous Before meals and at bedtime  pantoprazole    Tablet 40 milliGRAM(s) Oral before breakfast  sevelamer carbonate 800 milliGRAM(s) Oral three times a day with meals    MEDICATIONS  (PRN):  acetaminophen     Tablet .. 650 milliGRAM(s) Oral every 6 hours PRN Mild Pain (1 - 3), Moderate Pain (4 - 6)  dextrose Oral Gel 15 Gram(s) Oral once PRN Blood Glucose LESS THAN 70 milliGRAM(s)/deciliter      Vital Signs Last 24 Hrs  T(C): 36.8 (07 Mar 2024 05:22), Max: 36.9 (06 Mar 2024 16:39)  T(F): 98.2 (07 Mar 2024 05:22), Max: 98.4 (06 Mar 2024 16:39)  HR: 67 (07 Mar 2024 05:22) (67 - 88)  BP: 147/66 (07 Mar 2024 05:22) (118/58 - 160/72)  BP(mean): --  RR: 16 (07 Mar 2024 05:22) (16 - 18)  SpO2: 98% (07 Mar 2024 05:22) (97% - 99%)    Parameters below as of 07 Mar 2024 05:22  Patient On (Oxygen Delivery Method): room air        Physical Exam  General: Patient is doing well and lying in bed comfortably  Constitutional: alert and oriented   Pulm: Nonlabored breathing, no respiratory distress  CV: regular rate, normal rhythm   Abd:  soft, nontender, nondistended. No rebound, no guarding.   Extremities: LUE AVF with well healing incision, no palpable thrill; palpable radial pulses b/l       I&O's Detail    06 Mar 2024 07:01  -  07 Mar 2024 07:00  --------------------------------------------------------  IN:    Oral Fluid: 220 mL  Total IN: 220 mL    OUT:    Voided (mL): 400 mL  Total OUT: 400 mL    Total NET: -180 mL        LABS:                        8.3    10.39 )-----------( 435      ( 07 Mar 2024 05:18 )             26.9     03-07    138  |  101  |  52<H>  ----------------------------<  85  4.9   |  24  |  6.38<H>    Ca    8.8      07 Mar 2024 05:18  Mg     2.2     03-07    TPro  6.3  /  Alb  2.6<L>  /  TBili  <0.2  /  DBili  x   /  AST  10  /  ALT  <5<L>  /  AlkPhos  84  03-07    PT/INR - ( 07 Mar 2024 05:18 )   PT: 11.8 sec;   INR: 1.04          PTT - ( 07 Mar 2024 05:18 )  PTT:32.6 sec  Urinalysis Basic - ( 07 Mar 2024 05:18 )    Color: x / Appearance: x / SG: x / pH: x  Gluc: 85 mg/dL / Ketone: x  / Bili: x / Urobili: x   Blood: x / Protein: x / Nitrite: x   Leuk Esterase: x / RBC: x / WBC x   Sq Epi: x / Non Sq Epi: x / Bacteria: x

## 2024-03-07 NOTE — PROGRESS NOTE ADULT - ASSESSMENT
84 yo M PMH HTN, DM, HLD, ESRD admitted to [facility] on 3/2 for asymptomatic bradycardia noted during HD. The patient was recently admitted on 2/6 for hyperkalemia, during this admission he had an left arm AV fistula creation with Dr. Lai (2/9/24), he tolerated the procedure without complication. Vascular Surgery was consulted for evaluation with possible revision of the patient's AVF. Now s/p PPM placement by EP on 3/5    Recommendations  Plan for fistulogram today  Rest of care per primary  Vascular Surgery will continue to follow

## 2024-03-07 NOTE — DISCHARGE NOTE PROVIDER - NSDCCPCAREPLAN_GEN_ALL_CORE_FT
PRINCIPAL DISCHARGE DIAGNOSIS  Diagnosis: AV block, Mobitz 2  Assessment and Plan of Treatment:      PRINCIPAL DISCHARGE DIAGNOSIS  Diagnosis: AV block, Mobitz 2  Assessment and Plan of Treatment:       SECONDARY DISCHARGE DIAGNOSES  Diagnosis: S/P placement of cardiac pacemaker  Assessment and Plan of Treatment:     Diagnosis: Hypertensive urgency  Assessment and Plan of Treatment: When you arrived at the ED your blood pressure was 191/91 mmHg, which met the criteria for hypertensive urgency (systolic blood pressure > 180 mmHg and/or diastolic blood pressure > 120 mmHg. Please continue hydralazine 50 mg three times a day as listed to keep your blood pressure controlled. For blood pressure that is too high or too low please see your doctor or go to the emergency room as necessary.    Diagnosis: ESRD on dialysis  Assessment and Plan of Treatment: You have a known history of chronic kidney disease and your baseline Creatinine (kidney function) is __. While you were in the hospital, your kidney function was elevated due to the heart failure and volume overload. Your kidney function has now improved back to baseline with the diuresis, prior to dicharge your Creatinine was ___. Please follow up with your nephrologist as scheduled.    Diagnosis: S/P arteriovenous (AV) fistula repair  Assessment and Plan of Treatment:     Diagnosis: HLD (hyperlipidemia)  Assessment and Plan of Treatment: Please continue ____ at bedtime to keep your cholesterol low. High cholesterol contributes to heart disease.    Diagnosis: DM (diabetes mellitus)  Assessment and Plan of Treatment: Your Hemoglobin A1c is ___ and your goal A1c is less than 7.0%. This number measures your average blood sugar level over the last three months.  Please continue ___ as listed for diabetes. Maintain a low carbohydrate, low sugar diet, exercise, monitor your fingerstick blood sugars regarly and follow up with your Endocrinologist/Primary Care Doctor.    Diagnosis: Anemia  Assessment and Plan of Treatment:      PRINCIPAL DISCHARGE DIAGNOSIS  Diagnosis: AV block, Mobitz 2  Assessment and Plan of Treatment:       SECONDARY DISCHARGE DIAGNOSES  Diagnosis: S/P placement of cardiac pacemaker  Assessment and Plan of Treatment:     Diagnosis: Hypertensive urgency  Assessment and Plan of Treatment: When you arrived at the ED your blood pressure was 191/91 mmHg, which met the criteria for hypertensive urgency (systolic blood pressure > 180 mmHg and/or diastolic blood pressure > 120 mmHg. Your Please START hydralazine 50 mg three times a day, and continue [id number] mg once a day, and nifedipine 60 mg twice a day as listed to keep your blood pressure controlled. For blood pressure that is too high or too low please see your doctor or go to the emergency room as necessary.    Diagnosis: ESRD on dialysis  Assessment and Plan of Treatment: You have a known history of chronic kidney disease and your baseline Creatinine (kidney function) is __. While you were in the hospital, your kidney function was elevated due to the heart failure and volume overload. Your kidney function has now improved back to baseline with the diuresis, prior to dicharge your Creatinine was ___. Please follow up with your nephrologist as scheduled.    Diagnosis: S/P arteriovenous (AV) fistula repair  Assessment and Plan of Treatment:     Diagnosis: HLD (hyperlipidemia)  Assessment and Plan of Treatment: Please continue ____ at bedtime to keep your cholesterol low. High cholesterol contributes to heart disease.    Diagnosis: DM (diabetes mellitus)  Assessment and Plan of Treatment: Your Hemoglobin A1c is ___ and your goal A1c is less than 7.0%. This number measures your average blood sugar level over the last three months.  Please continue ___ as listed for diabetes. Maintain a low carbohydrate, low sugar diet, exercise, monitor your fingerstick blood sugars regarly and follow up with your Endocrinologist/Primary Care Doctor.    Diagnosis: Anemia  Assessment and Plan of Treatment:      PRINCIPAL DISCHARGE DIAGNOSIS  Diagnosis: AV block, Mobitz 2  Assessment and Plan of Treatment:       SECONDARY DISCHARGE DIAGNOSES  Diagnosis: S/P placement of cardiac pacemaker  Assessment and Plan of Treatment:     Diagnosis: Hypertensive urgency  Assessment and Plan of Treatment: When you arrived at the ED your blood pressure was 191/91 mmHg, which met the criteria for hypertensive urgency (systolic blood pressure > 180 mmHg and/or diastolic blood pressure > 120 mmHg. Your Please START hydralazine 50 mg three times a day, and continue [id number] mg once a day, and nifedipine 60 mg twice a day as listed to keep your blood pressure controlled. For blood pressure that is too high or too low please see your doctor or go to the emergency room as necessary.    Diagnosis: ESRD on dialysis  Assessment and Plan of Treatment: Please continue to follow up with your outpatient dialysis schedule: Tuesdays, Thursdays, and Saturday.   End-stage renal (or kidney) disease happens when your kidneys can no longer do their jobs. They can't remove waste from your blood. And they aren't able to balance your body's fluids and chemicals.  This stage of the disease usually occurs after you have chronic kidney disease for years. Now the kidneys work so poorly that you need dialysis or a kidney transplant to live. Dialysis is a treatment to help filter waste from your blood. A transplant is surgery to give you a healthy kidney from another person.  Be safe with medicines. Take your medicines exactly as prescribed. Call your doctor or nurse advice line if you have any problems with your medicine. Make sure your doctor knows all of the medicines and natural health products you take.  Do not take anti-inflammatory medicines such as ibuprofen and naproxen. They can make chronic kidney disease worse.  If you have diabetes, keep your blood sugar in your target range with a healthy diet, regular exercise, and medicine if needed.  Do not smoke or use other tobacco products and limit alcohol.  Follow a diet plan that is easy on your kidneys. A dietitian can help you create an eating plan with the right amounts of salt (sodium), potassium, and protein. You may also need to limit how much fluid you drink each day.  Please follow up with your nephrologist as scheduled.    Diagnosis: S/P arteriovenous (AV) fistula repair  Assessment and Plan of Treatment:     Diagnosis: HLD (hyperlipidemia)  Assessment and Plan of Treatment: Your LDL on this admission was 61. The goal LDL Please continue ____ at bedtime to keep your cholesterol low. High cholesterol contributes to heart disease.    Diagnosis: DM (diabetes mellitus)  Assessment and Plan of Treatment: Your Hemoglobin A1c is 7.7% and your goal A1c is less than 7.0%. This number measures your average blood sugar level over the last three months.  Please continue Lantus (100 U/mL) 40 unit subcutaneous injection at bedtime, as listed for diabetes. Maintain a low carbohydrate, low sugar diet, exercise, monitor your fingerstick blood sugars regarly and follow up with your Endocrinologist/Primary Care Doctor.    Diagnosis: Anemia  Assessment and Plan of Treatment:      PRINCIPAL DISCHARGE DIAGNOSIS  Diagnosis: AV block, Mobitz 2  Assessment and Plan of Treatment:       SECONDARY DISCHARGE DIAGNOSES  Diagnosis: S/P placement of cardiac pacemaker  Assessment and Plan of Treatment:     Diagnosis: Hypertensive urgency  Assessment and Plan of Treatment: When you arrived at the ED your blood pressure was 191/91 mmHg, which met the criteria for hypertensive urgency (systolic blood pressure > 180 mmHg and/or diastolic blood pressure > 120 mmHg. Your Please START hydralazine 50 mg three times a day, and continue [id number] mg once a day, and nifedipine 60 mg twice a day as listed to keep your blood pressure controlled. For blood pressure that is too high or too low please see your doctor or go to the emergency room as necessary.    Diagnosis: ESRD on dialysis  Assessment and Plan of Treatment: Please continue to follow up with your outpatient dialysis schedule: Tuesdays, Thursdays, and Saturday. Your last inpatient dialysis was 3/7/24, and your next outpatient dialysis is due Saturday (3/9/24).   Please continue to take sevelamer carbonate 800 mg three times a day with meals.   End-stage renal (or kidney) disease happens when your kidneys can no longer do their jobs. They can't remove waste from your blood. And they aren't able to balance your body's fluids and chemicals.  This stage of the disease usually occurs after you have chronic kidney disease for years. Now the kidneys work so poorly that you need dialysis or a kidney transplant to live. Dialysis is a treatment to help filter waste from your blood. A transplant is surgery to give you a healthy kidney from another person.  Be safe with medicines. Take your medicines exactly as prescribed. Call your doctor or nurse advice line if you have any problems with your medicine. Make sure your doctor knows all of the medicines and natural health products you take.  Do not take anti-inflammatory medicines such as ibuprofen and naproxen. They can make chronic kidney disease worse.  If you have diabetes, keep your blood sugar in your target range with a healthy diet, regular exercise, and medicine if needed.  Do not smoke or use other tobacco products and limit alcohol.  Follow a diet plan that is easy on your kidneys. A dietitian can help you create an eating plan with the right amounts of salt (sodium), potassium, and protein. You may also need to limit how much fluid you drink each day.  Please follow up with your nephrologist as scheduled.    Diagnosis: S/P arteriovenous (AV) fistula repair  Assessment and Plan of Treatment:     Diagnosis: Anemia  Assessment and Plan of Treatment: Anemia and end-stage renal disease (ESRD), also known as kidney failure, often go hand in hand. Most people with kidney failure who are on dialysis have anemia. Healthy kidneys help send signals to your bones to make red blood cells, so if your kidneys fail, they may not be able to help your body make the red blood cells it needs. Your hemoglobin, which is the protein in the blood that carries oxygen to the rest of the body ranged from 8.3-9.6, which is consistent with your baseline on previous admissions. Please continue to attend your outpatient hemodialysis as above, where they will continue to administer Epoetin.    Diagnosis: HLD (hyperlipidemia)  Assessment and Plan of Treatment: Your LDL on this admission was 61. The goal LDL is < 70, but we like to see it as low as possible for the prevention of heart disease. Please continue atorvastatin 10 mg at bedtime to keep your cholesterol low. Please discuss the initiation of additional cholesterol lowering medications such as Zetia 10 mg once a day with your outpatient cardiologist or primary care provider.    Diagnosis: DM (diabetes mellitus)  Assessment and Plan of Treatment: Your Hemoglobin A1c is 7.7% and your goal A1c is less than 7.0%. This number measures your average blood sugar level over the last three months.  Please continue Lantus (100 U/mL) 40 unit subcutaneous injection at bedtime, as listed for diabetes. Maintain a low carbohydrate, low sugar diet, exercise, monitor your fingerstick blood sugars regarly and follow up with your Endocrinologist/Primary Care Doctor.     PRINCIPAL DISCHARGE DIAGNOSIS  Diagnosis: AV block, Mobitz 2  Assessment and Plan of Treatment: You were found to have an arrhythmia called Mobitz Type II Heart Block.   This means the electrical signal that controls your heartbeat is partially or completely blocked. This can make your heartbeat to slow or skip beats and your heart can’t pump blood effectively.   Due to this heart rhythm, you required a pacemaker (Medtronic Micra AV leadless pacemaker) which sends frequent electrical pulses to keep your heart beating regularly.        SECONDARY DISCHARGE DIAGNOSES  Diagnosis: Hypertensive urgency  Assessment and Plan of Treatment: When you arrived at the ED your blood pressure was 191/91 mmHg, which met the criteria for hypertensive urgency (systolic blood pressure > 180 mmHg and/or diastolic blood pressure > 120 mmHg. Your Please START hydralazine 50 mg three times a day, and continue [id number] mg once a day, and nifedipine 60 mg twice a day as listed to keep your blood pressure controlled. For blood pressure that is too high or too low please see your doctor or go to the emergency room as necessary.    Diagnosis: ESRD on dialysis  Assessment and Plan of Treatment: Please continue to follow up with your outpatient dialysis schedule: Tuesdays, Thursdays, and Saturday. Your last inpatient dialysis was 3/7/24, and your next outpatient dialysis is due Saturday (3/9/24).   Please continue to take sevelamer carbonate 800 mg three times a day with meals.   End-stage renal (or kidney) disease happens when your kidneys can no longer do their jobs. They can't remove waste from your blood. And they aren't able to balance your body's fluids and chemicals.  This stage of the disease usually occurs after you have chronic kidney disease for years. Now the kidneys work so poorly that you need dialysis or a kidney transplant to live. Dialysis is a treatment to help filter waste from your blood. A transplant is surgery to give you a healthy kidney from another person.  Be safe with medicines. Take your medicines exactly as prescribed. Call your doctor or nurse advice line if you have any problems with your medicine. Make sure your doctor knows all of the medicines and natural health products you take.  Do not take anti-inflammatory medicines such as ibuprofen and naproxen. They can make chronic kidney disease worse.  If you have diabetes, keep your blood sugar in your target range with a healthy diet, regular exercise, and medicine if needed.  Do not smoke or use other tobacco products and limit alcohol.  Follow a diet plan that is easy on your kidneys. A dietitian can help you create an eating plan with the right amounts of salt (sodium), potassium, and protein. You may also need to limit how much fluid you drink each day.  Please follow up with your nephrologist as scheduled.    Diagnosis: S/P arteriovenous (AV) fistula repair  Assessment and Plan of Treatment:     Diagnosis: DM (diabetes mellitus)  Assessment and Plan of Treatment: Your Hemoglobin A1c is 7.7% and your goal A1c is less than 7.0%. This number measures your average blood sugar level over the last three months.  Please continue Lantus (100 U/mL) 40 unit subcutaneous injection at bedtime, as listed for diabetes. Maintain a low carbohydrate, low sugar diet, exercise, monitor your fingerstick blood sugars regarly and follow up with your Endocrinologist/Primary Care Doctor.    Diagnosis: HLD (hyperlipidemia)  Assessment and Plan of Treatment: Your LDL on this admission was 61. The goal LDL is < 70, but we like to see it as low as possible for the prevention of heart disease. Please continue atorvastatin 10 mg at bedtime to keep your cholesterol low. Please discuss the initiation of additional cholesterol lowering medications such as Zetia 10 mg once a day with your outpatient cardiologist or primary care provider.    Diagnosis: Anemia  Assessment and Plan of Treatment: Anemia and end-stage renal disease (ESRD), also known as kidney failure, often go hand in hand. Most people with kidney failure who are on dialysis have anemia. Healthy kidneys help send signals to your bones to make red blood cells, so if your kidneys fail, they may not be able to help your body make the red blood cells it needs. Your hemoglobin, which is the protein in the blood that carries oxygen to the rest of the body ranged from 8.3-9.6, which is consistent with your baseline on previous admissions. Please continue to attend your outpatient hemodialysis as above, where they will continue to administer Epoetin.    Diagnosis: S/P placement of cardiac pacemaker  Assessment and Plan of Treatment: - Unless instructed otherwise, leave the outer clear plastic dressing with white gauze (tegaderm) in place for 24 hours after your procedure.  If you stay overnight it should be removed before discharge - if not please remove it when you get home.  The glue under the dressing will take weeks to fully come off   – DO Not pick, rub or remove this glue (pat dry with shower).  All the stitches used during your procedure will dissolve on their own.  If skin irritation is noted from around the bandage site (e.g., redness, blistering, skin breakdown, etc.), contact the Electrophysiology Office at [phone]  -Do not shower for 2 days following your implant. During this time, sponge baths are permissible providing the incision site remains dry. Complete submersion of the incision (i.e., tub baths, swimming) should be avoided for at least 4 weeks.  -Do not use any ointments or creams (Including prescriptions) on the incision area.  - Avoid heavy lifting with the procedural side arm of > 10 pounds for 3 weeks.  Also, avoid sudden jerking movements on the procedural side arm as well as avoiding upper body exercises for 4 weeks, as these movements can interfere with wound healing and may cause lead dislodgement.  -Contact the Electrophysiology Office ([phone]) or notify your physician if you notice ANY of the following:                o    Bleeding or discharge from incision                o   increased discomfort                o   skin irritation                o   signs of infection including fever > 101.0 F.  - Some tenderness and swelling by the procedure site is normal.  If the incision site gets harder or larger then discharge, please call us.  Your skin will likely turn black and blue, and this can get worse before it gets better.  As long as the area remains soft this is a normal part of the healing process.  -Please call [phone] to make a follow up appointment with your implanting physician in 4-6 weeks if you were not given a discharge appointment       PRINCIPAL DISCHARGE DIAGNOSIS  Diagnosis: AV block, Mobitz 2  Assessment and Plan of Treatment: You were found to have an arrhythmia called Mobitz Type II Heart Block.   This means the electrical signal that controls your heartbeat is partially or completely blocked. This can make your heartbeat to slow or skip beats and your heart can’t pump blood effectively.   Due to this heart rhythm, you required a pacemaker (Medtronic Micra AV leadless pacemaker) which sends frequent electrical pulses to keep your heart beating regularly.        SECONDARY DISCHARGE DIAGNOSES  Diagnosis: S/P placement of cardiac pacemaker  Assessment and Plan of Treatment: - Unless instructed otherwise, leave the outer clear plastic dressing with white gauze (tegaderm) in place for 24 hours after your procedure.  If you stay overnight it should be removed before discharge - if not please remove it when you get home.  The glue under the dressing will take weeks to fully come off   – DO Not pick, rub or remove this glue (pat dry with shower).  All the stitches used during your procedure will dissolve on their own.  If skin irritation is noted from around the bandage site (e.g., redness, blistering, skin breakdown, etc.), contact the Electrophysiology Office at [phone]  -Do not shower for 2 days following your implant. During this time, sponge baths are permissible providing the incision site remains dry. Complete submersion of the incision (i.e., tub baths, swimming) should be avoided for at least 4 weeks.  -Do not use any ointments or creams (Including prescriptions) on the incision area.  - Avoid heavy lifting with the procedural side arm of > 10 pounds for 3 weeks.  Also, avoid sudden jerking movements on the procedural side arm as well as avoiding upper body exercises for 4 weeks, as these movements can interfere with wound healing and may cause lead dislodgement.  -Contact the Electrophysiology Office ([phone]) or notify your physician if you notice ANY of the following:                o    Bleeding or discharge from incision                o   increased discomfort                o   skin irritation                o   signs of infection including fever > 101.0 F.  - Some tenderness and swelling by the procedure site is normal.  If the incision site gets harder or larger then discharge, please call us.  Your skin will likely turn black and blue, and this can get worse before it gets better.  As long as the area remains soft this is a normal part of the healing process.  -Please call [phone] to make a follow up appointment with your implanting physician in 4-6 weeks if you were not given a discharge appointment      Diagnosis: Hypertensive urgency  Assessment and Plan of Treatment: When you arrived at the ED your blood pressure was 191/91 mmHg, which met the criteria for hypertensive urgency (systolic blood pressure > 180 mmHg and/or diastolic blood pressure > 120 mmHg. Your blood pressure before discharge is now 129/63 mmHg.  Please START hydralazine 50 mg three times a day and continue [id number] mg once a day as listed to keep your blood pressure controlled. For blood pressure that is too high or too low please see your doctor or go to the emergency room as necessary.    Diagnosis: ESRD on dialysis  Assessment and Plan of Treatment: Please continue to follow up with your outpatient dialysis schedule: Tuesdays, Thursdays, and Saturday. Your last inpatient dialysis was 3/7/24, and your next outpatient dialysis is due Saturday (3/9/24).   Please continue to take sevelamer carbonate 800 mg three times a day with meals.   You underwent fistulogram/thrombectomy of your left upper extremity AV fistula on 3/7 performed by Dr. Lai. Please continue dialysis using catheter, the fistula site should NOT be accessed.  Please follow up with Dr. Lai in office in 1-2 weeks for continued assessment of fistula.   End-stage renal (or kidney) disease happens when your kidneys can no longer do their jobs. They can't remove waste from your blood. And they aren't able to balance your body's fluids and chemicals.  This stage of the disease usually occurs after you have chronic kidney disease for years. Now the kidneys work so poorly that you need dialysis or a kidney transplant to live. Dialysis is a treatment to help filter waste from your blood. A transplant is surgery to give you a healthy kidney from another person.  Be safe with medicines. Take your medicines exactly as prescribed. Call your doctor or nurse advice line if you have any problems with your medicine. Make sure your doctor knows all of the medicines and natural health products you take.  Do not take anti-inflammatory medicines such as ibuprofen and naproxen. They can make chronic kidney disease worse.  If you have diabetes, keep your blood sugar in your target range with a healthy diet, regular exercise, and medicine if needed.  Do not smoke or use other tobacco products and limit alcohol.  Follow a diet plan that is easy on your kidneys. A dietitian can help you create an eating plan with the right amounts of salt (sodium), potassium, and protein. You may also need to limit how much fluid you drink each day.  Please follow up with your nephrologist as scheduled.    Diagnosis: S/P arteriovenous (AV) fistula repair  Assessment and Plan of Treatment: You underwent fistulogram/thrombectomy of your left upper extremity AV fistula on 3/7 performed by Dr. Lai. Please continue dialysis using catheter, the fistula site should NOT be accessed. Please follow up with Dr. Lai in office in 1-2 weeks for continued assessment of fistula.       Diagnosis: Anemia  Assessment and Plan of Treatment: Anemia and end-stage renal disease (ESRD), also known as kidney failure, often go hand in hand. Most people with kidney failure who are on dialysis have anemia. Healthy kidneys help send signals to your bones to make red blood cells, so if your kidneys fail, they may not be able to help your body make the red blood cells it needs. Your hemoglobin, which is the protein in the blood that carries oxygen to the rest of the body ranged from 8.3-9.6, which is consistent with your baseline on previous admissions. Please continue to attend your outpatient hemodialysis as above, where they will continue to administer Epoetin.    Diagnosis: HLD (hyperlipidemia)  Assessment and Plan of Treatment: Your LDL on this admission was 61. The goal LDL is < 70, but we like to see it as low as possible for the prevention of heart disease. Please continue atorvastatin 10 mg at bedtime to keep your cholesterol low. Please discuss the initiation of additional cholesterol lowering medications such as Zetia 10 mg once a day with your outpatient cardiologist or primary care provider.    Diagnosis: DM (diabetes mellitus)  Assessment and Plan of Treatment: Your Hemoglobin A1c is 7.7% and your goal A1c is less than 7.0%. This number measures your average blood sugar level over the last three months.  Please continue Lantus (100 U/mL) 40 unit subcutaneous injection at bedtime, as listed for diabetes. Maintain a low carbohydrate, low sugar diet, exercise, monitor your fingerstick blood sugars regarly and follow up with your Endocrinologist/Primary Care Doctor.     PRINCIPAL DISCHARGE DIAGNOSIS  Diagnosis: AV block, Mobitz 2  Assessment and Plan of Treatment: You were found to have an arrhythmia called Mobitz Type II Heart Block.   This means the electrical signal that controls your heartbeat is partially or completely blocked. This can make your heartbeat to slow or skip beats and your heart can’t pump blood effectively.   Due to this heart rhythm, you required a pacemaker (Medtronic Micra AV leadless pacemaker) which sends frequent electrical pulses to keep your heart beating regularly.        SECONDARY DISCHARGE DIAGNOSES  Diagnosis: S/P placement of cardiac pacemaker  Assessment and Plan of Treatment: - Unless instructed otherwise, leave the outer clear plastic dressing with white gauze (tegaderm) in place for 24 hours after your procedure.  If you stay overnight it should be removed before discharge - if not please remove it when you get home.  The glue under the dressing will take weeks to fully come off   – DO Not pick, rub or remove this glue (pat dry with shower).  All the stitches used during your procedure will dissolve on their own.  If skin irritation is noted from around the bandage site (e.g., redness, blistering, skin breakdown, etc.), contact the Electrophysiology Office at [phone]  -Do not shower for 2 days following your implant. During this time, sponge baths are permissible providing the incision site remains dry. Complete submersion of the incision (i.e., tub baths, swimming) should be avoided for at least 4 weeks.  -Do not use any ointments or creams (Including prescriptions) on the incision area.  - Avoid heavy lifting with the procedural side arm of > 10 pounds for 3 weeks.  Also, avoid sudden jerking movements on the procedural side arm as well as avoiding upper body exercises for 4 weeks, as these movements can interfere with wound healing and may cause lead dislodgement.  -Contact the Electrophysiology Office ([phone]) or notify your physician if you notice ANY of the following:                o    Bleeding or discharge from incision                o   increased discomfort                o   skin irritation                o   signs of infection including fever > 101.0 F.  - Some tenderness and swelling by the procedure site is normal.  If the incision site gets harder or larger then discharge, please call us.  Your skin will likely turn black and blue, and this can get worse before it gets better.  As long as the area remains soft this is a normal part of the healing process.  -Please call [phone] to make a follow up appointment with your implanting physician in 4-6 weeks if you were not given a discharge appointment      Diagnosis: Hypertensive urgency  Assessment and Plan of Treatment: When you arrived at the ED your blood pressure was 191/91 mmHg, which met the criteria for hypertensive urgency (systolic blood pressure > 180 mmHg and/or diastolic blood pressure > 120 mmHg. Your blood pressure before discharge is now 129/63 mmHg.  Please START hydralazine 50 mg three times a day and continue [id number] mg once a day as listed to keep your blood pressure controlled.  Please do not take your blood pressure medications before dialysis on Tuesday, Thursday, or Saturday because dialysis lowers your blood pressure. Please follow up with your nephrologist and cardiologist regarding updating your blood pressure medications as needed.   For blood pressure that is too high or too low please see your doctor or go to the emergency room as necessary.    Diagnosis: ESRD on dialysis  Assessment and Plan of Treatment: Please continue to follow up with your outpatient dialysis schedule: Tuesdays, Thursdays, and Saturday. Your last inpatient dialysis was 3/7/24, and your next outpatient dialysis is due Saturday (3/9/24).   Please continue to take sevelamer carbonate 800 mg three times a day with meals. Please do not take blood pressure medications before dialysis as detailed above.  You underwent fistulogram/thrombectomy of your left upper extremity AV fistula on 3/7 performed by Dr. Lai. Please continue dialysis using catheter, the fistula site should NOT be accessed.  Please follow up with Dr. Lai in office in 1-2 weeks for continued assessment of fistula.   End-stage renal (or kidney) disease happens when your kidneys can no longer do their jobs. They can't remove waste from your blood. And they aren't able to balance your body's fluids and chemicals.  This stage of the disease usually occurs after you have chronic kidney disease for years. Now the kidneys work so poorly that you need dialysis or a kidney transplant to live. Dialysis is a treatment to help filter waste from your blood. A transplant is surgery to give you a healthy kidney from another person.  Be safe with medicines. Take your medicines exactly as prescribed. Call your doctor or nurse advice line if you have any problems with your medicine. Make sure your doctor knows all of the medicines and natural health products you take.  Do not take anti-inflammatory medicines such as ibuprofen and naproxen. They can make chronic kidney disease worse.  If you have diabetes, keep your blood sugar in your target range with a healthy diet, regular exercise, and medicine if needed.  Do not smoke or use other tobacco products and limit alcohol.  Follow a diet plan that is easy on your kidneys. A dietitian can help you create an eating plan with the right amounts of salt (sodium), potassium, and protein. You may also need to limit how much fluid you drink each day.  Please follow up with your nephrologist as scheduled.    Diagnosis: S/P arteriovenous (AV) fistula repair  Assessment and Plan of Treatment: You underwent fistulogram/thrombectomy of your left upper extremity AV fistula on 3/7 performed by Dr. Lai. Please continue dialysis using catheter, the fistula site should NOT be accessed. Please follow up with Dr. Lai in office in 1-2 weeks for continued assessment of fistula.       Diagnosis: Anemia  Assessment and Plan of Treatment: Anemia and end-stage renal disease (ESRD), also known as kidney failure, often go hand in hand. Most people with kidney failure who are on dialysis have anemia. Healthy kidneys help send signals to your bones to make red blood cells, so if your kidneys fail, they may not be able to help your body make the red blood cells it needs. Your hemoglobin, which is the protein in the blood that carries oxygen to the rest of the body ranged from 8.3-9.6, which is consistent with your baseline on previous admissions. Please continue to attend your outpatient hemodialysis as above, where they will continue to administer Epoetin.    Diagnosis: HLD (hyperlipidemia)  Assessment and Plan of Treatment: Your LDL on this admission was 61. The goal LDL is < 70, but we like to see it as low as possible for the prevention of heart disease. Please continue atorvastatin 10 mg at bedtime to keep your cholesterol low. Please discuss the initiation of additional cholesterol lowering medications such as Zetia 10 mg once a day with your outpatient cardiologist or primary care provider.    Diagnosis: DM (diabetes mellitus)  Assessment and Plan of Treatment: Your Hemoglobin A1c is 7.7% and your goal A1c is less than 7.0%. This number measures your average blood sugar level over the last three months.  Please continue Lantus (100 U/mL) 40 unit subcutaneous injection at bedtime, as listed for diabetes. Maintain a low carbohydrate, low sugar diet, exercise, monitor your fingerstick blood sugars regarly and follow up with your Endocrinologist/Primary Care Doctor.

## 2024-03-07 NOTE — DISCHARGE NOTE NURSING/CASE MANAGEMENT/SOCIAL WORK - NSDCPEFALRISK_GEN_ALL_CORE
For information on Fall & Injury Prevention, visit: [email or web address] OR  [email or web address] OR  https://www.cdc.gov/steadi/patient.html

## 2024-03-07 NOTE — DISCHARGE NOTE NURSING/CASE MANAGEMENT/SOCIAL WORK - PATIENT PORTAL LINK FT
You can access the FollowMyHealth Patient Portal offered by [facility] by registering at the following website: [email or web address]. By joining [facility]’s FollowMyHealth portal, you will also be able to view your health information using other applications (apps) compatible with our system.

## 2024-03-07 NOTE — PROGRESS NOTE ADULT - ASSESSMENT
84 yo M PMH HTN, DM, HLD, ESRD admitted to [facility] on 3/2 for asymptomatic bradycardia noted during HD. The patient was recently admitted on 2/6 for hyperkalemia, during this admission he had an left arm AV fistula creation with Dr. Lai (2/9/24), he tolerated the procedure without complication. Vascular Surgery was consulted for evaluation with possible revision of the patient's AVF. Now s/p PPM placement by EP on 3/5 and fistulogram on 3/7 with good thrill at end of procedure    Recommendations  Now s/p fistulogram on 3/7  Patient should continue dialysis using catheter - do NOT access fistula site.   Patient should follow up with Dr. Lai in office in 1-2 weeks for continued assessment of fistula  Okay for discharge from vascular perspective  Rest of care per primary team

## 2024-03-07 NOTE — PROGRESS NOTE ADULT - ATTENDING COMMENTS
seen and evaluated at bedside during dialysis. No adverse events reported by RN at bedside  Dialysis indicated for metabolic clearance using THDC-L  Will be going for PPM during this admission  Further recs as above  Discussed with primary team
Seen and evaluated at bedside, due for HD today  AVF s/p fistulogram done today. Not cleared for use yet  HD as detailed rx as above    Further recs as above  Discussed with primary team

## 2024-03-07 NOTE — BRIEF OPERATIVE NOTE - FIRST ASSIST NAME
Jeanette Pittman (Resident) [Appropriately responsive] : appropriately responsive [Alert] : alert [No Acute Distress] : no acute distress [Oriented x3] : oriented x3

## 2024-03-07 NOTE — PROGRESS NOTE ADULT - PROVIDER SPECIALTY LIST ADULT
Nephrology
Vascular Surgery
Cardiology
Electrophysiology
Electrophysiology
Vascular Surgery
Nephrology
Vascular Surgery
Cardiology

## 2024-03-07 NOTE — DISCHARGE NOTE PROVIDER - HOSPITAL COURSE
83M w/ a PMH of HTN, DM, HLD, ESRD with recent initiation of HD (T/TH/Saturday) via tunneled cath who presents to the ED from HD center for asymptomatic bradycardia. Of note Mr. Anguiano was recently hospitalized 02/06/2024 - 02/17/2024 admitted for hyperkalemia, w/ AVF placement completed, course was c/b coffee ground emesis w/ a clean based non bleeding ulcer seen. Today patient was at HD center and completed session of dialysis w/o complications After completion he was noted to have HR in the 30's. Found in the ED to be in hypertensive urgency w/ /81.     In ED:  VSS: Afebrile, HR 40, /81, 94% RA  Labs s/f: WBC 11.83, Hgb 9.6, Lactate 0.9, Na 136, K+ 4.2, BUN 19/Cr 2.89, LFTs normal  CXR: unremarkable for acute disease   EKG: HR 30-40s. Morbitz type 2 AV block. Non-ischemic. Of note, pt with a history of Mobitz type 1 block on previous admission.   Interventions: Hydral 25mg PO x2     EP consulted, pt now s/p pacemaker placement    83M w/ a PMH of HTN, DM, HLD, ESRD with recent initiation of HD (T/TH/Saturday) via tunneled cath who presents to the ED from HD center for asymptomatic bradycardia. Of note Mr. Anguiano was recently hospitalized 02/06/2024 - 02/17/2024 admitted for hyperkalemia, w/ AVF placement completed, course was c/b coffee ground emesis w/ a clean based non bleeding ulcer seen. Today patient was at HD center and completed session of dialysis w/o complications After completion he was noted to have HR in the 30's. Found in the ED to be in hypertensive urgency w/ /81. Pt admitted to cardiac tele for further eval of Mobitz type 2 and HTN urgency.     In ED:  VSS: Afebrile, HR 40, /81, 94% RA  Labs s/f: WBC 11.83, Hgb 9.6, Lactate 0.9, Na 136, K+ 4.2, BUN 19/Cr 2.89, LFTs normal  CXR: unremarkable for acute disease   EKG: HR 30-40s. Morbitz type 2 AV block. Non-ischemic. Of note, pt with a history of Mobitz type 1 block on previous admission.   Interventions: Hydral 25mg PO x2      EP consulted, pt now s/p AV Micra implantation via right femoral vein approach (3/5/24) with uncomplicated suture removal. Groin check WNL, device check WNL, CXR WNL - interval pacemaker within the apex of the RV. F/u with EP (Dr. Velazco) in 4-6 weeks for follow up, pt aware. Post-implant instructions provided to the patient and sister in law by EP at bedside.    Of note, was recently admitted on 2/6 for hyperkalemia, during this admission he had an left arm AV fistula creation with Dr. Lai (2/9/24), he tolerated the procedure without complication. He was seen in the office on 2/28 for a post-op assessment and the incision was noted to be healing well, however there was no thrill noted. LUE US done in the office demonstrated no flow at the anastomosis, outflow cephalic vein patent, inflow brachial artery with pulsatile flow.     Vascular consulted, pt now s/p fistulogram/thrombectomy of the LUE AVF on 3/7.     Pt set up with home PT on discharge. Pt's outpatient dialysis to resume as previously scheduled (Tues/Thursday/Saturday). Last inpatient HD 3/7/24, next HD due Saturday 3/9/24.     Discharge meds:  - Hydralazine 50 mg TID  - Valsartan 160 mg PO qd  - Atorvastatin 10 mg PO qhs  - Lantus 35 U SQ qhs  - Sevelamer carbonate 800 mg TID with meals  - Pantoprazole 40 mg QD      83M w/ a PMH of HTN, DM, HLD, ESRD with recent initiation of HD (Tues/Thurs/Saturday) via tunneled cath who presents to the ED from HD center for asymptomatic bradycardia. Of note Mr. Anguiano was recently hospitalized 02/06/2024 - 02/17/2024 admitted for hyperkalemia, w/ AVF placement completed, course was c/b coffee ground emesis w/ a clean based non bleeding ulcer seen. Today patient was at HD center and completed session of dialysis w/o complications After completion he was noted to have HR in the 30's. Found in the ED to be in hypertensive urgency w/ /81. Pt admitted to cardiac tele for further eval of Mobitz type 2 and HTN urgency.     In ED:  VSS: Afebrile, HR 40, /81, 94% RA  Labs s/f: WBC 11.83, Hgb 9.6, Lactate 0.9, Na 136, K+ 4.2, BUN 19/Cr 2.89, LFTs normal  CXR: unremarkable for acute disease   EKG: HR 30-40s. Morbitz type 2 AV block. Non-ischemic. Of note, pt with a history of Mobitz type 1 block on previous admission.   Interventions: Hydral 25mg PO x2      EP consulted, pt now s/p AV Micra implantation via right femoral vein approach (3/5/24) with uncomplicated suture removal. Groin check WNL, device check WNL, CXR WNL - interval pacemaker within the apex of the RV. F/u with EP (Dr. Velazco) in 4-6 weeks for follow up, pt aware. Post-implant instructions provided to the patient and sister in law by EP at bedside.    Of note, was recently admitted on 2/6 for hyperkalemia, during this admission he had an left arm AV fistula creation with Dr. Lai (2/9/24), he tolerated the procedure without complication. He was seen in the office on 2/28 for a post-op assessment and the incision was noted to be healing well, however there was no thrill noted. LUE US done in the office demonstrated no flow at the anastomosis, outflow cephalic vein patent, inflow brachial artery with pulsatile flow.     Vascular surgery consulted, pt now s/p fistulogram/thrombectomy of the LUE AVF on 3/7. As per vascular surgery, patient should continue dialysis using catheter - do NOT access fistula site. Patient advised to follow up with Dr. Lai in office in 1-2 weeks for continued assessment of fistula. Okay for discharge from vascular perspective.    Pt seen and examined at bedside today. VSS, labs, and telemetry reviewed. Pt stable for discharged as discussed with EP, vascular surgery, and Dr. Diaz. Pt set up with home nursing services on discharge. Pt's outpatient dialysis to resume as previously scheduled (Tues/Thursday/Saturday). Last inpatient HD 3/7/24, next HD due Saturday 3/9/24. As per renal, hold BP meds prior to HD on HD days. Expect that as patient has volume removed with HD, antihypertensive requirement will decrease.    Discharge meds:  - Hydralazine 50 mg TID.   - Valsartan 160 mg PO qd  - Atorvastatin 10 mg PO qhs  - Lantus 35 U SQ qhs  - Sevelamer carbonate 800 mg TID with meals  - Pantoprazole 40 mg QD      83M w/ a PMH of HTN, DM, HLD, ESRD with recent initiation of HD (Tues/Thurs/Saturday) via tunneled cath who presents to the ED from HD center for asymptomatic bradycardia. Of note Mr. Anguiano was recently hospitalized 02/06/2024 - 02/17/2024 admitted for hyperkalemia, w/ AVF placement completed, course was c/b coffee ground emesis w/ a clean based non bleeding ulcer seen. Today patient was at HD center and completed session of dialysis w/o complications After completion he was noted to have HR in the 30's. Found in the ED to be in hypertensive urgency w/ /81. Pt admitted to cardiac tele for further eval of Mobitz type 2 and HTN urgency.     In ED:  VSS: Afebrile, HR 40, /81, 94% RA  Labs s/f: WBC 11.83, Hgb 9.6, Lactate 0.9, Na 136, K+ 4.2, BUN 19/Cr 2.89, LFTs normal  CXR: unremarkable for acute disease   EKG: HR 30-40s. Morbitz type 2 AV block. Non-ischemic. Of note, pt with a history of Mobitz type 1 block on previous admission.   Interventions: Hydral 25mg PO x2      EP consulted, pt now s/p AV Micra implantation via right femoral vein approach (3/5/24) with uncomplicated suture removal. Groin check WNL, device check WNL, CXR WNL - interval pacemaker within the apex of the RV. F/u with EP (Dr. Velazco) in 4-6 weeks for follow up, pt aware. Post-implant instructions provided to the patient and sister in law by EP at bedside.    Of note, was recently admitted on 2/6 for hyperkalemia, during this admission he had an left arm AV fistula creation with Dr. Lai (2/9/24), he tolerated the procedure without complication. He was seen in the office on 2/28 for a post-op assessment and the incision was noted to be healing well, however there was no thrill noted. LUE US done in the office demonstrated no flow at the anastomosis, outflow cephalic vein patent, inflow brachial artery with pulsatile flow.     Vascular surgery consulted, pt now s/p fistulogram/thrombectomy of the LUE AVF on 3/7. As per vascular surgery, patient should continue dialysis using catheter - do NOT access fistula site. Patient advised to follow up with Dr. Lai in office in 1-2 weeks for continued assessment of fistula. Okay for discharge from vascular perspective.    Pt seen and examined at bedside today. VSS, labs, and telemetry reviewed. Pt stable for discharged as discussed with EP, vascular surgery, and Dr. Diaz. Pt set up with home nursing services on discharge. Pt's outpatient dialysis to resume as previously scheduled (Tues/Thursday/Saturday). Last inpatient HD 3/7/24, next HD due Saturday 3/9/24. As per renal, hold BP meds prior to HD on HD days. Expect that as patient has volume removed with HD, antihypertensive requirement will decrease.    Discharge meds:  - Hydralazine 50 mg TID.   - Valsartan 160 mg PO qd  - Atorvastatin 10 mg PO qhs  - Lantus 40 U SQ qhs  - Sevelamer carbonate 800 mg TID with meals  - Pantoprazole 40 mg PO qd   - Calcitriol 0.25 mcg PO qd  - Sodium bicarbonate 650 mg PO x2 tabs TID  - Timpotic 0.5% ophthalmic solution 1 drop in each eye BID

## 2024-03-07 NOTE — DISCHARGE NOTE PROVIDER - CARE PROVIDERS DIRECT ADDRESSES
[email or web address]

## 2024-03-07 NOTE — DISCHARGE NOTE NURSING/CASE MANAGEMENT/SOCIAL WORK - NSSCNAMETXT_GEN_ALL_CORE
[facility] at home ( pending agency review and acceptance) [facility] at home

## 2024-03-07 NOTE — DISCHARGE NOTE PROVIDER - NSDCFUADDINST_GEN_ALL_CORE_FT
Please continue to follow a heart healthy diet, low in sodium, cholesterol and fats. We recommend a Mediterranean diet:  -Incorporate 2 or more servings per day of vegetables, fruits, and whole grains  -Increase intake of fish and legumes/beans to 2 or more servings per week  -Increase intake of healthy fats, such as olive oil and avocados, and have a handful of nuts/seeds most days  -Reduce red/processed meat consumption to 2 or fewer times per week  Please continue to follow a heart healthy diet, low in sodium, cholesterol and fats. We recommend a Mediterranean diet:  -Incorporate 2 or more servings per day of vegetables, fruits, and whole grains  -Increase intake of fish and legumes/beans to 2 or more servings per week  -Increase intake of healthy fats, such as olive oil and avocados, and have a handful of nuts/seeds most days  -Reduce red/processed meat consumption to 2 or fewer times per week     - SEEK IMMEDIATE MEDICAL CARE IF YOU HAVE ANY OF THE FOLLOWING SYMPTOMS: worsening chest pain, racing heart beat, unexplained jaw/neck/back pain, severe abdominal pain, shortness of breath, dizziness or lightheadedness, fainting, sweaty or clammy skin, vomiting, or coughing up blood. These symptoms may represent a serious problem that is an emergency. Do not wait to see if the symptoms will go away. Get medical help right away. Call 911 and do not drive yourself to the hospital.

## 2024-03-07 NOTE — DISCHARGE NOTE PROVIDER - PROVIDER TOKENS
PROVIDER:[TOKEN:[[id number]],FOLLOWUP:[2 months]],PROVIDER:[TOKEN:[[id number]]] PROVIDER:[TOKEN:[[id number]],FOLLOWUP:[2 months]],PROVIDER:[TOKEN:[[id number]],FOLLOWUP:[2 weeks]]

## 2024-03-07 NOTE — PROGRESS NOTE ADULT - SUBJECTIVE AND OBJECTIVE BOX
Vascular Surgery Post-Op Note    Procedure: LUE fistulogram    Diagnosis/Indication: ESRD on HD    Surgeon: Dr. Lai    S: Pt has no complaints. Denies numbness or tingling in fingers, denies pain or swelling in L arm. Denies CP, SOB, ELIZABETH, calf tenderness.     O:  T(C): 36.8 (03-07-24 @ 13:13), Max: 36.8 (03-07-24 @ 13:13)  T(F): 98.3 (03-07-24 @ 13:13), Max: 98.3 (03-07-24 @ 13:13)  HR: 66 (03-07-24 @ 13:13) (66 - 66)  BP: 129/63 (03-07-24 @ 13:13) (129/63 - 129/63)  RR: 17 (03-07-24 @ 13:13) (17 - 17)  SpO2: 99% (03-07-24 @ 13:13) (99% - 99%)  Wt(kg): --                        8.3    10.39 )-----------( 435      ( 07 Mar 2024 05:18 )             26.9     03-07    138  |  101  |  52<H>  ----------------------------<  85  4.9   |  24  |  6.38<H>    Ca    8.8      07 Mar 2024 05:18  Mg     2.2     03-07    TPro  6.3  /  Alb  2.6<L>  /  TBili  <0.2  /  DBili  x   /  AST  10  /  ALT  <5<L>  /  AlkPhos  84  03-07      Gen: NAD, resting comfortably in bed  C/V: NSR  Pulm: Nonlabored breathing, no respiratory distress  Abd: soft, NT/ND  Extrem: WWP, no calf edema, SCDs in place

## 2024-03-07 NOTE — DISCHARGE NOTE PROVIDER - CARE PROVIDER_API CALL
Osmani Velazco  Cardiac Electrophysiology  [address] [address]  Phone: [phone]  Fax: [phone]  Follow Up Time: 2 months    Dc Lai  Vascular Surgery  [address]  [place] [address]  Phone: [phone]  Fax: [phone]  Follow Up Time:    Osmani Velazco  Cardiac Electrophysiology  [address] [address]  Phone: [phone]  Fax: [phone]  Follow Up Time: 2 months    Dc Lai  Vascular Surgery  [address]  [place] [address]  Phone: [phone]  Fax: [phone]  Follow Up Time: 2 weeks

## 2024-03-11 ENCOUNTER — APPOINTMENT (OUTPATIENT)
Dept: GASTROENTEROLOGY | Facility: CLINIC | Age: 84
End: 2024-03-11
Payer: MEDICARE

## 2024-03-11 VITALS
HEART RATE: 85 BPM | TEMPERATURE: 96.9 F | SYSTOLIC BLOOD PRESSURE: 135 MMHG | BODY MASS INDEX: 23.07 KG/M2 | RESPIRATION RATE: 16 BRPM | HEIGHT: 67 IN | DIASTOLIC BLOOD PRESSURE: 70 MMHG | WEIGHT: 147 LBS | OXYGEN SATURATION: 98 %

## 2024-03-11 DIAGNOSIS — K25.9 GASTRIC ULCER, UNSPECIFIED AS ACUTE OR CHRONIC, W/OUT HEMORRHAGE OR PERFORATION: ICD-10-CM

## 2024-03-11 PROCEDURE — 99205 OFFICE O/P NEW HI 60 MIN: CPT

## 2024-03-11 RX ORDER — PANTOPRAZOLE 40 MG/1
40 TABLET, DELAYED RELEASE ORAL
Qty: 30 | Refills: 0 | Status: ACTIVE | COMMUNITY
Start: 2024-03-11 | End: 1900-01-01

## 2024-03-11 NOTE — HISTORY OF PRESENT ILLNESS
[FreeTextEntry1] : 83M h/o ESRD (on TTS HD), DM, HTN, HLD, Mobitz II block (s/p PPM) presents for outpatient evaluation of gastric ulcer seen on EGD 2/2024.  Pt was hospitalized in 2/2024 for hyperkalemia and Mobitz I block, underwent AVF creation, then developed coffee-ground emesis where EGD showed a clean-based gastric ulcer. He was discharged on pantoprazole BID which he was taking and finished <1 wk ago. A few days ago he was hospitalized for fistula issue (s/p fistulogram) and asymptomatic Mobitz II block (s/p PPM).   He denies melena, BRBPR, hematemesis, dysphagia, odynophagia, reflux, nausea/vomiting, or changes in bowel habits. Lost 40 lbs over the past year in setting of poor PO intake with dialysis. Denies NSAIDs or h/o H. pylori. Last colonoscopy >10 yrs ago, family believes no h/o polyps. Hgb 8-9 in 2024, ferritin 345, and Tsat 22% in 2/2024.   Discussed EGD with pt/family and they want to hold off for 1-2 months until he has further recovered from his most recent hospitalization.   PMH: ESRD (on TTS HD), DM, HTN, HLD, bradycardia (s/p PPM) Meds: atorvastatin, calcitriol, Lantus, hydralazine, pantoprazole, sevelamer, valsartan (stopped Lasix and nifedipine per daughter) FH: Denies FH of colorectal or other GI malignancies. SH: No smoking, no alcohol. Born in [place] but came to [place] in 20s.

## 2024-03-11 NOTE — ASSESSMENT
[FreeTextEntry1] : 1. Gastric ulcer No risk factors such as NSAIDs/EtOH/tobacco, however no H. pylori testing and may be in setting of acute medical stresors. Currently clinically doing well. Pt is open to repeat EGD in future however wants to wait until he has further recovered further from his illness. - Continue PPI daily x 4 wks - F/u in 6 wks to discuss repeat EGD (will need to be at [facility]) vs non-invasive management w/ H. pylori testing  RTC 6 wks

## 2024-03-11 NOTE — END OF VISIT
[] : Fellow [FreeTextEntry3] : Agree w/ above. Needs EGD to ensure healing of gastric ulcer, but pt w/ multiple comorbidities and recent PPM placed. Feeling well from GI perspective, but decreased appetite and weight loss. Will give another month of PPI daily, f/u in 6 weeks. At that time can schedule EGD if doing well, or at the very least check for H pylori.  Pt and family agree.  [Time Spent: ___ minutes] : I have spent [unfilled] minutes of time on the encounter.

## 2024-03-11 NOTE — PHYSICAL EXAM
[Alert] : alert [No Respiratory Distress] : no respiratory distress [None] : no edema [Abdomen Tenderness] : non-tender [Abdomen Soft] : soft [No Masses] : no abdominal mass palpated [de-identified] : CN II-XII grossly intact, moving all extremities

## 2024-03-14 DIAGNOSIS — D63.1 ANEMIA IN CHRONIC KIDNEY DISEASE: ICD-10-CM

## 2024-03-14 DIAGNOSIS — R00.1 BRADYCARDIA, UNSPECIFIED: ICD-10-CM

## 2024-03-14 DIAGNOSIS — E78.5 HYPERLIPIDEMIA, UNSPECIFIED: ICD-10-CM

## 2024-03-14 DIAGNOSIS — H40.9 UNSPECIFIED GLAUCOMA: ICD-10-CM
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
## 2024-03-14 DIAGNOSIS — Z00.6 ENCOUNTER FOR EXAMINATION FOR NORMAL COMPARISON AND CONTROL IN CLINICAL RESEARCH PROGRAM: ICD-10-CM

## 2024-03-14 DIAGNOSIS — I13.11 HYPERTENSIVE HEART AND CHRONIC KIDNEY DISEASE WITHOUT HEART FAILURE, WITH STAGE 5 CHRONIC KIDNEY DISEASE, OR END STAGE RENAL DISEASE: ICD-10-CM

## 2024-03-14 DIAGNOSIS — I44.1 ATRIOVENTRICULAR BLOCK, SECOND DEGREE: ICD-10-CM

## 2024-03-14 DIAGNOSIS — N18.6 END STAGE RENAL DISEASE: ICD-10-CM

## 2024-03-14 DIAGNOSIS — T82.590A OTHER MECHANICAL COMPLICATION OF SURGICALLY CREATED ARTERIOVENOUS FISTULA, INITIAL ENCOUNTER: ICD-10-CM

## 2024-03-14 DIAGNOSIS — I16.0 HYPERTENSIVE URGENCY: ICD-10-CM

## 2024-03-14 DIAGNOSIS — Z99.2 DEPENDENCE ON RENAL DIALYSIS: ICD-10-CM
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
## 2024-03-14 DIAGNOSIS — E11.22 TYPE 2 DIABETES MELLITUS WITH DIABETIC CHRONIC KIDNEY DISEASE: ICD-10-CM

## 2024-03-14 DIAGNOSIS — Z79.4 LONG TERM (CURRENT) USE OF INSULIN: ICD-10-CM

## 2024-03-18 ENCOUNTER — APPOINTMENT (OUTPATIENT)
Dept: VASCULAR SURGERY | Facility: CLINIC | Age: 84
End: 2024-03-18
Payer: MEDICARE

## 2024-03-18 VITALS
DIASTOLIC BLOOD PRESSURE: 82 MMHG | HEIGHT: 67 IN | WEIGHT: 147 LBS | BODY MASS INDEX: 23.07 KG/M2 | HEART RATE: 79 BPM | SYSTOLIC BLOOD PRESSURE: 185 MMHG

## 2024-03-18 PROCEDURE — 99024 POSTOP FOLLOW-UP VISIT: CPT

## 2024-03-18 PROCEDURE — 93990 DOPPLER FLOW TESTING: CPT

## 2024-03-18 NOTE — PROCEDURE
[FreeTextEntry1] : LUE US was done in the office demonstrating patent AVF, prox. anastomosis with >75% stenosis, volume flow is low, 144 ml/min

## 2024-03-18 NOTE — HISTORY OF PRESENT ILLNESS
[FreeTextEntry1] : 83yoM, Ethiopian speaking w/ hx of HTN, T2DM, HLD, ESRD on HD via permcath, now s/p L AVF creation on 2/9/24 returns for a f/u visit. He was seen in the office on 2/28/24 for a post-op assessment and the incision was noted to be healing well, however there was no thrill noted. LUE US done in the office demonstrated no flow at the anastomosis, outflow cephalic vein patent, inflow brachial artery with pulsatile flow. He was scheduled for an elective fistulogram, however he was admitted to the hospital for symptomatic bradycardia. hypertensive urgency.  During his hospital stay he underwent  fistulogram/thrombectomy of the LUE AVF on 3/7/24.  He is doing well, denies left arm pain, edema, neurologic deficits, fever, chills.

## 2024-03-18 NOTE — ADDENDUM
[FreeTextEntry1] : I, Dr. Dc Lai, personally performed the evaluation and management (E/M) services for this established patient who presents today with (an) existing condition(s).  That E/M includes conducting the examination, assessing all conditions, and (re)establishing/reinforcing a plan of care.  Today, my ACP, Odalys RAYA, was here to observe my evaluation and management services for this condition to be followed going forward.

## 2024-03-18 NOTE — PHYSICAL EXAM
[2+] : left 2+ [Alert] : alert [Calm] : calm [de-identified] : NAD [FreeTextEntry1] : LUE with well healed antecubital incision, faint thrill appreciated, no neurological deficits [de-identified] : +FROM [de-identified] : grossly intact

## 2024-03-18 NOTE — ASSESSMENT
[FreeTextEntry1] : 83yoM, Cook Islander speaking w/ hx of HTN, T2DM, HLD, ESRD on HD via permcath, now s/p L AVF creation on 2/9/24, followed by fistulogram/thrombectomy of the LUE AVF on 3/7/24 returns for a f/u visit. Patient doing well, denies left arm pain, edema, neurologic deficits, fever, chills. On exam, LUE with well healed antecubital incision, faint thrill appreciated, no neurological deficits. LUE US was done in the office demonstrating patent AVF, prox. anastomosis with >75% stenosis, volume flow is low, 144 ml/min. We discussed the findings and recommended either to wait and see if the flow improves vs to proceed with open revision of AVF. He prefers to wait and see. We recommended to perform hand exercises; stress ball was provided. F/u in 4 weeks.

## 2024-03-29 NOTE — PROGRESS NOTE ADULT - ASSESSMENT
Labeled consent given to OR tech-pt sent to PACU in gown ONLY and socks. Pt states the only jewelry she has is a nose ring that they were unable to remove for previous surgery. Pipo Wolfe RN aware of transfer to OR.    84 yo M w/ PMH of CKD5, HTN, HLD, DM2 presenting for creation of AVF in preparation for dialysis. Pre-op labs revealed K 6 and procedure was canceled. Patient given lokelma and insulin, transferred to ED. Reports missing medications last night and this morning. Repeat K 5. Patient comfortable, offers no complaints. Nephrology consulted for further management of CKD5 and hyperkalemia.    CKD5 with hyperkalemia  - Continue first HD treatment as tolerated, next HD tomorrow  - Can discontinue Lokelma now that patient is on HD and will monitor K  - Renal diet, fluid restriction <1.2L/day, strict I&O, daily standing weights  - Check hepatitis panel and quantiferon  - Vascular following for AVF creation  - Social work consult for HD unit placement

## 2024-04-15 ENCOUNTER — APPOINTMENT (OUTPATIENT)
Dept: VASCULAR SURGERY | Facility: CLINIC | Age: 84
End: 2024-04-15
Payer: MEDICARE

## 2024-04-15 VITALS
DIASTOLIC BLOOD PRESSURE: 67 MMHG | HEIGHT: 67 IN | WEIGHT: 147 LBS | HEART RATE: 64 BPM | SYSTOLIC BLOOD PRESSURE: 108 MMHG | BODY MASS INDEX: 23.07 KG/M2

## 2024-04-15 DIAGNOSIS — T82.590A OTHER MECHANICAL COMPLICATION OF SURGICALLY CREATED ARTERIOVENOUS FISTULA, INITIAL ENCOUNTER: ICD-10-CM

## 2024-04-15 PROCEDURE — 93990 DOPPLER FLOW TESTING: CPT

## 2024-04-15 PROCEDURE — 99215 OFFICE O/P EST HI 40 MIN: CPT | Mod: 24

## 2024-04-16 PROBLEM — T82.590A MALFUNCTION OF ARTERIOVENOUS DIALYSIS FISTULA, INITIAL ENCOUNTER: Status: ACTIVE | Noted: 2024-02-26

## 2024-04-16 LAB
ALBUMIN SERPL ELPH-MCNC: 3.7 G/DL
ALP BLD-CCNC: 84 U/L
ALT SERPL-CCNC: 11 U/L
ANION GAP SERPL CALC-SCNC: 15 MMOL/L
AST SERPL-CCNC: 11 U/L
BASOPHILS # BLD AUTO: 0.08 K/UL
BASOPHILS NFR BLD AUTO: 0.8 %
BILIRUB SERPL-MCNC: 0.2 MG/DL
BUN SERPL-MCNC: 48 MG/DL
CALCIUM SERPL-MCNC: 9.1 MG/DL
CHLORIDE SERPL-SCNC: 105 MMOL/L
CO2 SERPL-SCNC: 24 MMOL/L
CREAT SERPL-MCNC: 6.09 MG/DL
EGFR: 9 ML/MIN/1.73M2
EOSINOPHIL # BLD AUTO: 0.23 K/UL
EOSINOPHIL NFR BLD AUTO: 2.3 %
GLUCOSE SERPL-MCNC: 110 MG/DL
HCT VFR BLD CALC: 36.1 %
HGB BLD-MCNC: 10.9 G/DL
IMM GRANULOCYTES NFR BLD AUTO: 0.8 %
INR PPP: 0.93 RATIO
LYMPHOCYTES # BLD AUTO: 0.97 K/UL
LYMPHOCYTES NFR BLD AUTO: 9.7 %
MAN DIFF?: NORMAL
MCHC RBC-ENTMCNC: 29.9 PG
MCHC RBC-ENTMCNC: 30.2 GM/DL
MCV RBC AUTO: 98.9 FL
MONOCYTES # BLD AUTO: 1.48 K/UL
MONOCYTES NFR BLD AUTO: 14.7 %
NEUTROPHILS # BLD AUTO: 7.2 K/UL
NEUTROPHILS NFR BLD AUTO: 71.7 %
PLATELET # BLD AUTO: 396 K/UL
POTASSIUM SERPL-SCNC: 6.3 MMOL/L
PROT SERPL-MCNC: 7.3 G/DL
PT BLD: 10.5 SEC
RBC # BLD: 3.65 M/UL
RBC # FLD: 15.5 %
SODIUM SERPL-SCNC: 143 MMOL/L
WBC # FLD AUTO: 10.04 K/UL

## 2024-04-17 NOTE — ADDENDUM
[FreeTextEntry1] : I, Dr. Dc Lai, personally performed the evaluation and management (E/M) services for this established patient who presents today with (an) existing condition(s).  That E/M includes conducting the examination, assessing all conditions, and (re)establishing/reinforcing a plan of care.  Today, my ACP, Odalys RAYA, was here to observe my evaluation and management services for this condition to be followed going forward.  I spent a total of 40 minutes in this encounter.

## 2024-04-17 NOTE — PROCEDURE
[FreeTextEntry1] : LUE US was repeated in the office demonstrating patent AVF, prox. anastomosis with >75% stenosis

## 2024-04-17 NOTE — HISTORY OF PRESENT ILLNESS
[FreeTextEntry1] : 83yoM, Cambodian speaking w/ hx of HTN, T2DM, HLD, ESRD on HD via permcath, now s/p L AVF creation on 2/9/24, followed by fistulogram/thrombectomy of the LUE AVF on 3/7/24.  He was a month ago and LUE US was done in the office demonstrating patent AVF, prox. anastomosis with >75% stenosis, volume flow is low, 144 ml/min. We discussed the findings and recommended either to wait and see if the flow improves vs to proceed with open revision of AVF. He chose to wait and returns today for reevaluation. He is doing well, denies left arm pain, edema, neurologic deficits, fever, chills.

## 2024-04-17 NOTE — ASSESSMENT
[Other: _____] : [unfilled] [FreeTextEntry1] : 83yoM, Moroccan speaking w/ hx of HTN, T2DM, HLD, ESRD on HD via permcath, now s/p L AVF creation on 2/9/24, followed by fistulogram/thrombectomy of the LUE AVF on 3/7/24 returns for a f/u visit. Patient doing well, denies left arm pain, edema, neurologic deficits, fever, chills. On exam, LUE with well healed antecubital incision, faint thrill appreciated, no neurological deficits. LUE US was repeated in the office demonstrating patent AVF, prox. anastomosis with >75% stenosis. We recommended to proceed with AV fistulogram/plasty. RABs were discussed, all questions answered.

## 2024-04-17 NOTE — PHYSICAL EXAM
[2+] : left 2+ [Alert] : alert [Calm] : calm [de-identified] : NAD [FreeTextEntry1] : LUE with well healed antecubital incision, faint thrill appreciated, no neurological deficits [de-identified] : +FROM [de-identified] : grossly intact

## 2024-04-21 ENCOUNTER — TRANSCRIPTION ENCOUNTER (OUTPATIENT)
Age: 84
End: 2024-04-21

## 2024-04-22 ENCOUNTER — APPOINTMENT (OUTPATIENT)
Dept: GASTROENTEROLOGY | Facility: CLINIC | Age: 84
End: 2024-04-22

## 2024-04-22 ENCOUNTER — APPOINTMENT (OUTPATIENT)
Dept: VASCULAR SURGERY | Facility: CLINIC | Age: 84
End: 2024-04-22

## 2024-04-22 ENCOUNTER — OUTPATIENT (OUTPATIENT)
Dept: OUTPATIENT SERVICES | Facility: HOSPITAL | Age: 84
LOS: 1 days | Discharge: HOME | End: 2024-04-22
Payer: MEDICARE

## 2024-04-22 VITALS — WEIGHT: 125 LBS

## 2024-04-22 DIAGNOSIS — Z98.890 OTHER SPECIFIED POSTPROCEDURAL STATES: Chronic | ICD-10-CM

## 2024-04-22 LAB
ANION GAP SERPL CALC-SCNC: 11 MMOL/L — SIGNIFICANT CHANGE UP (ref 5–17)
APTT BLD: 27.6 SEC — SIGNIFICANT CHANGE UP (ref 24.5–35.6)
BASOPHILS # BLD AUTO: 0.06 K/UL — SIGNIFICANT CHANGE UP (ref 0–0.2)
BASOPHILS NFR BLD AUTO: 0.6 % — SIGNIFICANT CHANGE UP (ref 0–2)
BUN SERPL-MCNC: 39 MG/DL — HIGH (ref 7–23)
CALCIUM SERPL-MCNC: 9.4 MG/DL — SIGNIFICANT CHANGE UP (ref 8.4–10.5)
CHLORIDE SERPL-SCNC: 103 MMOL/L — SIGNIFICANT CHANGE UP (ref 96–108)
CO2 SERPL-SCNC: 26 MMOL/L — SIGNIFICANT CHANGE UP (ref 22–31)
CREAT SERPL-MCNC: 5.13 MG/DL — HIGH (ref 0.5–1.3)
EGFR: 11 ML/MIN/1.73M2 — LOW
EOSINOPHIL # BLD AUTO: 0.55 K/UL — HIGH (ref 0–0.5)
EOSINOPHIL NFR BLD AUTO: 5.9 % — SIGNIFICANT CHANGE UP (ref 0–6)
GLUCOSE BLDC GLUCOMTR-MCNC: 110 MG/DL — HIGH (ref 70–99)
GLUCOSE BLDC GLUCOMTR-MCNC: 145 MG/DL — HIGH (ref 70–99)
GLUCOSE SERPL-MCNC: 151 MG/DL — HIGH (ref 70–99)
HCT VFR BLD CALC: 33.1 % — LOW (ref 39–50)
HGB BLD-MCNC: 10.4 G/DL — LOW (ref 13–17)
IMM GRANULOCYTES NFR BLD AUTO: 0.4 % — SIGNIFICANT CHANGE UP (ref 0–0.9)
INR BLD: 0.94 — SIGNIFICANT CHANGE UP (ref 0.85–1.18)
LYMPHOCYTES # BLD AUTO: 1.02 K/UL — SIGNIFICANT CHANGE UP (ref 1–3.3)
LYMPHOCYTES # BLD AUTO: 10.9 % — LOW (ref 13–44)
MCHC RBC-ENTMCNC: 30 PG — SIGNIFICANT CHANGE UP (ref 27–34)
MCHC RBC-ENTMCNC: 31.4 GM/DL — LOW (ref 32–36)
MCV RBC AUTO: 95.4 FL — SIGNIFICANT CHANGE UP (ref 80–100)
MONOCYTES # BLD AUTO: 1.36 K/UL — HIGH (ref 0–0.9)
MONOCYTES NFR BLD AUTO: 14.5 % — HIGH (ref 2–14)
NEUTROPHILS # BLD AUTO: 6.34 K/UL — SIGNIFICANT CHANGE UP (ref 1.8–7.4)
NEUTROPHILS NFR BLD AUTO: 67.7 % — SIGNIFICANT CHANGE UP (ref 43–77)
NRBC # BLD: 0 /100 WBCS — SIGNIFICANT CHANGE UP (ref 0–0)
PLATELET # BLD AUTO: 314 K/UL — SIGNIFICANT CHANGE UP (ref 150–400)
POTASSIUM SERPL-MCNC: 5.4 MMOL/L — HIGH (ref 3.5–5.3)
POTASSIUM SERPL-SCNC: 5.4 MMOL/L — HIGH (ref 3.5–5.3)
PROTHROM AB SERPL-ACNC: 10.7 SEC — SIGNIFICANT CHANGE UP (ref 9.5–13)
RBC # BLD: 3.47 M/UL — LOW (ref 4.2–5.8)
RBC # FLD: 15 % — HIGH (ref 10.3–14.5)
SODIUM SERPL-SCNC: 140 MMOL/L — SIGNIFICANT CHANGE UP (ref 135–145)
WBC # BLD: 9.37 K/UL — SIGNIFICANT CHANGE UP (ref 3.8–10.5)
WBC # FLD AUTO: 9.37 K/UL — SIGNIFICANT CHANGE UP (ref 3.8–10.5)

## 2024-04-22 PROCEDURE — 36909 DIALYSIS CIRCUIT EMBOLJ: CPT | Mod: GC,79

## 2024-04-22 PROCEDURE — C1769: CPT

## 2024-04-22 PROCEDURE — 93010 ELECTROCARDIOGRAM REPORT: CPT

## 2024-04-22 PROCEDURE — C1887: CPT

## 2024-04-22 PROCEDURE — 80048 BASIC METABOLIC PNL TOTAL CA: CPT

## 2024-04-22 PROCEDURE — 85610 PROTHROMBIN TIME: CPT

## 2024-04-22 PROCEDURE — 36902 INTRO CATH DIALYSIS CIRCUIT: CPT | Mod: GC,79

## 2024-04-22 PROCEDURE — 36909 DIALYSIS CIRCUIT EMBOLJ: CPT

## 2024-04-22 PROCEDURE — 76000 FLUOROSCOPY <1 HR PHYS/QHP: CPT

## 2024-04-22 PROCEDURE — 85730 THROMBOPLASTIN TIME PARTIAL: CPT

## 2024-04-22 PROCEDURE — 82962 GLUCOSE BLOOD TEST: CPT

## 2024-04-22 PROCEDURE — 85025 COMPLETE CBC W/AUTO DIFF WBC: CPT

## 2024-04-22 PROCEDURE — C2623: CPT

## 2024-04-22 PROCEDURE — 36902 INTRO CATH DIALYSIS CIRCUIT: CPT

## 2024-04-22 PROCEDURE — C1889: CPT

## 2024-04-22 PROCEDURE — 93005 ELECTROCARDIOGRAM TRACING: CPT

## 2024-04-22 PROCEDURE — C1725: CPT

## 2024-04-22 PROCEDURE — C1894: CPT

## 2024-04-22 RX ORDER — CHLORHEXIDINE GLUCONATE 213 G/1000ML
1 SOLUTION TOPICAL ONCE
Refills: 0 | Status: DISCONTINUED | OUTPATIENT
Start: 2024-04-22 | End: 2024-05-06

## 2024-04-22 NOTE — BRIEF OPERATIVE NOTE - OPERATION/FINDINGS
LUE fistulogram performed showing stenosis just after anastomosis. This was ballooned with a 6x40 mustang with slight resolution but it recoiled. a 6x20 cutting balloon was used and then post-ballooned with a 6x40 IN.PACT. Completion showed much improved inflow with some residual stenosis. Two large outflow branches were coil embolized with multiple 6 and 8mm butch coils. Completion showed no filling of outflow branches. The anastamosis was re-evaulated which remained patent and improved but still with residual stenosis. An 8mm cutting balloon was used followed by an 8x20mm mustang with improvement in the stenosis. 7Fr sheath was pulled and prolene stitch placed.  Contrast: 45cc  Palpable thrill and radial pulse at conclusion

## 2024-04-29 ENCOUNTER — APPOINTMENT (OUTPATIENT)
Dept: VASCULAR SURGERY | Facility: CLINIC | Age: 84
End: 2024-04-29
Payer: MEDICARE

## 2024-04-29 VITALS
SYSTOLIC BLOOD PRESSURE: 170 MMHG | DIASTOLIC BLOOD PRESSURE: 63 MMHG | WEIGHT: 147 LBS | BODY MASS INDEX: 23.07 KG/M2 | HEIGHT: 67 IN | HEART RATE: 76 BPM

## 2024-04-29 PROCEDURE — 93990 DOPPLER FLOW TESTING: CPT

## 2024-04-29 PROCEDURE — 99214 OFFICE O/P EST MOD 30 MIN: CPT | Mod: 24

## 2024-04-30 NOTE — PHYSICAL EXAM
[2+] : left 2+ [Alert] : alert [Calm] : calm [de-identified] : NAD [FreeTextEntry1] : LUE with well healed antecubital incision, mild skin erythema and induration over forearm, good thrill appreciated, no neurological deficits [de-identified] : grossly intact [de-identified] : +FROM

## 2024-04-30 NOTE — ADDENDUM
[FreeTextEntry1] : I, Dr. Dc Lai, personally performed the evaluation and management (E/M) services for this established patient who presents today with (an) existing condition(s).  That E/M includes conducting the examination, assessing all conditions, and (re)establishing/reinforcing a plan of care.  Today, my ACP, Odalys RAYA, was here to observe my evaluation and management services for this condition to be followed going forward.  I spent a total of 30 minutes in this encounter.

## 2024-04-30 NOTE — ASSESSMENT
[Other: _____] : [unfilled] [FreeTextEntry1] : 83yoM, Belarusian speaking w/ hx of HTN, T2DM, HLD, ESRD on HD via permcath, now s/p L AVF creation on 2/9/24, followed by fistulogram/thrombectomy of the LUE AVF on 3/7/24  and most recent on 4/22/24, returns for a f/u visit. He c/o mild left arm pain, and skin erythema, denies edema, fever, chills. On exam, LUE with well healed antecubital incision, mild skin erythema and induration over forearm, good thrill appreciated, no neurological deficits LUE US was repeated in the office demonstrating patent AVF, volume flow 800 ml/min, SVT in cephalic vein of forearm post coil embolization. We discussed the findings and recommended to apply warm compress to his forearm, explained that redness and discomfort are expected and will resolve over time. F/u in 1 month or sooner if needed.

## 2024-04-30 NOTE — HISTORY OF PRESENT ILLNESS
[FreeTextEntry1] : 83yoM, Bulgarian speaking w/ hx of HTN, T2DM, HLD, ESRD on HD via permcath, now s/p L AVF creation on 2/9/24, followed by fistulogram/thrombectomy of the LUE AVF on 3/7/24 and again on 4/22/24 due to failure to mature of AVF. He returns today for a post-op visit. He c/o mild left arm pain, and skin erythema, denies edema, fever, chills.   was used during the visit.

## 2024-04-30 NOTE — PROCEDURE
[FreeTextEntry1] : LUE US was repeated in the office demonstrating patent AVF, volume flow 800 ml/min, SVT in cephalic vein of forearm post coil embolization

## 2024-05-02 ENCOUNTER — APPOINTMENT (OUTPATIENT)
Dept: HEART AND VASCULAR | Facility: CLINIC | Age: 84
End: 2024-05-02
Payer: MEDICARE

## 2024-05-02 VITALS
WEIGHT: 147 LBS | BODY MASS INDEX: 23.07 KG/M2 | HEART RATE: 71 BPM | SYSTOLIC BLOOD PRESSURE: 150 MMHG | HEIGHT: 67 IN | DIASTOLIC BLOOD PRESSURE: 78 MMHG

## 2024-05-02 DIAGNOSIS — E78.5 HYPERLIPIDEMIA, UNSPECIFIED: ICD-10-CM

## 2024-05-02 DIAGNOSIS — I44.1 ATRIOVENTRICULAR BLOCK, SECOND DEGREE: ICD-10-CM

## 2024-05-02 DIAGNOSIS — T82.510A BREAKDOWN (MECHANICAL) OF SURGICALLY CREATED ARTERIOVENOUS FISTULA, INITIAL ENCOUNTER: ICD-10-CM
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
## 2024-05-02 DIAGNOSIS — I12.0 HYPERTENSIVE CHRONIC KIDNEY DISEASE WITH STAGE 5 CHRONIC KIDNEY DISEASE OR END STAGE RENAL DISEASE: ICD-10-CM

## 2024-05-02 DIAGNOSIS — E11.22 TYPE 2 DIABETES MELLITUS WITH DIABETIC CHRONIC KIDNEY DISEASE: ICD-10-CM

## 2024-05-02 DIAGNOSIS — N18.6 END STAGE RENAL DISEASE: ICD-10-CM

## 2024-05-02 DIAGNOSIS — Z79.4 LONG TERM (CURRENT) USE OF INSULIN: ICD-10-CM

## 2024-05-02 DIAGNOSIS — T82.858A STENOSIS OF OTHER VASCULAR PROSTHETIC DEVICES, IMPLANTS AND GRAFTS, INITIAL ENCOUNTER: ICD-10-CM

## 2024-05-02 DIAGNOSIS — D64.9 ANEMIA, UNSPECIFIED: ICD-10-CM
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
## 2024-05-02 PROCEDURE — 93279 PRGRMG DEV EVAL PM/LDLS PM: CPT

## 2024-05-07 NOTE — PHYSICAL EXAM
[General Appearance - Well Developed] : well developed [Normal Appearance] : normal appearance [Well Groomed] : well groomed [General Appearance - Well Nourished] : well nourished [No Deformities] : no deformities [General Appearance - In No Acute Distress] : no acute distress [Heart Rate And Rhythm] : heart rate and rhythm were normal [Heart Sounds] : normal S1 and S2 [] : no respiratory distress [Respiration, Rhythm And Depth] : normal respiratory rhythm and effort [Exaggerated Use Of Accessory Muscles For Inspiration] : no accessory muscle use [Clean] : clean [Dry] : dry [Well-Healed] : well-healed

## 2024-05-15 NOTE — ADDENDUM
[FreeTextEntry1] : I, Osmani Velazco, hereby attest that the medical record entry for this patient accurately reflects signatures/notations that I made on the Date of Service in my capacity as an Attending Physician when I treated/diagnosed the above patient. I do hereby attest that this information is true, accurate and complete to the best of my knowledge and I understand that any falsification, omission, or concealment of material fact may subject me to administrative, civil, or, criminal liability. I agree with the note as written by my PA in its entirety. I was present for the entire visit and supervised the entire visit and agree with the plan as outlined.  I, Delta Brown, am scribing for and the presence of Dr. Velazco the following sections: HPI, PMH,Family/social history, ROS, Physical Exam, Assessment / Plan.

## 2024-05-15 NOTE — REVIEW OF SYSTEMS
[Negative] : Psychiatric [Fever] : no fever [Chills] : no chills [SOB] : no shortness of breath [Dyspnea on exertion] : not dyspnea during exertion [Chest Discomfort] : no chest discomfort [Palpitations] : no palpitations [Syncope] : no syncope

## 2024-05-15 NOTE — DISCUSSION/SUMMARY
[FreeTextEntry1] : Normal functioning pacemaker.  All measured data is within normal limits.  No events for review and no changes made today.  Follow up in 6 months or sooner if needed.  He knows to call with any questions or concerns.

## 2024-05-15 NOTE — PROCEDURE
[No] : not [Pacemaker] : pacemaker [Threshold Testing Performed] : Threshold testing was performed [Lead Imp:  ___ohms] : lead impedance was [unfilled] ohms [Sensing Amplitude ___mv] : sensing amplitude was [unfilled] mv [___V @] : [unfilled] V [___ ms] : [unfilled] ms [None] : none [de-identified] : OANH [de-identified] : 2024 [de-identified] : АНДРЕЙ [de-identified] :  [de-identified] : >10 years [de-identified] : DID not session synch but normal function and no changes made AS 50% AS  33.4%  only 14.5%

## 2024-05-15 NOTE — HISTORY OF PRESENT ILLNESS
[de-identified] : 83 year old male with HTN, HLD, DM-II, ESRD on dialysis and 2:1 heart block with heart rates in the 30s s/p MICRA 3/2024.  He presents for routine follow up and has no complaints.  No issue with groin / procedure site.  No palpitations, chest pain, syncope.

## 2024-06-02 NOTE — PROGRESS NOTE ADULT - PROBLEM SELECTOR PLAN 3
eMERGENCY dEPARTMENT eNCOUnter      CHIEF COMPLAINT    Chief Complaint   Patient presents with    Foot Pain       HPI    Aure Jeffries is a 64 year old female who presents to the emergency department complaining of left foot pain time 2 days prior to arrival.  There has been some swollen.  There has been no trauma.  There has been no redness no warmth no drainage.  No open wound.    ALLERGIES    ALLERGIES:   Allergen Reactions    Penicillin G HIVES     upset stomach      Penicillins ANAPHYLAXIS    Penicillin V RASH       CURRENT MEDICATIONS    Current Facility-Administered Medications   Medication Dose Route Frequency Provider Last Rate Last Admin    HYDROcodone-acetaminophen (NORCO) 5-325 MG per tablet 1 tablet  1 tablet Oral Once Khampane, Thotsaphone, PA-C         Current Outpatient Medications   Medication Sig Dispense Refill    polyethylene glycol (MIRALAX) 17 g packet Take 17 g by mouth daily. Stir and dissolve powder in any 4 to 8 ounces of beverage, then drink. 90 each 1    HYDROcodone-acetaminophen (NORCO) 5-325 MG per tablet Take 1 tablet by mouth every 8 hours as needed for Pain. 8 tablet 0    sulfamethoxazole-trimethoprim (BACTRIM DS) 800-160 MG per tablet Take 1 tablet by mouth in the morning and 1 tablet in the evening. For 7 days      cilostazol (PLETAL) 50 MG tablet TAKE 1 TABLET BY MOUTH IN THE MORNING AND IN THE EVENING 180 tablet 3    escitalopram (Lexapro) 10 MG tablet Take 1 tablet by mouth daily for 7 days. 7 tablet 0    escitalopram (Lexapro) 20 MG tablet Take 1 tablet by mouth daily. Do not start before November 27, 2023. 90 tablet 3    prazosin (MINIPRESS) 2 MG capsule Take 1 capsule by mouth nightly for 3 days, THEN 2 capsules nightly for 3 days, THEN 3 capsules nightly for 3 days, THEN 4 capsules nightly. 138 capsule 0    rivaroxaban (XARELTO) 2.5 MG Tab Take 1 tablet by mouth in the morning and 1 tablet in the evening. 90 tablet 1    NIFEdipine XL (PROCARDIA XL) 60 MG 24 hr tablet TAKE  1 TABLET BY MOUTH DAILY 90 tablet 3    aspirin (ECOTRIN) 81 MG EC tablet Take 1 tablet by mouth daily. 90 tablet 1    cyclobenzaprine (FLEXERIL) 10 MG tablet Take 1 tablet by mouth daily as needed for Muscle spasms. 90 tablet 3    hydrochlorothiazide (HYDRODIURIL) 50 MG tablet TAKE 1 TABLET BY MOUTH DAILY 90 tablet 3    ezetimibe (ZETIA) 10 MG tablet Take 10 mg by mouth daily.      nicotine polacrilex (COMMIT) 2 MG lozenge Place 1 lozenge inside cheek as needed for Smoking cessation. 30 lozenge 3    diclofenac (VOLTAREN) 1 % gel Apply 4 g topically in the morning and 4 g at noon and 4 g in the evening and 4 g before bedtime. 350 g 1    rosuvastatin (CRESTOR) 20 MG tablet Take 1 tablet by mouth daily. 90 tablet 3    sennosides-docusate sodium (SENOKOT-S) 8.6-50 MG tablet Take 1 tablet by mouth nightly. Only take as needed. You should improve diet (eat more fiber) and if you are still having difficulty, then take this nightly. 90 tablet 3    nicotine polacrilex (NICORETTE) 2 MG gum Take 1 each by mouth as needed for Smoking cessation. 125 each 3    azelastine (OPTIVAR) 0.05 % ophthalmic solution Apply to eye every 12 hours.         PAST MEDICAL HISTORY    Past Medical History:   Diagnosis Date    Depressive disorder     Essential (primary) hypertension        SURGICAL HISTORY    History reviewed. No pertinent surgical history.    SOCIAL HISTORY    Social History     Tobacco Use    Smoking status: Every Day    Smokeless tobacco: Never   Substance Use Topics    Alcohol use: Yes    Drug use: Never       FAMILY HISTORY    Family History   Problem Relation Age of Onset    Heart disease Mother     Heart disease Father        REVIEW OF SYSTEMS      Constitutional:  Denies fever or chills.   Eyes:  Denies change in visual acuity.   HENT:  Denies nasal congestion or sore throat.   Respiratory:  Denies cough or shortness of breath.   Cardiovascular:  Denies chest pain or edema.   GI:  Denies abdominal pain, nausea, vomiting,  bloody stools or diarrhea.   :  Denies dysuria.   Musculoskeletal:  See HPI.   Integument:  Denies rash.   Neurologic:  See HPI.     PHYSICAL EXAM    ED Triage Vitals [06/02/24 1549]   BP (!) 177/91   Heart Rate 82   Resp 16   Temp 98.3 °F (36.8 °C)   SpO2 97 %     Pulse Ox Interpretation:    Constitutional:  Well developed, well nourished, no acute distress, non-toxic appearance.    Respiratory:  No respiratory distress, normal breath sounds. No rales. No wheezing.   Cardiovascular:  Normal rate, normal rhythm. No murmurs, gallops, or rubs.     Musculoskeletal:  Tender at the proximal big toe left foot.  Some redness no warmth no drainage.  No open wound.  Some swollen.  Full range of motion with some pain.  Sensorimotor intact.  Pulse equal on both sides.  Normal capillary refill.  Right foot, nontender.  No redness warmth swollen or drainage.  Patient concern regarding bunion.  Patient requests right foot x-ray  Patient has no pain at the right 5th metatarsal.  No calf tenderness, no calf swollen.  Integument:  Well hydrated, no rash.       RADIOLOGY    XR FOOT 3 OR MORE VIEWS BILATERAL   Final Result   IMPRESSION:      Degenerative changes involve the first MTP joints bilaterally.      No peritubular soft tissue calcification present.      No acute fracture or dislocation present.      No radiopaque foreign body is noted.      Healed remote fracture involves the right distal fifth metatarsal.      Small plantar calcaneal spurs are present right larger than left. Minor   enthesophytes at the insertions of the Achilles tendons.            Electronically Signed by: Justen Hernandez MD   Signed on: 6/2/2024 5:15 PM   Created on Workstation ID: [id number]   Signed on Workstation ID: [id number]                    ED COURSE & MEDICAL DECISION MAKING    No septic joint.  No cellulitis.  Patient is alert no apparent distress nontoxic well-hydrated.  Differential, sprain, fracture, gout arthritis.  Patient will be given  reports no bm since admission, but denies any discomfort to fullness  started on bowel regimen, will continue to encourage oobtc    - follow up abd x-ray  - will consider enema if unable to move bowels with increased activity, bowel regimen Indocin.  Patient was told to follow up with her family MD as directed.  I have discussed the diagnosis and risks, as well as follow-up with the patient's PCP. I discussed the possible diagnoses with the patient as well as the warning signs and symptoms. The patient understands that this is a provisional diagnosis which can and do change. The diagnosis that the patient was discharged with today is based on the symptoms with which they presented. I discussed in great length returning to the ED immediately if new or worsening symptoms occur. I discussed the symptoms that are most concerning that necessitate immediate return. The patient verbalizes understanding of all discharge instructions, stating there are no further questions and/or concerns at this time. The patient was discharged home, ambulatory in stable condition.       FINAL IMPRESSION    Left big toe pain     Alyssa Davis PA-C  06/02/24 1737       Alyssa Davis PA-C  06/02/24 1737

## 2024-06-10 ENCOUNTER — APPOINTMENT (OUTPATIENT)
Dept: VASCULAR SURGERY | Facility: CLINIC | Age: 84
End: 2024-06-10

## 2024-06-10 VITALS
SYSTOLIC BLOOD PRESSURE: 164 MMHG | BODY MASS INDEX: 23.07 KG/M2 | WEIGHT: 147 LBS | HEIGHT: 67 IN | DIASTOLIC BLOOD PRESSURE: 83 MMHG | HEART RATE: 92 BPM

## 2024-06-10 DIAGNOSIS — Z99.2 END STAGE RENAL DISEASE: ICD-10-CM

## 2024-06-10 DIAGNOSIS — N18.6 END STAGE RENAL DISEASE: ICD-10-CM

## 2024-06-10 PROCEDURE — 99214 OFFICE O/P EST MOD 30 MIN: CPT

## 2024-06-10 PROCEDURE — 93990 DOPPLER FLOW TESTING: CPT

## 2024-06-11 PROBLEM — N18.6 ESRD (END STAGE RENAL DISEASE) ON DIALYSIS: Status: ACTIVE | Noted: 2024-03-18

## 2024-06-11 NOTE — HISTORY OF PRESENT ILLNESS
[FreeTextEntry1] : 83yoM, Montserratian speaking w/ hx of HTN, T2DM, HLD, ESRD on HD via permcath, now s/p L AVF creation on 2/9/24, followed by fistulogram/thrombectomy of the LUE AVF on 3/7/24 and again on 4/22/24 due to failure to mature of AVF. He returns today for a post-op visit. He denies left arm pain, edema, fever, chills.

## 2024-06-11 NOTE — PHYSICAL EXAM
[2+] : left 2+ [Alert] : alert [Calm] : calm [de-identified] : NAD [FreeTextEntry1] : LUE with well healed antecubital incision, mild skin erythema and induration over forearm, good thrill appreciated, no neurological deficits [de-identified] : +FROM [de-identified] : grossly intact

## 2024-06-11 NOTE — PROCEDURE
[FreeTextEntry1] : LUE US was done in the office that demonstrated patent AVF with flow restrictive lesion in prox. segment due to kinked vessel and distal outflow vein.

## 2024-06-11 NOTE — ASSESSMENT
[Other: _____] : [unfilled] [FreeTextEntry1] : 83yoM, Moroccan speaking w/ hx of HTN, T2DM, HLD, ESRD on HD via permcath, now s/p L AVF creation on 2/9/24, followed by fistulogram/thrombectomy of the LUE AVF on 3/7/24  and most recent on 4/22/24, returns for a f/u visit. He c/o mild left arm pain, and skin erythema, denies edema, fever, chills. On exam, LUE with well healed antecubital incision, mild skin erythema and induration over forearm, good thrill appreciated, no neurological deficits LUE US was done in the office that demonstrated patent AVF with flow restrictive lesion in prox. segment due to kinked vessel and distal outflow vein. We discussed the findings and recommended to start accessing his AVF during his dialysis sessions. If no issues, he was recommended to f/u in 2 weeks for his permcath removal.

## 2024-07-19 ENCOUNTER — APPOINTMENT (OUTPATIENT)
Dept: VASCULAR SURGERY | Facility: CLINIC | Age: 84
End: 2024-07-19
Payer: MEDICARE

## 2024-07-19 DIAGNOSIS — T82.590A OTHER MECHANICAL COMPLICATION OF SURGICALLY CREATED ARTERIOVENOUS FISTULA, INITIAL ENCOUNTER: ICD-10-CM

## 2024-07-19 DIAGNOSIS — N18.6 END STAGE RENAL DISEASE: ICD-10-CM

## 2024-07-19 DIAGNOSIS — Z99.2 END STAGE RENAL DISEASE: ICD-10-CM

## 2024-07-19 PROCEDURE — 36589 REMOVAL TUNNELED CV CATH: CPT | Mod: RT

## 2024-07-19 PROCEDURE — 99213 OFFICE O/P EST LOW 20 MIN: CPT | Mod: 25

## 2024-08-20 NOTE — ASU PATIENT PROFILE, ADULT - HISTORY OF COVID-19 VACCINATION
What Type Of Note Output Would You Prefer (Optional)?: Standard Output
Is The Patient Presenting As Previously Scheduled?: Yes
How Severe Is Your Rash?: mild
Is This A New Presentation, Or A Follow-Up?: Rash
Yes

## 2024-11-06 ENCOUNTER — APPOINTMENT (OUTPATIENT)
Dept: HEART AND VASCULAR | Facility: CLINIC | Age: 84
End: 2024-11-06

## 2024-12-10 NOTE — DISCHARGE NOTE NURSING/CASE MANAGEMENT/SOCIAL WORK - NSTRANSFERBELONGINGSRESP_GEN_A_NUR
[facility] - Intensive Care  Discharge Summary     Patient ID:  Rigoberto Quintana  [id number]  73 y.o.  1951    Admit date: 12/6/2024    Discharge Date and Time:  12/10/2024 10:36 AM    Admitting Physician: Lokesh Quiroz MD     Discharge Provider: Lokesh Quiroz    Reason for Admission: STEMI (ST elevation myocardial infarction) [I21.3]    Admission Condition: good    Procedures Performed: Procedure(s) (LRB):  ANGIOGRAM, CORONARY ARTERY (N/A)  INSERTION, INTRA-AORTIC BALLOON PUMP  PTCA, Single Vessel  INSERTION, STENT, CORONARY ARTERY (N/A)  IVUS, Coronary  INSERTION, CATHETER, RIGHT HEART (Right)    Hospital Course   HPI:  Code STEMI  Transferred from [facility].  Patient with history of type 2 DM, HTN, HLP, and nonobstructive CAD.  Started having chest pain 3 hours ago that has getting significantly worse.  Called EMS.  EKG with anterior ST elevation he went into V-tach and was shocked 3 times was given amiodarone 150.  [facility] EKG with ST elevation in the anterior leads.  He is hemodynamically stable at this time and cath lab team was activated. .  He was loaded with Brilinta and given 4000 units of heparin. Patient was taken emergently to cath lab with PCI to LM with IABP support.       12/7/2024 seen and examined this morning.  He is doing well.  Stable overnight.  No significant arrhythmias.  Balloon pump one-to-one weaned to 1-2 for 3 hours and he tolerated it well.   Weaned off BiPAP to nasal cannula.  Diuresing well.  Denies any chest pain.  Breathing is better.       12/8/2024  Seen and examined this morning.  Hemodynamically stable.  No events overnight.  Monitor reviewed without any significant arrhythmias.  Diuresed very well.  Oxygen saturation improved to mid 90s on room air. IABP waned to 1:2 and 1:4 and he tolerated very well.  Mild drop in H&H noted without overt bleeding.      12/9/2024:  Seen and examined this morning.  Balloon pump removed yesterday.  He is doing well.  In a good  spirit.  LFTs trending down.  Hemodynamically stable.  Tele monitor reviewed without any significant arrhythmias.  Otero catheter removed.  Repeat echo done today showed improvement in the EF visually around 55%.        12/10/2024. Seen and examined. Doing very well. LA normal, LFTs trending down. H/H stable. No significant arrhythmias on monitor. Tolerating meds well with no issues.  Stable for discharge home today. Ambulating ok with no issues.         POST CATH NOTE     Date of Procedure: 12/6/2024      Procedure: Procedure(s) (LRB):  ANGIOGRAM, CORONARY ARTERY (N/A)  INSERTION, INTRA-AORTIC BALLOON PUMP  PTCA, Single Vessel  INSERTION, STENT, CORONARY ARTERY (N/A)  IVUS, Coronary  INSERTION, CATHETER, RIGHT HEART (Right)      Surgeons and Role:     * Lokesh Quiroz MD - Primary     Assisting Surgeon: None     Pre-Operative Diagnosis: STEMI     Post-Operative Diagnosis: Post-Op Diagnosis Codes:     * STEMI (ST elevation myocardial infarction) [I21.3]        Cath Results:  Access:  Right radial artery 5-6 [place]e sheath  Right common femoral artery 8 [place] sheath  Left common femoral vein 7 [place] sheath        LM:  JAME II flow 99% stenosis mid left main reduced to 0% post  LAD: JAME II flow ostial 50% stenosis  LCx:  JAME II flow proximal circ 40-50% stenosis  RCA: JAME III flow  Dominance right     LVgram: LVEDP 17 mmHg     Intervention:   Status post successful emergent PTCA and stenting to left main 99% stenosis with 4.5 x 16 mm RIAN post dilated with 4.5 NC balloon.  IVUS guided with excellent results.  We had to intervene on the left main as the patient was unstable with frequent PVCs and had V-tach and was shocked 3 times by EMS.  The procedure was performed with mechanical support with intra-aortic balloon pump that was placed before the coronary intervention.  He required Levophed during the procedure that was weaned off.  BiPAP was placed prior to the procedure.     2. Intra-aortic balloon pump  yes "placement for mechanical support prior to PCI     3. Right heart catheterization for cardiogenic shock                 Closure device:    Vasc band right radial artery  Right common femoral artery sheath and balloon pump secured in place  Left common femoral vein sheath sutured in place              RHC      Right heart catheterization pressures in mmHg     PCWP:  8    PA:  31   / 5   /16  RV:  37   /    /10  RA:   2     Oxygen saturations in%     PA:  46%  RA:  51%  AO  95%        Cardiac output:  6.3      l/min     Zehra  Cardiac index :   1.75     l/min     On Levophed 0.04        Consults: Medicine team for  Type II DM management           Final Diagnoses:    Principal Problem: STEMI (ST elevation myocardial infarction)   Secondary Diagnoses:   Active Hospital Problems    Diagnosis  POA    *STEMI (ST elevation myocardial infarction) [I21.3]  Yes    Diabetes mellitus due to underlying condition without complications [E08.9]  Yes    Hyperlipidemia with target LDL less than 130 [E78.5]  Yes    Benign essential HTN [I10]  Yes      Resolved Hospital Problems   No resolved problems to display.       Discharged Condition: good    Discharge Exam:  /60   Pulse 85   Temp 99.3 °F (37.4 °C) (Oral)   Resp (!) 26   Ht 5' 6" (1.676 m)   Wt 77.6 kg (171 lb)   SpO2 97%   BMI 27.60 kg/m²     General Appearance:    Alert, cooperative, no distress, appears stated age   Head:    Normocephalic, without obvious abnormality, atraumatic   Eyes:    PERRL, conjunctiva/corneas clear, EOM's intact, fundi     benign, both eyes                Throat:   Lips, mucosa, and tongue normal; teeth and gums normal   Neck:   Supple, symmetrical, trachea midline, no adenopathy;        thyroid:  No enlargement/tenderness/nodules; no carotid    bruit or JVD   Back:     Symmetric, no curvature, ROM normal, no CVA tenderness   Lungs:     Clear to auscultation bilaterally, respirations unlabored       Heart:    Regular rate and rhythm, S1 and S2 " "normal, no murmur, rub   or gallop     Abdomen:     Soft, non-tender, bowel sounds active all four quadrants,     no masses, no organomegaly           Extremities:   Extremities normal, atraumatic, no cyanosis or edema   Pulses:   2+ and symmetric all extremities   Skin:   Skin color, texture, turgor normal, no rashes or lesions               Disposition: Planned Readmission - Discharged to other facility     Follow Up/Patient Instructions:     Medications:  Reconciled Home Medications:      Medication List        START taking these medications      aspirin 81 MG EC tablet  Commonly known as: ECOTRIN  Take 1 tablet (81 mg total) by mouth once daily.  Start taking on: December 11, 2024  Replaces: aspirin 81 MG Chew     empagliflozin 10 mg tablet  Commonly known as: JARDIANCE  Take 1 tablet (10 mg total) by mouth once daily.     furosemide 20 MG tablet  Commonly known as: LASIX  Take 1 tablet (20 mg total) by mouth daily as needed (Swelling, Shortness of breath 2 pounds wt gain in 24 hours).     metoprolol succinate 25 MG 24 hr tablet  Commonly known as: TOPROL-XL  Take 0.5 tablets (12.5 mg total) by mouth once daily.  Start taking on: December 11, 2024     rosuvastatin 40 MG Tab  Commonly known as: CRESTOR  Take 1 tablet (40 mg total) by mouth every evening.     ticagrelor 90 mg tablet  Commonly known as: BRILINTA  Take 1 tablet (90 mg total) by mouth 2 (two) times daily.            CHANGE how you take these medications      losartan 25 MG tablet  Commonly known as: COZAAR  Take 0.5 tablets (12.5 mg total) by mouth once daily.  Start taking on: December 11, 2024  What changed:   medication strength  how much to take            CONTINUE taking these medications      alcohol swabs Padm  Commonly known as: ALCOHOL PREP PADS  Test blood sugar 2 times daily and PRN     BD ULTRA-FINE SONAL PEN NEEDLE 32 gauge x 5/32" Ndle  Generic drug: pen needle, diabetic  USE WHICHEVER NEEDLE COMBINES WITH THE VICTOZA PEN PLEASE.( MICRO " ).     blood sugar diagnostic Strp  Test blood sugar 2 times daily and PRN; DISP: Whichever brand is covered by insurance     blood-glucose meter kit  Test blood sugar 2 times daily and PRN; DISP: Whichever brand is covered under insurance     FLUARIX QUAD [id number] (PF) 60 mcg (15 mcg x 4)/0.5 mL Syrg vaccine  Generic drug: influenza  TO BE ADMINISTERED BY PHARMACIST FOR IMMUNIZATION     JANUMET -1,000 mg Tm24  Generic drug: SITagliptan-metformin  TAKE 1 TABLET BY MOUTH ONCE DAILY.     lancets Misc  Test blood sugar 2 times daily and PRN; DISP: whichever brand is covered by insurance     metFORMIN 500 MG ER 24hr tablet  Commonly known as: GLUCOPHAGE-XR  Take 2,000 mg by mouth.     OZEMPIC 1 mg/dose (4 mg/3 mL)  Generic drug: semaglutide  as directed Subcutaneous     VICTOZA 2-DESTIN 0.6 mg/0.1 mL (18 mg/3 mL) Pnij pen  Generic drug: liraglutide 0.6 mg/0.1 mL (18 mg/3 mL) subq PNIJ  INJECT 0.6 MG INTO THE SKIN ONCE DAILY.            STOP taking these medications      aspirin 81 MG Chew  Replaced by: aspirin 81 MG EC tablet     hydroCHLOROthiazide 25 MG tablet  Commonly known as: HYDRODIURIL     naproxen 500 MG tablet  Commonly known as: NAPROSYN     pravastatin 80 MG tablet  Commonly known as: PRAVACHOL            ASK your doctor about these medications      potassium chloride SA 20 MEQ tablet  Commonly known as: K-DUR,KLOR-CON  Take 2 tablets (40 mEq total) by mouth every 4 (four) hours. for 2 doses  Ask about: Should I take this medication?            Discharge Procedure Orders   Cardiac rehab phase II   Standing Status: Future Standing Exp. Date: 12/06/25     Order Specific Question Answer Comments   Department [facility] CARDIAC REHAB       Follow-up Information       Noé Patino Jr., NP Follow up on 12/16/2024.    Specialty: Family Medicine  Why: at 9:30 am  NOTE:  Patient's PCP at [facility] does not have availaibiity until January so they scheduled patient at [facility] for a sooner appt.  [facility]    [address]  [place] [address]  PH:  [phone]  NOTE:  FAX Hospital Notes, labs and images  to FAX: [phone]  Patient needs to bring in photo ID, and Medicine bottles.  Contact information:  [place] [address] [phone]                           Activity: activity as tolerated  Diet: cardiac diet and diabetic diet    Cardiac rehab phase II   F/U in 2 weeks with cardiology       Signed:  Lokesh Quiroz  12/10/2024  10:36 AM

## 2025-01-27 ENCOUNTER — APPOINTMENT (OUTPATIENT)
Dept: VASCULAR SURGERY | Facility: CLINIC | Age: 85
End: 2025-01-27

## 2025-02-13 NOTE — PROVIDER CONTACT NOTE (OTHER) - DATE AND TIME:
Prediabetes Patient Information    What is the difference between prediabetes and diabetes?  Diabetes is a disorder that disrupts the way the body uses sugar.  All of the cells in the body need sugar to work normally. Sugar gets into the cells with the help of a hormone called insulin. Insulin is made by the pancreas, an organ in the belly. When a person's body stops responding to insulin normally, blood sugar can rise over time. If the blood sugar rises high enough, type 2 diabetes develops. This can lead to other problems.  \"Prediabetes\" is a term doctors use if a person's blood sugar is higher than normal, but not high enough to be called diabetes. People with prediabetes are at high risk of getting type 2 diabetes over time.    What increases my risk for prediabetes and diabetes?  There are a few things that can increase your risk, including:  Having excess body weight or obesity, especially if you carry extra weight in your belly area  Not doing enough physical activity  Smoking  Having a close relative with diabetes  Having diabetes during pregnancy, called \"gestational diabetes\"    Are there ways to lower my risk?  Yes. To lower your chances of getting prediabetes or diabetes, the most important things you can do are to eat a healthy diet and get plenty of physical activity. These can help you lose weight if you have excess body weight. But eating well and being active are also good for your overall health, whether or not they lead to weight loss. Even gentle activity, like walking, has benefits.  If you smoke, quitting can also lower your risk. This can be difficult, but your doctor or nurse can help. Quitting smoking also lowers your risk of stroke, heart disease, and lots of other problems.  How do I know if I have prediabetes?  There are 3 different tests that can show if your blood sugar is higher than normal. They measure blood sugar in different ways.  The tests are:    Fasting glucose test - \"Glucose\"  03-Mar-2024 08:41

## 2025-02-13 NOTE — CONSULT NOTE ADULT - CONSULT REQUESTED BY NAME
Dr. Cr
Subjective    Mr. Acosta is 84 y.o. male    Chief Complaint: left renal pelvic stone    History of Present Illness    84-year-old male established patient in for ER follow-up after patient developed new onset bilateral flank pain yesterday.  Was found to have 2 large left renal pelvic stones.  Unable to decipher hydronephrosis due to the parapelvic cysts.  Pain controlled with Norco.    Followed in our office for history of enlarged prostate and erectile dysfunction as well as the previous finding of left renal stones for which has been followed by Dr. Tellez.  History of 3 piece inflatable penile implant 12/2021.  Combination therapy for LUTS with tamsulosin and finasteride.       I independently visualized and reviewed the patient's prior imaging studies today in clinic and discussed the imaging findings with the patient.    The following portions of the patient's history were reviewed and updated as appropriate: allergies, current medications, past family history, past medical history, past social history, past surgical history and problem list.    Review of Systems   Constitutional:  Negative for chills and fever.   Gastrointestinal:  Negative for abdominal pain, anal bleeding, blood in stool, nausea and vomiting.   Genitourinary:  Positive for flank pain. Negative for dysuria and hematuria.         Current Outpatient Medications:     acetaminophen (TYLENOL) 500 MG tablet, Take 1 tablet by mouth Every 6 (Six) Hours As Needed for Mild Pain., Disp: , Rfl:     aspirin 81 MG EC tablet, Take 1 tablet by mouth Daily., Disp: , Rfl:     Budeson-Glycopyrrol-Formoterol (Breztri Aerosphere) 160-9-4.8 MCG/ACT aerosol inhaler, Inhale 2 puffs 2 (Two) Times a Day., Disp: 3 each, Rfl: 4    ciclopirox (LOPROX) 0.77 % cream, Apply 1 Application topically to the appropriate area as directed 2 (Two) Times a Day. To under arms after using Clindamycin Solution, Disp: , Rfl:     clindamycin (CLEOCIN T) 1 % external solution, 1 
Cardiology
application  2 (Two) Times a Day. To under arms, Disp: , Rfl:     Continuous Blood Gluc Sensor (Dexcom G7 Sensor) misc, 1 each Every 10 (Ten) Days., Disp: 9 each, Rfl: 3    Dupilumab 300 MG/2ML solution prefilled syringe, Inject  under the skin into the appropriate area as directed Every 14 (Fourteen) Days., Disp: , Rfl:     ferrous sulfate 325 (65 FE) MG tablet, Take 1 tablet by mouth Daily With Breakfast., Disp: , Rfl:     finasteride (PROSCAR) 5 MG tablet, Take 1 tablet by mouth Daily., Disp: 90 tablet, Rfl: 3    fluticasone (FLONASE) 50 MCG/ACT nasal spray, 1 spray into the nostril(s) as directed by provider Daily., Disp: 16 g, Rfl: 5    HYDROcodone-acetaminophen (NORCO) 5-325 MG per tablet, Take 1 tablet by mouth Every 6 (Six) Hours As Needed for Moderate Pain., Disp: 15 tablet, Rfl: 0    insulin glargine (LANTUS, SEMGLEE) 100 UNIT/ML injection, 15 units in the morning and 40 units at bedtime., Disp: 18 mL, Rfl: 2    Insulin Glargine-yfgn 100 UNIT/ML solution pen-injector, Inject  under the skin into the appropriate area as directed., Disp: , Rfl:     levalbuterol (XOPENEX HFA) 45 MCG/ACT inhaler, Inhale 2 puffs Every 6 (Six) Hours As Needed for Wheezing., Disp: , Rfl:     levothyroxine (Synthroid) 50 MCG tablet, Take 1 tablet by mouth Daily., Disp: 90 tablet, Rfl: 4    lisinopril (PRINIVIL,ZESTRIL) 20 MG tablet, TAKE 1 TABLET BY MOUTH IN THE MORNING AND 1/2 (ONE-HALF) AT BEDTIME, Disp: 180 tablet, Rfl: 0    meclizine (ANTIVERT) 12.5 MG tablet, Take 1 tablet by mouth 3 (Three) Times a Day As Needed for Dizziness., Disp: 30 tablet, Rfl: 0    melatonin 5 MG tablet tablet, Take 1 tablet by mouth At Night As Needed., Disp: , Rfl:     metFORMIN ER (GLUCOPHAGE-XR) 500 MG 24 hr tablet, Take 1 tablet by mouth Daily With Breakfast., Disp: 90 tablet, Rfl: 4    metoprolol tartrate (LOPRESSOR) 25 MG tablet, Take 1 tablet by mouth 2 (Two) Times a Day., Disp: 180 tablet, Rfl: 4    mupirocin (BACTROBAN) 2 % ointment, Apply 1 
Application topically to the appropriate area as directed Daily., Disp: , Rfl:     Pediatric Multiple Vit-C-FA (CHILDRENS CHEWABLE MULTI VITS PO), Take 1 tablet by mouth Daily., Disp: , Rfl:     rosuvastatin (CRESTOR) 40 MG tablet, Take 0.5 tablets by mouth Daily., Disp: 90 tablet, Rfl: 4    tamsulosin (FLOMAX) 0.4 MG capsule 24 hr capsule, Take 1 capsule by mouth Every Night., Disp: 90 capsule, Rfl: 3    tiZANidine (ZANAFLEX) 2 MG tablet, Take 1 tablet by mouth At Night As Needed for Muscle Spasms., Disp: 30 tablet, Rfl: 1    triamcinolone (KENALOG) 0.1 % cream, Apply 1 Application topically to the appropriate area as directed 2 (Two) Times a Day., Disp: 80 g, Rfl: 0    Ustekinumab (STELARA) 90 MG/ML solution prefilled syringe Injection, Inject 90 mg under the skin into the appropriate area as directed Every 28 (Twenty-Eight) Days., Disp: 1 mL, Rfl: 6    vitamin B-12 (CYANOCOBALAMIN) 1000 MCG tablet, Take 1 tablet by mouth Daily., Disp: , Rfl:     Vitamin D, Cholecalciferol, 25 MCG (1000 UT) capsule, Take 1 capsule by mouth Daily., Disp: , Rfl:     gabapentin (NEURONTIN) 100 MG capsule, , Disp: , Rfl:     naloxone (NARCAN) 4 MG/0.1ML nasal spray, Administer 1 spray into the nostril(s) as directed by provider As Needed. (Patient not taking: Reported on 2/13/2025), Disp: , Rfl:     nitroglycerin (NITROSTAT) 0.4 MG SL tablet, Place 1 tablet under the tongue Every 5 (Five) Minutes As Needed for Chest Pain. Take no more than 3 doses in 15 minutes. (Patient not taking: Reported on 2/13/2025), Disp: 25 tablet, Rfl: 1  No current facility-administered medications for this visit.    Past Medical History:   Diagnosis Date    Asthma     Colitis     Coronary artery disease     Diabetes mellitus     Diverticulitis     Elevated cholesterol     HL (hearing loss) 2012    Hyperlipidemia     Hypertension     Inflammatory bowel disease 2001    Myocardial infarct     EASTER 2020    Primary central sleep apnea 2009    Using Cpap    
Sleep apnea     cpap at     Sleep apnea, obstructive     Stroke     Visual impairment 2016    Double vision       Past Surgical History:   Procedure Laterality Date    ADENOIDECTOMY  1947    APPENDECTOMY N/A 05/05/2023    Procedure: APPENDECTOMY LAPAROSCOPIC;  Surgeon: Katherine Carranza MD;  Location: [facility] OR;  Service: General;  Laterality: N/A;    BACK SURGERY      CARDIAC CATHETERIZATION      stents x 6    CARDIAC SURGERY      CABG TRIPLE BYPASS 2012    CATARACT EXTRACTION      COLONOSCOPY      COLONOSCOPY N/A 06/04/2021    Procedure: COLONOSCOPY WITH ANESTHESIA;  Surgeon: Zachariah Menjivar DO;  Location: [facility] ENDOSCOPY;  Service: Gastroenterology;  Laterality: N/A;  pre hx ulcerative colitis  post ulcerative colitis  dr collins gusman    COLONOSCOPY N/A 11/29/2023    Procedure: COLONOSCOPY LOWER LIMITED;  Surgeon: Zachariah Menjivar DO;  Location: [facility] ENDOSCOPY;  Service: Gastroenterology;  Laterality: N/A;  preop; hx of colitis  postop colitis - questionable colonic stricture at 30cm   pcp bryanna salty    COLOSTOMY      CORONARY ANGIOPLASTY WITH STENT PLACEMENT      CORONARY ARTERY BYPASS GRAFT  December 2012    CORONARY STENT PLACEMENT      HIP SURGERY Right     total hip    JOINT REPLACEMENT  2015    Right hip    PENILE PROSTHESIS IMPLANT N/A 12/13/2021    Procedure: 3-PIECE INFLATABLE PENILE PROSTHESIS PLACEMENT;  Surgeon: Jose Tellez MD;  Location: [facility] OR;  Service: Urology;  Laterality: N/A;    TONSILLECTOMY  1947       Social History     Socioeconomic History    Marital status:    Tobacco Use    Smoking status: Never     Passive exposure: Never    Smokeless tobacco: Never   Vaping Use    Vaping status: Never Used   Substance and Sexual Activity    Alcohol use: Never    Drug use: Never    Sexual activity: Not Currently     Partners: Female       Family History   Problem Relation Age of Onset    Colon cancer Brother     Colon polyps Neg Hx     Esophageal cancer Neg Hx  
"      Objective    Temp 97.5 °F (36.4 °C)   Ht 175.3 cm (69.02\")   Wt 99.9 kg (220 lb 4.8 oz)   BMI 32.52 kg/m²     Physical Exam  Constitutional:       Appearance: Normal appearance.   Abdominal:      Tenderness: There is left CVA tenderness. There is no right CVA tenderness.   Skin:     General: Skin is warm and dry.   Neurological:      Mental Status: He is alert and oriented to person, place, and time.   Psychiatric:         Mood and Affect: Mood normal.         Behavior: Behavior normal.             Results for orders placed or performed in visit on 02/13/25   POC Urinalysis Dipstick, Multipro    Collection Time: 02/13/25  2:47 PM    Specimen: Urine   Result Value Ref Range    Color Yellow Yellow, Straw, Dark Yellow, Vicky    Clarity, UA Clear Clear    Glucose, UA Negative Negative mg/dL    Bilirubin Negative Negative    Ketones, UA Negative Negative    Specific Gravity  1.020 1.005 - 1.030    Blood, UA Small (A) Negative    pH, Urine 6.5 5.0 - 8.0    Protein, POC Negative Negative mg/dL    Urobilinogen, UA 0.2 E.U./dL Normal, 0.2 E.U./dL    Nitrite, UA Negative Negative    Leukocytes Negative Negative   IPSS Questionnaire (AUA-7):  Incomplete emptying  Over the past month, how often have you had a sensation of not emptying your bladder completely after you finished urinating?: About half the time (02/13/25 1448)  Frequency  Over the past month, how often have you had to urinate again less than two hours after you finishied urinating ?: About half the time (02/13/25 1448)  Intermittency  Over the past month, how often have you found you stopped and started again several time when you urinated ?: About half the time (02/13/25 1448)  Urgency  Over the last month, how often have you found it difficult  have you found it difficult to postpone urination ?: About half the time (02/13/25 1448)  Weak Stream  Over the past month, how often have you had a weak urinary stream ?: About half the time (02/13/25 "
1448)  Straining  Over the past month, how often have you had to push or strain to begin urination ?: About half the time (02/13/25 1448)  Nocturia  Over the past month, how many times did you most typically get up to urinate from the time you went to bed until the time you got up in the morning ?: 2 times (02/13/25 1448)  Quality of life due to urinary symptoms  If you were to spend the rest of your life with your urinary condition the way it is now, how would feel about that?: Mixed - about equally satisfied (02/13/25 1448)    Scores  Total IPSS Score: (!) 20 (02/13/25 1448)  Total Score = Symptomatic Level: Severely symptomatic: 20-35 (02/13/25 1448)     XR Abdomen KUB (02/13/2025 13:15) CT Abdomen Pelvis With Contrast (02/12/2025 23:55)     Assessment and Plan    Diagnoses and all orders for this visit:    1. Kidney stone (Primary)  -     POC Urinalysis Dipstick, Multipro  -     Case Request; Standing  -     ECG 12 Lead; Future  -     sodium chloride 0.9 % infusion  -     ceFAZolin (ANCEF) 2,000 mg in sodium chloride 0.9 % 100 mL IVPB  -     Case Request  -     Case Request; Standing  -     lactated ringers infusion  -     ceFAZolin (ANCEF) 2,000 mg in sodium chloride 0.9 % 100 mL IVPB    Other orders  -     Follow Anesthesia Guidelines / Protocol; Future  -     Follow Anesthesia Guidelines / Protocol; Standing  -     Verify / Perform Chlorhexidine Skin Prep; Standing  -     Provide NPO Instructions to Patient; Future  -     Chlorhexidine Skin Prep; Future  -     Place Sequential Compression Device; Standing  -     Maintain Sequential Compression Device; Standing  -     Follow Anesthesia Guidelines / Protocol; Future  -     Follow Anesthesia Guidelines / Protocol; Standing  -     Verify / Perform Chlorhexidine Skin Prep; Standing  -     Provide NPO Instructions to Patient; Future  -     Chlorhexidine Skin Prep; Future  -     Place Sequential Compression Device; Standing  -     Maintain Sequential Compression 
Dr. Diaz
Device; Standing      2 large left renal pelvic stones again noted on imaging KUB today    Previous microscopic urine evaluation completed yesterday in ER revealing no bacteria seen.  Therefore no current antibiotic warranted    At this time we will discuss case with Dr. Tellez to see which procedure is recommended to proceed.      Addend: Have spoke with Dr. Tellez who recommends proceeding with ureteroscopy laser lithotripsy with stent insertion.  Have spoke with patient who would like to proceed.  Was educated on the risks and benefits of the procedure as well as potential stent side effects.    Have the corrected case request in for ureteroscopy laser lithotripsy with stent insertion left side

## 2025-03-05 ENCOUNTER — APPOINTMENT (OUTPATIENT)
Dept: HEART AND VASCULAR | Facility: CLINIC | Age: 85
End: 2025-03-05
Payer: MEDICARE

## 2025-03-05 VITALS
HEART RATE: 89 BPM | SYSTOLIC BLOOD PRESSURE: 168 MMHG | HEIGHT: 67 IN | TEMPERATURE: 96.7 F | BODY MASS INDEX: 26.37 KG/M2 | DIASTOLIC BLOOD PRESSURE: 74 MMHG | WEIGHT: 168 LBS

## 2025-03-05 PROCEDURE — 93279 PRGRMG DEV EVAL PM/LDLS PM: CPT

## 2025-05-22 NOTE — ED ADULT TRIAGE NOTE - AS TEMP SITE
- presented with Left sided paralysis, slurred speech, left facial droop  - Stroke RF: HTN, CAD, CVA  - Intervention: none, stroke mimic suspected with no acute findings on MRI brain limited  - Etiology: TBD    Stroke workup:  -CTh:  No acute intracranial hemorrhage.   - CTP: No definite territorial perfusion mismatch.   -CTA h/n: No evidence of large vessel occlusion. 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   -MRI brain w/o limited: Impression:  1. No acute infarct.  2. Mild chronic microvascular ischemic changes.   -MRI brain w/wo contrast: Impression:  1. No evidence of intracranial metastatic disease.  2. Mild chronic microvascular ischemic changes.  -MRI cervical spine w/wo contrast: Impression:  1. Moderate degenerative narrowing of the spinal canal at C3-C7.  2. Multilevel neural foraminal stenoses as described.  3. No cord signal abnormality or abnormal enhancement.  -ECHO: (6/10/2024) EF: 55-60%. There was 6 mL of intravenous agitated saline (bubble) used. Study is negative for shunt.    -LDL: 75   -A1c: 5.6% (6/10/2024)    -TSH: 0.654   -home medications include: ASA 81 mg and Plavix 75 mg daily.    NIH Stroke Scale      1a  Level of consciousness: 0=alert; keenly responsive   1b. LOC questions:  0=Answers both tasks correctly   1c. LOC commands: 0=Answers both tasks correctly   2.  Best Gaze: 0=normal   3.  Visual: 0=No visual loss   4. Facial Palsy: 0=Normal symmetric movement   5a.  Motor left arm: 4=No movement   5b.  Motor right arm: 0=No drift, limb holds 90 (or 45) degrees for full 10 seconds   6a. motor left le=No movement   6b  Motor right le=No drift, limb holds 90 (or 45) degrees for full 10 seconds   7. Limb Ataxia: 0=Absent   8.  Sensory: 2=Severe to total sensory loss; patient is not aware of being touched in face, arm, leg   9. Best Language:  0=No aphasia, normal   10. Dysarthria: 0=Normal   11. Extinction and Inattention:  1=Visual, tactile, auditory, spatial or personal inattention or extinction to bilateral simultaneous stimulation in one of the sensory modalities   12. Distal motor function: 0=Normal    Total:   11     Assessement:  -no acute stroke or intracranial mass   -persisting left sided paralysis UE/LE, left facial droop.  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   - follow up in 1 month with Mora Evangelista NP, Dr Aguiar's out patient interventional neurology clinic for consideration for out patient DSA   - okay to normalize blood pressure  - consideration for in patient rehab   - continued care with hospital medicine     - other recommendations and follow up as per MD          oral

## 2025-09-09 ENCOUNTER — NON-APPOINTMENT (OUTPATIENT)
Age: 85
End: 2025-09-09

## 2025-09-10 ENCOUNTER — NON-APPOINTMENT (OUTPATIENT)
Age: 85
End: 2025-09-10

## 2025-09-10 ENCOUNTER — APPOINTMENT (OUTPATIENT)
Dept: HEART AND VASCULAR | Facility: CLINIC | Age: 85
End: 2025-09-10
Payer: MEDICARE

## 2025-09-10 VITALS
OXYGEN SATURATION: 99 % | HEART RATE: 86 BPM | BODY MASS INDEX: 26.37 KG/M2 | DIASTOLIC BLOOD PRESSURE: 72 MMHG | SYSTOLIC BLOOD PRESSURE: 118 MMHG | TEMPERATURE: 97.9 F | WEIGHT: 168 LBS | HEIGHT: 67 IN

## 2025-09-10 PROCEDURE — 93279 PRGRMG DEV EVAL PM/LDLS PM: CPT

## 2025-09-10 PROCEDURE — 99213 OFFICE O/P EST LOW 20 MIN: CPT | Mod: 25

## (undated) DEVICE — DRSG TEGADERM 4X10"

## (undated) DEVICE — GLV 7.5 PROTEXIS (WHITE)

## (undated) DEVICE — SUT PLEDGET SOFT MEDIUM 1/4" X 1/8" X 1/16" X6

## (undated) DEVICE — DRAIN PENROSE .5" X 18" LATEX

## (undated) DEVICE — GLV 6.5 PROTEXIS (WHITE)

## (undated) DEVICE — PACK VASCULAR MINOR

## (undated) DEVICE — DRAPE TOWEL WHITE 18" X 26"

## (undated) DEVICE — Device

## (undated) DEVICE — NDL HYPO SAFE 25G X 1.5" (ORANGE)

## (undated) DEVICE — GLV 7 PROTEXIS (WHITE)

## (undated) DEVICE — SUT PROLENE 6-0 30" RB-2

## (undated) DEVICE — CANISTER SPECIMEN CONVERTOR PLASTIC

## (undated) DEVICE — MARKING PEN W RULER

## (undated) DEVICE — WARMING BLANKET LOWER ADULT

## (undated) DEVICE — SUT PROLENE 7-0 18" BV

## (undated) DEVICE — SUT SILK 2-0 18" SH (POP-OFF)

## (undated) DEVICE — BAG DECANTER IV STERILE

## (undated) DEVICE — SUT VICRYL 2-0 27" CT-1 UNDYED

## (undated) DEVICE — SUT SILK 2-0 12-18"

## (undated) DEVICE — SUT SILK 4-0 18" TIES

## (undated) DEVICE — CATH IV OPTIVA 16G X 2"

## (undated) DEVICE — SUT PROLENE 5-0 36" RB-1

## (undated) DEVICE — SUT MONOCRYL 4-0 27" PS-2 UNDYED

## (undated) DEVICE — DRAPE PROBE COVER LATEX FREE 3X96"

## (undated) DEVICE — SPECIMEN CONTAINER 4OZ